# Patient Record
Sex: MALE | Race: WHITE | Employment: UNEMPLOYED | URBAN - METROPOLITAN AREA
[De-identification: names, ages, dates, MRNs, and addresses within clinical notes are randomized per-mention and may not be internally consistent; named-entity substitution may affect disease eponyms.]

---

## 2018-08-20 RX ORDER — SODIUM CHLORIDE 9 MG/ML
INJECTION, SOLUTION INTRAVENOUS CONTINUOUS
Status: CANCELLED | OUTPATIENT
Start: 2018-08-20

## 2018-08-20 RX ORDER — METOLAZONE 2.5 MG/1
2.5 TABLET ORAL DAILY
COMMUNITY

## 2018-08-20 RX ORDER — TAMSULOSIN HYDROCHLORIDE 0.4 MG/1
0.4 CAPSULE ORAL 2 TIMES DAILY
COMMUNITY

## 2018-08-20 RX ORDER — ESCITALOPRAM OXALATE 20 MG/1
20 TABLET ORAL DAILY
COMMUNITY

## 2018-08-20 RX ORDER — AMLODIPINE BESYLATE 10 MG/1
10 TABLET ORAL DAILY
COMMUNITY

## 2018-08-20 RX ORDER — VALSARTAN 320 MG/1
320 TABLET ORAL DAILY
COMMUNITY
End: 2018-11-26

## 2018-08-20 RX ORDER — LIDOCAINE HYDROCHLORIDE 10 MG/ML
0.5 INJECTION, SOLUTION EPIDURAL; INFILTRATION; INTRACAUDAL; PERINEURAL ONCE
Status: CANCELLED | OUTPATIENT
Start: 2018-08-20 | End: 2018-08-20

## 2018-08-20 RX ORDER — FUROSEMIDE 20 MG/1
20 TABLET ORAL 3 TIMES DAILY
COMMUNITY

## 2018-08-20 RX ORDER — ASPIRIN 325 MG
325 TABLET ORAL DAILY
COMMUNITY
End: 2018-11-26

## 2018-08-20 RX ORDER — ATENOLOL 50 MG/1
50 TABLET ORAL DAILY
COMMUNITY

## 2018-08-21 ENCOUNTER — HOSPITAL ENCOUNTER (OUTPATIENT)
Dept: PREADMISSION TESTING | Age: 58
Discharge: HOME | End: 2018-08-21
Attending: UROLOGY | Admitting: UROLOGY

## 2018-08-21 DIAGNOSIS — Z01.811 PRE-OP CHEST EXAM: ICD-10-CM

## 2018-08-21 LAB
ABO/RH: NORMAL
ANION GAP SERPL CALCULATED.3IONS-SCNC: 14 MMOL/L (ref 3–16)
ANTIBODY SCREEN: NORMAL
APTT: 33.3 SEC (ref 26–36)
BASOPHILS ABSOLUTE: 0.1 K/UL (ref 0–0.2)
BASOPHILS RELATIVE PERCENT: 0.8 %
BUN BLDV-MCNC: 30 MG/DL (ref 7–20)
CALCIUM SERPL-MCNC: 9.5 MG/DL (ref 8.3–10.6)
CHLORIDE BLD-SCNC: 94 MMOL/L (ref 99–110)
CO2: 29 MMOL/L (ref 21–32)
CREAT SERPL-MCNC: 0.8 MG/DL (ref 0.9–1.3)
EKG ATRIAL RATE: 64 BPM
EKG DIAGNOSIS: NORMAL
EKG Q-T INTERVAL: 452 MS
EKG QRS DURATION: 122 MS
EKG QTC CALCULATION (BAZETT): 466 MS
EKG R AXIS: -32 DEGREES
EKG T AXIS: -3 DEGREES
EKG VENTRICULAR RATE: 64 BPM
EOSINOPHILS ABSOLUTE: 0.2 K/UL (ref 0–0.6)
EOSINOPHILS RELATIVE PERCENT: 1.7 %
GFR AFRICAN AMERICAN: >60
GFR NON-AFRICAN AMERICAN: >60
GLUCOSE BLD-MCNC: 271 MG/DL (ref 70–99)
HCT VFR BLD CALC: 40.2 % (ref 40.5–52.5)
HEMOGLOBIN: 13.9 G/DL (ref 13.5–17.5)
INR BLD: 1.7 (ref 0.86–1.14)
LYMPHOCYTES ABSOLUTE: 1.9 K/UL (ref 1–5.1)
LYMPHOCYTES RELATIVE PERCENT: 20.2 %
MCH RBC QN AUTO: 33.8 PG (ref 26–34)
MCHC RBC AUTO-ENTMCNC: 34.5 G/DL (ref 31–36)
MCV RBC AUTO: 98 FL (ref 80–100)
MONOCYTES ABSOLUTE: 0.6 K/UL (ref 0–1.3)
MONOCYTES RELATIVE PERCENT: 6.6 %
NEUTROPHILS ABSOLUTE: 6.8 K/UL (ref 1.7–7.7)
NEUTROPHILS RELATIVE PERCENT: 70.7 %
PDW BLD-RTO: 13.6 % (ref 12.4–15.4)
PLATELET # BLD: 211 K/UL (ref 135–450)
PMV BLD AUTO: 9.9 FL (ref 5–10.5)
POTASSIUM SERPL-SCNC: 3.8 MMOL/L (ref 3.5–5.1)
PROTHROMBIN TIME: 19.4 SEC (ref 9.8–13)
RBC # BLD: 4.11 M/UL (ref 4.2–5.9)
SODIUM BLD-SCNC: 137 MMOL/L (ref 136–145)
WBC # BLD: 9.6 K/UL (ref 4–11)

## 2018-08-21 PROCEDURE — 93010 ELECTROCARDIOGRAM REPORT: CPT | Performed by: INTERNAL MEDICINE

## 2018-09-13 PROBLEM — E66.01 OBESITY, MORBID (HCC): Status: ACTIVE | Noted: 2018-08-07

## 2018-09-13 PROBLEM — I10 HYPERTENSION: Status: ACTIVE | Noted: 2018-08-07

## 2018-09-13 PROBLEM — C61 PROSTATE CANCER (HCC): Status: ACTIVE | Noted: 2018-09-13

## 2018-09-13 PROBLEM — G47.30 SLEEP APNEA: Status: ACTIVE | Noted: 2018-08-07

## 2018-09-13 PROBLEM — I48.91 ATRIAL FIBRILLATION (HCC): Status: ACTIVE | Noted: 2018-08-07

## 2018-09-13 PROBLEM — E11.8 CONTROLLED DIABETES MELLITUS TYPE 2 WITH COMPLICATIONS (HCC): Status: ACTIVE | Noted: 2018-08-07

## 2018-11-26 RX ORDER — IRBESARTAN 300 MG/1
300 TABLET ORAL EVERY MORNING
COMMUNITY

## 2018-12-05 ENCOUNTER — ANESTHESIA (OUTPATIENT)
Dept: OPERATING ROOM | Age: 58
End: 2018-12-05
Payer: MEDICARE

## 2018-12-05 ENCOUNTER — ANESTHESIA EVENT (OUTPATIENT)
Dept: OPERATING ROOM | Age: 58
End: 2018-12-05
Payer: MEDICARE

## 2018-12-05 ENCOUNTER — HOSPITAL ENCOUNTER (OUTPATIENT)
Age: 58
Setting detail: OUTPATIENT SURGERY
Discharge: HOME | End: 2018-12-05
Attending: UROLOGY | Admitting: UROLOGY
Payer: MEDICARE

## 2018-12-05 VITALS
SYSTOLIC BLOOD PRESSURE: 122 MMHG | BODY MASS INDEX: 42.66 KG/M2 | OXYGEN SATURATION: 93 % | WEIGHT: 315 LBS | RESPIRATION RATE: 14 BRPM | TEMPERATURE: 97.2 F | HEART RATE: 78 BPM | DIASTOLIC BLOOD PRESSURE: 74 MMHG | HEIGHT: 72 IN

## 2018-12-05 VITALS
SYSTOLIC BLOOD PRESSURE: 151 MMHG | OXYGEN SATURATION: 73 % | DIASTOLIC BLOOD PRESSURE: 71 MMHG | RESPIRATION RATE: 18 BRPM | TEMPERATURE: 99.3 F

## 2018-12-05 DIAGNOSIS — C61 PROSTATE CANCER (HCC): Primary | ICD-10-CM

## 2018-12-05 LAB
A/G RATIO: 1.1 (ref 1.1–2.2)
ALBUMIN SERPL-MCNC: 3.9 G/DL (ref 3.4–5)
ALP BLD-CCNC: 177 U/L (ref 40–129)
ALT SERPL-CCNC: 44 U/L (ref 10–40)
ANION GAP SERPL CALCULATED.3IONS-SCNC: 13 MMOL/L (ref 3–16)
AST SERPL-CCNC: 34 U/L (ref 15–37)
BILIRUB SERPL-MCNC: 0.6 MG/DL (ref 0–1)
BUN BLDV-MCNC: 21 MG/DL (ref 7–20)
CALCIUM SERPL-MCNC: 9.3 MG/DL (ref 8.3–10.6)
CHLORIDE BLD-SCNC: 97 MMOL/L (ref 99–110)
CO2: 30 MMOL/L (ref 21–32)
CREAT SERPL-MCNC: 0.8 MG/DL (ref 0.9–1.3)
GFR AFRICAN AMERICAN: >60
GFR NON-AFRICAN AMERICAN: >60
GLOBULIN: 3.7 G/DL
GLUCOSE BLD-MCNC: 161 MG/DL (ref 70–99)
GLUCOSE BLD-MCNC: 163 MG/DL (ref 70–99)
GLUCOSE BLD-MCNC: 184 MG/DL (ref 70–99)
HCT VFR BLD CALC: 37.1 % (ref 40.5–52.5)
HEMOGLOBIN: 12.5 G/DL (ref 13.5–17.5)
MCH RBC QN AUTO: 31.8 PG (ref 26–34)
MCHC RBC AUTO-ENTMCNC: 33.7 G/DL (ref 31–36)
MCV RBC AUTO: 94.5 FL (ref 80–100)
PDW BLD-RTO: 12.8 % (ref 12.4–15.4)
PERFORMED ON: ABNORMAL
PERFORMED ON: ABNORMAL
PLATELET # BLD: 203 K/UL (ref 135–450)
PMV BLD AUTO: 8.8 FL (ref 5–10.5)
POTASSIUM SERPL-SCNC: 3.2 MMOL/L (ref 3.5–5.1)
RBC # BLD: 3.93 M/UL (ref 4.2–5.9)
SODIUM BLD-SCNC: 140 MMOL/L (ref 136–145)
TOTAL PROTEIN: 7.6 G/DL (ref 6.4–8.2)
WBC # BLD: 8.1 K/UL (ref 4–11)

## 2018-12-05 PROCEDURE — 3700000000 HC ANESTHESIA ATTENDED CARE: Performed by: UROLOGY

## 2018-12-05 PROCEDURE — 85027 COMPLETE CBC AUTOMATED: CPT

## 2018-12-05 PROCEDURE — 3600000014 HC SURGERY LEVEL 4 ADDTL 15MIN: Performed by: UROLOGY

## 2018-12-05 PROCEDURE — 3600000004 HC SURGERY LEVEL 4 BASE: Performed by: UROLOGY

## 2018-12-05 PROCEDURE — 7100000011 HC PHASE II RECOVERY - ADDTL 15 MIN: Performed by: UROLOGY

## 2018-12-05 PROCEDURE — 6360000002 HC RX W HCPCS: Performed by: NURSE ANESTHETIST, CERTIFIED REGISTERED

## 2018-12-05 PROCEDURE — 7100000001 HC PACU RECOVERY - ADDTL 15 MIN: Performed by: UROLOGY

## 2018-12-05 PROCEDURE — 2580000003 HC RX 258: Performed by: NURSE ANESTHETIST, CERTIFIED REGISTERED

## 2018-12-05 PROCEDURE — 88305 TISSUE EXAM BY PATHOLOGIST: CPT

## 2018-12-05 PROCEDURE — 2709999900 HC NON-CHARGEABLE SUPPLY: Performed by: UROLOGY

## 2018-12-05 PROCEDURE — 6360000002 HC RX W HCPCS: Performed by: UROLOGY

## 2018-12-05 PROCEDURE — 3700000001 HC ADD 15 MINUTES (ANESTHESIA): Performed by: UROLOGY

## 2018-12-05 PROCEDURE — 80053 COMPREHEN METABOLIC PANEL: CPT

## 2018-12-05 PROCEDURE — 7100000010 HC PHASE II RECOVERY - FIRST 15 MIN: Performed by: UROLOGY

## 2018-12-05 PROCEDURE — 7100000000 HC PACU RECOVERY - FIRST 15 MIN: Performed by: UROLOGY

## 2018-12-05 PROCEDURE — 2500000003 HC RX 250 WO HCPCS: Performed by: NURSE ANESTHETIST, CERTIFIED REGISTERED

## 2018-12-05 PROCEDURE — 2580000003 HC RX 258: Performed by: UROLOGY

## 2018-12-05 RX ORDER — SODIUM CHLORIDE 0.9 % (FLUSH) 0.9 %
10 SYRINGE (ML) INJECTION EVERY 12 HOURS SCHEDULED
Status: CANCELLED | OUTPATIENT
Start: 2018-12-05

## 2018-12-05 RX ORDER — DEXAMETHASONE SODIUM PHOSPHATE 4 MG/ML
INJECTION, SOLUTION INTRA-ARTICULAR; INTRALESIONAL; INTRAMUSCULAR; INTRAVENOUS; SOFT TISSUE PRN
Status: DISCONTINUED | OUTPATIENT
Start: 2018-12-05 | End: 2018-12-05 | Stop reason: SDUPTHER

## 2018-12-05 RX ORDER — HYDROMORPHONE HCL 110MG/55ML
0.25 PATIENT CONTROLLED ANALGESIA SYRINGE INTRAVENOUS EVERY 5 MIN PRN
Status: DISCONTINUED | OUTPATIENT
Start: 2018-12-05 | End: 2018-12-05 | Stop reason: HOSPADM

## 2018-12-05 RX ORDER — SODIUM CHLORIDE 9 MG/ML
INJECTION, SOLUTION INTRAVENOUS CONTINUOUS PRN
Status: DISCONTINUED | OUTPATIENT
Start: 2018-12-05 | End: 2018-12-05 | Stop reason: SDUPTHER

## 2018-12-05 RX ORDER — FENTANYL CITRATE 50 UG/ML
INJECTION, SOLUTION INTRAMUSCULAR; INTRAVENOUS PRN
Status: DISCONTINUED | OUTPATIENT
Start: 2018-12-05 | End: 2018-12-05 | Stop reason: SDUPTHER

## 2018-12-05 RX ORDER — LIDOCAINE HYDROCHLORIDE 10 MG/ML
1 INJECTION, SOLUTION EPIDURAL; INFILTRATION; INTRACAUDAL; PERINEURAL
Status: CANCELLED | OUTPATIENT
Start: 2018-12-05 | End: 2018-12-05

## 2018-12-05 RX ORDER — ONDANSETRON 2 MG/ML
INJECTION INTRAMUSCULAR; INTRAVENOUS PRN
Status: DISCONTINUED | OUTPATIENT
Start: 2018-12-05 | End: 2018-12-05 | Stop reason: SDUPTHER

## 2018-12-05 RX ORDER — SODIUM CHLORIDE 0.9 % (FLUSH) 0.9 %
10 SYRINGE (ML) INJECTION PRN
Status: CANCELLED | OUTPATIENT
Start: 2018-12-05

## 2018-12-05 RX ORDER — GLYCINE 1.5 G/100ML
IRRIGANT IRRIGATION
Status: COMPLETED | OUTPATIENT
Start: 2018-12-05 | End: 2018-12-05

## 2018-12-05 RX ORDER — SUCCINYLCHOLINE CHLORIDE 20 MG/ML
INJECTION INTRAMUSCULAR; INTRAVENOUS PRN
Status: DISCONTINUED | OUTPATIENT
Start: 2018-12-05 | End: 2018-12-05 | Stop reason: SDUPTHER

## 2018-12-05 RX ORDER — LIDOCAINE HYDROCHLORIDE 20 MG/ML
INJECTION, SOLUTION EPIDURAL; INFILTRATION; INTRACAUDAL; PERINEURAL PRN
Status: DISCONTINUED | OUTPATIENT
Start: 2018-12-05 | End: 2018-12-05 | Stop reason: SDUPTHER

## 2018-12-05 RX ORDER — PROMETHAZINE HYDROCHLORIDE 25 MG/ML
6.25 INJECTION, SOLUTION INTRAMUSCULAR; INTRAVENOUS EVERY 30 MIN PRN
Status: DISCONTINUED | OUTPATIENT
Start: 2018-12-05 | End: 2018-12-05 | Stop reason: HOSPADM

## 2018-12-05 RX ORDER — SODIUM CHLORIDE 9 MG/ML
INJECTION, SOLUTION INTRAVENOUS CONTINUOUS
Status: CANCELLED | OUTPATIENT
Start: 2018-12-05

## 2018-12-05 RX ORDER — LIDOCAINE HYDROCHLORIDE 10 MG/ML
0.5 INJECTION, SOLUTION EPIDURAL; INFILTRATION; INTRACAUDAL; PERINEURAL ONCE
Status: DISCONTINUED | OUTPATIENT
Start: 2018-12-05 | End: 2018-12-05 | Stop reason: HOSPADM

## 2018-12-05 RX ORDER — HYDROMORPHONE HCL 110MG/55ML
0.5 PATIENT CONTROLLED ANALGESIA SYRINGE INTRAVENOUS EVERY 5 MIN PRN
Status: DISCONTINUED | OUTPATIENT
Start: 2018-12-05 | End: 2018-12-05 | Stop reason: HOSPADM

## 2018-12-05 RX ORDER — MAGNESIUM HYDROXIDE 1200 MG/15ML
LIQUID ORAL
Status: COMPLETED | OUTPATIENT
Start: 2018-12-05 | End: 2018-12-05

## 2018-12-05 RX ORDER — HYDROCODONE BITARTRATE AND ACETAMINOPHEN 5; 325 MG/1; MG/1
1 TABLET ORAL EVERY 6 HOURS PRN
Qty: 20 TABLET | Refills: 0 | Status: SHIPPED | OUTPATIENT
Start: 2018-12-05 | End: 2018-12-10

## 2018-12-05 RX ORDER — DIPHENHYDRAMINE HYDROCHLORIDE 50 MG/ML
12.5 INJECTION INTRAMUSCULAR; INTRAVENOUS
Status: DISCONTINUED | OUTPATIENT
Start: 2018-12-05 | End: 2018-12-05 | Stop reason: HOSPADM

## 2018-12-05 RX ORDER — SODIUM CHLORIDE 9 MG/ML
INJECTION, SOLUTION INTRAVENOUS CONTINUOUS PRN
Status: DISCONTINUED | OUTPATIENT
Start: 2018-12-05 | End: 2018-12-05

## 2018-12-05 RX ORDER — AMOXICILLIN 250 MG
1 CAPSULE ORAL 2 TIMES DAILY
Qty: 30 TABLET | Refills: 0 | Status: SHIPPED | OUTPATIENT
Start: 2018-12-05 | End: 2018-12-20

## 2018-12-05 RX ORDER — MEPERIDINE HYDROCHLORIDE 25 MG/ML
12.5 INJECTION INTRAMUSCULAR; INTRAVENOUS; SUBCUTANEOUS EVERY 5 MIN PRN
Status: DISCONTINUED | OUTPATIENT
Start: 2018-12-05 | End: 2018-12-05 | Stop reason: HOSPADM

## 2018-12-05 RX ORDER — HYDROCODONE BITARTRATE AND ACETAMINOPHEN 5; 325 MG/1; MG/1
1 TABLET ORAL PRN
Status: DISCONTINUED | OUTPATIENT
Start: 2018-12-05 | End: 2018-12-05 | Stop reason: HOSPADM

## 2018-12-05 RX ORDER — ROCURONIUM BROMIDE 10 MG/ML
INJECTION, SOLUTION INTRAVENOUS PRN
Status: DISCONTINUED | OUTPATIENT
Start: 2018-12-05 | End: 2018-12-05 | Stop reason: SDUPTHER

## 2018-12-05 RX ORDER — LIDOCAINE HYDROCHLORIDE 20 MG/ML
INJECTION, SOLUTION INFILTRATION; PERINEURAL PRN
Status: DISCONTINUED | OUTPATIENT
Start: 2018-12-05 | End: 2018-12-05 | Stop reason: SDUPTHER

## 2018-12-05 RX ORDER — HYDROCODONE BITARTRATE AND ACETAMINOPHEN 5; 325 MG/1; MG/1
2 TABLET ORAL PRN
Status: DISCONTINUED | OUTPATIENT
Start: 2018-12-05 | End: 2018-12-05 | Stop reason: HOSPADM

## 2018-12-05 RX ORDER — FENTANYL CITRATE 50 UG/ML
25 INJECTION, SOLUTION INTRAMUSCULAR; INTRAVENOUS EVERY 5 MIN PRN
Status: DISCONTINUED | OUTPATIENT
Start: 2018-12-05 | End: 2018-12-05 | Stop reason: HOSPADM

## 2018-12-05 RX ORDER — FENTANYL CITRATE 50 UG/ML
50 INJECTION, SOLUTION INTRAMUSCULAR; INTRAVENOUS EVERY 5 MIN PRN
Status: DISCONTINUED | OUTPATIENT
Start: 2018-12-05 | End: 2018-12-05 | Stop reason: HOSPADM

## 2018-12-05 RX ORDER — CIPROFLOXACIN 2 MG/ML
400 INJECTION, SOLUTION INTRAVENOUS
Status: COMPLETED | OUTPATIENT
Start: 2018-12-05 | End: 2018-12-05

## 2018-12-05 RX ORDER — SODIUM CHLORIDE 9 MG/ML
INJECTION, SOLUTION INTRAVENOUS CONTINUOUS
Status: DISCONTINUED | OUTPATIENT
Start: 2018-12-05 | End: 2018-12-05 | Stop reason: HOSPADM

## 2018-12-05 RX ADMIN — LIDOCAINE HYDROCHLORIDE 200 MG: 20 INJECTION, SOLUTION EPIDURAL; INFILTRATION; INTRACAUDAL; PERINEURAL at 08:25

## 2018-12-05 RX ADMIN — FENTANYL CITRATE 50 MCG: 50 INJECTION, SOLUTION INTRAMUSCULAR; INTRAVENOUS at 08:18

## 2018-12-05 RX ADMIN — FENTANYL CITRATE 50 MCG: 50 INJECTION, SOLUTION INTRAMUSCULAR; INTRAVENOUS at 09:55

## 2018-12-05 RX ADMIN — ONDANSETRON 4 MG: 2 INJECTION INTRAMUSCULAR; INTRAVENOUS at 08:41

## 2018-12-05 RX ADMIN — CIPROFLOXACIN 400 MG: 2 INJECTION, SOLUTION INTRAVENOUS at 08:13

## 2018-12-05 RX ADMIN — SUGAMMADEX 200 MG: 100 INJECTION, SOLUTION INTRAVENOUS at 09:58

## 2018-12-05 RX ADMIN — LIDOCAINE HYDROCHLORIDE 100 MG: 20 INJECTION, SOLUTION INFILTRATION; PERINEURAL at 08:25

## 2018-12-05 RX ADMIN — ROCURONIUM BROMIDE 10 MG: 10 INJECTION, SOLUTION INTRAVENOUS at 08:40

## 2018-12-05 RX ADMIN — FENTANYL CITRATE 50 MCG: 50 INJECTION, SOLUTION INTRAMUSCULAR; INTRAVENOUS at 10:20

## 2018-12-05 RX ADMIN — FENTANYL CITRATE 50 MCG: 50 INJECTION, SOLUTION INTRAMUSCULAR; INTRAVENOUS at 09:05

## 2018-12-05 RX ADMIN — DEXAMETHASONE SODIUM PHOSPHATE 4 MG: 4 INJECTION, SOLUTION INTRAMUSCULAR; INTRAVENOUS at 08:41

## 2018-12-05 RX ADMIN — SODIUM CHLORIDE: 9 INJECTION, SOLUTION INTRAVENOUS at 08:18

## 2018-12-05 RX ADMIN — FENTANYL CITRATE 50 MCG: 50 INJECTION, SOLUTION INTRAMUSCULAR; INTRAVENOUS at 08:25

## 2018-12-05 RX ADMIN — ROCURONIUM BROMIDE 30 MG: 10 INJECTION, SOLUTION INTRAVENOUS at 09:05

## 2018-12-05 RX ADMIN — SUCCINYLCHOLINE CHLORIDE 200 MG: 20 INJECTION, SOLUTION INTRAMUSCULAR; INTRAVENOUS at 08:25

## 2018-12-05 RX ADMIN — SODIUM CHLORIDE: 9 INJECTION, SOLUTION INTRAVENOUS at 07:24

## 2018-12-05 RX ADMIN — ROCURONIUM BROMIDE 20 MG: 10 INJECTION, SOLUTION INTRAVENOUS at 08:29

## 2018-12-05 ASSESSMENT — PULMONARY FUNCTION TESTS
PIF_VALUE: 26
PIF_VALUE: 31
PIF_VALUE: 31
PIF_VALUE: 27
PIF_VALUE: 2
PIF_VALUE: 8
PIF_VALUE: 30
PIF_VALUE: 31
PIF_VALUE: 31
PIF_VALUE: 24
PIF_VALUE: 27
PIF_VALUE: 31
PIF_VALUE: 29
PIF_VALUE: 32
PIF_VALUE: 2
PIF_VALUE: 31
PIF_VALUE: 32
PIF_VALUE: 33
PIF_VALUE: 29
PIF_VALUE: 35
PIF_VALUE: 30
PIF_VALUE: 31
PIF_VALUE: 26
PIF_VALUE: 28
PIF_VALUE: 29
PIF_VALUE: 30
PIF_VALUE: 31
PIF_VALUE: 3
PIF_VALUE: 31
PIF_VALUE: 30
PIF_VALUE: 31
PIF_VALUE: 31
PIF_VALUE: 30
PIF_VALUE: 30
PIF_VALUE: 26
PIF_VALUE: 31
PIF_VALUE: 32
PIF_VALUE: 30
PIF_VALUE: 31
PIF_VALUE: 35
PIF_VALUE: 33
PIF_VALUE: 27
PIF_VALUE: 25
PIF_VALUE: 26
PIF_VALUE: 2
PIF_VALUE: 34
PIF_VALUE: 31
PIF_VALUE: 29
PIF_VALUE: 32
PIF_VALUE: 32
PIF_VALUE: 27
PIF_VALUE: 30
PIF_VALUE: 27
PIF_VALUE: 1
PIF_VALUE: 27
PIF_VALUE: 32
PIF_VALUE: 2
PIF_VALUE: 4
PIF_VALUE: 31
PIF_VALUE: 31
PIF_VALUE: 29
PIF_VALUE: 36
PIF_VALUE: 32
PIF_VALUE: 26
PIF_VALUE: 31
PIF_VALUE: 29
PIF_VALUE: 26
PIF_VALUE: 25
PIF_VALUE: 32
PIF_VALUE: 30
PIF_VALUE: 33
PIF_VALUE: 31
PIF_VALUE: 28
PIF_VALUE: 31
PIF_VALUE: 31
PIF_VALUE: 2
PIF_VALUE: 27
PIF_VALUE: 27
PIF_VALUE: 28
PIF_VALUE: 29
PIF_VALUE: 30
PIF_VALUE: 27
PIF_VALUE: 32
PIF_VALUE: 31
PIF_VALUE: 5
PIF_VALUE: 31
PIF_VALUE: 31
PIF_VALUE: 30
PIF_VALUE: 29
PIF_VALUE: 26
PIF_VALUE: 32
PIF_VALUE: 30
PIF_VALUE: 31
PIF_VALUE: 30
PIF_VALUE: 29
PIF_VALUE: 32
PIF_VALUE: 2
PIF_VALUE: 29
PIF_VALUE: 32
PIF_VALUE: 1
PIF_VALUE: 30
PIF_VALUE: 30

## 2018-12-05 ASSESSMENT — PAIN - FUNCTIONAL ASSESSMENT: PAIN_FUNCTIONAL_ASSESSMENT: 0-10

## 2018-12-05 NOTE — ANESTHESIA PRE PROCEDURE
Department of Anesthesiology  Preprocedure Note       Name:  July Medel   Age:  60 y.o.  :  1960                                          MRN:  [id number]         Date:  2018      Surgeon: Michelle Raymundo):  Susie Hernandez MD    Procedure: CYSTOSCOPY TRANSURETHRAL RESECTION OF PROSTATE (N/A )    Medications prior to admission:   Prior to Admission medications    Medication Sig Start Date End Date Taking? Authorizing Provider   irbesartan (AVAPRO) 300 MG tablet Take 300 mg by mouth every morning   Yes Historical Provider, MD   amLODIPine (NORVASC) 10 MG tablet Take 10 mg by mouth daily   Yes Historical Provider, MD   atenolol (TENORMIN) 50 MG tablet Take 50 mg by mouth daily    Yes Historical Provider, MD   escitalopram (LEXAPRO) 20 MG tablet Take 20 mg by mouth daily   Yes Historical Provider, MD   metolazone (ZAROXOLYN) 2.5 MG tablet Take 2.5 mg by mouth daily   Yes Historical Provider, MD   rivaroxaban (XARELTO) 20 MG TABS tablet Take 20 mg by mouth daily 18  Yes Historical Provider, MD   tamsulosin (FLOMAX) 0.4 MG capsule Take 0.4 mg by mouth 2 times daily    Yes Historical Provider, MD   linagliptin (TRADJENTA) 5 MG tablet Take 1 tablet by mouth daily 18  Yes Historical Provider, MD   furosemide (LASIX) 20 MG tablet Take 20 mg by mouth 3 times daily     Historical Provider, MD       Current medications:    Current Facility-Administered Medications   Medication Dose Route Frequency Provider Last Rate Last Dose    0.9 % sodium chloride infusion   Intravenous Continuous Susie Hernandez MD        lidocaine PF 1 % injection 0.5 mL  0.5 mL Intradermal Once Susie Hernandez MD        ciprofloxacin (CIPRO) IVPB 400 mg  400 mg Intravenous On Call to [address] [facility] [address]           Allergies:     Allergies   Allergen Reactions    Nsaids      Swelling of throat       Problem List:    Patient Active Problem List   Diagnosis Code    Prostate cancer ([facility] C61    Atrial fibrillation ([facility] I48.91  Controlled diabetes mellitus type 2 with complications (HCC) Z02.2    Hypertension I10    Obesity, morbid ([facility] E66.01    Sleep apnea G47.30       Past Medical History:        Diagnosis Date    [facility]fib ([facility]     Diabetes mellitus ([facility]     Hypertension     OCTAVIO (obstructive sleep apnea)     cpap       Past Surgical History:        Procedure Laterality Date    ANKLE SURGERY      KNEE CLOSED REDUCTION Right     PROSTATECTOMY  09/13/2018    ATTEMPTED robotic, DID NOT FINISH        Social History:    Social History   Substance Use Topics    Smoking status: Former Smoker     Types: Pipe     Quit date: 8/20/2017    Smokeless tobacco: Never Used      Comment: 3-4 bowls a day    Alcohol use Yes      Comment: 2-3 beers a week                                Counseling given: Not Answered      Vital Signs (Current):   Vitals:    11/26/18 1443   Weight: (!) 380 lb (172.4 kg)   Height: 6' 1\" (1.854 m)                                              BP Readings from Last 3 Encounters:   09/15/18 134/80       NPO Status:                                                                                 BMI:   Wt Readings from Last 3 Encounters:   11/26/18 (!) 380 lb (172.4 kg)   09/13/18 (!) 383 lb 4.8 oz (173.9 kg)   08/20/18 (!) 360 lb (163.3 kg)     Body mass index is 50.13 kg/m². CBC:   Lab Results   Component Value Date    WBC 9.9 09/14/2018    RBC 3.85 09/14/2018    HGB 13.0 09/14/2018    HCT 37.5 09/14/2018    MCV 97.6 09/14/2018    RDW 13.3 09/14/2018     09/14/2018       CMP:   Lab Results   Component Value Date     09/14/2018    K 3.5 09/14/2018    CL 97 09/14/2018    CO2 30 09/14/2018    BUN 29 09/14/2018    CREATININE 0.8 09/14/2018    GFRAA >60 09/14/2018    LABGLOM >60 09/14/2018    GLUCOSE 306 09/14/2018    CALCIUM 8.8 09/14/2018       POC Tests: No results for input(s): POCGLU, POCNA, POCK, POCCL, POCBUN, POCHEMO, POCHCT in the last 72 hours.     Coags:   Lab Results   Component Value Date

## 2018-12-05 NOTE — PROGRESS NOTES
Pt resting quietly in bed, awake, denies pain. VSS, O2 sats 92% on room air. Khanna in place draining clear yellow urine. Pt seen by Dr. Som Vasquez and anesthesia, phase 1 criteria met. Will transfer pt to same day for discharge.
All body piercing jewelry must be removed. 11. If you have ___dentures, they will be removed before going to the OR; we will provide you a container. If you wear ___contact lenses or ___glasses, they will be removed; please bring a case for them. 12. Please see your family doctor/pediatrician for a history & physical and/or concerning medications. Bring any test results/reports from your physician's office. PCP__________________Phone___________H&P Appt. Date________             13 If you  have a Living Will and Durable Power of  for Healthcare, please bring in a copy. 15. Notify your Surgeon if you develop any illness between now and surgery  time, cough, cold, fever, sore throat, nausea, vomiting, etc.  Please notify your surgeon if you experience dizziness, shortness of breath or blurred vision between now & the time of your surgery             15. DO NOT shave your operative site 96 hours prior to surgery. For face & neck surgery, men may use an electric razor 48 hours prior to surgery. 16. Shower the night before surgery with _x__Antibacterial soap ___Hibiclens             17. To provide excellent care visitors will be limited to one in the room at any given time. 18.  Please bring picture ID and insurance card. 19.  Visit our web site for additional information:  [email or web address]              20.During flu season no children under the age of 15 are permitted in the hospital for the safety of all patients. 21. If you take a long acting insulin in the evening only  take half of your usual  dose the night  before your procedure              22. If you use a c-pap please bring DOS if staying overnight,             23.For your convenience [facility] has a pharmacy on site to fill your prescriptions.              24. If you use oxygen and have a portable tank please bring it  with you the DOS

## 2021-03-15 ENCOUNTER — OFFICE VISIT (OUTPATIENT)
Dept: GASTROENTEROLOGY | Facility: CLINIC | Age: 61
End: 2021-03-15

## 2021-03-15 ENCOUNTER — PREP FOR SURGERY (OUTPATIENT)
Dept: OTHER | Facility: HOSPITAL | Age: 61
End: 2021-03-15

## 2021-03-15 VITALS
DIASTOLIC BLOOD PRESSURE: 58 MMHG | WEIGHT: 315 LBS | SYSTOLIC BLOOD PRESSURE: 105 MMHG | TEMPERATURE: 96.9 F | HEART RATE: 74 BPM

## 2021-03-15 DIAGNOSIS — R93.89 ABNORMAL CAT SCAN: Primary | ICD-10-CM

## 2021-03-15 DIAGNOSIS — K59.00 CONSTIPATION, UNSPECIFIED CONSTIPATION TYPE: ICD-10-CM

## 2021-03-15 DIAGNOSIS — G47.33 OBSTRUCTIVE SLEEP APNEA SYNDROME: ICD-10-CM

## 2021-03-15 DIAGNOSIS — R10.30 LOWER ABDOMINAL PAIN: ICD-10-CM

## 2021-03-15 DIAGNOSIS — Z12.11 SCREEN FOR COLON CANCER: Primary | ICD-10-CM

## 2021-03-15 DIAGNOSIS — E66.9 OBESITY WITHOUT SERIOUS COMORBIDITY, UNSPECIFIED CLASSIFICATION, UNSPECIFIED OBESITY TYPE: ICD-10-CM

## 2021-03-15 PROCEDURE — 99204 OFFICE O/P NEW MOD 45 MIN: CPT | Performed by: INTERNAL MEDICINE

## 2021-03-15 RX ORDER — NYSTATIN 100000 [USP'U]/G
POWDER TOPICAL DAILY PRN
COMMUNITY

## 2021-03-15 RX ORDER — GLIMEPIRIDE 2 MG/1
2 TABLET ORAL DAILY
COMMUNITY
Start: 2021-01-06

## 2021-03-15 RX ORDER — TERBINAFINE HYDROCHLORIDE 250 MG/1
1 TABLET ORAL DAILY
COMMUNITY

## 2021-03-15 RX ORDER — LOSARTAN POTASSIUM 100 MG/1
1 TABLET ORAL DAILY
COMMUNITY
End: 2021-04-26

## 2021-03-15 RX ORDER — SPIRONOLACTONE 100 MG/1
1 TABLET, FILM COATED ORAL DAILY
COMMUNITY

## 2021-03-15 RX ORDER — MAGNESIUM OXIDE 400 MG/1
1 TABLET ORAL DAILY
COMMUNITY

## 2021-03-15 RX ORDER — BUSPIRONE HYDROCHLORIDE 15 MG/1
1 TABLET ORAL DAILY
COMMUNITY

## 2021-03-15 RX ORDER — FUROSEMIDE 40 MG/1
1 TABLET ORAL 2 TIMES DAILY
COMMUNITY

## 2021-03-15 RX ORDER — TAMSULOSIN HYDROCHLORIDE 0.4 MG/1
2 CAPSULE ORAL DAILY
COMMUNITY

## 2021-03-15 NOTE — PROGRESS NOTES
PCP:  Lorena Chamberlain, GARRICK Chamberlain, Lorena Ferrer, GARRICK  [address] [place] [address]    Chief Complaint   Patient presents with   • Abdominal Pain        HPI   The patient is a 60-year-old gentleman with several complaints.  He gets a firmness in his lower abdomen and what sounds like pitting edema.  He gets constipated recently has been started on Linzess.  He has no family history of colon polyps or cancers.  He has never had a colonoscopy.  He does not have diarrhea and again is typically constipated.  He is not the best historian but it looks like he had a CAT scan of the abdomen and pelvis on 11/19/2020.  This showed mild findings of chronic inflammatory changes in the distal descending colon and rectum.  Of note is that he has a son with Crohn's disease and a mother with Crohn's disease.  He does not complain of significant rectal bleeding.  He does at times get a draining area behind his scrotum but it is unclear whether that is just a superficial skin lesion or something more involved such as a fistula.  He has been a smoker.  He quit about 6 years ago.  He was primarily a pipe and cigar smoker.  He is on home oxygen.  He does weigh approximately 385 pounds.  He has a history of hypertension, diabetes, pacemaker, ablation in the past and chronic Xarelto use.  He does have significant sleep apnea as well.  He does use a CPAP.    Allergies   Allergen Reactions   • Nsaids Swelling     Swelling of throat     • Contrast Dye Rash     Hives, swelling , itching             Current Outpatient Medications:   •  busPIRone (BUSPAR) 15 MG tablet, Take 1 tablet by mouth Daily., Disp: , Rfl:   •  furosemide (LASIX) 40 MG tablet, Take 1 tablet by mouth 2 (two) times a day., Disp: , Rfl:   •  glimepiride (AMARYL) 2 MG tablet, Take 2 mg by mouth Daily. as directed, Disp: , Rfl:   •  linaclotide (Linzess) 72 MCG capsule capsule, Take 1 capsule by mouth Daily., Disp: , Rfl:   •  linagliptin  (Tradjenta) 5 MG tablet tablet, Take 1 tablet by mouth Daily., Disp: , Rfl:   •  losartan (COZAAR) 100 MG tablet, Take 1 tablet by mouth Daily., Disp: , Rfl:   •  magnesium oxide (MAG-OX) 400 MG tablet, Take 1 tablet by mouth Daily., Disp: , Rfl:   •  metoprolol tartrate (LOPRESSOR) 25 MG tablet, Take 1 tablet by mouth Daily., Disp: , Rfl:   •  nystatin (nystatin) 440773 UNIT/GM powder, Nystop 100,000 unit/gram topical powder  APPLY POWDER TOPICALLY TO AFFECTED AREA(S) TWICE DAILY, Disp: , Rfl:   •  rivaroxaban (Xarelto) 20 MG tablet, Take 1 tablet by mouth Daily., Disp: , Rfl:   •  spironolactone (ALDACTONE) 100 MG tablet, Take 1 tablet by mouth Daily., Disp: , Rfl:   •  tamsulosin (FLOMAX) 0.4 MG capsule 24 hr capsule, Take 2 capsules by mouth Daily., Disp: , Rfl:   •  terbinafine (lamiSIL) 250 MG tablet, 1 tablet Daily., Disp: , Rfl:   •  vitamin D3 (vitamin d) 125 MCG (5000 UT) capsule capsule, Take 1 capsule by mouth Daily., Disp: , Rfl:      Past Medical History:   Diagnosis Date   • Diabetes (CMS/HCC)    • History of prostate cancer, status post transurethral resection of the prostate and radiation therapy.  He had difficulty with his breathing at that time.    • Hypertension    • Sleep apnea        Past Surgical History:   Procedure Laterality Date   • CARDIAC ABLATION     • PACEMAKER IMPLANTATION     • PROSTATE SURGERY     • REPLACEMENT TOTAL KNEE, knee and ankle surgery in the past on the right. Right         Social History     Socioeconomic History   • Marital status:      Spouse name: Not on file   • Number of children: Not on file   • Years of education: Not on file   • Highest education level: Not on file   Tobacco Use   • Smoking status: Never Smoker   • Smokeless tobacco: Never Used   Vaping Use   • Vaping Use: Former   • Quit date: 9/1/2019   • Substances: CBD   • Devices: Refillable tank   Substance and Sexual Activity   • Alcohol use: Yes     Comment: Rarely    • Drug use: Never   • Sexual  activity: Defer        Family History   Problem Relation Age of Onset   • Breast cancer Mother    • Ovarian cancer Mother    • Heart failure Mother    • Leukemia Father    • Colon cancer Neg Hx    • Colon polyps Neg Hx         Review of Systems   Constitutional: Positive for chills and fatigue. Negative for activity change, appetite change, diaphoresis, fever, unexpected weight gain and unexpected weight loss.   HENT: Positive for dental problem and hearing loss. Negative for congestion, drooling, ear discharge, ear pain, facial swelling, mouth sores, nosebleeds, postnasal drip, rhinorrhea, sinus pressure, sneezing, sore throat, swollen glands, tinnitus, trouble swallowing and voice change.    Respiratory: Negative for apnea, cough, choking, chest tightness, shortness of breath, wheezing and stridor.    Cardiovascular: Positive for leg swelling. Negative for chest pain and palpitations.   Gastrointestinal: Positive for abdominal distention, anal bleeding, constipation and nausea. Negative for abdominal pain, blood in stool, diarrhea, rectal pain, vomiting, GERD and indigestion.   Endocrine: Positive for cold intolerance and polydipsia. Negative for heat intolerance, polyphagia and polyuria.   Musculoskeletal: Positive for arthralgias, back pain, gait problem, joint swelling, neck pain and neck stiffness. Negative for myalgias and bursitis.   Allergic/Immunologic: Negative for environmental allergies, food allergies and immunocompromised state.   Neurological: Positive for dizziness, light-headedness and numbness. Negative for tremors, seizures, facial asymmetry, speech difficulty, weakness and confusion.   Hematological: Negative for adenopathy. Bruises/bleeds easily.   Psychiatric/Behavioral: Negative for agitation, behavioral problems, decreased concentration, dysphoric mood, hallucinations, self-injury, sleep disturbance, suicidal ideas, negative for hyperactivity, depressed mood and stress. The patient is not  nervous/anxious.         Vitals:    03/15/21 1527   BP: 105/58   Pulse: 74   Temp: 96.9 °F (36.1 °C)        Physical Exam   General Appearance: Alert, in no acute distress   Head: Normocephalic, without obvious abnormality, atraumatic   Eyes: Lids and lashes normal, conjunctivae and sclerae normal, no icterus, no pallor, corneas clear, PERRLA   Ears: Ears appear intact with no abnormalities noted   Lungs: respirations regular, even and unlabored Heart: normal rate   Chest Wall: Symmetrical respiratory expansion   Abdomen: No masses, no organomegaly, soft non-tender, non-distended   Extremities: Moves all extremities well, he does have 1-2+ pitting edema pretibially and some chronic venous stasis changes., no cyanosis, no redness   Skin: No bleeding, bruising or rash but he does have chronic venous stasis changes in the lower extremities.   Neurologic: Cranial nerves 2 - 12 grossly intact, no focal deficits     Review of systems was reviewed and positives are noted. All of the remaining review of systems in that system are negative.    Diagnoses and all orders for this visit:    1. Abnormal CAT scan (Primary)    2. Constipation, unspecified constipation type    3. Lower abdominal pain    4. Obstructive sleep apnea syndrome    5. Obesity without serious comorbidity, unspecified classification, unspecified obesity type    Impressions and plan #1 constipation with lower abdominal pain: He had a CAT scan.  This showed some possible inflammatory changes in the sigmoid colon and rectum.  He needs a colonoscopy based on age alone.  I would suggest evaluation.  He will be at higher risk given the fact that he has home oxygen use, sleep apnea, significant obesity, and other comorbid illness.  He will need to stop his anticoagulants 2 days before and he will check with his physician from that standpoint.  He has stopped it for previous procedures in the past and does not feel that this will be a problem.    #2 obstructive  sleep apnea on oxygen in the evening: I am going to do them at the hospital as I think would be safer to do in that setting.  He has significant obesity as well and diabetes.    #3 abnormal CAT scan: He has an abnormal CAT scan with some inflammatory changes in the rectum and sigmoid.  This may be artifactual but certainly with his family history of Crohn's disease and a son as well as a mother I would suggest evaluation.  He is in an age category where that would be important regardless.    #4 peripheral edema: It sounds like he has peripheral edema but sometimes the edema is even up in his lower abdomen.  He has pitting edema there at times.  He is on furosemide.    #5 chronic anticoagulation: He has had an ablation which I assume is heart ablation.  I cannot get much more history but he is on Xarelto and also has a pacemaker.  He will check with his physician to be sure that he can stop the Xarelto for the colonoscopy.    Maurice Proctor MD

## 2021-03-16 PROBLEM — Z12.11 SCREEN FOR COLON CANCER: Status: ACTIVE | Noted: 2021-03-16

## 2021-04-04 RX ORDER — SODIUM, POTASSIUM,MAG SULFATES 17.5-3.13G
2 SOLUTION, RECONSTITUTED, ORAL ORAL TAKE AS DIRECTED
Qty: 354 ML | Refills: 0 | Status: SHIPPED | OUTPATIENT
Start: 2021-04-04

## 2021-04-12 ENCOUNTER — APPOINTMENT (OUTPATIENT)
Dept: PREADMISSION TESTING | Facility: HOSPITAL | Age: 61
End: 2021-04-12

## 2021-04-12 PROCEDURE — U0004 COV-19 TEST NON-CDC HGH THRU: HCPCS

## 2021-04-12 PROCEDURE — C9803 HOPD COVID-19 SPEC COLLECT: HCPCS

## 2021-04-13 LAB — SARS-COV-2 RNA PNL SPEC NAA+PROBE: NOT DETECTED

## 2021-04-26 ENCOUNTER — APPOINTMENT (OUTPATIENT)
Dept: PREADMISSION TESTING | Facility: HOSPITAL | Age: 61
End: 2021-04-26

## 2021-04-26 ENCOUNTER — PRE-ADMISSION TESTING (OUTPATIENT)
Dept: PREADMISSION TESTING | Facility: HOSPITAL | Age: 61
End: 2021-04-26

## 2021-04-26 VITALS — WEIGHT: 315 LBS | BODY MASS INDEX: 41.75 KG/M2 | HEIGHT: 73 IN

## 2021-04-26 LAB
DEPRECATED RDW RBC AUTO: 49.5 FL (ref 37–54)
ERYTHROCYTE [DISTWIDTH] IN BLOOD BY AUTOMATED COUNT: 14.1 % (ref 12.3–15.4)
HCT VFR BLD AUTO: 38.6 % (ref 37.5–51)
HGB BLD-MCNC: 12.3 G/DL (ref 13–17.7)
MCH RBC QN AUTO: 30.7 PG (ref 26.6–33)
MCHC RBC AUTO-ENTMCNC: 31.9 G/DL (ref 31.5–35.7)
MCV RBC AUTO: 96.3 FL (ref 79–97)
PLATELET # BLD AUTO: 169 10*3/MM3 (ref 140–450)
PMV BLD AUTO: 11.2 FL (ref 6–12)
POTASSIUM SERPL-SCNC: 4.7 MMOL/L (ref 3.5–5.2)
QT INTERVAL: 392 MS
QTC INTERVAL: 414 MS
RBC # BLD AUTO: 4.01 10*6/MM3 (ref 4.14–5.8)
SARS-COV-2 RNA PNL SPEC NAA+PROBE: NOT DETECTED
WBC # BLD AUTO: 7.56 10*3/MM3 (ref 3.4–10.8)

## 2021-04-26 PROCEDURE — 93010 ELECTROCARDIOGRAM REPORT: CPT | Performed by: INTERNAL MEDICINE

## 2021-04-26 PROCEDURE — 36415 COLL VENOUS BLD VENIPUNCTURE: CPT

## 2021-04-26 PROCEDURE — 93005 ELECTROCARDIOGRAM TRACING: CPT

## 2021-04-26 PROCEDURE — 84132 ASSAY OF SERUM POTASSIUM: CPT

## 2021-04-26 PROCEDURE — C9803 HOPD COVID-19 SPEC COLLECT: HCPCS

## 2021-04-26 PROCEDURE — 85027 COMPLETE CBC AUTOMATED: CPT

## 2021-04-26 PROCEDURE — U0004 COV-19 TEST NON-CDC HGH THRU: HCPCS

## 2021-04-27 ENCOUNTER — ANESTHESIA EVENT (OUTPATIENT)
Dept: GASTROENTEROLOGY | Facility: HOSPITAL | Age: 61
End: 2021-04-27

## 2021-04-28 ENCOUNTER — ANESTHESIA (OUTPATIENT)
Dept: GASTROENTEROLOGY | Facility: HOSPITAL | Age: 61
End: 2021-04-28

## 2021-04-28 ENCOUNTER — HOSPITAL ENCOUNTER (OUTPATIENT)
Facility: HOSPITAL | Age: 61
Setting detail: HOSPITAL OUTPATIENT SURGERY
Discharge: HOME | End: 2021-04-28
Attending: INTERNAL MEDICINE | Admitting: INTERNAL MEDICINE

## 2021-04-28 VITALS
SYSTOLIC BLOOD PRESSURE: 113 MMHG | WEIGHT: 315 LBS | HEART RATE: 60 BPM | OXYGEN SATURATION: 96 % | DIASTOLIC BLOOD PRESSURE: 65 MMHG | TEMPERATURE: 96.7 F | RESPIRATION RATE: 28 BRPM | BODY MASS INDEX: 41.75 KG/M2 | HEIGHT: 73 IN

## 2021-04-28 DIAGNOSIS — Z12.11 SCREEN FOR COLON CANCER: ICD-10-CM

## 2021-04-28 LAB — GLUCOSE BLDC GLUCOMTR-MCNC: 144 MG/DL (ref 70–130)

## 2021-04-28 PROCEDURE — 82962 GLUCOSE BLOOD TEST: CPT

## 2021-04-28 PROCEDURE — 45385 COLONOSCOPY W/LESION REMOVAL: CPT | Performed by: INTERNAL MEDICINE

## 2021-04-28 PROCEDURE — C1889 IMPLANT/INSERT DEVICE, NOC: HCPCS | Performed by: INTERNAL MEDICINE

## 2021-04-28 PROCEDURE — 88305 TISSUE EXAM BY PATHOLOGIST: CPT | Performed by: INTERNAL MEDICINE

## 2021-04-28 PROCEDURE — 25010000002 PROPOFOL 10 MG/ML EMULSION: Performed by: NURSE ANESTHETIST, CERTIFIED REGISTERED

## 2021-04-28 DEVICE — DEV CLIP ENDO RESOLUTION360 CONTRL ROT 235CM: Type: IMPLANTABLE DEVICE | Site: COLON | Status: FUNCTIONAL

## 2021-04-28 RX ORDER — MIDAZOLAM HYDROCHLORIDE 1 MG/ML
1 INJECTION INTRAMUSCULAR; INTRAVENOUS
Status: DISCONTINUED | OUTPATIENT
Start: 2021-04-28 | End: 2021-04-28 | Stop reason: HOSPADM

## 2021-04-28 RX ORDER — FAMOTIDINE 20 MG/1
20 TABLET, FILM COATED ORAL ONCE
Status: DISCONTINUED | OUTPATIENT
Start: 2021-04-28 | End: 2021-04-28 | Stop reason: HOSPADM

## 2021-04-28 RX ORDER — SODIUM CHLORIDE 0.9 % (FLUSH) 0.9 %
10 SYRINGE (ML) INJECTION AS NEEDED
Status: DISCONTINUED | OUTPATIENT
Start: 2021-04-28 | End: 2021-04-28 | Stop reason: HOSPADM

## 2021-04-28 RX ORDER — FAMOTIDINE 10 MG/ML
20 INJECTION, SOLUTION INTRAVENOUS ONCE
Status: DISCONTINUED | OUTPATIENT
Start: 2021-04-28 | End: 2021-04-28 | Stop reason: HOSPADM

## 2021-04-28 RX ORDER — MIDAZOLAM HYDROCHLORIDE 1 MG/ML
2 INJECTION INTRAMUSCULAR; INTRAVENOUS
Status: DISCONTINUED | OUTPATIENT
Start: 2021-04-28 | End: 2021-04-28 | Stop reason: HOSPADM

## 2021-04-28 RX ORDER — LIDOCAINE HYDROCHLORIDE 10 MG/ML
0.5 INJECTION, SOLUTION EPIDURAL; INFILTRATION; INTRACAUDAL; PERINEURAL ONCE AS NEEDED
Status: DISCONTINUED | OUTPATIENT
Start: 2021-04-28 | End: 2021-04-28 | Stop reason: HOSPADM

## 2021-04-28 RX ORDER — SODIUM CHLORIDE, SODIUM LACTATE, POTASSIUM CHLORIDE, CALCIUM CHLORIDE 600; 310; 30; 20 MG/100ML; MG/100ML; MG/100ML; MG/100ML
9 INJECTION, SOLUTION INTRAVENOUS CONTINUOUS
Status: DISCONTINUED | OUTPATIENT
Start: 2021-04-28 | End: 2021-04-28 | Stop reason: HOSPADM

## 2021-04-28 RX ORDER — PROPOFOL 10 MG/ML
VIAL (ML) INTRAVENOUS AS NEEDED
Status: DISCONTINUED | OUTPATIENT
Start: 2021-04-28 | End: 2021-04-28 | Stop reason: SURG

## 2021-04-28 RX ORDER — SODIUM CHLORIDE 0.9 % (FLUSH) 0.9 %
10 SYRINGE (ML) INJECTION EVERY 12 HOURS SCHEDULED
Status: DISCONTINUED | OUTPATIENT
Start: 2021-04-28 | End: 2021-04-28 | Stop reason: HOSPADM

## 2021-04-28 RX ADMIN — SODIUM CHLORIDE, POTASSIUM CHLORIDE, SODIUM LACTATE AND CALCIUM CHLORIDE 9 ML/HR: 600; 310; 30; 20 INJECTION, SOLUTION INTRAVENOUS at 12:39

## 2021-04-28 RX ADMIN — PROPOFOL 100 MCG/KG/MIN: 10 INJECTION, EMULSION INTRAVENOUS at 13:34

## 2021-04-28 RX ADMIN — PROPOFOL 100 MG: 10 INJECTION, EMULSION INTRAVENOUS at 13:33

## 2021-04-28 NOTE — ANESTHESIA PREPROCEDURE EVALUATION
Anesthesia Evaluation     Patient summary reviewed and Nursing notes reviewed   NPO Solid Status: > 8 hours  NPO Liquid Status: > 8 hours           Airway   Mallampati: II  TM distance: >3 FB  Neck ROM: full  No difficulty expected  Dental    (+) poor dentition    Pulmonary    (+) a smoker (7 years ago) Former, shortness of breath, sleep apnea on CPAP,   (-) COPD, asthma, recent URI  Cardiovascular     Patient on routine beta blocker    (+) pacemaker pacemaker, hypertension, dysrhythmias (sp ablation ),   (-) past MI, angina, cardiac stents      Neuro/Psych  (+) psychiatric history (buspar),     (-) seizures, CVA  GI/Hepatic/Renal/Endo    (+)   hepatitis, liver disease, diabetes mellitus type 2,   (-) no renal disease, no thyroid disorder    Musculoskeletal     Abdominal    Substance History      OB/GYN          Other   arthritis,    history of cancer (prostate )    ROS/Med Hx Other:    K 4.7   Screening colon from Opt due to wieght      Phys Exam Other: Several broken teeth none loose   About to have them removed                 Anesthesia Plan    ASA 3     general and MAC   (PFL)  intravenous induction     Anesthetic plan, all risks, benefits, and alternatives have been provided, discussed and informed consent has been obtained with: patient.    Plan discussed with CRNA.

## 2021-04-29 LAB
CYTO UR: NORMAL
LAB AP CASE REPORT: NORMAL
LAB AP CLINICAL INFORMATION: NORMAL
PATH REPORT.FINAL DX SPEC: NORMAL
PATH REPORT.GROSS SPEC: NORMAL

## 2023-02-21 ENCOUNTER — TRANSCRIBE ORDERS (OUTPATIENT)
Dept: LAB | Facility: HOSPITAL | Age: 63
End: 2023-02-21
Payer: MEDICARE

## 2023-02-21 ENCOUNTER — LAB (OUTPATIENT)
Dept: LAB | Facility: HOSPITAL | Age: 63
End: 2023-02-21
Payer: MEDICARE

## 2023-02-21 DIAGNOSIS — I87.2 CHRONIC VENOUS STASIS DERMATITIS OF BOTH LOWER EXTREMITIES: ICD-10-CM

## 2023-02-21 DIAGNOSIS — E11.9 DIABETES MELLITUS WITHOUT COMPLICATION: ICD-10-CM

## 2023-02-21 DIAGNOSIS — T84.53XD INFECTION OF TOTAL RIGHT KNEE REPLACEMENT, SUBSEQUENT ENCOUNTER: Primary | ICD-10-CM

## 2023-02-21 DIAGNOSIS — L03.115 CELLULITIS OF RIGHT FOOT: ICD-10-CM

## 2023-02-21 DIAGNOSIS — T84.53XD INFECTION OF TOTAL RIGHT KNEE REPLACEMENT, SUBSEQUENT ENCOUNTER: ICD-10-CM

## 2023-02-21 LAB
ALBUMIN SERPL-MCNC: 4 G/DL (ref 3.5–5.2)
ALBUMIN/GLOB SERPL: 1.4 G/DL
ALP SERPL-CCNC: 106 U/L (ref 39–117)
ALT SERPL W P-5'-P-CCNC: 18 U/L (ref 1–41)
ANION GAP SERPL CALCULATED.3IONS-SCNC: 9 MMOL/L (ref 5–15)
AST SERPL-CCNC: 19 U/L (ref 1–40)
BASOPHILS # BLD AUTO: 0.06 10*3/MM3 (ref 0–0.2)
BASOPHILS NFR BLD AUTO: 0.7 % (ref 0–1.5)
BILIRUB SERPL-MCNC: 1.1 MG/DL (ref 0–1.2)
BUN SERPL-MCNC: 20 MG/DL (ref 8–23)
BUN/CREAT SERPL: 18 (ref 7–25)
CALCIUM SPEC-SCNC: 9.1 MG/DL (ref 8.6–10.5)
CHLORIDE SERPL-SCNC: 104 MMOL/L (ref 98–107)
CO2 SERPL-SCNC: 28 MMOL/L (ref 22–29)
CREAT SERPL-MCNC: 1.11 MG/DL (ref 0.76–1.27)
CRP SERPL-MCNC: 0.44 MG/DL (ref 0–0.5)
DEPRECATED RDW RBC AUTO: 50 FL (ref 37–54)
EGFRCR SERPLBLD CKD-EPI 2021: 74.6 ML/MIN/1.73
EOSINOPHIL # BLD AUTO: 0.05 10*3/MM3 (ref 0–0.4)
EOSINOPHIL NFR BLD AUTO: 0.6 % (ref 0.3–6.2)
ERYTHROCYTE [DISTWIDTH] IN BLOOD BY AUTOMATED COUNT: 13.8 % (ref 12.3–15.4)
ERYTHROCYTE [SEDIMENTATION RATE] IN BLOOD: 20 MM/HR (ref 0–20)
GLOBULIN UR ELPH-MCNC: 2.9 GM/DL
GLUCOSE SERPL-MCNC: 147 MG/DL (ref 65–99)
HCT VFR BLD AUTO: 40.9 % (ref 37.5–51)
HGB BLD-MCNC: 13.7 G/DL (ref 13–17.7)
IMM GRANULOCYTES # BLD AUTO: 0.02 10*3/MM3 (ref 0–0.05)
IMM GRANULOCYTES NFR BLD AUTO: 0.2 % (ref 0–0.5)
LYMPHOCYTES # BLD AUTO: 1.15 10*3/MM3 (ref 0.7–3.1)
LYMPHOCYTES NFR BLD AUTO: 12.7 % (ref 19.6–45.3)
MCH RBC QN AUTO: 33 PG (ref 26.6–33)
MCHC RBC AUTO-ENTMCNC: 33.5 G/DL (ref 31.5–35.7)
MCV RBC AUTO: 98.6 FL (ref 79–97)
MONOCYTES # BLD AUTO: 0.78 10*3/MM3 (ref 0.1–0.9)
MONOCYTES NFR BLD AUTO: 8.6 % (ref 5–12)
NEUTROPHILS NFR BLD AUTO: 7 10*3/MM3 (ref 1.7–7)
NEUTROPHILS NFR BLD AUTO: 77.2 % (ref 42.7–76)
NRBC BLD AUTO-RTO: 0 /100 WBC (ref 0–0.2)
PLATELET # BLD AUTO: 169 10*3/MM3 (ref 140–450)
PMV BLD AUTO: 11 FL (ref 6–12)
POTASSIUM SERPL-SCNC: 4.7 MMOL/L (ref 3.5–5.2)
PROT SERPL-MCNC: 6.9 G/DL (ref 6–8.5)
RBC # BLD AUTO: 4.15 10*6/MM3 (ref 4.14–5.8)
SODIUM SERPL-SCNC: 141 MMOL/L (ref 136–145)
WBC NRBC COR # BLD: 9.06 10*3/MM3 (ref 3.4–10.8)

## 2023-02-21 PROCEDURE — 86140 C-REACTIVE PROTEIN: CPT

## 2023-02-21 PROCEDURE — 80053 COMPREHEN METABOLIC PANEL: CPT

## 2023-02-21 PROCEDURE — 85025 COMPLETE CBC W/AUTO DIFF WBC: CPT

## 2023-02-21 PROCEDURE — 36415 COLL VENOUS BLD VENIPUNCTURE: CPT

## 2023-02-21 PROCEDURE — 85652 RBC SED RATE AUTOMATED: CPT

## 2023-10-10 ENCOUNTER — TRANSCRIBE ORDERS (OUTPATIENT)
Dept: LAB | Facility: HOSPITAL | Age: 63
End: 2023-10-10
Payer: MEDICARE

## 2023-10-10 ENCOUNTER — LAB (OUTPATIENT)
Dept: LAB | Facility: HOSPITAL | Age: 63
End: 2023-10-10
Payer: MEDICARE

## 2023-10-10 DIAGNOSIS — L03.115 CELLULITIS OF RIGHT FOOT: ICD-10-CM

## 2023-10-10 DIAGNOSIS — M25.461 SWELLING OF RIGHT KNEE JOINT: ICD-10-CM

## 2023-10-10 DIAGNOSIS — I48.11 LONGSTANDING PERSISTENT ATRIAL FIBRILLATION: ICD-10-CM

## 2023-10-10 DIAGNOSIS — T84.53XD INFECTION OF TOTAL RIGHT KNEE REPLACEMENT, SUBSEQUENT ENCOUNTER: ICD-10-CM

## 2023-10-10 DIAGNOSIS — R31.9 HEMATURIA SYNDROME: ICD-10-CM

## 2023-10-10 DIAGNOSIS — R31.9 HEMATURIA SYNDROME: Primary | ICD-10-CM

## 2023-10-10 LAB
ALBUMIN SERPL-MCNC: 3.4 G/DL (ref 3.5–5.2)
ALBUMIN/GLOB SERPL: 1.1 G/DL
ALP SERPL-CCNC: 117 U/L (ref 39–117)
ALT SERPL W P-5'-P-CCNC: 20 U/L (ref 1–41)
ANION GAP SERPL CALCULATED.3IONS-SCNC: 8 MMOL/L (ref 5–15)
AST SERPL-CCNC: 22 U/L (ref 1–40)
BASOPHILS # BLD AUTO: 0.06 10*3/MM3 (ref 0–0.2)
BASOPHILS NFR BLD AUTO: 0.6 % (ref 0–1.5)
BILIRUB SERPL-MCNC: 0.6 MG/DL (ref 0–1.2)
BUN SERPL-MCNC: 25 MG/DL (ref 8–23)
BUN/CREAT SERPL: 22.7 (ref 7–25)
CALCIUM SPEC-SCNC: 9.2 MG/DL (ref 8.6–10.5)
CHLORIDE SERPL-SCNC: 99 MMOL/L (ref 98–107)
CO2 SERPL-SCNC: 31 MMOL/L (ref 22–29)
CREAT SERPL-MCNC: 1.1 MG/DL (ref 0.76–1.27)
CRP SERPL-MCNC: 1.26 MG/DL (ref 0–0.5)
DEPRECATED RDW RBC AUTO: 47.9 FL (ref 37–54)
EGFRCR SERPLBLD CKD-EPI 2021: 75 ML/MIN/1.73
EOSINOPHIL # BLD AUTO: 0.03 10*3/MM3 (ref 0–0.4)
EOSINOPHIL NFR BLD AUTO: 0.3 % (ref 0.3–6.2)
ERYTHROCYTE [DISTWIDTH] IN BLOOD BY AUTOMATED COUNT: 13.3 % (ref 12.3–15.4)
ERYTHROCYTE [SEDIMENTATION RATE] IN BLOOD: 45 MM/HR (ref 0–20)
GLOBULIN UR ELPH-MCNC: 3.2 GM/DL
GLUCOSE SERPL-MCNC: 184 MG/DL (ref 65–99)
HCT VFR BLD AUTO: 36.4 % (ref 37.5–51)
HGB BLD-MCNC: 11.9 G/DL (ref 13–17.7)
IMM GRANULOCYTES # BLD AUTO: 0.04 10*3/MM3 (ref 0–0.05)
IMM GRANULOCYTES NFR BLD AUTO: 0.4 % (ref 0–0.5)
LYMPHOCYTES # BLD AUTO: 1.12 10*3/MM3 (ref 0.7–3.1)
LYMPHOCYTES NFR BLD AUTO: 10.5 % (ref 19.6–45.3)
MCH RBC QN AUTO: 32.3 PG (ref 26.6–33)
MCHC RBC AUTO-ENTMCNC: 32.7 G/DL (ref 31.5–35.7)
MCV RBC AUTO: 98.9 FL (ref 79–97)
MONOCYTES # BLD AUTO: 0.81 10*3/MM3 (ref 0.1–0.9)
MONOCYTES NFR BLD AUTO: 7.6 % (ref 5–12)
NEUTROPHILS NFR BLD AUTO: 8.65 10*3/MM3 (ref 1.7–7)
NEUTROPHILS NFR BLD AUTO: 80.6 % (ref 42.7–76)
NRBC BLD AUTO-RTO: 0 /100 WBC (ref 0–0.2)
PLATELET # BLD AUTO: 242 10*3/MM3 (ref 140–450)
PMV BLD AUTO: 9.8 FL (ref 6–12)
POTASSIUM SERPL-SCNC: 4.5 MMOL/L (ref 3.5–5.2)
PROT SERPL-MCNC: 6.6 G/DL (ref 6–8.5)
RBC # BLD AUTO: 3.68 10*6/MM3 (ref 4.14–5.8)
SODIUM SERPL-SCNC: 138 MMOL/L (ref 136–145)
WBC NRBC COR # BLD: 10.71 10*3/MM3 (ref 3.4–10.8)

## 2023-10-10 PROCEDURE — 80053 COMPREHEN METABOLIC PANEL: CPT

## 2023-10-10 PROCEDURE — 86140 C-REACTIVE PROTEIN: CPT

## 2023-10-10 PROCEDURE — 36415 COLL VENOUS BLD VENIPUNCTURE: CPT

## 2023-10-10 PROCEDURE — 85025 COMPLETE CBC W/AUTO DIFF WBC: CPT

## 2023-10-10 PROCEDURE — 85652 RBC SED RATE AUTOMATED: CPT

## 2024-06-21 ENCOUNTER — APPOINTMENT (OUTPATIENT)
Dept: GENERAL RADIOLOGY | Facility: HOSPITAL | Age: 64
DRG: 291 | End: 2024-06-21
Payer: MEDICARE

## 2024-06-21 ENCOUNTER — HOSPITAL ENCOUNTER (INPATIENT)
Facility: HOSPITAL | Age: 64
LOS: 16 days | Discharge: HOME | DRG: 291 | End: 2024-07-08
Attending: EMERGENCY MEDICINE | Admitting: INTERNAL MEDICINE
Payer: MEDICARE

## 2024-06-21 DIAGNOSIS — G62.9 NEUROPATHY: ICD-10-CM

## 2024-06-21 DIAGNOSIS — R60.0 PERIPHERAL EDEMA: ICD-10-CM

## 2024-06-21 DIAGNOSIS — R18.8 CIRRHOSIS OF LIVER WITH ASCITES, UNSPECIFIED HEPATIC CIRRHOSIS TYPE: Primary | ICD-10-CM

## 2024-06-21 DIAGNOSIS — R79.89 ELEVATED SERUM CREATININE: ICD-10-CM

## 2024-06-21 DIAGNOSIS — I50.9 CONGESTIVE HEART FAILURE, UNSPECIFIED HF CHRONICITY, UNSPECIFIED HEART FAILURE TYPE: ICD-10-CM

## 2024-06-21 DIAGNOSIS — I50.33 ACUTE ON CHRONIC HEART FAILURE WITH PRESERVED EJECTION FRACTION (HFPEF): ICD-10-CM

## 2024-06-21 DIAGNOSIS — I50.23 ACUTE ON CHRONIC HFREF (HEART FAILURE WITH REDUCED EJECTION FRACTION): ICD-10-CM

## 2024-06-21 DIAGNOSIS — K74.60 CIRRHOSIS OF LIVER WITH ASCITES, UNSPECIFIED HEPATIC CIRRHOSIS TYPE: Primary | ICD-10-CM

## 2024-06-21 DIAGNOSIS — Z12.11 SCREEN FOR COLON CANCER: ICD-10-CM

## 2024-06-21 DIAGNOSIS — E87.6 HYPOKALEMIA: ICD-10-CM

## 2024-06-21 PROBLEM — D64.9 ANEMIA: Status: ACTIVE | Noted: 2024-06-21

## 2024-06-21 PROBLEM — B19.20 HCV (HEPATITIS C VIRUS): Status: ACTIVE | Noted: 2024-06-21

## 2024-06-21 LAB
ALBUMIN SERPL-MCNC: 3.6 G/DL (ref 3.5–5.2)
ALBUMIN/GLOB SERPL: 1.4 G/DL
ALP SERPL-CCNC: 78 U/L (ref 39–117)
ALT SERPL W P-5'-P-CCNC: 9 U/L (ref 1–41)
ANION GAP SERPL CALCULATED.3IONS-SCNC: 14 MMOL/L (ref 5–15)
AST SERPL-CCNC: 14 U/L (ref 1–40)
BASOPHILS # BLD AUTO: 0.06 10*3/MM3 (ref 0–0.2)
BASOPHILS NFR BLD AUTO: 0.5 % (ref 0–1.5)
BILIRUB SERPL-MCNC: 0.6 MG/DL (ref 0–1.2)
BUN SERPL-MCNC: 62 MG/DL (ref 8–23)
BUN/CREAT SERPL: 29.4 (ref 7–25)
CALCIUM SPEC-SCNC: 8.7 MG/DL (ref 8.6–10.5)
CHLORIDE SERPL-SCNC: 93 MMOL/L (ref 98–107)
CO2 SERPL-SCNC: 27 MMOL/L (ref 22–29)
CREAT SERPL-MCNC: 2.11 MG/DL (ref 0.76–1.27)
DEPRECATED RDW RBC AUTO: 50.2 FL (ref 37–54)
EGFRCR SERPLBLD CKD-EPI 2021: 34.1 ML/MIN/1.73
EOSINOPHIL # BLD AUTO: 0.03 10*3/MM3 (ref 0–0.4)
EOSINOPHIL NFR BLD AUTO: 0.3 % (ref 0.3–6.2)
ERYTHROCYTE [DISTWIDTH] IN BLOOD BY AUTOMATED COUNT: 14.6 % (ref 12.3–15.4)
GEN 5 2HR TROPONIN T REFLEX: 131 NG/L
GLOBULIN UR ELPH-MCNC: 2.6 GM/DL
GLUCOSE BLDC GLUCOMTR-MCNC: 175 MG/DL (ref 70–130)
GLUCOSE SERPL-MCNC: 151 MG/DL (ref 65–99)
HCT VFR BLD AUTO: 28.5 % (ref 37.5–51)
HGB BLD-MCNC: 9.2 G/DL (ref 13–17.7)
HOLD SPECIMEN: NORMAL
IMM GRANULOCYTES # BLD AUTO: 0.16 10*3/MM3 (ref 0–0.05)
IMM GRANULOCYTES NFR BLD AUTO: 1.4 % (ref 0–0.5)
LYMPHOCYTES # BLD AUTO: 1.02 10*3/MM3 (ref 0.7–3.1)
LYMPHOCYTES NFR BLD AUTO: 8.7 % (ref 19.6–45.3)
MCH RBC QN AUTO: 30.3 PG (ref 26.6–33)
MCHC RBC AUTO-ENTMCNC: 32.3 G/DL (ref 31.5–35.7)
MCV RBC AUTO: 93.8 FL (ref 79–97)
MONOCYTES # BLD AUTO: 1.06 10*3/MM3 (ref 0.1–0.9)
MONOCYTES NFR BLD AUTO: 9 % (ref 5–12)
NEUTROPHILS NFR BLD AUTO: 80.1 % (ref 42.7–76)
NEUTROPHILS NFR BLD AUTO: 9.39 10*3/MM3 (ref 1.7–7)
NRBC BLD AUTO-RTO: 0 /100 WBC (ref 0–0.2)
NT-PROBNP SERPL-MCNC: 4183 PG/ML (ref 0–900)
PLATELET # BLD AUTO: 217 10*3/MM3 (ref 140–450)
PMV BLD AUTO: 9.9 FL (ref 6–12)
POTASSIUM SERPL-SCNC: 3.2 MMOL/L (ref 3.5–5.2)
PROT SERPL-MCNC: 6.2 G/DL (ref 6–8.5)
QT INTERVAL: 540 MS
QTC INTERVAL: 540 MS
RBC # BLD AUTO: 3.04 10*6/MM3 (ref 4.14–5.8)
SODIUM SERPL-SCNC: 134 MMOL/L (ref 136–145)
TROPONIN T DELTA: -6 NG/L
TROPONIN T SERPL HS-MCNC: 137 NG/L
WBC NRBC COR # BLD AUTO: 11.72 10*3/MM3 (ref 3.4–10.8)
WHOLE BLOOD HOLD COAG: NORMAL
WHOLE BLOOD HOLD SPECIMEN: NORMAL

## 2024-06-21 PROCEDURE — 93005 ELECTROCARDIOGRAM TRACING: CPT | Performed by: EMERGENCY MEDICINE

## 2024-06-21 PROCEDURE — 99285 EMERGENCY DEPT VISIT HI MDM: CPT

## 2024-06-21 PROCEDURE — 94799 UNLISTED PULMONARY SVC/PX: CPT

## 2024-06-21 PROCEDURE — 63710000001 INSULIN LISPRO (HUMAN) PER 5 UNITS: Performed by: NURSE PRACTITIONER

## 2024-06-21 PROCEDURE — 71045 X-RAY EXAM CHEST 1 VIEW: CPT

## 2024-06-21 PROCEDURE — G0378 HOSPITAL OBSERVATION PER HR: HCPCS

## 2024-06-21 PROCEDURE — 99223 1ST HOSP IP/OBS HIGH 75: CPT | Performed by: NURSE PRACTITIONER

## 2024-06-21 PROCEDURE — 84484 ASSAY OF TROPONIN QUANT: CPT | Performed by: EMERGENCY MEDICINE

## 2024-06-21 PROCEDURE — 94660 CPAP INITIATION&MGMT: CPT

## 2024-06-21 PROCEDURE — 85025 COMPLETE CBC W/AUTO DIFF WBC: CPT | Performed by: EMERGENCY MEDICINE

## 2024-06-21 PROCEDURE — 93005 ELECTROCARDIOGRAM TRACING: CPT

## 2024-06-21 PROCEDURE — 82948 REAGENT STRIP/BLOOD GLUCOSE: CPT

## 2024-06-21 PROCEDURE — 83880 ASSAY OF NATRIURETIC PEPTIDE: CPT | Performed by: EMERGENCY MEDICINE

## 2024-06-21 PROCEDURE — 94640 AIRWAY INHALATION TREATMENT: CPT

## 2024-06-21 PROCEDURE — 25010000002 HEPARIN (PORCINE) PER 1000 UNITS: Performed by: NURSE PRACTITIONER

## 2024-06-21 PROCEDURE — 80053 COMPREHEN METABOLIC PANEL: CPT | Performed by: EMERGENCY MEDICINE

## 2024-06-21 PROCEDURE — 25010000002 FUROSEMIDE PER 20 MG: Performed by: EMERGENCY MEDICINE

## 2024-06-21 PROCEDURE — 36415 COLL VENOUS BLD VENIPUNCTURE: CPT

## 2024-06-21 RX ORDER — IPRATROPIUM BROMIDE AND ALBUTEROL SULFATE 2.5; .5 MG/3ML; MG/3ML
3 SOLUTION RESPIRATORY (INHALATION)
Status: DISCONTINUED | OUTPATIENT
Start: 2024-06-21 | End: 2024-07-08 | Stop reason: HOSPADM

## 2024-06-21 RX ORDER — SODIUM CHLORIDE 0.9 % (FLUSH) 0.9 %
10 SYRINGE (ML) INJECTION AS NEEDED
Status: DISCONTINUED | OUTPATIENT
Start: 2024-06-21 | End: 2024-07-08 | Stop reason: HOSPADM

## 2024-06-21 RX ORDER — BUDESONIDE, GLYCOPYRROLATE, AND FORMOTEROL FUMARATE 160; 9; 4.8 UG/1; UG/1; UG/1
2 AEROSOL, METERED RESPIRATORY (INHALATION) 2 TIMES DAILY
COMMUNITY

## 2024-06-21 RX ORDER — ACETAMINOPHEN 160 MG/5ML
650 SOLUTION ORAL EVERY 4 HOURS PRN
Status: DISCONTINUED | OUTPATIENT
Start: 2024-06-21 | End: 2024-07-08 | Stop reason: HOSPADM

## 2024-06-21 RX ORDER — BISACODYL 5 MG/1
5 TABLET, DELAYED RELEASE ORAL DAILY PRN
Status: DISCONTINUED | OUTPATIENT
Start: 2024-06-21 | End: 2024-06-23

## 2024-06-21 RX ORDER — POLYETHYLENE GLYCOL 3350 17 G/17G
17 POWDER, FOR SOLUTION ORAL DAILY PRN
Status: DISCONTINUED | OUTPATIENT
Start: 2024-06-21 | End: 2024-06-23

## 2024-06-21 RX ORDER — NITROGLYCERIN 0.4 MG/1
0.4 TABLET SUBLINGUAL
Status: DISCONTINUED | OUTPATIENT
Start: 2024-06-21 | End: 2024-07-08 | Stop reason: HOSPADM

## 2024-06-21 RX ORDER — ACETAMINOPHEN 650 MG/1
650 SUPPOSITORY RECTAL EVERY 4 HOURS PRN
Status: DISCONTINUED | OUTPATIENT
Start: 2024-06-21 | End: 2024-07-08 | Stop reason: HOSPADM

## 2024-06-21 RX ORDER — FUROSEMIDE 10 MG/ML
80 INJECTION INTRAMUSCULAR; INTRAVENOUS ONCE
Status: COMPLETED | OUTPATIENT
Start: 2024-06-21 | End: 2024-06-21

## 2024-06-21 RX ORDER — PROCHLORPERAZINE EDISYLATE 5 MG/ML
5 INJECTION INTRAMUSCULAR; INTRAVENOUS EVERY 6 HOURS PRN
Status: DISCONTINUED | OUTPATIENT
Start: 2024-06-21 | End: 2024-07-08 | Stop reason: HOSPADM

## 2024-06-21 RX ORDER — BISACODYL 10 MG
10 SUPPOSITORY, RECTAL RECTAL DAILY PRN
Status: DISCONTINUED | OUTPATIENT
Start: 2024-06-21 | End: 2024-06-23

## 2024-06-21 RX ORDER — FERROUS SULFATE 325(65) MG
325 TABLET ORAL
Status: DISCONTINUED | OUTPATIENT
Start: 2024-06-22 | End: 2024-07-08 | Stop reason: HOSPADM

## 2024-06-21 RX ORDER — IBUPROFEN 600 MG/1
1 TABLET ORAL
Status: DISCONTINUED | OUTPATIENT
Start: 2024-06-21 | End: 2024-07-08 | Stop reason: HOSPADM

## 2024-06-21 RX ORDER — ALBUTEROL SULFATE 2.5 MG/3ML
2.5 SOLUTION RESPIRATORY (INHALATION) EVERY 6 HOURS PRN
Status: DISCONTINUED | OUTPATIENT
Start: 2024-06-21 | End: 2024-07-08 | Stop reason: HOSPADM

## 2024-06-21 RX ORDER — DEXTROSE MONOHYDRATE 25 G/50ML
25 INJECTION, SOLUTION INTRAVENOUS
Status: DISCONTINUED | OUTPATIENT
Start: 2024-06-21 | End: 2024-07-08 | Stop reason: HOSPADM

## 2024-06-21 RX ORDER — ONDANSETRON 4 MG/1
4 TABLET, ORALLY DISINTEGRATING ORAL EVERY 6 HOURS PRN
Status: DISCONTINUED | OUTPATIENT
Start: 2024-06-21 | End: 2024-06-21

## 2024-06-21 RX ORDER — METOLAZONE 5 MG/1
5 TABLET ORAL DAILY
COMMUNITY

## 2024-06-21 RX ORDER — ACETAMINOPHEN 325 MG/1
650 TABLET ORAL EVERY 4 HOURS PRN
Status: DISCONTINUED | OUTPATIENT
Start: 2024-06-21 | End: 2024-07-08 | Stop reason: HOSPADM

## 2024-06-21 RX ORDER — INSULIN LISPRO 100 [IU]/ML
2-7 INJECTION, SOLUTION INTRAVENOUS; SUBCUTANEOUS
Status: DISCONTINUED | OUTPATIENT
Start: 2024-06-21 | End: 2024-07-08 | Stop reason: HOSPADM

## 2024-06-21 RX ORDER — HEPARIN SODIUM 5000 [USP'U]/ML
5000 INJECTION, SOLUTION INTRAVENOUS; SUBCUTANEOUS EVERY 8 HOURS SCHEDULED
Status: DISCONTINUED | OUTPATIENT
Start: 2024-06-21 | End: 2024-07-08 | Stop reason: HOSPADM

## 2024-06-21 RX ORDER — IPRATROPIUM BROMIDE AND ALBUTEROL SULFATE 2.5; .5 MG/3ML; MG/3ML
3 SOLUTION RESPIRATORY (INHALATION) EVERY 6 HOURS PRN
Status: DISCONTINUED | OUTPATIENT
Start: 2024-06-21 | End: 2024-06-21

## 2024-06-21 RX ORDER — DOXYCYCLINE HYCLATE 100 MG
100 TABLET ORAL 2 TIMES DAILY
COMMUNITY
End: 2024-07-08 | Stop reason: HOSPADM

## 2024-06-21 RX ORDER — FERROUS GLUCONATE 324(37.5)
324 TABLET ORAL
COMMUNITY

## 2024-06-21 RX ORDER — SODIUM CHLORIDE 0.9 % (FLUSH) 0.9 %
10 SYRINGE (ML) INJECTION EVERY 12 HOURS SCHEDULED
Status: DISCONTINUED | OUTPATIENT
Start: 2024-06-21 | End: 2024-07-08 | Stop reason: HOSPADM

## 2024-06-21 RX ORDER — SODIUM CHLORIDE 9 MG/ML
40 INJECTION, SOLUTION INTRAVENOUS AS NEEDED
Status: DISCONTINUED | OUTPATIENT
Start: 2024-06-21 | End: 2024-07-08 | Stop reason: HOSPADM

## 2024-06-21 RX ORDER — ONDANSETRON 2 MG/ML
4 INJECTION INTRAMUSCULAR; INTRAVENOUS EVERY 6 HOURS PRN
Status: DISCONTINUED | OUTPATIENT
Start: 2024-06-21 | End: 2024-06-21

## 2024-06-21 RX ORDER — AMOXICILLIN 250 MG
2 CAPSULE ORAL 2 TIMES DAILY PRN
Status: DISCONTINUED | OUTPATIENT
Start: 2024-06-21 | End: 2024-06-23

## 2024-06-21 RX ORDER — FUROSEMIDE 10 MG/ML
80 INJECTION INTRAMUSCULAR; INTRAVENOUS
Status: DISCONTINUED | OUTPATIENT
Start: 2024-06-22 | End: 2024-06-25

## 2024-06-21 RX ORDER — POTASSIUM CHLORIDE 20 MEQ/1
40 TABLET, EXTENDED RELEASE ORAL EVERY 4 HOURS
Status: COMPLETED | OUTPATIENT
Start: 2024-06-21 | End: 2024-06-21

## 2024-06-21 RX ORDER — PANTOPRAZOLE SODIUM 40 MG/1
40 TABLET, DELAYED RELEASE ORAL
Status: DISCONTINUED | OUTPATIENT
Start: 2024-06-22 | End: 2024-07-08 | Stop reason: HOSPADM

## 2024-06-21 RX ORDER — NICOTINE POLACRILEX 4 MG
15 LOZENGE BUCCAL
Status: DISCONTINUED | OUTPATIENT
Start: 2024-06-21 | End: 2024-07-08 | Stop reason: HOSPADM

## 2024-06-21 RX ADMIN — POTASSIUM CHLORIDE 40 MEQ: 1500 TABLET, EXTENDED RELEASE ORAL at 16:50

## 2024-06-21 RX ADMIN — FUROSEMIDE 80 MG: 10 INJECTION, SOLUTION INTRAMUSCULAR; INTRAVENOUS at 15:04

## 2024-06-21 RX ADMIN — POTASSIUM CHLORIDE 40 MEQ: 1500 TABLET, EXTENDED RELEASE ORAL at 21:26

## 2024-06-21 RX ADMIN — IPRATROPIUM BROMIDE AND ALBUTEROL SULFATE 3 ML: 2.5; .5 SOLUTION RESPIRATORY (INHALATION) at 23:18

## 2024-06-21 RX ADMIN — INSULIN LISPRO 2 UNITS: 100 INJECTION, SOLUTION INTRAVENOUS; SUBCUTANEOUS at 21:26

## 2024-06-21 NOTE — ED PROVIDER NOTES
"[facility]    EMERGENCY DEPARTMENT ENCOUNTER      Pt Name: Jaycob Ndiaye II  MRN: [id number]  YOB: 1960  Date of evaluation: 6/21/2024  Provider: Greyson Vega DO    CHIEF COMPLAINT       Chief Complaint   Patient presents with    Shortness of Breath    Leg Swelling       HPI  Stated Reason for Visit: PT TO ER FROM HOME FOR BILAT LOWER EXREMITY EDEMA AND SOA. PT STATES HAD RECENT ADMISSION FOR \"INTERNAL BLEDDING\" AT [facility]. STATES IS SOA AND AMBULATION IS DIFFICULT. PT TAKES LASIX. History Obtained From: patient       HISTORY OF PRESENT ILLNESS  (Location/Symptom, Timing/Onset, Context/Setting, Quality, Duration, Modifying Factors, Severity.)   Jaycob Ndiaye II is a 64 y.o. male who presents to the emergency department for evaluation of bilateral lower extremity edema and swelling which been progressively worsening over the last few days.  Notes recent admission for GI bleed at [facility] discharge 1 week ago.  Has a history of arrhythmia, congestive heart failure, peripheral edema, follows with cardiologist Dr. Farfan.  Denies any chest pain, cough congestion fever chills.  He notes no compliant with his Lasix 40 mg twice daily, still making good urine output, notes that the soreness and swelling is been increasing through the bilateral calves and up through the medial thighs.  Usually in the mornings his symptoms are improved but this has been progressively worsening over the last few days.  He does not take his daily weights, is unsure of the amount of weight gain over the last week in general.  Does not wear supplemental oxygen at home, denies any other acute systemic complaints at this time.    Initial vitals heart rate of 70, blood pressure 112/92, saturation is 97% on room air, respiration of 16.      Nursing notes were reviewed.      PAST MEDICAL HISTORY     Past Medical History:   Diagnosis Date    Arthritis     Cancer     Constipation     Depression  "    Diabetes     type 2 dm, check blood sugar once a day    History of hepatitis C     History of prostate cancer     Hypertension     Irregular heartbeat     Pacemaker     home monitoring    Prostate CA     Requires supplemental oxygen     at night with cpap    Sleep apnea     wears a cpap    SOB (shortness of breath) on exertion     Wears glasses          SURGICAL HISTORY       Past Surgical History:   Procedure Laterality Date    CARDIAC ABLATION      COLONOSCOPY N/A 4/28/2021    Procedure: Colonoscopy with polypectomy;  Surgeon: Maurice Proctor MD;  [facility]  Service: Gastroenterology;  Laterality: N/A;    KNEE SURGERY Right     1985    PACEMAKER IMPLANTATION      PROSTATE SURGERY      REPLACEMENT TOTAL KNEE Right          CURRENT MEDICATIONS       Current Facility-Administered Medications:     potassium chloride (KLOR-CON M20) CR tablet 40 mEq, 40 mEq, Oral, Q4H, Greyson Vega DO, 40 mEq at 06/21/24 1650    Potassium Replacement - Follow Nurse / BPA Driven Protocol, , Does not apply, PRN, Greyson Vega DO    sodium chloride 0.9 % flush 10 mL, 10 mL, Intravenous, PRN, Greyson Vega DO    Current Outpatient Medications:     busPIRone (BUSPAR) 15 MG tablet, Take 1 tablet by mouth Daily., Disp: , Rfl:     furosemide (LASIX) 40 MG tablet, Take 1 tablet by mouth 2 (two) times a day., Disp: , Rfl:     glimepiride (AMARYL) 2 MG tablet, Take 2 mg by mouth Daily., Disp: , Rfl:     linaclotide (Linzess) 72 MCG capsule capsule, Take 1 capsule by mouth Daily., Disp: , Rfl:     linagliptin (Tradjenta) 5 MG tablet tablet, Take 1 tablet by mouth Daily., Disp: , Rfl:     magnesium oxide (MAG-OX) 400 MG tablet, Take 1 tablet by mouth Daily., Disp: , Rfl:     Nebivolol HCl (BYSTOLIC PO), Take  by mouth 2 (two) times a day., Disp: , Rfl:     nystatin (nystatin) 182476 UNIT/GM powder, Daily As Needed., Disp: , Rfl:     rivaroxaban (Xarelto) 20 MG tablet, Take 1 tablet by mouth  "Daily. Last dose 4/25, Disp: , Rfl:     sodium-potassium-magnesium sulfates (Suprep Bowel Prep Kit) 17.5-3.13-1.6 GM/177ML solution oral solution, Take 2 bottles by mouth Take As Directed., Disp: 354 mL, Rfl: 0    spironolactone (ALDACTONE) 100 MG tablet, Take 1 tablet by mouth Daily., Disp: , Rfl:     tamsulosin (FLOMAX) 0.4 MG capsule 24 hr capsule, Take 2 capsules by mouth Daily., Disp: , Rfl:     terbinafine (lamiSIL) 250 MG tablet, 1 tablet Daily., Disp: , Rfl:     vitamin D3 (vitamin d) 125 MCG (5000 UT) capsule capsule, Take 1 capsule by mouth Daily., Disp: , Rfl:     ALLERGIES     Nsaids and Contrast dye (echo or unknown ct/mr)    FAMILY HISTORY       Family History   Problem Relation Age of Onset    Breast cancer Mother     Ovarian cancer Mother     Heart failure Mother     Leukemia Father     Colon cancer Neg Hx     Colon polyps Neg Hx           SOCIAL HISTORY       Social History     Socioeconomic History    Marital status:    Tobacco Use    Smoking status: Former     Types: Pipe, Cigars    Smokeless tobacco: Never   Vaping Use    Vaping status: Former    Quit date: 9/1/2019    Substances: CBD    Devices: Refillable tank   Substance and Sexual Activity    Alcohol use: Yes     Comment: Rarely     Drug use: Never    Sexual activity: Defer         PHYSICAL EXAM    (up to 7 for level 4, 8 or more for level 5)     Vitals:    06/21/24 1409 06/21/24 1449 06/21/24 1628   BP: 91/59 112/97 108/70   BP Location: Left arm     Patient Position: Sitting     Pulse: 62 60 61   Resp: 16     Temp: 98.1 °F (36.7 °C)     SpO2: 98% 98% 97%   Weight: 127 kg (279 lb)     Height: 185.4 cm (73\")         Physical Exam  General : Patient is heavyset, obese, awake, alert, oriented, in no acute distress, nontoxic appearing  HEENT: Pupils are equally round, EOMI, conjunctivae clear  Neck: Neck is supple, full range of motion, trachea midline  Cardiac: Heart regular rate, rhythm, no murmurs, rubs, or gallops  Lungs: Lungs are " clear to auscultation, there is no wheezing, rhonchi, or rales. There is no use of accessory muscles  Abdomen: Abdomen is soft, nontender, nondistended. There are no firm or pulsatile masses, no rebound rigidity or guarding  Musculoskeletal: There is significant bilateral lower extremity venous stasis dermatitis, 4+ pitting edema of the lower extremities extending up from the bilateral feet up to the mid thighs, 4 out of 5 strength in all 4 extremities.  No focal muscle deficits are appreciated  Neuro: Motor intact, sensory intact, level of consciousness is normal  Dermatology: venous stasis dermatitis to bilateral lower extremities, skin is warm and dry  Psych: Mentation is grossly normal, cognition is grossly normal. Affect is appropriate      DIAGNOSTIC RESULTS     EKG:  All EKGs are interpreted by the Emergency Department Physician who either signs or Co-signs this chart in the absence of a cardiologist.    ECG 12 Lead ED Triage Standing Order; SOA   Final Result   Test Reason : ED Triage Standing Order~   Blood Pressure :   */*   mmHG   Vent. Rate :  60 BPM     Atrial Rate :  64 BPM      P-R Int :   * ms          QRS Dur : 172 ms       QT Int : 540 ms       P-R-T Axes :   * -68 114 degrees      QTc Int : 540 ms      Ventricular-paced rhythm   Abnormal ECG   When compared with ECG of 26-APR-2021 14:29,   Electronic ventricular pacemaker has replaced Junctional rhythm   Confirmed by XENA MONACO MD ([id number]) on 6/21/2024 2:41:10 PM      Referred By: EDMD           Confirmed By: XENA MONACO MD          RADIOLOGY:     [x] Radiologist's Report Reviewed:  XR Chest 1 View   Final Result   Impression:   Marked enlargement of the cardiac silhouette, with mild pulmonary vascular congestion. Probable bibasilar atelectasis.         Electronically Signed: Maurizio Oakley MD     6/21/2024 3:14 PM EDT     Workstation ID: [id number]          I ordered and independently reviewed the above noted radiographic studies.      I viewed  images of chest x-ray which showed cardiomegaly, vascular congestion per my independent interpretation.    See radiologist's dictation for official interpretation.      ED BEDSIDE ULTRASOUND:   Performed by ED Physician - none    LABS:    I have reviewed and interpreted all of the currently available lab results from this visit (if applicable):  Results for orders placed or performed during the hospital encounter of 06/21/24   Comprehensive Metabolic Panel    Specimen: Blood   Result Value Ref Range    Glucose 151 (H) 65 - 99 mg/dL    BUN 62 (H) 8 - 23 mg/dL    Creatinine 2.11 (H) 0.76 - 1.27 mg/dL    Sodium 134 (L) 136 - 145 mmol/L    Potassium 3.2 (L) 3.5 - 5.2 mmol/L    Chloride 93 (L) 98 - 107 mmol/L    CO2 27.0 22.0 - 29.0 mmol/L    Calcium 8.7 8.6 - 10.5 mg/dL    Total Protein 6.2 6.0 - 8.5 g/dL    Albumin 3.6 3.5 - 5.2 g/dL    ALT (SGPT) 9 1 - 41 U/L    AST (SGOT) 14 1 - 40 U/L    Alkaline Phosphatase 78 39 - 117 U/L    Total Bilirubin 0.6 0.0 - 1.2 mg/dL    Globulin 2.6 gm/dL    A/G Ratio 1.4 g/dL    BUN/Creatinine Ratio 29.4 (H) 7.0 - 25.0    Anion Gap 14.0 5.0 - 15.0 mmol/L    eGFR 34.1 (L) >60.0 mL/min/1.73   BNP    Specimen: Blood   Result Value Ref Range    proBNP 4,183.0 (H) 0.0 - 900.0 pg/mL   Single High Sensitivity Troponin T    Specimen: Blood   Result Value Ref Range    HS Troponin T 137 (C) <22 ng/L   CBC Auto Differential    Specimen: Blood   Result Value Ref Range    WBC 11.72 (H) 3.40 - 10.80 10*3/mm3    RBC 3.04 (L) 4.14 - 5.80 10*6/mm3    Hemoglobin 9.2 (L) 13.0 - 17.7 g/dL    Hematocrit 28.5 (L) 37.5 - 51.0 %    MCV 93.8 79.0 - 97.0 fL    MCH 30.3 26.6 - 33.0 pg    MCHC 32.3 31.5 - 35.7 g/dL    RDW 14.6 12.3 - 15.4 %    RDW-SD 50.2 37.0 - 54.0 fl    MPV 9.9 6.0 - 12.0 fL    Platelets 217 140 - 450 10*3/mm3    Neutrophil % 80.1 (H) 42.7 - 76.0 %    Lymphocyte % 8.7 (L) 19.6 - 45.3 %    Monocyte % 9.0 5.0 - 12.0 %    Eosinophil % 0.3 0.3 - 6.2 %    Basophil % 0.5 0.0 - 1.5 %    Immature Grans  % 1.4 (H) 0.0 - 0.5 %    Neutrophils, Absolute 9.39 (H) 1.70 - 7.00 10*3/mm3    Lymphocytes, Absolute 1.02 0.70 - 3.10 10*3/mm3    Monocytes, Absolute 1.06 (H) 0.10 - 0.90 10*3/mm3    Eosinophils, Absolute 0.03 0.00 - 0.40 10*3/mm3    Basophils, Absolute 0.06 0.00 - 0.20 10*3/mm3    Immature Grans, Absolute 0.16 (H) 0.00 - 0.05 10*3/mm3    nRBC 0.0 0.0 - 0.2 /100 WBC   ECG 12 Lead ED Triage Standing Order; SOA   Result Value Ref Range    QT Interval 540 ms    QTC Interval 540 ms   Green Top (Gel)   Result Value Ref Range    Extra Tube Hold for add-ons.    Lavender Top   Result Value Ref Range    Extra Tube hold for add-on    Gold Top - SST   Result Value Ref Range    Extra Tube Hold for add-ons.    Gray Top   Result Value Ref Range    Extra Tube Hold for add-ons.    Light Blue Top   Result Value Ref Range    Extra Tube Hold for add-ons.         If labs were ordered, I independently reviewed the results and considered them in treating the patient.      EMERGENCY DEPARTMENT COURSE and DIFFERENTIAL DIAGNOSIS/MDM:   Vitals:  AS OF 16:56 EDT    BP - 108/70  HR - 61  TEMP - 98.1 °F (36.7 °C)  O2 SATS - 97%      Orders placed during this visit:  Orders Placed This Encounter   Procedures    XR Chest 1 View    [place] Draw    Comprehensive Metabolic Panel    BNP    Single High Sensitivity Troponin T    CBC Auto Differential    High Sensitivity Troponin T 2Hr    Potassium    NPO Diet NPO Type: Strict NPO    Undress & Gown    Continuous Pulse Oximetry    Vital Signs    Oxygen Therapy- Nasal Cannula; Titrate 1-6 LPM Per SpO2; 90 - 95%    ECG 12 Lead ED Triage Standing Order; SOA    Insert Peripheral IV    CBC & Differential    Green Top (Gel)    Lavender Top    Gold Top - SST    Gray Top    Light Blue Top       All labs have been independently reviewed by me.  All radiology studies have been reviewed by me and the radiologist dictating the report.  All EKG's have been independently viewed and interpreted by me.      Discussion  below represents my analysis of pertinent findings related to patient's condition, differential diagnosis, treatment plan and final disposition.    Differential diagnosis:  The differential diagnosis associated with the patient's presentation includes: Peripheral edema, venous status dermatitis, congestive heart failure.    Additional sources  Discussed/ obtained information from independent historians:   [] Spouse  [] Parent  [x] Family member, son at bedside  [] Friend  [] EMS   [] Other:    External (non-ED) record review:   [] Inpatient record:   [] Office record:   [] Outpatient record:   [] Prior Outpatient labs:   [] Prior Outpatient radiology:   [] Primary Care record:   [] Outside ED record:   [] Other:     Patient's care impacted by:   [] Diabetes  [] Hypertension  [] CHF  [] Hyperlipidemia  [] Coronary Artery Disease   [] COPD   [] Cancer   [] Tobacco Abuse   [] Substance Abuse    [x] Other: Congestive heart failure, venous status dermatitis    Care significantly affected by Social Determinants of Health (housing and economic circumstances, unemployment)    [] Yes     [x] No   If yes, Patient's care significantly limited by Social Determinants of Health including:   [] Inadequate housing   [] Low income   [] Alcoholism and drug addiction in family   [] Problems related to primary support group   [] Unemployment   [] Problems related to employment   [] Other Social Determinants of Health:       MEDICATIONS ADMINISTERED IN ED:  Medications   sodium chloride 0.9 % flush 10 mL (has no administration in time range)   Potassium Replacement - Follow Nurse / BPA Driven Protocol (has no administration in time range)   potassium chloride (KLOR-CON M20) CR tablet 40 mEq (40 mEq Oral Given 6/21/24 1650)   furosemide (LASIX) injection 80 mg (80 mg Intravenous Given 6/21/24 1504)              This is a pleasant 64-year-old male who was had significant worsening of his bilateral lower extremity peripheral edema.  He is  on Lasix 40 mg twice daily has been compliant with his medication.  Recent hospitalization for GI bleed requiring intervention.  Unfortunately his swelling is been progressively worsening on his outpatient educations.  I saturations are 96% on room air, as patient is very heavyset individual with 4+ edema to bilateral lower extremities, significant venous stasis dermatitis.  Lab work reveals a BNP of 4100, creatinine of 2.11, potassium 3.2.  Will plan on continuing with diuresis, using caution long-term given his renal insufficiency.  Potassium replacement.  Chest x-ray with congestive heart failure changes.  At this point given his worsening symptoms on his home therapies he will require stronger diuresis, monitoring, close follow-up with his kidney function.  Case discussed with hospitalist, Dr. Key, for admission.      PROCEDURES:  Procedures    CRITICAL CARE TIME    Total Critical Care time was 0 minutes, excluding separately reportable procedures.   There was a high probability of clinically significant/life threatening deterioration in the patient's condition which required my urgent intervention.      FINAL IMPRESSION      1. Peripheral edema    2. Congestive heart failure, unspecified HF chronicity, unspecified heart failure type    3. Hypokalemia          DISPOSITION/PLAN     ED Disposition       ED Disposition   Decision to Admit    Condition   --    Comment   --               PATIENT REFERRED TO:  No follow-up provider specified.    DISCHARGE MEDICATIONS:     Medication List        ASK your doctor about these medications      busPIRone 15 MG tablet  Commonly known as: BUSPAR     BYSTOLIC PO     furosemide 40 MG tablet  Commonly known as: LASIX     glimepiride 2 MG tablet  Commonly known as: AMARYL     Linzess 72 MCG capsule capsule  Generic drug: linaclotide     magnesium oxide 400 MG tablet  Commonly known as: MAG-OX     nystatin 355935 UNIT/GM powder     spironolactone 100 MG tablet  Commonly known  as: ALDACTONE     Suprep Bowel Prep Kit 17.5-3.13-1.6 GM/177ML solution oral solution  Generic drug: sodium-potassium-magnesium sulfates  Take 2 bottles by mouth Take As Directed.     tamsulosin 0.4 MG capsule 24 hr capsule  Commonly known as: FLOMAX     terbinafine 250 MG tablet  Commonly known as: lamiSIL     Tradjenta 5 MG tablet tablet  Generic drug: linagliptin     vitamin D3 125 MCG (5000 UT) capsule capsule     Xarelto 20 MG tablet  Generic drug: rivaroxaban                  Comment: Please note this report has been produced using speech recognition software.      Greyson Vega DO  Attending Emergency Physician         Greyson Vega,   06/21/24 2602

## 2024-06-21 NOTE — H&P
[facility]  HISTORY AND PHYSICAL    Patient Name: Jaycob Ndiaye II  : 1960  MRN: [id number]  Primary Care Physician: Lorena Chamberlain, GARRICK  Date of admission: 2024    Subjective   Subjective     Chief Complaint:  Shortness of breath, leg swelling     HPI:  Jaycob Ndiaye II is a 64 y.o. male with past medical history significant for CHF, COPD, cirrhosis, prostate cancer, chronic hematuria, GI bleed, HCV, HTN, and HLD who presents to the ED due to worsening shortness of breath and lower extremity edema over the past week. He was recently admitted to [facility] due to hematuria and concern for GI bleed. He reports hematuria has been chronic since prostatectomy and subsequent prostate radiation.  He underwent EGD and colonoscopy at [facility] on  but he is unsure if they found a source of bleeding.  He had previously been on Xarelto due to history of atrial fibrillation but this was discontinued due to persistent hematuria during previous admission to [facility].  He denies any further hematuria or melena since AC was discontinued. Since returning home, he has experienced progressive shortness of breath and worsening peripheral edema which prompted evaluation in the ED today.  He follows with cardiologist Dr. Craig and he has been compliant with home Lasix 40 mg BID. He also notes increased abdominal edema.  He recently underwent abdominal US on 6/10/2024 that revealed nodular liver consistent with cirrhosis. He underwent ultrasound-guided paracentesis on 2024 with approximately 200 ml of skylar-colored fluid removed. He denies any recent chest pain, fever, chills, BRBPR, melena, cough, vomiting, or diarrhea.     In the ED, chest x-ray revealed marked enlargement of the cardiac silhouette with mild pulmonary vascular congestion and probable bibasilar atelectasis.  Labs were reviewed and significant for troponin 137, proBNP 4183,  sodium 134, potassium 3.2, creatinine 2.11, EGFR 34.1, glucose 151, WBC 11.72, and hemoglobin 9.2.  Vital signs were reviewed and significant for blood pressure of 91/59 on presentation.  The patient will be admitted to hospital medicine for further evaluation and treatment.      Personal History     Past Medical History:   Diagnosis Date    Arthritis     Cancer     Constipation     Depression     Diabetes     type 2 dm, check blood sugar once a day    History of hepatitis C     History of prostate cancer     Hypertension     Irregular heartbeat     Pacemaker     home monitoring    Prostate CA     Requires supplemental oxygen     at night with cpap    Sleep apnea     wears a cpap    SOB (shortness of breath) on exertion     Wears glasses              Past Surgical History:   Procedure Laterality Date    CARDIAC ABLATION      COLONOSCOPY N/A 04/28/2021    Procedure: Colonoscopy with polypectomy;  Surgeon: Maurice Proctor MD;  Location: [facility] ENDOSCOPY;  Service: Gastroenterology;  Laterality: N/A;    COLONOSCOPY      ENDOSCOPY      KNEE SURGERY Right     1985    PACEMAKER IMPLANTATION      PROSTATE SURGERY      REPLACEMENT TOTAL KNEE Right        Family History:  family history includes Breast cancer in his mother; Heart failure in his mother; Leukemia in his father; Ovarian cancer in his mother.     Social History:  reports that he has quit smoking. His smoking use included pipe and cigars. He has never used smokeless tobacco. He reports current alcohol use. He reports that he does not use drugs.  Social History     Social History Narrative    Not on file       Medications:  Budeson-Glycopyrrol-Formoterol, Tirzepatide, doxycycline, ferrous gluconate, furosemide, linaclotide, magnesium oxide, metOLazone, nebivolol, rivaroxaban, and spironolactone    Allergies   Allergen Reactions    Ketamine Confusion    Nsaids Swelling     Swelling of throat      Contrast Dye (Echo Or Unknown Ct/Mr) Rash     Hives, swelling ,  itching          Objective   Objective     Vital Signs:   Temp:  [97.9 °F (36.6 °C)-98.1 °F (36.7 °C)] 97.9 °F (36.6 °C)  Heart Rate:  [57-62] 57  Resp:  [16-18] 18  BP: ()/(57-97) 107/64    Physical Exam   Constitutional: Awake, alert, chronically ill-appearing  Eyes: PERRLA, sclerae anicteric, no conjunctival injection  HENT: NCAT, mucous membranes moist  Neck: Supple, no thyromegaly, no lymphadenopathy, trachea midline  Respiratory: Coarse, bibasilar crackles, nonlabored respirations on room air  Cardiovascular: RRR, no murmurs, rubs, or gallops  Gastrointestinal: Positive bowel sounds, moderately distended, nontender  Musculoskeletal: 2+ BLE edema from ankle to abdomen  Psychiatric: Appropriate affect, cooperative  Neurologic: Oriented x 3, moves all extremities, speech clear  Skin: warm, dry, BLE with chronic venous stasis dermatitis changes     Result Review:  I have personally reviewed the results from the time of this admission to 6/21/2024 18:41 EDT and agree with these findings:  [x]  Laboratory list / accordion  [x]  Microbiology  [x]  Radiology  [x]  EKG/Telemetry   []  Cardiology/Vascular   []  Pathology  [x]  Old records  []  Other:  Most notable findings include:     LAB RESULTS:      Lab 06/21/24  1430   WBC 11.72*   HEMOGLOBIN 9.2*   HEMATOCRIT 28.5*   PLATELETS 217   NEUTROS ABS 9.39*   IMMATURE GRANS (ABS) 0.16*   LYMPHS ABS 1.02   MONOS ABS 1.06*   EOS ABS 0.03   MCV 93.8         Lab 06/21/24  1430   SODIUM 134*   POTASSIUM 3.2*   CHLORIDE 93*   CO2 27.0   ANION GAP 14.0   BUN 62*   CREATININE 2.11*   EGFR 34.1*   GLUCOSE 151*   CALCIUM 8.7         Lab 06/21/24  1430   TOTAL PROTEIN 6.2   ALBUMIN 3.6   GLOBULIN 2.6   ALT (SGPT) 9   AST (SGOT) 14   BILIRUBIN 0.6   ALK PHOS 78         Lab 06/21/24  1627 06/21/24  1430   PROBNP  --  4,183.0*   HSTROP T 131* 137*                 Brief Urine Lab Results       None          Microbiology Results (last 10 days)       ** No results found for the  last 240 hours. **            XR Chest 1 View    Result Date: 6/21/2024  XR CHEST 1 VW Date of Exam: 6/21/2024 2:56 PM EDT Indication: SOA triage protocol Comparison: None available. Findings: There is a left subclavian dual-lead cardiac stimulator device. There is cardiomegaly. Central pulmonary vascular congestion. No definite findings to suggest CHF. No pneumothorax identified. No large effusion. There are linear densities in the mid and lower lung suggesting minor atelectasis.     Impression: Impression: Marked enlargement of the cardiac silhouette, with mild pulmonary vascular congestion. Probable bibasilar atelectasis. Electronically Signed: Maurizio Oakley MD  6/21/2024 3:14 PM EDT  Workstation ID: [id number]     ECHO at [facility] 6/10/24  Normal sized left ventricle.    Severe left ventricular septal hypertrophy.    Visually estimated ejection fraction 60% +/- 5%.    Normal left ventricular systolic function.    Grade II diastolic dysfunction.    No hemodynamically significant valvular heart disease.    Severely abnormal left atrial volume index 74 ml/m2.   RUQ US 6/10/24  1. Nodular liver consistent with cirrhosis. Moderate ascites.   2. Cholelithiasis. There is gallbladder wall thickening but this is   nonspecific in the setting of ascites.     Assessment & Plan   Assessment & Plan       Acute on chronic heart failure with preserved ejection fraction (HFpEF)    Elevated serum creatinine    Anemia    Hypokalemia    Cirrhosis of liver    HCV (hepatitis C virus)    Jaycob Ndiaye II is a 64 y.o. male with past medical history significant for CHF, COPD, cirrhosis, prostate cancer, chronic hematuria, GI bleed, HCV, HTN, and HLD who presents to the ED due to worsening shortness of breath and lower extremity edema over the past week.    A/C HFrEF  -Follows with Cardiologist Dr. Craig  -Abhinav ust done at [facility]  -s/p Lasix 80 mg IV in ED cont bid  -hold home Lasix, Metolazone for now   -Cardiology consult in AM    -Strict I&O, daily weights  -AM labs     Elevated Creatinine  -Baseline data deficit  -Creat 2.11 on presentation  -avoid nephrotoxins as able  -CMP in AM     Anemia  Recent GI Bleed  -s/p EGD and colonoscopy at [facility] on 6/13/2024, records requested  -Hgb 9.2 on presentation  -Continue PPI   -continue PO Iron   -Check Iron profile, ferritin, B12, folate, retic in AM  -CBC in AM     Hypokalemia  -Replace per protocol    New cirrhosis   HCV  -s/p US abd on 6/10/24 that revealed nodular liver consistent with cirrhosis and moderate ascites  -S/p US guided paracentesis on 6/12/2024 at [facility] with 200 mL of skylar-colored fluid removed.  No fluid was sent to lab.  -Repeat abdominal US ordered to evaluate ascites   -Will need referral to establish with [facility] upon discharge per patient request    T2DM with peripheral neuropathy  -hold home Mounjaro   -Low dose SSI for now  -Hgb A1C in AM     PAF  -Xarelto discontinued 6/13 at [facility] d/t persistent hematuria     HTN  -hold home bystolic for now d/t borderline BP     DVT prophylaxis:  Heparin     CODE STATUS:    Code Status and Medical Interventions:   Ordered at: 06/21/24 1840     Level Of Support Discussed With:    Patient     Code Status (Patient has no pulse and is not breathing):    CPR (Attempt to Resuscitate)     Medical Interventions (Patient has pulse or is breathing):    Full Support     Expected Discharge  Expected Discharge Date: 6/24/2024; Expected Discharge Time:       This note has been completed as part of a split-shared workflow.     Signature: Electronically signed by GARRICK Danielle, 06/21/24, 6:41 PM EDT  Patient seen and examined.  Agree with above. Cont diuresis, hold other BP meds, CPAP (needs new machine for home)  Edda Key MD 06/21/24 21:51 EDT

## 2024-06-21 NOTE — ED NOTES
" Jaycob Sanchez [facility]    Nursing Report ED to Floor:  Mental status: A&Ox4  Ambulatory status: standby to x1  Oxygen Therapy:  room air  Cardiac Rhythm: paced  Admitted from: home  Safety Concerns:  none  Social Issues: none, family at bedside  ED Room #:  09    ED Nurse Phone Extension - [phone] or may call [phone].      HPI:   Chief Complaint   Patient presents with    Shortness of Breath    Leg Swelling       Past Medical History:  Past Medical History:   Diagnosis Date    Arthritis     Cancer     Constipation     Depression     Diabetes     type 2 dm, check blood sugar once a day    History of hepatitis C     History of prostate cancer     Hypertension     Irregular heartbeat     Pacemaker     home monitoring    Prostate CA     Requires supplemental oxygen     at night with cpap    Sleep apnea     wears a cpap    SOB (shortness of breath) on exertion     Wears glasses         Past Surgical History:  Past Surgical History:   Procedure Laterality Date    CARDIAC ABLATION      COLONOSCOPY N/A 4/28/2021    Procedure: Colonoscopy with polypectomy;  Surgeon: Maurice Proctor MD;  [facility];  Service: Gastroenterology;  Laterality: N/A;    KNEE SURGERY Right     1985    PACEMAKER IMPLANTATION      PROSTATE SURGERY      REPLACEMENT TOTAL KNEE Right         Admitting Doctor:   Edda Key MD    Consulting Provider(s):  Consults       No orders found from 5/23/2024 to 6/22/2024.             Admitting Diagnosis:   The primary encounter diagnosis was Peripheral edema. Diagnoses of Congestive heart failure, unspecified HF chronicity, unspecified heart failure type and Hypokalemia were also pertinent to this visit.    Most Recent Vitals:   Vitals:    06/21/24 1409 06/21/24 1449 06/21/24 1628   BP: 91/59 112/97 108/70   BP Location: Left arm     Patient Position: Sitting     Pulse: 62 60 61   Resp: 16     Temp: 98.1 °F (36.7 °C)     SpO2: 98% 98% 97%   Weight: 127 kg (279 lb)     Height: 185.4 cm (73\") "         Active LDAs/IV Access:   Lines, Drains & Airways       Active LDAs       Name Placement date Placement time Site Days    Peripheral IV 06/21/24 1504 Right Antecubital 06/21/24  1504  Antecubital  less than 1                    Labs (abnormal labs have a star):   Labs Reviewed   COMPREHENSIVE METABOLIC PANEL - Abnormal; Notable for the following components:       Result Value    Glucose 151 (*)     BUN 62 (*)     Creatinine 2.11 (*)     Sodium 134 (*)     Potassium 3.2 (*)     Chloride 93 (*)     BUN/Creatinine Ratio 29.4 (*)     eGFR 34.1 (*)     All other components within normal limits    Narrative:     GFR Normal >60  Chronic Kidney Disease <60  Kidney Failure <15     BNP (IN-HOUSE) - Abnormal; Notable for the following components:    proBNP 4,183.0 (*)     All other components within normal limits    Narrative:     This assay is used as an aid in the diagnosis of individuals suspected of having heart failure. It can be used as an aid in the diagnosis of acute decompensated heart failure (ADHF) in patients presenting with signs and symptoms of ADHF to the emergency department (ED). In addition, NT-proBNP of <300 pg/mL indicates ADHF is not likely.    Age Range Result Interpretation  NT-proBNP Concentration (pg/mL:      <50             Positive            >450                   Gray                 300-450                    Negative             <300    50-75           Positive            >900                  Gray                300-900                  Negative            <300      >75             Positive            >1800                  Gray                300-1800                  Negative            <300   SINGLE HS TROPONIN T - Abnormal; Notable for the following components:    HS Troponin T 137 (*)     All other components within normal limits    Narrative:     High Sensitive Troponin T Reference Range:  <14.0 ng/L- Negative Female for AMI  <22.0 ng/L- Negative Male for AMI  >=14 - Abnormal Female  indicating possible myocardial injury.  >=22 - Abnormal Male indicating possible myocardial injury.   Clinicians would have to utilize clinical acumen, EKG, Troponin, and serial changes to determine if it is an Acute Myocardial Infarction or myocardial injury due to an underlying chronic condition.        CBC WITH AUTO DIFFERENTIAL - Abnormal; Notable for the following components:    WBC 11.72 (*)     RBC 3.04 (*)     Hemoglobin 9.2 (*)     Hematocrit 28.5 (*)     Neutrophil % 80.1 (*)     Lymphocyte % 8.7 (*)     Immature Grans % 1.4 (*)     Neutrophils, Absolute 9.39 (*)     Monocytes, Absolute 1.06 (*)     Immature Grans, Absolute 0.16 (*)     All other components within normal limits   HIGH SENSITIVITIY TROPONIN T 2HR - Abnormal; Notable for the following components:    HS Troponin T 131 (*)     Troponin T Delta -6 (*)     All other components within normal limits    Narrative:     High Sensitive Troponin T Reference Range:  <14.0 ng/L- Negative Female for AMI  <22.0 ng/L- Negative Male for AMI  >=14 - Abnormal Female indicating possible myocardial injury.  >=22 - Abnormal Male indicating possible myocardial injury.   Clinicians would have to utilize clinical acumen, EKG, Troponin, and serial changes to determine if it is an Acute Myocardial Infarction or myocardial injury due to an underlying chronic condition.        RAINBOW DRAW    Narrative:     The following orders were created for panel order [place] Draw.  Procedure                               Abnormality         Status                     ---------                               -----------         ------                     Green Top (Gel)[[id number]]                                  Final result               Lavender Top[[id number]]                                     Final result               Gold Top - SST[[id number]]                                   Final result               Mayen Top[[id number]]                                         Final result                Light Blue Top[[id number]]                                   Final result                 Please view results for these tests on the individual orders.   POTASSIUM   CBC AND DIFFERENTIAL    Narrative:     The following orders were created for panel order CBC & Differential.  Procedure                               Abnormality         Status                     ---------                               -----------         ------                     CBC Auto Differential[[id number]]        Abnormal            Final result                 Please view results for these tests on the individual orders.   GREEN TOP   LAVENDER TOP   GOLD TOP - SST   GRAY TOP   LIGHT BLUE TOP       Meds Given in ED:   Medications   sodium chloride 0.9 % flush 10 mL (has no administration in time range)   Potassium Replacement - Follow Nurse / BPA Driven Protocol (has no administration in time range)   potassium chloride (KLOR-CON M20) CR tablet 40 mEq (40 mEq Oral Given 6/21/24 1650)   furosemide (LASIX) injection 80 mg (80 mg Intravenous Given 6/21/24 1504)           Last NIH score:                                                          Dysphagia screening results:  Patient Factors Component (Dysphagia:Stroke or Rule-out)  Best Eye Response: 4-->(E4) spontaneous (06/21/24 1436)  Best Motor Response: 6-->(M6) obeys commands (06/21/24 1436)  Best Verbal Response: 5-->(V5) oriented (06/21/24 1436)  Kiran Coma Scale Score: 15 (06/21/24 1436)     Kiran Coma Scale:  No data recorded     CIWA:        Restraint Type:            Isolation Status:  No active isolations

## 2024-06-22 PROBLEM — I50.23 ACUTE ON CHRONIC HFREF (HEART FAILURE WITH REDUCED EJECTION FRACTION): Status: ACTIVE | Noted: 2024-06-22

## 2024-06-22 LAB
ALBUMIN SERPL-MCNC: 3.5 G/DL (ref 3.5–5.2)
ALBUMIN/GLOB SERPL: 1.5 G/DL
ALP SERPL-CCNC: 74 U/L (ref 39–117)
ALT SERPL W P-5'-P-CCNC: 9 U/L (ref 1–41)
ANION GAP SERPL CALCULATED.3IONS-SCNC: 12 MMOL/L (ref 5–15)
AST SERPL-CCNC: 13 U/L (ref 1–40)
BASOPHILS # BLD AUTO: 0.05 10*3/MM3 (ref 0–0.2)
BASOPHILS NFR BLD AUTO: 0.5 % (ref 0–1.5)
BILIRUB SERPL-MCNC: 0.6 MG/DL (ref 0–1.2)
BUN SERPL-MCNC: 63 MG/DL (ref 8–23)
BUN/CREAT SERPL: 29.7 (ref 7–25)
CALCIUM SPEC-SCNC: 9 MG/DL (ref 8.6–10.5)
CHLORIDE SERPL-SCNC: 96 MMOL/L (ref 98–107)
CO2 SERPL-SCNC: 27 MMOL/L (ref 22–29)
CREAT SERPL-MCNC: 2.12 MG/DL (ref 0.76–1.27)
DEPRECATED RDW RBC AUTO: 50.5 FL (ref 37–54)
EGFRCR SERPLBLD CKD-EPI 2021: 33.9 ML/MIN/1.73
EOSINOPHIL # BLD AUTO: 0.07 10*3/MM3 (ref 0–0.4)
EOSINOPHIL NFR BLD AUTO: 0.7 % (ref 0.3–6.2)
ERYTHROCYTE [DISTWIDTH] IN BLOOD BY AUTOMATED COUNT: 14.6 % (ref 12.3–15.4)
FERRITIN SERPL-MCNC: 88.02 NG/ML (ref 30–400)
FOLATE SERPL-MCNC: 8.26 NG/ML (ref 4.78–24.2)
GLOBULIN UR ELPH-MCNC: 2.3 GM/DL
GLUCOSE BLDC GLUCOMTR-MCNC: 139 MG/DL (ref 70–130)
GLUCOSE BLDC GLUCOMTR-MCNC: 145 MG/DL (ref 70–130)
GLUCOSE BLDC GLUCOMTR-MCNC: 167 MG/DL (ref 70–130)
GLUCOSE BLDC GLUCOMTR-MCNC: 180 MG/DL (ref 70–130)
GLUCOSE SERPL-MCNC: 116 MG/DL (ref 65–99)
HBA1C MFR BLD: 6 % (ref 4.8–5.6)
HCT VFR BLD AUTO: 27.6 % (ref 37.5–51)
HGB BLD-MCNC: 8.8 G/DL (ref 13–17.7)
IMM GRANULOCYTES # BLD AUTO: 0.07 10*3/MM3 (ref 0–0.05)
IMM GRANULOCYTES NFR BLD AUTO: 0.7 % (ref 0–0.5)
IRON 24H UR-MRATE: 30 MCG/DL (ref 59–158)
IRON SATN MFR SERPL: 10 % (ref 20–50)
LYMPHOCYTES # BLD AUTO: 0.94 10*3/MM3 (ref 0.7–3.1)
LYMPHOCYTES NFR BLD AUTO: 9.2 % (ref 19.6–45.3)
MAGNESIUM SERPL-MCNC: 2.1 MG/DL (ref 1.6–2.4)
MCH RBC QN AUTO: 30.1 PG (ref 26.6–33)
MCHC RBC AUTO-ENTMCNC: 31.9 G/DL (ref 31.5–35.7)
MCV RBC AUTO: 94.5 FL (ref 79–97)
MONOCYTES # BLD AUTO: 1.01 10*3/MM3 (ref 0.1–0.9)
MONOCYTES NFR BLD AUTO: 9.9 % (ref 5–12)
NEUTROPHILS NFR BLD AUTO: 79 % (ref 42.7–76)
NEUTROPHILS NFR BLD AUTO: 8.04 10*3/MM3 (ref 1.7–7)
NRBC BLD AUTO-RTO: 0 /100 WBC (ref 0–0.2)
PLATELET # BLD AUTO: 215 10*3/MM3 (ref 140–450)
PMV BLD AUTO: 10.2 FL (ref 6–12)
POTASSIUM SERPL-SCNC: 3.4 MMOL/L (ref 3.5–5.2)
POTASSIUM SERPL-SCNC: 3.7 MMOL/L (ref 3.5–5.2)
PROT SERPL-MCNC: 5.8 G/DL (ref 6–8.5)
RBC # BLD AUTO: 2.92 10*6/MM3 (ref 4.14–5.8)
RETICS # AUTO: 0.05 10*6/MM3 (ref 0.02–0.13)
RETICS/RBC NFR AUTO: 1.85 % (ref 0.7–1.9)
SODIUM SERPL-SCNC: 135 MMOL/L (ref 136–145)
TIBC SERPL-MCNC: 301 MCG/DL (ref 298–536)
TRANSFERRIN SERPL-MCNC: 202 MG/DL (ref 200–360)
VIT B12 BLD-MCNC: 1127 PG/ML (ref 211–946)
WBC NRBC COR # BLD AUTO: 10.18 10*3/MM3 (ref 3.4–10.8)

## 2024-06-22 PROCEDURE — 97166 OT EVAL MOD COMPLEX 45 MIN: CPT

## 2024-06-22 PROCEDURE — 29581 APPL MULTLAYER CMPRN SYS LEG: CPT

## 2024-06-22 PROCEDURE — 84132 ASSAY OF SERUM POTASSIUM: CPT | Performed by: INTERNAL MEDICINE

## 2024-06-22 PROCEDURE — 97530 THERAPEUTIC ACTIVITIES: CPT

## 2024-06-22 PROCEDURE — 82728 ASSAY OF FERRITIN: CPT | Performed by: NURSE PRACTITIONER

## 2024-06-22 PROCEDURE — 82948 REAGENT STRIP/BLOOD GLUCOSE: CPT

## 2024-06-22 PROCEDURE — 63710000001 INSULIN LISPRO (HUMAN) PER 5 UNITS: Performed by: NURSE PRACTITIONER

## 2024-06-22 PROCEDURE — 83036 HEMOGLOBIN GLYCOSYLATED A1C: CPT | Performed by: NURSE PRACTITIONER

## 2024-06-22 PROCEDURE — 94799 UNLISTED PULMONARY SVC/PX: CPT

## 2024-06-22 PROCEDURE — 84466 ASSAY OF TRANSFERRIN: CPT | Performed by: NURSE PRACTITIONER

## 2024-06-22 PROCEDURE — 85025 COMPLETE CBC W/AUTO DIFF WBC: CPT | Performed by: NURSE PRACTITIONER

## 2024-06-22 PROCEDURE — 80053 COMPREHEN METABOLIC PANEL: CPT | Performed by: NURSE PRACTITIONER

## 2024-06-22 PROCEDURE — 25010000002 FUROSEMIDE PER 20 MG: Performed by: INTERNAL MEDICINE

## 2024-06-22 PROCEDURE — 94664 DEMO&/EVAL PT USE INHALER: CPT

## 2024-06-22 PROCEDURE — 82746 ASSAY OF FOLIC ACID SERUM: CPT | Performed by: NURSE PRACTITIONER

## 2024-06-22 PROCEDURE — 82607 VITAMIN B-12: CPT | Performed by: NURSE PRACTITIONER

## 2024-06-22 PROCEDURE — 97162 PT EVAL MOD COMPLEX 30 MIN: CPT

## 2024-06-22 PROCEDURE — 83735 ASSAY OF MAGNESIUM: CPT | Performed by: NURSE PRACTITIONER

## 2024-06-22 PROCEDURE — 99222 1ST HOSP IP/OBS MODERATE 55: CPT | Performed by: INTERNAL MEDICINE

## 2024-06-22 PROCEDURE — 99233 SBSQ HOSP IP/OBS HIGH 50: CPT | Performed by: INTERNAL MEDICINE

## 2024-06-22 PROCEDURE — 25010000002 HEPARIN (PORCINE) PER 1000 UNITS: Performed by: NURSE PRACTITIONER

## 2024-06-22 PROCEDURE — 85045 AUTOMATED RETICULOCYTE COUNT: CPT | Performed by: NURSE PRACTITIONER

## 2024-06-22 PROCEDURE — 83540 ASSAY OF IRON: CPT | Performed by: NURSE PRACTITIONER

## 2024-06-22 PROCEDURE — 94761 N-INVAS EAR/PLS OXIMETRY MLT: CPT

## 2024-06-22 RX ORDER — POTASSIUM CHLORIDE 20 MEQ/1
40 TABLET, EXTENDED RELEASE ORAL EVERY 4 HOURS
Qty: 4 TABLET | Refills: 0 | Status: COMPLETED | OUTPATIENT
Start: 2024-06-22 | End: 2024-06-22

## 2024-06-22 RX ORDER — METOLAZONE 5 MG/1
5 TABLET ORAL ONCE
Status: COMPLETED | OUTPATIENT
Start: 2024-06-22 | End: 2024-06-22

## 2024-06-22 RX ADMIN — FUROSEMIDE 80 MG: 10 INJECTION, SOLUTION INTRAMUSCULAR; INTRAVENOUS at 08:15

## 2024-06-22 RX ADMIN — FUROSEMIDE 80 MG: 10 INJECTION, SOLUTION INTRAMUSCULAR; INTRAVENOUS at 17:30

## 2024-06-22 RX ADMIN — IPRATROPIUM BROMIDE AND ALBUTEROL SULFATE 3 ML: 2.5; .5 SOLUTION RESPIRATORY (INHALATION) at 16:27

## 2024-06-22 RX ADMIN — POTASSIUM CHLORIDE 40 MEQ: 1500 TABLET, EXTENDED RELEASE ORAL at 11:40

## 2024-06-22 RX ADMIN — METOLAZONE 5 MG: 5 TABLET ORAL at 11:40

## 2024-06-22 RX ADMIN — POTASSIUM CHLORIDE 40 MEQ: 1500 TABLET, EXTENDED RELEASE ORAL at 08:15

## 2024-06-22 RX ADMIN — INSULIN LISPRO 2 UNITS: 100 INJECTION, SOLUTION INTRAVENOUS; SUBCUTANEOUS at 17:30

## 2024-06-22 RX ADMIN — FERROUS SULFATE TAB 325 MG (65 MG ELEMENTAL FE) 325 MG: 325 (65 FE) TAB at 08:15

## 2024-06-22 RX ADMIN — PANTOPRAZOLE SODIUM 40 MG: 40 TABLET, DELAYED RELEASE ORAL at 03:46

## 2024-06-22 RX ADMIN — Medication 10 ML: at 08:16

## 2024-06-22 RX ADMIN — POLYETHYLENE GLYCOL 3350 17 G: 17 POWDER, FOR SOLUTION ORAL at 21:16

## 2024-06-22 RX ADMIN — IPRATROPIUM BROMIDE AND ALBUTEROL SULFATE 3 ML: 2.5; .5 SOLUTION RESPIRATORY (INHALATION) at 20:38

## 2024-06-22 RX ADMIN — INSULIN LISPRO 2 UNITS: 100 INJECTION, SOLUTION INTRAVENOUS; SUBCUTANEOUS at 08:15

## 2024-06-22 RX ADMIN — Medication 10 ML: at 21:16

## 2024-06-22 NOTE — PLAN OF CARE
Goal Outcome Evaluation:              Outcome Evaluation: Pt presents with generalized weakness, decreased activity tolerance, and LE edema limiting ADL and fxnl mobility indp. Pt D for LBD and IND for feeding. Pt completed bed mobility w/ Jesus and STS w/ SPV. Rec. IPOT to address deficits and support return to baseline. Rec. IRF upon d/c, will monitor as pt progresses and LE edema decreases.      Anticipated Discharge Disposition (OT): inpatient rehabilitation facility

## 2024-06-22 NOTE — PAYOR COMM NOTE
"Ref# [id number]  6/21/24 Observation admission  6/22/24 Flipped to IP    Utilization Review  Phone [phone]  Fax [phone]    [facility]  [address] [place] [address]                 Jaycob Scott II (64 y.o. Male)       Date of Birth   1960    Social Security Number       Address   [address] [address]    Home Phone   [phone]    MRN   [id number]       Jewish   None    Marital Status                               Admission Date   6/21/24    Admission Type   Emergency    Admitting Provider   Edda Key MD    Attending Provider   Edda Key MD    Department, Room/Bed   [facility] 6B, N636/1       Discharge Date       Discharge Disposition       Discharge Destination                                 Attending Provider: Edda Key MD    Allergies: Ketamine, Nsaids, Contrast Dye (Echo Or Unknown Ct/mr)    Isolation: None   Infection: None   Code Status: CPR    Ht: 185.4 cm (73\")   Wt: 127 kg (279 lb)    Admission Cmt: None   Principal Problem: Acute on chronic heart failure with preserved ejection fraction (HFpEF) [I50.33]                   Active Insurance as of 6/21/2024       Primary Coverage       Payor Plan Insurance Group Employer/Plan Group    ANTHEM MEDICARE REPLACEMENT ANTHEM MEDICARE ADVANTAGE [id number]       Payor Plan Address Payor Plan Phone Number Payor Plan Fax Number Effective Dates    [address] [phone]  10/1/2020 - None Entered    [facility] [address]         Subscriber Name Subscriber Birth Date Member ID       JAYCOB SCOTT II 1960 [id number]               Secondary Coverage       Payor Plan Insurance Group Employer/Plan Group    [place]        Payor Plan Address Payor Plan Phone Number Payor Plan Fax Number Effective Dates    [address] [phone]  6/21/2024 - None Entered    [facility] [address]         Subscriber Name " Subscriber Birth Date Member ID       KIKI SCOTT II 1960 [id number]                     Emergency Contacts        (Rel.) Home Phone Work Phone Mobile Phone    Kiki Scott (Son) [phone] -- --    isidra scott (Son) -- -- [phone]              [place]: NPI [id number] Tax ID [id number]  Insurance Information                  ANTHEM MEDICARE REPLACEMENT/ANTHEM MEDICARE ADVANTAGE Phone: [phone]    Subscriber: Kiki Scott II Subscriber#: [id number]    Group#: [id number] Precert#: --        [place] Phone: [phone]    Subscriber: Kiki Scott II Subscriber#: [id number]    Group#: -- Precert#: [id number]             History & Physical        Edda Key MD at [facility]              [facility] Medicine Services  HISTORY AND PHYSICAL    Patient Name: Kiki Scott II  : 1960  MRN: [id number]  Primary Care Physician: Lorena Chamberlain, GARRICK  Date of admission: 2024    Subjective  Subjective     Chief Complaint:  Shortness of breath, leg swelling     HPI:  Kiki Scott II is a 64 y.o. male with past medical history significant for CHF, COPD, cirrhosis, prostate cancer, chronic hematuria, GI bleed, HCV, HTN, and HLD who presents to the ED due to worsening shortness of breath and lower extremity edema over the past week. He was recently admitted to [facility] due to hematuria and concern for GI bleed. He reports hematuria has been chronic since prostatectomy and subsequent prostate radiation.  He underwent EGD and colonoscopy at [facility] on  but he is unsure if they found a source of bleeding.  He had previously been on Xarelto due to history of atrial fibrillation but this was discontinued due to persistent hematuria during previous admission to [facility].  He denies any further hematuria or melena since AC was discontinued. Since returning home, he has  experienced progressive shortness of breath and worsening peripheral edema which prompted evaluation in the ED today.  He follows with cardiologist Dr. Craig and he has been compliant with home Lasix 40 mg BID. He also notes increased abdominal edema.  He recently underwent abdominal US on 6/10/2024 that revealed nodular liver consistent with cirrhosis. He underwent ultrasound-guided paracentesis on 6/12/2024 with approximately 200 ml of skylar-colored fluid removed. He denies any recent chest pain, fever, chills, BRBPR, melena, cough, vomiting, or diarrhea.     In the ED, chest x-ray revealed marked enlargement of the cardiac silhouette with mild pulmonary vascular congestion and probable bibasilar atelectasis.  Labs were reviewed and significant for troponin 137, proBNP 4183, sodium 134, potassium 3.2, creatinine 2.11, EGFR 34.1, glucose 151, WBC 11.72, and hemoglobin 9.2.  Vital signs were reviewed and significant for blood pressure of 91/59 on presentation.  The patient will be admitted to hospital medicine for further evaluation and treatment.      Personal History     Past Medical History:   Diagnosis Date    Arthritis     Cancer     Constipation     Depression     Diabetes     type 2 dm, check blood sugar once a day    History of hepatitis C     History of prostate cancer     Hypertension     Irregular heartbeat     Pacemaker     home monitoring    Prostate CA     Requires supplemental oxygen     at night with cpap    Sleep apnea     wears a cpap    SOB (shortness of breath) on exertion     Wears glasses              Past Surgical History:   Procedure Laterality Date    CARDIAC ABLATION      COLONOSCOPY N/A 04/28/2021    Procedure: Colonoscopy with polypectomy;  Surgeon: Maurice Proctor MD;  Location: [facility] ENDOSCOPY;  Service: Gastroenterology;  Laterality: N/A;    COLONOSCOPY      ENDOSCOPY      KNEE SURGERY Right     1985    PACEMAKER IMPLANTATION      PROSTATE SURGERY      REPLACEMENT TOTAL KNEE  Right        Family History:  family history includes Breast cancer in his mother; Heart failure in his mother; Leukemia in his father; Ovarian cancer in his mother.     Social History:  reports that he has quit smoking. His smoking use included pipe and cigars. He has never used smokeless tobacco. He reports current alcohol use. He reports that he does not use drugs.  Social History     Social History Narrative    Not on file       Medications:  Budeson-Glycopyrrol-Formoterol, Tirzepatide, doxycycline, ferrous gluconate, furosemide, linaclotide, magnesium oxide, metOLazone, nebivolol, rivaroxaban, and spironolactone    Allergies   Allergen Reactions    Ketamine Confusion    Nsaids Swelling     Swelling of throat      Contrast Dye (Echo Or Unknown Ct/Mr) Rash     Hives, swelling , itching          Objective  Objective     Vital Signs:   Temp:  [97.9 °F (36.6 °C)-98.1 °F (36.7 °C)] 97.9 °F (36.6 °C)  Heart Rate:  [57-62] 57  Resp:  [16-18] 18  BP: ()/(57-97) 107/64    Physical Exam   Constitutional: Awake, alert, chronically ill-appearing  Eyes: PERRLA, sclerae anicteric, no conjunctival injection  HENT: NCAT, mucous membranes moist  Neck: Supple, no thyromegaly, no lymphadenopathy, trachea midline  Respiratory: Coarse, bibasilar crackles, nonlabored respirations on room air  Cardiovascular: RRR, no murmurs, rubs, or gallops  Gastrointestinal: Positive bowel sounds, moderately distended, nontender  Musculoskeletal: 2+ BLE edema from ankle to abdomen  Psychiatric: Appropriate affect, cooperative  Neurologic: Oriented x 3, moves all extremities, speech clear  Skin: warm, dry, BLE with chronic venous stasis dermatitis changes     Result Review:  I have personally reviewed the results from the time of this admission to 6/21/2024 18:41 EDT and agree with these findings:  [x]  Laboratory list / accordion  [x]  Microbiology  [x]  Radiology  [x]  EKG/Telemetry   []  Cardiology/Vascular   []  Pathology  [x]  Old  records  []  Other:  Most notable findings include:     LAB RESULTS:      Lab 06/21/24  1430   WBC 11.72*   HEMOGLOBIN 9.2*   HEMATOCRIT 28.5*   PLATELETS 217   NEUTROS ABS 9.39*   IMMATURE GRANS (ABS) 0.16*   LYMPHS ABS 1.02   MONOS ABS 1.06*   EOS ABS 0.03   MCV 93.8         Lab 06/21/24  1430   SODIUM 134*   POTASSIUM 3.2*   CHLORIDE 93*   CO2 27.0   ANION GAP 14.0   BUN 62*   CREATININE 2.11*   EGFR 34.1*   GLUCOSE 151*   CALCIUM 8.7         Lab 06/21/24  1430   TOTAL PROTEIN 6.2   ALBUMIN 3.6   GLOBULIN 2.6   ALT (SGPT) 9   AST (SGOT) 14   BILIRUBIN 0.6   ALK PHOS 78         Lab 06/21/24  1627 06/21/24  1430   PROBNP  --  4,183.0*   HSTROP T 131* 137*                 Brief Urine Lab Results       None          Microbiology Results (last 10 days)       ** No results found for the last 240 hours. **            XR Chest 1 View    Result Date: 6/21/2024  XR CHEST 1 VW Date of Exam: 6/21/2024 2:56 PM EDT Indication: SOA triage protocol Comparison: None available. Findings: There is a left subclavian dual-lead cardiac stimulator device. There is cardiomegaly. Central pulmonary vascular congestion. No definite findings to suggest CHF. No pneumothorax identified. No large effusion. There are linear densities in the mid and lower lung suggesting minor atelectasis.     Impression: Impression: Marked enlargement of the cardiac silhouette, with mild pulmonary vascular congestion. Probable bibasilar atelectasis. Electronically Signed: Maurizio Oakley MD  6/21/2024 3:14 PM EDT  Workstation ID: [id number]     ECHO at [facility] 6/10/24  Normal sized left ventricle.    Severe left ventricular septal hypertrophy.    Visually estimated ejection fraction 60% +/- 5%.    Normal left ventricular systolic function.    Grade II diastolic dysfunction.    No hemodynamically significant valvular heart disease.    Severely abnormal left atrial volume index 74 ml/m2.   RUQ US 6/10/24  1. Nodular liver consistent with cirrhosis. Moderate ascites.   2.  Cholelithiasis. There is gallbladder wall thickening but this is   nonspecific in the setting of ascites.     Assessment & Plan  Assessment & Plan       Acute on chronic heart failure with preserved ejection fraction (HFpEF)    Elevated serum creatinine    Anemia    Hypokalemia    Cirrhosis of liver    HCV (hepatitis C virus)    Jaycob Ndiaye II is a 64 y.o. male with past medical history significant for CHF, COPD, cirrhosis, prostate cancer, chronic hematuria, GI bleed, HCV, HTN, and HLD who presents to the ED due to worsening shortness of breath and lower extremity edema over the past week.    A/C HFrEF  -Follows with Cardiologist Dr. Craig  -Echoj ust done at [facility]  -s/p Lasix 80 mg IV in ED cont bid  -hold home Lasix, Metolazone for now   -Cardiology consult in AM   -Strict I&O, daily weights  -AM labs     Elevated Creatinine  -Baseline data deficit  -Creat 2.11 on presentation  -avoid nephrotoxins as able  -CMP in AM     Anemia  Recent GI Bleed  -s/p EGD and colonoscopy at [facility] on 6/13/2024, records requested  -Hgb 9.2 on presentation  -Continue PPI   -continue PO Iron   -Check Iron profile, ferritin, B12, folate, retic in AM  -CBC in AM     Hypokalemia  -Replace per protocol    New cirrhosis   HCV  -s/p US abd on 6/10/24 that revealed nodular liver consistent with cirrhosis and moderate ascites  -S/p US guided paracentesis on 6/12/2024 at [facility] with 200 mL of skylar-colored fluid removed.  No fluid was sent to lab.  -Repeat abdominal US ordered to evaluate ascites   -Will need referral to establish with [facility] upon discharge per patient request    T2DM with peripheral neuropathy  -hold home Mounjaro   -Low dose SSI for now  -Hgb A1C in AM     PAF  -Xarelto discontinued 6/13 at [facility] d/t persistent hematuria     HTN  -hold home bystolic for now d/t borderline BP     DVT prophylaxis:  Heparin     CODE STATUS:    Code Status and Medical Interventions:   Ordered at: 06/21/24 1840     Level Of Support  Discussed With:    Patient     Code Status (Patient has no pulse and is not breathing):    CPR (Attempt to Resuscitate)     Medical Interventions (Patient has pulse or is breathing):    Full Support     Expected Discharge  Expected Discharge Date: 6/24/2024; Expected Discharge Time:       This note has been completed as part of a split-shared workflow.     Signature: Electronically signed by GARRICK Danielle, 06/21/24, 6:41 PM EDT  Patient seen and examined.  Agree with above. Cont diuresis, hold other BP meds, CPAP (needs new machine for home)  Edda Key MD 06/21/24 21:51 EDT               Electronically signed by Edda Key MD at 06/21/24 2151          Emergency Department Notes        Ruma Bermudez, LYNSEY at 06/21/24 1743           [facility]    Nursing Report ED to Floor:  Mental status: A&Ox4  Ambulatory status: standby to x1  Oxygen Therapy:  room air  Cardiac Rhythm: paced  Admitted from: home  Safety Concerns:  none  Social Issues: none, family at bedside  ED Room #:  09    ED Nurse Phone Extension - [phone] or may call [phone].      HPI:   Chief Complaint   Patient presents with    Shortness of Breath    Leg Swelling       Past Medical History:  Past Medical History:   Diagnosis Date    Arthritis     Cancer     Constipation     Depression     Diabetes     type 2 dm, check blood sugar once a day    History of hepatitis C     History of prostate cancer     Hypertension     Irregular heartbeat     Pacemaker     home monitoring    Prostate CA     Requires supplemental oxygen     at night with cpap    Sleep apnea     wears a cpap    SOB (shortness of breath) on exertion     Wears glasses         Past Surgical History:  Past Surgical History:   Procedure Laterality Date    CARDIAC ABLATION      COLONOSCOPY N/A 4/28/2021    Procedure: Colonoscopy with polypectomy;  Surgeon: Maurice Proctor MD;  Location: [facility] ENDOSCOPY;  Service: Gastroenterology;  Laterality: N/A;    KNEE  "SURGERY Right     1985    PACEMAKER IMPLANTATION      PROSTATE SURGERY      REPLACEMENT TOTAL KNEE Right         Admitting Doctor:   Edda Key MD    Consulting Provider(s):  Consults       No orders found from 5/23/2024 to 6/22/2024.             Admitting Diagnosis:   The primary encounter diagnosis was Peripheral edema. Diagnoses of Congestive heart failure, unspecified HF chronicity, unspecified heart failure type and Hypokalemia were also pertinent to this visit.    Most Recent Vitals:   Vitals:    06/21/24 1409 06/21/24 1449 06/21/24 1628   BP: 91/59 112/97 108/70   BP Location: Left arm     Patient Position: Sitting     Pulse: 62 60 61   Resp: 16     Temp: 98.1 °F (36.7 °C)     SpO2: 98% 98% 97%   Weight: 127 kg (279 lb)     Height: 185.4 cm (73\")         Active LDAs/IV Access:   Lines, Drains & Airways       Active LDAs       Name Placement date Placement time Site Days    Peripheral IV 06/21/24 1504 Right Antecubital 06/21/24  1504  Antecubital  less than 1                    Labs (abnormal labs have a star):   Labs Reviewed   COMPREHENSIVE METABOLIC PANEL - Abnormal; Notable for the following components:       Result Value    Glucose 151 (*)     BUN 62 (*)     Creatinine 2.11 (*)     Sodium 134 (*)     Potassium 3.2 (*)     Chloride 93 (*)     BUN/Creatinine Ratio 29.4 (*)     eGFR 34.1 (*)     All other components within normal limits    Narrative:     GFR Normal >60  Chronic Kidney Disease <60  Kidney Failure <15     BNP (IN-HOUSE) - Abnormal; Notable for the following components:    proBNP 4,183.0 (*)     All other components within normal limits    Narrative:     This assay is used as an aid in the diagnosis of individuals suspected of having heart failure. It can be used as an aid in the diagnosis of acute decompensated heart failure (ADHF) in patients presenting with signs and symptoms of ADHF to the emergency department (ED). In addition, NT-proBNP of <300 pg/mL indicates ADHF is not " likely.    Age Range Result Interpretation  NT-proBNP Concentration (pg/mL:      <50             Positive            >450                   Gray                 300-450                    Negative             <300    50-75           Positive            >900                  Gray                300-900                  Negative            <300      >75             Positive            >1800                  Gray                300-1800                  Negative            <300   SINGLE HS TROPONIN T - Abnormal; Notable for the following components:    HS Troponin T 137 (*)     All other components within normal limits    Narrative:     High Sensitive Troponin T Reference Range:  <14.0 ng/L- Negative Female for AMI  <22.0 ng/L- Negative Male for AMI  >=14 - Abnormal Female indicating possible myocardial injury.  >=22 - Abnormal Male indicating possible myocardial injury.   Clinicians would have to utilize clinical acumen, EKG, Troponin, and serial changes to determine if it is an Acute Myocardial Infarction or myocardial injury due to an underlying chronic condition.        CBC WITH AUTO DIFFERENTIAL - Abnormal; Notable for the following components:    WBC 11.72 (*)     RBC 3.04 (*)     Hemoglobin 9.2 (*)     Hematocrit 28.5 (*)     Neutrophil % 80.1 (*)     Lymphocyte % 8.7 (*)     Immature Grans % 1.4 (*)     Neutrophils, Absolute 9.39 (*)     Monocytes, Absolute 1.06 (*)     Immature Grans, Absolute 0.16 (*)     All other components within normal limits   HIGH SENSITIVITIY TROPONIN T 2HR - Abnormal; Notable for the following components:    HS Troponin T 131 (*)     Troponin T Delta -6 (*)     All other components within normal limits    Narrative:     High Sensitive Troponin T Reference Range:  <14.0 ng/L- Negative Female for AMI  <22.0 ng/L- Negative Male for AMI  >=14 - Abnormal Female indicating possible myocardial injury.  >=22 - Abnormal Male indicating possible myocardial injury.   Clinicians would have to  utilize clinical acumen, EKG, Troponin, and serial changes to determine if it is an Acute Myocardial Infarction or myocardial injury due to an underlying chronic condition.        RAINBOW DRAW    Narrative:     The following orders were created for panel order [place] Draw.  Procedure                               Abnormality         Status                     ---------                               -----------         ------                     Green Top (Gel)[[id number]]                                  Final result               Lavender Top[[id number]]                                     Final result               Gold Top - SST[[id number]]                                   Final result               Mayen Top[[id number]]                                         Final result               Light Blue Top[[id number]]                                   Final result                 Please view results for these tests on the individual orders.   POTASSIUM   CBC AND DIFFERENTIAL    Narrative:     The following orders were created for panel order CBC & Differential.  Procedure                               Abnormality         Status                     ---------                               -----------         ------                     CBC Auto Differential[[id number]]        Abnormal            Final result                 Please view results for these tests on the individual orders.   GREEN TOP   LAVENDER TOP   GOLD TOP - SST   GRAY TOP   LIGHT BLUE TOP       Meds Given in ED:   Medications   sodium chloride 0.9 % flush 10 mL (has no administration in time range)   Potassium Replacement - Follow Nurse / BPA Driven Protocol (has no administration in time range)   potassium chloride (KLOR-CON M20) CR tablet 40 mEq (40 mEq Oral Given 6/21/24 1650)   furosemide (LASIX) injection 80 mg (80 mg Intravenous Given 6/21/24 1504)           Last NIH score:                                                          Dysphagia screening  "results:  Patient Factors Component (Dysphagia:Stroke or Rule-out)  Best Eye Response: 4-->(E4) spontaneous (06/21/24 1436)  Best Motor Response: 6-->(M6) obeys commands (06/21/24 1436)  Best Verbal Response: 5-->(V5) oriented (06/21/24 1436)  [place] Coma Scale Score: 15 (06/21/24 1436)     [place] Coma Scale:  No data recorded     CIWA:        Restraint Type:            Isolation Status:  No active isolations          Electronically signed by Ruma Bermudez RN at 06/21/24 1747       Greyson Vega DO at 06/21/24 1432            [place]    EMERGENCY DEPARTMENT ENCOUNTER      Pt Name: Jaycob Ndiaye II  MRN: [id number]  YOB: 1960  Date of evaluation: 6/21/2024  Provider: Greyson Vega DO    CHIEF COMPLAINT       Chief Complaint   Patient presents with    Shortness of Breath    Leg Swelling       HPI  Stated Reason for Visit: PT TO ER FROM HOME FOR BILAT LOWER EXREMITY EDEMA AND SOA. PT STATES HAD RECENT ADMISSION FOR \"INTERNAL BLEDDING\" AT [facility]. STATES IS SOA AND AMBULATION IS DIFFICULT. PT TAKES LASIX. History Obtained From: patient       HISTORY OF PRESENT ILLNESS  (Location/Symptom, Timing/Onset, Context/Setting, Quality, Duration, Modifying Factors, Severity.)   Jaycob Ndiaye II is a 64 y.o. male who presents to the emergency department for evaluation of bilateral lower extremity edema and swelling which been progressively worsening over the last few days.  Notes recent admission for GI bleed at [facility] discharge 1 week ago.  Has a history of arrhythmia, congestive heart failure, peripheral edema, follows with cardiologist Dr. Farfan.  Denies any chest pain, cough congestion fever chills.  He notes no compliant with his Lasix 40 mg twice daily, still making good urine output, notes that the soreness and swelling is been increasing through the bilateral calves and up through the medial thighs.  Usually in the mornings his symptoms are " improved but this has been progressively worsening over the last few days.  He does not take his daily weights, is unsure of the amount of weight gain over the last week in general.  Does not wear supplemental oxygen at home, denies any other acute systemic complaints at this time.    Initial vitals heart rate of 70, blood pressure 112/92, saturation is 97% on room air, respiration of 16.      Nursing notes were reviewed.      PAST MEDICAL HISTORY     Past Medical History:   Diagnosis Date    Arthritis     Cancer     Constipation     Depression     Diabetes     type 2 dm, check blood sugar once a day    History of hepatitis C     History of prostate cancer     Hypertension     Irregular heartbeat     Pacemaker     home monitoring    Prostate CA     Requires supplemental oxygen     at night with cpap    Sleep apnea     wears a cpap    SOB (shortness of breath) on exertion     Wears glasses          SURGICAL HISTORY       Past Surgical History:   Procedure Laterality Date    CARDIAC ABLATION      COLONOSCOPY N/A 4/28/2021    Procedure: Colonoscopy with polypectomy;  Surgeon: Maurice Proctor MD;  Location: [facility] ENDOSCOPY;  Service: Gastroenterology;  Laterality: N/A;    KNEE SURGERY Right     1985    PACEMAKER IMPLANTATION      PROSTATE SURGERY      REPLACEMENT TOTAL KNEE Right          CURRENT MEDICATIONS       Current Facility-Administered Medications:     potassium chloride (KLOR-CON M20) CR tablet 40 mEq, 40 mEq, Oral, Q4H, Greyson Vega DO, 40 mEq at 06/21/24 1650    Potassium Replacement - Follow Nurse / BPA Driven Protocol, , Does not apply, PRN, Greyson Vega DO    sodium chloride 0.9 % flush 10 mL, 10 mL, Intravenous, PRN, rGeyson Vega, DO    Current Outpatient Medications:     busPIRone (BUSPAR) 15 MG tablet, Take 1 tablet by mouth Daily., Disp: , Rfl:     furosemide (LASIX) 40 MG tablet, Take 1 tablet by mouth 2 (two) times a day., Disp: , Rfl:     glimepiride (AMARYL) 2 MG  tablet, Take 2 mg by mouth Daily., Disp: , Rfl:     linaclotide (Linzess) 72 MCG capsule capsule, Take 1 capsule by mouth Daily., Disp: , Rfl:     linagliptin (Tradjenta) 5 MG tablet tablet, Take 1 tablet by mouth Daily., Disp: , Rfl:     magnesium oxide (MAG-OX) 400 MG tablet, Take 1 tablet by mouth Daily., Disp: , Rfl:     Nebivolol HCl (BYSTOLIC PO), Take  by mouth 2 (two) times a day., Disp: , Rfl:     nystatin (nystatin) 854720 UNIT/GM powder, Daily As Needed., Disp: , Rfl:     rivaroxaban (Xarelto) 20 MG tablet, Take 1 tablet by mouth Daily. Last dose 4/25, Disp: , Rfl:     sodium-potassium-magnesium sulfates (Suprep Bowel Prep Kit) 17.5-3.13-1.6 GM/177ML solution oral solution, Take 2 bottles by mouth Take As Directed., Disp: 354 mL, Rfl: 0    spironolactone (ALDACTONE) 100 MG tablet, Take 1 tablet by mouth Daily., Disp: , Rfl:     tamsulosin (FLOMAX) 0.4 MG capsule 24 hr capsule, Take 2 capsules by mouth Daily., Disp: , Rfl:     terbinafine (lamiSIL) 250 MG tablet, 1 tablet Daily., Disp: , Rfl:     vitamin D3 (vitamin d) 125 MCG (5000 UT) capsule capsule, Take 1 capsule by mouth Daily., Disp: , Rfl:     ALLERGIES     Nsaids and Contrast dye (echo or unknown ct/mr)    FAMILY HISTORY       Family History   Problem Relation Age of Onset    Breast cancer Mother     Ovarian cancer Mother     Heart failure Mother     Leukemia Father     Colon cancer Neg Hx     Colon polyps Neg Hx           SOCIAL HISTORY       Social History     Socioeconomic History    Marital status:    Tobacco Use    Smoking status: Former     Types: Pipe, Cigars    Smokeless tobacco: Never   Vaping Use    Vaping status: Former    Quit date: 9/1/2019    Substances: CBD    Devices: Refillable tank   Substance and Sexual Activity    Alcohol use: Yes     Comment: Rarely     Drug use: Never    Sexual activity: Defer         PHYSICAL EXAM    (up to 7 for level 4, 8 or more for level 5)     Vitals:    06/21/24 1409 06/21/24 1449 06/21/24 1628  "  BP: 91/59 112/97 108/70   BP Location: Left arm     Patient Position: Sitting     Pulse: 62 60 61   Resp: 16     Temp: 98.1 °F (36.7 °C)     SpO2: 98% 98% 97%   Weight: 127 kg (279 lb)     Height: 185.4 cm (73\")         Physical Exam  General : Patient is heavyset, obese, awake, alert, oriented, in no acute distress, nontoxic appearing  HEENT: Pupils are equally round, EOMI, conjunctivae clear  Neck: Neck is supple, full range of motion, trachea midline  Cardiac: Heart regular rate, rhythm, no murmurs, rubs, or gallops  Lungs: Lungs are clear to auscultation, there is no wheezing, rhonchi, or rales. There is no use of accessory muscles  Abdomen: Abdomen is soft, nontender, nondistended. There are no firm or pulsatile masses, no rebound rigidity or guarding  Musculoskeletal: There is significant bilateral lower extremity venous stasis dermatitis, 4+ pitting edema of the lower extremities extending up from the bilateral feet up to the mid thighs, 4 out of 5 strength in all 4 extremities.  No focal muscle deficits are appreciated  Neuro: Motor intact, sensory intact, level of consciousness is normal  Dermatology: venous stasis dermatitis to bilateral lower extremities, skin is warm and dry  Psych: Mentation is grossly normal, cognition is grossly normal. Affect is appropriate      DIAGNOSTIC RESULTS     EKG:  All EKGs are interpreted by the Emergency Department Physician who either signs or Co-signs this chart in the absence of a cardiologist.    ECG 12 Lead ED Triage Standing Order; SOA   Final Result   Test Reason : ED Triage Standing Order~   Blood Pressure :   */*   mmHG   Vent. Rate :  60 BPM     Atrial Rate :  64 BPM      P-R Int :   * ms          QRS Dur : 172 ms       QT Int : 540 ms       P-R-T Axes :   * -68 114 degrees      QTc Int : 540 ms      Ventricular-paced rhythm   Abnormal ECG   When compared with ECG of 26-APR-2021 14:29,   Electronic ventricular pacemaker has replaced Junctional rhythm "   Confirmed by XENA MONACO MD ([id number]) on 6/21/2024 2:41:10 PM      Referred By: EDMD           Confirmed By: XENA MONACO MD          RADIOLOGY:     [x] Radiologist's Report Reviewed:  XR Chest 1 View   Final Result   Impression:   Marked enlargement of the cardiac silhouette, with mild pulmonary vascular congestion. Probable bibasilar atelectasis.         Electronically Signed: Maurizio Oakley MD     6/21/2024 3:14 PM EDT     Workstation ID: [id number]          I ordered and independently reviewed the above noted radiographic studies.      I viewed images of chest x-ray which showed cardiomegaly, vascular congestion per my independent interpretation.    See radiologist's dictation for official interpretation.      ED BEDSIDE ULTRASOUND:   Performed by ED Physician - none    LABS:    I have reviewed and interpreted all of the currently available lab results from this visit (if applicable):  Results for orders placed or performed during the hospital encounter of 06/21/24   Comprehensive Metabolic Panel    Specimen: Blood   Result Value Ref Range    Glucose 151 (H) 65 - 99 mg/dL    BUN 62 (H) 8 - 23 mg/dL    Creatinine 2.11 (H) 0.76 - 1.27 mg/dL    Sodium 134 (L) 136 - 145 mmol/L    Potassium 3.2 (L) 3.5 - 5.2 mmol/L    Chloride 93 (L) 98 - 107 mmol/L    CO2 27.0 22.0 - 29.0 mmol/L    Calcium 8.7 8.6 - 10.5 mg/dL    Total Protein 6.2 6.0 - 8.5 g/dL    Albumin 3.6 3.5 - 5.2 g/dL    ALT (SGPT) 9 1 - 41 U/L    AST (SGOT) 14 1 - 40 U/L    Alkaline Phosphatase 78 39 - 117 U/L    Total Bilirubin 0.6 0.0 - 1.2 mg/dL    Globulin 2.6 gm/dL    A/G Ratio 1.4 g/dL    BUN/Creatinine Ratio 29.4 (H) 7.0 - 25.0    Anion Gap 14.0 5.0 - 15.0 mmol/L    eGFR 34.1 (L) >60.0 mL/min/1.73   BNP    Specimen: Blood   Result Value Ref Range    proBNP 4,183.0 (H) 0.0 - 900.0 pg/mL   Single High Sensitivity Troponin T    Specimen: Blood   Result Value Ref Range    HS Troponin T 137 (C) <22 ng/L   CBC Auto Differential    Specimen: Blood   Result  Value Ref Range    WBC 11.72 (H) 3.40 - 10.80 10*3/mm3    RBC 3.04 (L) 4.14 - 5.80 10*6/mm3    Hemoglobin 9.2 (L) 13.0 - 17.7 g/dL    Hematocrit 28.5 (L) 37.5 - 51.0 %    MCV 93.8 79.0 - 97.0 fL    MCH 30.3 26.6 - 33.0 pg    MCHC 32.3 31.5 - 35.7 g/dL    RDW 14.6 12.3 - 15.4 %    RDW-SD 50.2 37.0 - 54.0 fl    MPV 9.9 6.0 - 12.0 fL    Platelets 217 140 - 450 10*3/mm3    Neutrophil % 80.1 (H) 42.7 - 76.0 %    Lymphocyte % 8.7 (L) 19.6 - 45.3 %    Monocyte % 9.0 5.0 - 12.0 %    Eosinophil % 0.3 0.3 - 6.2 %    Basophil % 0.5 0.0 - 1.5 %    Immature Grans % 1.4 (H) 0.0 - 0.5 %    Neutrophils, Absolute 9.39 (H) 1.70 - 7.00 10*3/mm3    Lymphocytes, Absolute 1.02 0.70 - 3.10 10*3/mm3    Monocytes, Absolute 1.06 (H) 0.10 - 0.90 10*3/mm3    Eosinophils, Absolute 0.03 0.00 - 0.40 10*3/mm3    Basophils, Absolute 0.06 0.00 - 0.20 10*3/mm3    Immature Grans, Absolute 0.16 (H) 0.00 - 0.05 10*3/mm3    nRBC 0.0 0.0 - 0.2 /100 WBC   ECG 12 Lead ED Triage Standing Order; SOA   Result Value Ref Range    QT Interval 540 ms    QTC Interval 540 ms   Green Top (Gel)   Result Value Ref Range    Extra Tube Hold for add-ons.    Lavender Top   Result Value Ref Range    Extra Tube hold for add-on    Gold Top - SST   Result Value Ref Range    Extra Tube Hold for add-ons.    Gray Top   Result Value Ref Range    Extra Tube Hold for add-ons.    Light Blue Top   Result Value Ref Range    Extra Tube Hold for add-ons.         If labs were ordered, I independently reviewed the results and considered them in treating the patient.      EMERGENCY DEPARTMENT COURSE and DIFFERENTIAL DIAGNOSIS/MDM:   Vitals:  AS OF 16:56 EDT    BP - 108/70  HR - 61  TEMP - 98.1 °F (36.7 °C)  O2 SATS - 97%      Orders placed during this visit:  Orders Placed This Encounter   Procedures    XR Chest 1 View    [place] Draw    Comprehensive Metabolic Panel    BNP    Single High Sensitivity Troponin T    CBC Auto Differential    High Sensitivity Troponin T 2Hr    Potassium    NPO  Diet NPO Type: Strict NPO    Undress & Gown    Continuous Pulse Oximetry    Vital Signs    Oxygen Therapy- Nasal Cannula; Titrate 1-6 LPM Per SpO2; 90 - 95%    ECG 12 Lead ED Triage Standing Order; SOA    Insert Peripheral IV    CBC & Differential    Green Top (Gel)    Lavender Top    Gold Top - SST    Gray Top    Light Blue Top       All labs have been independently reviewed by me.  All radiology studies have been reviewed by me and the radiologist dictating the report.  All EKG's have been independently viewed and interpreted by me.      Discussion below represents my analysis of pertinent findings related to patient's condition, differential diagnosis, treatment plan and final disposition.    Differential diagnosis:  The differential diagnosis associated with the patient's presentation includes: Peripheral edema, venous status dermatitis, congestive heart failure.    Additional sources  Discussed/ obtained information from independent historians:   [] Spouse  [] Parent  [x] Family member, son at bedside  [] Friend  [] EMS   [] Other:    External (non-ED) record review:   [] Inpatient record:   [] Office record:   [] Outpatient record:   [] Prior Outpatient labs:   [] Prior Outpatient radiology:   [] Primary Care record:   [] Outside ED record:   [] Other:     Patient's care impacted by:   [] Diabetes  [] Hypertension  [] CHF  [] Hyperlipidemia  [] Coronary Artery Disease   [] COPD   [] Cancer   [] Tobacco Abuse   [] Substance Abuse    [x] Other: Congestive heart failure, venous status dermatitis    Care significantly affected by Social Determinants of Health (housing and economic circumstances, unemployment)    [] Yes     [x] No   If yes, Patient's care significantly limited by Social Determinants of Health including:   [] Inadequate housing   [] Low income   [] Alcoholism and drug addiction in family   [] Problems related to primary support group   [] Unemployment   [] Problems related to employment   [] Other  Social Determinants of Health:       MEDICATIONS ADMINISTERED IN ED:  Medications   sodium chloride 0.9 % flush 10 mL (has no administration in time range)   Potassium Replacement - Follow Nurse / BPA Driven Protocol (has no administration in time range)   potassium chloride (KLOR-CON M20) CR tablet 40 mEq (40 mEq Oral Given 6/21/24 1650)   furosemide (LASIX) injection 80 mg (80 mg Intravenous Given 6/21/24 1504)              This is a pleasant 64-year-old male who was had significant worsening of his bilateral lower extremity peripheral edema.  He is on Lasix 40 mg twice daily has been compliant with his medication.  Recent hospitalization for GI bleed requiring intervention.  Unfortunately his swelling is been progressively worsening on his outpatient educations.  I saturations are 96% on room air, as patient is very heavyset individual with 4+ edema to bilateral lower extremities, significant venous stasis dermatitis.  Lab work reveals a BNP of 4100, creatinine of 2.11, potassium 3.2.  Will plan on continuing with diuresis, using caution long-term given his renal insufficiency.  Potassium replacement.  Chest x-ray with congestive heart failure changes.  At this point given his worsening symptoms on his home therapies he will require stronger diuresis, monitoring, close follow-up with his kidney function.  Case discussed with hospitalist, Dr. Key, for admission.      PROCEDURES:  Procedures    CRITICAL CARE TIME    Total Critical Care time was 0 minutes, excluding separately reportable procedures.   There was a high probability of clinically significant/life threatening deterioration in the patient's condition which required my urgent intervention.      FINAL IMPRESSION      1. Peripheral edema    2. Congestive heart failure, unspecified HF chronicity, unspecified heart failure type    3. Hypokalemia          DISPOSITION/PLAN     ED Disposition       ED Disposition   Decision to Admit    Condition   --     Comment   --               PATIENT REFERRED TO:  No follow-up provider specified.    DISCHARGE MEDICATIONS:     Medication List        ASK your doctor about these medications      busPIRone 15 MG tablet  Commonly known as: BUSPAR     BYSTOLIC PO     furosemide 40 MG tablet  Commonly known as: LASIX     glimepiride 2 MG tablet  Commonly known as: AMARYL     Linzess 72 MCG capsule capsule  Generic drug: linaclotide     magnesium oxide 400 MG tablet  Commonly known as: MAG-OX     nystatin 872139 UNIT/GM powder     spironolactone 100 MG tablet  Commonly known as: ALDACTONE     Suprep Bowel Prep Kit 17.5-3.13-1.6 GM/177ML solution oral solution  Generic drug: sodium-potassium-magnesium sulfates  Take 2 bottles by mouth Take As Directed.     tamsulosin 0.4 MG capsule 24 hr capsule  Commonly known as: FLOMAX     terbinafine 250 MG tablet  Commonly known as: lamiSIL     Tradjenta 5 MG tablet tablet  Generic drug: linagliptin     vitamin D3 125 MCG (5000 UT) capsule capsule     Xarelto 20 MG tablet  Generic drug: rivaroxaban                  Comment: Please note this report has been produced using speech recognition software.      Greyson Vega DO  Attending Emergency Physician         Greyson Vega DO  06/21/24 1657      Electronically signed by Greyson Vega DO at 06/21/24 1657       Vital Signs (last 2 days)       Date/Time Temp Temp src Pulse Resp BP Patient Position SpO2    06/22/24 1109 97.6 (36.4) Oral 72 18 112/69 Sitting 100    06/22/24 0658 97.4 (36.3) Oral 68 18 101/65 Sitting 99    06/22/24 0458 97.8 (36.6) Oral -- 18 101/67 Sitting --    06/21/24 2327 -- -- 61 -- -- -- 100    06/21/24 2325 98 (36.7) Oral 65 18 99/61 Lying 99    06/21/24 2318 -- -- 64 18 -- -- 98    06/21/24 1924 98 (36.7) Oral 64 18 99/65 Lying 98    06/21/24 1827 97.9 (36.6) Oral 57 18 107/64 Lying 98    06/21/24 1700 -- -- 59 -- 103/57 -- 98    06/21/24 1628 -- -- 61 -- 108/70 -- 97    06/21/24 1449 -- -- 60 -- 112/97  -- 98    06/21/24 1409 98.1 (36.7) -- 62 16 91/59 Sitting 98          Facility-Administered Medications as of 6/22/2024   Medication Dose Route Frequency Provider Last Rate Last Admin    acetaminophen (TYLENOL) tablet 650 mg  650 mg Oral Q4H PRN Ginna, Mihir A, APRN        Or    acetaminophen (TYLENOL) 160 MG/5ML oral solution 650 mg  650 mg Oral Q4H PRN Ginna, Mihir A, APRN        Or    acetaminophen (TYLENOL) suppository 650 mg  650 mg Rectal Q4H PRN Ginna, Mihir A, APRN        albuterol (PROVENTIL) nebulizer solution 0.083% 2.5 mg/3mL  2.5 mg Nebulization Q6H PRN Edda Key MD        sennosides-docusate (PERICOLACE) 8.6-50 MG per tablet 2 tablet  2 tablet Oral BID PRN Ginna, Mihir A, APRN        And    polyethylene glycol (MIRALAX) packet 17 g  17 g Oral Daily PRN Krysten Chackoshua A, APRN        And    bisacodyl (DULCOLAX) EC tablet 5 mg  5 mg Oral Daily PRN Ginna, Mihir A, APRN        And    bisacodyl (DULCOLAX) suppository 10 mg  10 mg Rectal Daily PRN Ginna, Mihir A, APRN        Calcium Replacement - Follow Nurse / BPA Driven Protocol   Does not apply PRN Ginna, Imhir A, APRN        dextrose (D50W) (25 g/50 mL) IV injection 25 g  25 g Intravenous Q15 Min PRN Krysten Chackoshua A, APRN        dextrose (GLUTOSE) oral gel 15 g  15 g Oral Q15 Min PRN Ginna, Mihir A, APRN        ferrous sulfate tablet 325 mg  325 mg Oral Daily With Breakfast Duncan Chackoua A, APRN   325 mg at 06/22/24 0815    [COMPLETED] furosemide (LASIX) injection 80 mg  80 mg Intravenous Once Greyson Vega DO   80 mg at 06/21/24 1504    furosemide (LASIX) injection 80 mg  80 mg Intravenous BID Edda Key MD   80 mg at 06/22/24 0815    glucagon (GLUCAGEN) injection 1 mg  1 mg Intramuscular Q15 Min PRN Mihir Chacko APRN        heparin (porcine) 5000 UNIT/ML injection 5,000 Units  5,000 Units Subcutaneous Q8H Mihir Chacko APRN        Insulin Lispro (humaLOG) injection 2-7 Units  2-7 Units Subcutaneous 4x Daily AC & at  Bedtime GinnaMihir velásquez APRN   2 Units at 06/22/24 0815    ipratropium-albuterol (DUO-NEB) nebulizer solution 3 mL  3 mL Nebulization 4x Daily - RT Edda Key MD   3 mL at 06/21/24 2318    Magnesium Low Dose Replacement - Follow Nurse / BPA Driven Protocol   Does not apply PRN GinnaKrystenMihir A, APRN        metOLazone (ZAROXOLYN) tablet 5 mg  5 mg Oral Once Kimberley Carrington PA-C        nitroglycerin (NITROSTAT) SL tablet 0.4 mg  0.4 mg Sublingual Q5 Min PRN GinnaMihir, APRN        pantoprazole (PROTONIX) EC tablet 40 mg  40 mg Oral Q AM GinnaMihir APRN   40 mg at 06/22/24 0346    Pharmacy Consult - MTM   Does not apply Daily Jose Aleman, DELMAR        Phosphorus Replacement - Follow Nurse / BPA Driven Protocol   Does not apply PRN GinnaMihir velásquez APRN        [COMPLETED] potassium chloride (KLOR-CON M20) CR tablet 40 mEq  40 mEq Oral Q4H Greyson Vega DO   40 mEq at 06/21/24 2126    potassium chloride (KLOR-CON M20) CR tablet 40 mEq  40 mEq Oral Q4H Edda Key MD   40 mEq at 06/22/24 0815    Potassium Replacement - Follow Nurse / BPA Driven Protocol   Does not apply PRN GinnaDuncan velásquezua A, APRN        prochlorperazine (COMPAZINE) injection 5 mg  5 mg Intravenous Q6H PRN Edda Key MD        sodium chloride 0.9 % flush 10 mL  10 mL Intravenous PRN GinnaKrystenMihir A, APRN        sodium chloride 0.9 % flush 10 mL  10 mL Intravenous Q12H GinnaMihir velásquez, APRN   10 mL at 06/22/24 0816    sodium chloride 0.9 % flush 10 mL  10 mL Intravenous PRN GinnaKrystenMihir A, APRN        sodium chloride 0.9 % infusion 40 mL  40 mL Intravenous PRN GinnaKrystenMihir A, APRN         Lab Results (all)       Procedure Component Value Units Date/Time    POC Glucose Once [[id number]]  (Abnormal) Collected: 06/22/24 1113    Specimen: Blood Updated: 06/22/24 1114     Glucose 145 mg/dL     POC Glucose Once [[id number]]  (Abnormal) Collected: 06/22/24 0700    Specimen: Blood Updated: 06/22/24 0701      Glucose 180 mg/dL     Iron Profile [[id number]]  (Abnormal) Collected: 06/22/24 0203    Specimen: Blood Updated: 06/22/24 0316     Iron 30 mcg/dL      Iron Saturation (TSAT) 10 %      Transferrin 202 mg/dL      TIBC 301 mcg/dL     Comprehensive Metabolic Panel [[id number]]  (Abnormal) Collected: 06/22/24 0203    Specimen: Blood Updated: 06/22/24 0314     Glucose 116 mg/dL      BUN 63 mg/dL      Creatinine 2.12 mg/dL      Sodium 135 mmol/L      Potassium 3.4 mmol/L      Chloride 96 mmol/L      CO2 27.0 mmol/L      Calcium 9.0 mg/dL      Total Protein 5.8 g/dL      Albumin 3.5 g/dL      ALT (SGPT) 9 U/L      AST (SGOT) 13 U/L      Alkaline Phosphatase 74 U/L      Total Bilirubin 0.6 mg/dL      Globulin 2.3 gm/dL      Comment: Calculated Result        A/G Ratio 1.5 g/dL      BUN/Creatinine Ratio 29.7     Anion Gap 12.0 mmol/L      eGFR 33.9 mL/min/1.73     Narrative:      GFR Normal >60  Chronic Kidney Disease <60  Kidney Failure <15      Magnesium [[id number]]  (Normal) Collected: 06/22/24 0203    Specimen: Blood Updated: 06/22/24 0314     Magnesium 2.1 mg/dL     Ferritin [[id number]]  (Normal) Collected: 06/22/24 0203    Specimen: Blood Updated: 06/22/24 0314     Ferritin 88.02 ng/mL     Narrative:      Results may be falsely decreased if patient taking Biotin.      Hemoglobin A1c [[id number]]  (Abnormal) Collected: 06/22/24 0203    Specimen: Blood Updated: 06/22/24 0313     Hemoglobin A1C 6.00 %     Narrative:      Hemoglobin A1C Ranges:    Increased Risk for Diabetes  5.7% to 6.4%  Diabetes                     >= 6.5%  Diabetic Goal                < 7.0%    CBC Auto Differential [[id number]]  (Abnormal) Collected: 06/22/24 0203    Specimen: Blood Updated: 06/22/24 0238     WBC 10.18 10*3/mm3      RBC 2.92 10*6/mm3      Hemoglobin 8.8 g/dL      Hematocrit 27.6 %      MCV 94.5 fL      MCH 30.1 pg      MCHC 31.9 g/dL      RDW 14.6 %      RDW-SD 50.5 fl      MPV 10.2 fL      Platelets 215 10*3/mm3      Neutrophil % 79.0 %       Lymphocyte % 9.2 %      Monocyte % 9.9 %      Eosinophil % 0.7 %      Basophil % 0.5 %      Immature Grans % 0.7 %      Neutrophils, Absolute 8.04 10*3/mm3      Lymphocytes, Absolute 0.94 10*3/mm3      Monocytes, Absolute 1.01 10*3/mm3      Eosinophils, Absolute 0.07 10*3/mm3      Basophils, Absolute 0.05 10*3/mm3      Immature Grans, Absolute 0.07 10*3/mm3      nRBC 0.0 /100 WBC     Reticulocytes [[id number]]  (Normal) Collected: 06/22/24 0203    Specimen: Blood Updated: 06/22/24 0238     Reticulocyte % 1.85 %      Reticulocyte Absolute 0.0540 10*6/mm3     Vitamin B12 [[id number]] Collected: 06/22/24 0203    Specimen: Blood Updated: 06/22/24 0222    Folate [[id number]] Collected: 06/22/24 0203    Specimen: Blood Updated: 06/22/24 0222    POC Glucose Once [[id number]]  (Abnormal) Collected: 06/21/24 1927    Specimen: Blood Updated: 06/21/24 1928     Glucose 175 mg/dL     High Sensitivity Troponin T 2Hr [[id number]]  (Abnormal) Collected: 06/21/24 1627    Specimen: Blood from Arm, Left Updated: 06/21/24 1657     HS Troponin T 131 ng/L      Troponin T Delta -6 ng/L     Narrative:      High Sensitive Troponin T Reference Range:  <14.0 ng/L- Negative Female for AMI  <22.0 ng/L- Negative Male for AMI  >=14 - Abnormal Female indicating possible myocardial injury.  >=22 - Abnormal Male indicating possible myocardial injury.   Clinicians would have to utilize clinical acumen, EKG, Troponin, and serial changes to determine if it is an Acute Myocardial Infarction or myocardial injury due to an underlying chronic condition.         Single High Sensitivity Troponin T [[id number]]  (Abnormal) Collected: 06/21/24 1430    Specimen: Blood Updated: 06/21/24 1506     HS Troponin T 137 ng/L     Narrative:      High Sensitive Troponin T Reference Range:  <14.0 ng/L- Negative Female for AMI  <22.0 ng/L- Negative Male for AMI  >=14 - Abnormal Female indicating possible myocardial injury.  >=22 - Abnormal Male indicating possible myocardial  injury.   Clinicians would have to utilize clinical acumen, EKG, Troponin, and serial changes to determine if it is an Acute Myocardial Infarction or myocardial injury due to an underlying chronic condition.         Comprehensive Metabolic Panel [[id number]]  (Abnormal) Collected: 06/21/24 1430    Specimen: Blood Updated: 06/21/24 1501     Glucose 151 mg/dL      BUN 62 mg/dL      Creatinine 2.11 mg/dL      Sodium 134 mmol/L      Potassium 3.2 mmol/L      Chloride 93 mmol/L      CO2 27.0 mmol/L      Calcium 8.7 mg/dL      Total Protein 6.2 g/dL      Albumin 3.6 g/dL      ALT (SGPT) 9 U/L      AST (SGOT) 14 U/L      Alkaline Phosphatase 78 U/L      Total Bilirubin 0.6 mg/dL      Globulin 2.6 gm/dL      Comment: Calculated Result        A/G Ratio 1.4 g/dL      BUN/Creatinine Ratio 29.4     Anion Gap 14.0 mmol/L      eGFR 34.1 mL/min/1.73     Narrative:      GFR Normal >60  Chronic Kidney Disease <60  Kidney Failure <15      BNP [[id number]]  (Abnormal) Collected: 06/21/24 1430    Specimen: Blood Updated: 06/21/24 1501     proBNP 4,183.0 pg/mL     Narrative:      This assay is used as an aid in the diagnosis of individuals suspected of having heart failure. It can be used as an aid in the diagnosis of acute decompensated heart failure (ADHF) in patients presenting with signs and symptoms of ADHF to the emergency department (ED). In addition, NT-proBNP of <300 pg/mL indicates ADHF is not likely.    Age Range Result Interpretation  NT-proBNP Concentration (pg/mL:      <50             Positive            >450                   Gray                 300-450                    Negative             <300    50-75           Positive            >900                  Gray                300-900                  Negative            <300      >75             Positive            >1800                  Gray                300-1800                  Negative            <300    CBC & Differential [[id number]]  (Abnormal) Collected: 06/21/24  1430    Specimen: Blood Updated: 06/21/24 1445    Narrative:      The following orders were created for panel order CBC & Differential.  Procedure                               Abnormality         Status                     ---------                               -----------         ------                     CBC Auto Differential[[id number]]        Abnormal            Final result                 Please view results for these tests on the individual orders.    CBC Auto Differential [[id number]]  (Abnormal) Collected: 06/21/24 1430    Specimen: Blood Updated: 06/21/24 1445     WBC 11.72 10*3/mm3      RBC 3.04 10*6/mm3      Hemoglobin 9.2 g/dL      Hematocrit 28.5 %      MCV 93.8 fL      MCH 30.3 pg      MCHC 32.3 g/dL      RDW 14.6 %      RDW-SD 50.2 fl      MPV 9.9 fL      Platelets 217 10*3/mm3      Neutrophil % 80.1 %      Lymphocyte % 8.7 %      Monocyte % 9.0 %      Eosinophil % 0.3 %      Basophil % 0.5 %      Immature Grans % 1.4 %      Neutrophils, Absolute 9.39 10*3/mm3      Lymphocytes, Absolute 1.02 10*3/mm3      Monocytes, Absolute 1.06 10*3/mm3      Eosinophils, Absolute 0.03 10*3/mm3      Basophils, Absolute 0.06 10*3/mm3      Immature Grans, Absolute 0.16 10*3/mm3      nRBC 0.0 /100 WBC     [place] Draw [[id number]] Collected: 06/21/24 1430    Specimen: Blood Updated: 06/21/24 1445    Narrative:      The following orders were created for panel order [place] Draw.  Procedure                               Abnormality         Status                     ---------                               -----------         ------                     Green Top (Gel)[[id number]]                                  Final result               Lavender Top[[id number]]                                     Final result               Gold Top - SST[[id number]]                                   Final result               Mayen Top[[id number]]                                         Final result               Light Blue Top[[id number]]                                    Final result                 Please view results for these tests on the individual orders.    Green Top (Gel) [[id number]] Collected: 06/21/24 1430    Specimen: Blood Updated: 06/21/24 1445     Extra Tube Hold for add-ons.     Comment: Auto resulted.       Lavender Top [[id number]] Collected: 06/21/24 1430    Specimen: Blood Updated: 06/21/24 1445     Extra Tube hold for add-on     Comment: Auto resulted       Gold Top - SST [[id number]] Collected: 06/21/24 1430    Specimen: Blood Updated: 06/21/24 1445     Extra Tube Hold for add-ons.     Comment: Auto resulted.       Mayen Top [[id number]] Collected: 06/21/24 1430    Specimen: Blood Updated: 06/21/24 1445     Extra Tube Hold for add-ons.     Comment: Auto resulted.       Light Blue Top [[id number]] Collected: 06/21/24 1430    Specimen: Blood Updated: 06/21/24 1445     Extra Tube Hold for add-ons.     Comment: Auto resulted             Imaging Results (All)       Procedure Component Value Units Date/Time    XR Chest 1 View [[id number]] Collected: 06/21/24 1512     Updated: 06/21/24 1518    Narrative:      XR CHEST 1 VW    Date of Exam: 6/21/2024 2:56 PM EDT    Indication: SOA triage protocol    Comparison: None available.    Findings:  There is a left subclavian dual-lead cardiac stimulator device. There is cardiomegaly. Central pulmonary vascular congestion. No definite findings to suggest CHF. No pneumothorax identified. No large effusion. There are linear densities in the mid and   lower lung suggesting minor atelectasis.      Impression:      Impression:  Marked enlargement of the cardiac silhouette, with mild pulmonary vascular congestion. Probable bibasilar atelectasis.      Electronically Signed: Maurizio Oakley MD    6/21/2024 3:14 PM EDT    Workstation ID: [id number]          ECG/EMG Results (all)       Procedure Component Value Units Date/Time    ECG 12 Lead ED Triage Standing Order; SOA [[id number]] Collected: 06/21/24 1417     Updated:  24 1441     QT Interval 540 ms      QTC Interval 540 ms     Narrative:      Test Reason : ED Triage Standing Order~  Blood Pressure :   */*   mmHG  Vent. Rate :  60 BPM     Atrial Rate :  64 BPM     P-R Int :   * ms          QRS Dur : 172 ms      QT Int : 540 ms       P-R-T Axes :   * -68 114 degrees     QTc Int : 540 ms    Ventricular-paced rhythm  Abnormal ECG  When compared with ECG of 2021 14:29,  Electronic ventricular pacemaker has replaced Junctional rhythm  Confirmed by XENA MONACO MD ([id number]) on 2024 2:41:10 PM    Referred By: EDMD           Confirmed By: XENA MONACO MD          Physician Progress Notes (all)    No notes of this type exist for this encounter.          Consult Notes (all)        Kimberley Carrington PA-C at 24 1011        Consult Orders    1. Inpatient Cardiology Consult [[id number]] ordered by Mihir Chacko APRN at 24 1840                 [facility]  CARDIOLOGY CONSULTATION NOTE    Jaycob Ndiaye   : 1960  MRN:[id number]  Home Phone:[phone]    Date of Admission:2024  Date of Consultation: 24    PCP: Lorena Chamberlain APRN    IDENTIFICATION: A 64 y.o. male resident of [place], [place]     Chief Complaint   Patient presents with    Shortness of Breath    Leg Swelling     PROBLEM LIST:     Acute on chronic heart failure with preserved ejection fraction (HFpEF)    Elevated serum creatinine    Anemia    Hypokalemia    Cirrhosis of liver    HCV (hepatitis C virus)    ALLERGIES:   Allergies   Allergen Reactions    Ketamine Confusion    Nsaids Swelling     Swelling of throat      Contrast Dye (Echo Or Unknown Ct/Mr) Rash     Hives, swelling , itching          HOME MEDICINES:   Current Outpatient Medications   Medication Instructions    Budeson-Glycopyrrol-Formoterol (Breztri Aerosphere) 160-9-4.8 MCG/ACT aerosol inhaler 2 puffs, Inhalation, 2 Times Daily    doxycycline  (VIBRAMYICN) 100 mg, Oral, 2 Times Daily    ferrous gluconate 324 mg, Oral, Daily With Breakfast    furosemide (LASIX) 40 MG tablet 1 tablet, Oral, 2 times daily    linaclotide (Linzess) 72 MCG capsule capsule 1 capsule, Oral, Daily    magnesium oxide (MAG-OX) 400 MG tablet 1 tablet, Oral, Daily    metOLazone (ZAROXOLYN) 5 mg, Oral, Daily    nebivolol (BYSTOLIC) 5 MG tablet 1 tablet, Oral, Daily    spironolactone (ALDACTONE) 100 MG tablet 1 tablet, Daily    Tirzepatide (MOUNJARO) 10 mg, Subcutaneous, Weekly, Patient takes on Fridays     HPI: Mr. Ndiaye is a 63 y/o male with PAF, s/p remote ablation and PPM placement, HTN, DM2, cirrhosis, prostate cancer with radiation proctitis, CKD III, JEN on CPAP and recent hematuria and GI bleed treated at [facility] last week who is seen in consultation for CHF. Presents to the hospital with increasing RODRIGUEZ and LE edema for the past month. He follows with Dr. Craig, reports compliance with Lasix 40mg BID and Metolazone daily. He was discharged from [facility] last Friday, his anticoagulation was discontinued, and he has not seen any recent hematuria or hematochezia. He notes LE edema and RODRIGUEZ with minimal exertion. He wears CPAP nightly. He ambulates with walker at home. He denies any chest pain. Recent echo at [facility]  6/10 showed normal LVEF, severe septal hypertrophy, grade II diastolic dysfunction, severe LAE, no significant valvular abnormalities. Cr is elevated at 2.1, and he denies prior known history of CKD, however recent Cr at [facility] last week ranged from 1.7--2.0.       ROS: All systems have been reviewed and are negative with the exception of those mentioned in the HPI and problem list above.    Surgical History:   Past Surgical History:   Procedure Laterality Date    CARDIAC ABLATION      COLONOSCOPY N/A 04/28/2021    Procedure: Colonoscopy with polypectomy;  Surgeon: Maurice Proctor MD;  Location: [facility] ENDOSCOPY;  Service: Gastroenterology;  Laterality: N/A;    COLONOSCOPY    "   ENDOSCOPY      KNEE SURGERY Right     1985    PACEMAKER IMPLANTATION      PROSTATE SURGERY      REPLACEMENT TOTAL KNEE Right        Social History:   Social History     Socioeconomic History    Marital status:    Tobacco Use    Smoking status: Former     Types: Pipe, Cigars    Smokeless tobacco: Never   Vaping Use    Vaping status: Former    Quit date: 9/1/2019    Substances: CBD    Devices: Refillable tank   Substance and Sexual Activity    Alcohol use: Yes     Comment: Rarely     Drug use: Never    Sexual activity: Defer       Family History:   Family History   Problem Relation Age of Onset    Breast cancer Mother     Ovarian cancer Mother     Heart failure Mother     Leukemia Father     Colon cancer Neg Hx     Colon polyps Neg Hx        Objective     /65 (BP Location: Left arm, Patient Position: Sitting)   Pulse 68   Temp 97.4 °F (36.3 °C) (Oral)   Resp 18   Ht 185.4 cm (73\")   Wt 127 kg (279 lb)   SpO2 99%   BMI 36.81 kg/m²     Intake/Output Summary (Last 24 hours) at 6/22/2024 1011  Last data filed at 6/22/2024 0400  Gross per 24 hour   Intake 50 ml   Output 500 ml   Net -450 ml       PHYSICAL EXAM:  CONSTITUTIONAL: Obese, cooperative, in no acute distress. Anasarca   CARDIOVASCULAR:  Regular rhythm and normal rate, no murmur, gallop, rub.   RESPIRATORY: Normal respiratory effort, no wheezing, rales or rhonchi  EXTREMITIES: No gross deformities, 4+ pitting edema, anasarca  SKIN: Warm, dry. No bleeding, bruising or rash  NEUROLOGICAL: No focal deficits    Labs/Diagnostic Data  Results from last 7 days   Lab Units 06/22/24  0203 06/21/24  1430   SODIUM mmol/L 135* 134*   POTASSIUM mmol/L 3.4* 3.2*   CHLORIDE mmol/L 96* 93*   CO2 mmol/L 27.0 27.0   BUN mg/dL 63* 62*   CREATININE mg/dL 2.12* 2.11*   GLUCOSE mg/dL 116* 151*   CALCIUM mg/dL 9.0 8.7     Results from last 7 days   Lab Units 06/21/24  1627 06/21/24  1430   HSTROP T ng/L 131* 137*     Results from last 7 days   Lab Units " 06/22/24  0203 06/21/24  1430   WBC 10*3/mm3 10.18 11.72*   HEMOGLOBIN g/dL 8.8* 9.2*   HEMATOCRIT % 27.6* 28.5*   PLATELETS 10*3/mm3 215 217     Results from last 7 days   Lab Units 06/22/24  0203   MAGNESIUM mg/dL 2.1       Results from last 7 days   Lab Units 06/22/24  0203   HEMOGLOBIN A1C % 6.00*     Results from last 7 days   Lab Units 06/21/24  1430   PROBNP pg/mL 4,183.0*     I personally reviewed the patient's EKG/Telemetry data    Radiology Data:   XR Chest 1 View    Result Date: 6/21/2024  Impression: Marked enlargement of the cardiac silhouette, with mild pulmonary vascular congestion. Probable bibasilar atelectasis. Electronically Signed: Maurizio Oakley MD  6/21/2024 3:14 PM EDT  Workstation ID: [id number]       Current Medications:    ferrous sulfate, 325 mg, Oral, Daily With Breakfast  furosemide, 80 mg, Intravenous, BID  heparin (porcine), 5,000 Units, Subcutaneous, Q8H  insulin lispro, 2-7 Units, Subcutaneous, 4x Daily AC & at Bedtime  ipratropium-albuterol, 3 mL, Nebulization, 4x Daily - RT  pantoprazole, 40 mg, Oral, Q AM  potassium chloride ER, 40 mEq, Oral, Q4H  sodium chloride, 10 mL, Intravenous, Q12H         Assessment and Plan:     1. HFpEF/ Anasarca   - echo 6/10/24 at [facility] with normal EF, severe septal hypertrophy, grade II diastolic dysfunction and no significant valvular abnormalities  - agree with IV Lasix 80mg BID, will give Metolazone 5mg today for additional diuresis  - consider vasodilator if BP allows, however BP is borderline low now   - resume BB when able   - PT/wounds for light compression wraps when able     2. History of PAF, s/p ablation and PPM (IDB)  - per Dr. Craig  - recent discontinuation of anticoagulation due to GI bleed - ?candidate for Watchman or GA exclusion device     3. Cirrhosis with recent paracentesis  - per hospitalists    4. CKD III  - Cr 2 at [facility] last week, stable   - monitor closely with diuresis and low threshold to consult nephrology if renal function  worsens     5. Anemia with recent hematuria/hematochezia   - per hospitalists  - patient denies recent signs/symptoms of bleeding       Scribed for Hung Meyers MD by Kimberley Carrington PA-C. 6/22/2024  10:56 EDT              Electronically signed by Kimberley Carrington PA-C at 06/22/24 0573

## 2024-06-22 NOTE — PLAN OF CARE
Goal Outcome Evaluation:  Plan of Care Reviewed With: patient           Outcome Evaluation: Pt presenting with severe pitting edema of BLE with shiny blistered skin and several dry crusts/scabs.  PT applied light compression with multilayered compressogrips to increase venous return and improve skin integrity.  Nursing may remove compression as needed for increased pain or impaired circulation, and may lift up wraps for skin checks.  PT will change wraps in 2-3 days.

## 2024-06-22 NOTE — PLAN OF CARE
Goal Outcome Evaluation:  Plan of Care Reviewed With: patient         Patient Aox4, HIGINIO RANDLE. HR 60s-70s. SBP 110s. Patient getting up to toilet and became aggressive with RN as he did not want to abide by safety rules such as chair alarms and having staff in the room when walking. Patient educated on safety multiple times. Mood improved throughout the day. LLE compression sleeve taken off around foot due to increased pain, okay by PT WOC.

## 2024-06-22 NOTE — THERAPY EVALUATION
Acute Care - Wound/Debridement Initial Evaluation  [facility]     Patient Name: Jaycob Ndiaye II  : 1960  MRN: [id number]  Today's Date: 2024                Admit Date: 2024    Visit Dx:    ICD-10-CM ICD-9-CM   1. Peripheral edema  R60.0 782.3   2. Congestive heart failure, unspecified HF chronicity, unspecified heart failure type  I50.9 428.0   3. Hypokalemia  E87.6 276.8       Patient Active Problem List   Diagnosis    Screen for colon cancer    Acute on chronic heart failure with preserved ejection fraction (HFpEF)    Elevated serum creatinine    Anemia    Hypokalemia    Cirrhosis of liver    HCV (hepatitis C virus)    Acute on chronic HFrEF (heart failure with reduced ejection fraction)        Past Medical History:   Diagnosis Date    Arthritis     Cancer     Constipation     Depression     Diabetes     type 2 dm, check blood sugar once a day    History of hepatitis C     History of prostate cancer     Hypertension     Irregular heartbeat     Pacemaker     home monitoring    Prostate CA     Requires supplemental oxygen     at night with cpap    Sleep apnea     wears a cpap    SOB (shortness of breath) on exertion     Wears glasses         Past Surgical History:   Procedure Laterality Date    CARDIAC ABLATION      COLONOSCOPY N/A 2021    Procedure: Colonoscopy with polypectomy;  Surgeon: Maurice Proctor MD;  Location: [facility] ENDOSCOPY;  Service: Gastroenterology;  Laterality: N/A;    COLONOSCOPY      ENDOSCOPY      KNEE SURGERY Right     1985    PACEMAKER IMPLANTATION      PROSTATE SURGERY      REPLACEMENT TOTAL KNEE Right            Wound 24 1540 Bilateral lower leg Blisters (Active)   Wound Image     24 1540   Dressing Appearance open to air;dried drainage 24 1540   Base dry;scab 24 1540   Periwound intact;blistered;redness;swelling;warm 24 1540   Periwound Temperature warm 24 1540   Periwound Skin Turgor soft 24 1540   Edges  irregular 06/22/24 1540   Wound Length (cm) 2.5 cm 06/22/24 1540   Wound Width (cm) 3 cm 06/22/24 1540   Wound Surface Area (cm^2) 7.5 cm^2 06/22/24 1540   Drainage Characteristics/Odor serous 06/22/24 1540   Drainage Amount scant 06/22/24 1540   Care, Wound cleansed with;wound cleanser 06/22/24 1540   Dressing Care multi-layer wrap 06/22/24 1540   Periwound Care cleansed with pH balanced cleanser;dry periwound area maintained 06/22/24 1540      Lymphedema       Row Name 06/22/24 1540             Lymphedema Edema Assessment    Pitting Edema Severe  -         Skin Changes/Observations    Location/Assessment Lower Extremity  -      Lower Extremity Conditions bilateral:;clean;dry;shiny;crust;fragile  -      Lower Extremity Color/Pigment bilateral:;blanchable;hyperpigmented  -         Lymphedema Sensation    Lymphedema Sensation Reports RLE:;LLE:;tingling;numbness  -      Lymphedema Sensation Comments reports neuropathy toes to prox calf BLE  -         Lymphedema Pulses/Capillary Refill    Lymphedema Pulses/Capillary Refill lower extremity pulses;capillary refill  -      Dorsalis Pedis Pulse right:;left:;+2 normal  -      Posterior Tibialis Pulse not tested  -      Capillary Refill lower extremity capillary refill  -      Lower Extremity Capillary Refill right:;left:;less than 3 seconds  -         Lymphedema Measurements    Measurement Type(s) Quick Girth  -      Quick Girth Areas Lower extremities  -         LLE Quick Girth (cm)    Mid foot 30.3 cm  -      Smallest ankle 31 cm  -      Largest calf 52.5 cm  -         RLE Quick Girth (cm)    Mid foot 31 cm  -JM      Smallest ankle 30.5 cm  -      Largest calf 51 cm  -      RLE Quick Girth Total 112.5  -         Compression/Skin Care    Compression/Skin Care skin care;wrapping location;bandaging  -      Skin Care washed/dried;lotion applied  -      Wrapping Location lower extremity  -      Wrapping Location LE bilateral:;foot to  knee  -JM      Wrapping Comments size 5&6 compressogrips doubled/overlapping for gradient compression  -                User Key  (r) = Recorded By, (t) = Taken By, (c) = Cosigned By      Initials Name Provider Type    Carolina Mccurdy, PT Physical Therapist                    WOUND DEBRIDEMENT                     PT Assessment (Last 12 Hours)       PT Evaluation and Treatment       Row Name 06/22/24 1540          Physical Therapy Time and Intention    Subjective Information complains of;swelling;pain  -     Document Type wound care;evaluation  -     Patient Effort good  -     Symptoms Noted During/After Treatment none  -       Row Name 06/22/24 1540          General Information    Patient Profile Reviewed yes  -     Patient Observations alert;cooperative;agree to therapy  -     Pertinent History of Current Functional Problem Pt admitted with CHF exacerbation and BLE edema, reports he has tried wearing compression stockings with difficulty donning them consistently.  -     Existing Precautions/Restrictions fall;other (see comments)  BLE edema  -     Risks Reviewed patient:;increased discomfort  -     Benefits Reviewed patient:;improve skin integrity;improve function  -     Barriers to Rehab medically complex;previous functional deficit  -       Row Name 06/22/24 1540          Pain    Pretreatment Pain Rating 2/10  -JM     Posttreatment Pain Rating 2/10  -JM     Pain Location - Side/Orientation Bilateral  -     Pain Location lower  -     Pain Location - extremity  -     Pre/Posttreatment Pain Comment tenderness with palpation to calves  -     Pain Intervention(s) Repositioned  -       Row Name 06/22/24 1540          Cognition    Orientation Status (Cognition) oriented x 3  -       Row Name 06/22/24 1540          Wound 06/22/24 1540 Bilateral lower leg Blisters    Wound - Properties Group Placement Date: 06/22/24  - Placement Time: 1540  - Side: Bilateral  - Orientation:  lower  -JM Location: leg  -JM Primary Wound Type: Blisters  -JM    Wound Image Images linked: 3  -JM     Dressing Appearance open to air;dried drainage  -JM     Base dry;scab  -JM     Periwound intact;blistered;redness;swelling;warm  -     Periwound Temperature warm  -     Periwound Skin Turgor soft  -     Edges irregular  -     Wound Length (cm) 2.5 cm  lat RLE, medial RLE scab 1.5cm x1.5cm x obscured  -JM     Wound Width (cm) 3 cm  -     Wound Depth (cm) --  obscured by crusts  -     Wound Surface Area (cm^2) 7.5 cm^2  -     Drainage Characteristics/Odor serous  -JM     Drainage Amount scant  -JM     Care, Wound cleansed with;wound cleanser  -     Dressing Care multi-layer wrap  -     Periwound Care cleansed with pH balanced cleanser;dry periwound area maintained  -JM     Retired Wound - Properties Group Placement Date: 06/22/24  - Placement Time: 1540  - Side: Bilateral  - Orientation: lower  - Location: leg  -JM Primary Wound Type: Blisters  -JM    Retired Wound - Properties Group Date first assessed: 06/22/24  - Time first assessed: 1540  - Side: Bilateral  - Location: leg  -JM Primary Wound Type: Blisters  -JM      Row Name 06/22/24 1540          Plan of Care Review    Plan of Care Reviewed With patient  -     Outcome Evaluation Pt presenting with severe pitting edema of BLE with shiny blistered skin and several dry crusts/scabs.  PT applied light compression with multilayered compressogrips to increase venous return and improve skin integrity.  Nursing may remove compression as needed for increased pain or impaired circulation, and may lift up wraps for skin checks.  PT will change wraps in 2-3 days.  -JM       Row Name 06/22/24 1540          Positioning and Restraints    Pre-Treatment Position sitting in chair/recliner  -     Post Treatment Position chair  -     In Chair notified nsg;reclined;call light within reach;encouraged to call for assist;legs elevated  -        Row Name 06/22/24 1540          Therapy Assessment/Plan (PT)    Criteria for Skilled Interventions Met (PT) yes;skilled treatment is necessary  -       Row Name 06/22/24 1540          PT Evaluation Complexity    History, PT Evaluation Complexity 1-2 personal factors and/or comorbidities  -     Examination of Body Systems (PT Eval Complexity) total of 3 or more elements  -     Clinical Presentation (PT Evaluation Complexity) evolving  -     Clinical Decision Making (PT Evaluation Complexity) moderate complexity  -     Overall Complexity (PT Evaluation Complexity) moderate complexity  -       Row Name 06/22/24 1540          Physical Therapy Goals    Wound Care Goal Selection (PT) wound care, PT goal 1  -       Row Name 06/22/24 1540          Wound Care Goal 1 (PT)    Wound Care Goal 1 (PT) Reduce BLE edema by 4cm to faciliate improved skin integrity and functional mobility.  -     Time Frame (Wound Care Goal 1, PT) long term goal (LTG);10 days  -               User Key  (r) = Recorded By, (t) = Taken By, (c) = Cosigned By      Initials Name Provider Type    Carolina Mccurdy, PT Physical Therapist                  Physical Therapy Education       Title: PT OT SLP Therapies (In Progress)       Topic: Physical Therapy (In Progress)       Point: Mobility training (Done)       Learning Progress Summary             Patient Acceptance, E, VU by ND at 6/22/2024 1006                         Point: Home exercise program (Not Started)       Learner Progress:  Not documented in this visit.              Point: Body mechanics (Done)       Learning Progress Summary             Patient Acceptance, E, VU by ND at 6/22/2024 1006                         Point: Precautions (Done)       Learning Progress Summary             Patient Acceptance, E, VU by ND at 6/22/2024 1006                                         User Key       Initials Effective Dates Name Provider Type Discipline    ND 11/16/23 -  Pippa  aMryjane, PT Physical Therapist PT                    Recommendation and Plan  Anticipated Discharge Disposition (PT): inpatient rehabilitation facility  Planned Therapy Interventions (PT): wound care  Therapy Frequency (PT): daily  Plan of Care Reviewed With: patient           Outcome Evaluation: Pt presenting with severe pitting edema of BLE with shiny blistered skin and several dry crusts/scabs.  PT applied light compression with multilayered compressogrips to increase venous return and improve skin integrity.  Nursing may remove compression as needed for increased pain or impaired circulation, and may lift up wraps for skin checks.  PT will change wraps in 2-3 days.  Plan of Care Reviewed With: patient            Time Calculation   PT Charges       Row Name 06/22/24 1628 06/22/24 1006          Time Calculation    Start Time 1540  -JM 0842  -ND     PT Received On -- 06/22/24  -ND     PT Goal Re-Cert Due Date 07/02/24  -JM 07/02/24  -ND        Timed Charges    41920 - PT Therapeutic Activity Minutes -- 10  -ND        Untimed Charges    PT Eval/Re-eval Minutes 25  -JM 31  -ND     Wound Care 61495 Multilayer comp below knee  -JM --     02980-Klexbubwza comp below knee 20  -JM --        Total Minutes    Timed Charges Total Minutes -- 10  -ND     Untimed Charges Total Minutes 45  -JM 31  -ND      Total Minutes 45  -JM 41  -ND               User Key  (r) = Recorded By, (t) = Taken By, (c) = Cosigned By      Initials Name Provider Type    Carolina Mccurdy, PT Physical Therapist    ND Maryjane Das PT Physical Therapist                      Therapy Charges for Today       Code Description Service Date Service Provider Modifiers Qty    [id number] HC PT MULTI LAYER COMP SYS BELOW KNEE 6/22/2024 Carolina Singleton PT GP 1              PT G-Codes  Outcome Measure Options: AM-PAC 6 Clicks Basic Mobility (PT)  AM-PAC 6 Clicks Score (PT): 18  AM-PAC 6 Clicks Score (OT): 17       Carolina Singleton PT  6/22/2024

## 2024-06-22 NOTE — CONSULTS
[facility] at [facility]  CARDIOLOGY CONSULTATION NOTE    Jaycob Ndiaye II  : 1960  MRN:[id number]  Home Phone:[phone]    Date of Admission:2024  Date of Consultation: 24    PCP: Lorena Chamberlain APRN    IDENTIFICATION: A 64 y.o. male resident of [place], [place]     Chief Complaint   Patient presents with    Shortness of Breath    Leg Swelling     PROBLEM LIST:     Acute on chronic heart failure with preserved ejection fraction (HFpEF)    Elevated serum creatinine    Anemia    Hypokalemia    Cirrhosis of liver    HCV (hepatitis C virus)    ALLERGIES:   Allergies   Allergen Reactions    Ketamine Confusion    Nsaids Swelling     Swelling of throat      Contrast Dye (Echo Or Unknown Ct/Mr) Rash     Hives, swelling , itching          HOME MEDICINES:   Current Outpatient Medications   Medication Instructions    Budeson-Glycopyrrol-Formoterol (Breztri Aerosphere) 160-9-4.8 MCG/ACT aerosol inhaler 2 puffs, Inhalation, 2 Times Daily    doxycycline (VIBRAMYICN) 100 mg, Oral, 2 Times Daily    ferrous gluconate 324 mg, Oral, Daily With Breakfast    furosemide (LASIX) 40 MG tablet 1 tablet, Oral, 2 times daily    linaclotide (Linzess) 72 MCG capsule capsule 1 capsule, Oral, Daily    magnesium oxide (MAG-OX) 400 MG tablet 1 tablet, Oral, Daily    metOLazone (ZAROXOLYN) 5 mg, Oral, Daily    nebivolol (BYSTOLIC) 5 MG tablet 1 tablet, Oral, Daily    spironolactone (ALDACTONE) 100 MG tablet 1 tablet, Daily    Tirzepatide (MOUNJARO) 10 mg, Subcutaneous, Weekly, Patient takes on      HPI: Mr. Ndiaye is a 65 y/o male with PAF, s/p remote ablation and PPM placement, HTN, DM2, cirrhosis, prostate cancer with radiation proctitis, CKD III, JEN on CPAP and recent hematuria and GI bleed treated at [facility] last week who is seen in consultation for CHF. Presents to the hospital with increasing RODRIGUEZ and LE edema for the past month. He follows with Dr. Craig, reports  "compliance with Lasix 40mg BID and Metolazone daily. He was discharged from [facility] last Friday, his anticoagulation was discontinued, and he has not seen any recent hematuria or hematochezia. He notes LE edema and RODRIGUEZ with minimal exertion. He wears CPAP nightly. He ambulates with walker at home. He denies any chest pain. Recent echo at [facility]  6/10 showed normal LVEF, severe septal hypertrophy, grade II diastolic dysfunction, severe LAE, no significant valvular abnormalities. Cr is elevated at 2.1, and he denies prior known history of CKD, however recent Cr at [facility] last week ranged from 1.7--2.0.       ROS: All systems have been reviewed and are negative with the exception of those mentioned in the HPI and problem list above.    Surgical History:   Past Surgical History:   Procedure Laterality Date    CARDIAC ABLATION      COLONOSCOPY N/A 04/28/2021    Procedure: Colonoscopy with polypectomy;  Surgeon: Maurice Proctor MD;  Location: [facility] ENDOSCOPY;  Service: Gastroenterology;  Laterality: N/A;    COLONOSCOPY      ENDOSCOPY      KNEE SURGERY Right     1985    PACEMAKER IMPLANTATION      PROSTATE SURGERY      REPLACEMENT TOTAL KNEE Right        Social History:   Social History     Socioeconomic History    Marital status:    Tobacco Use    Smoking status: Former     Types: Pipe, Cigars    Smokeless tobacco: Never   Vaping Use    Vaping status: Former    Quit date: 9/1/2019    Substances: CBD    Devices: Refillable tank   Substance and Sexual Activity    Alcohol use: Yes     Comment: Rarely     Drug use: Never    Sexual activity: Defer       Family History:   Family History   Problem Relation Age of Onset    Breast cancer Mother     Ovarian cancer Mother     Heart failure Mother     Leukemia Father     Colon cancer Neg Hx     Colon polyps Neg Hx        Objective     /65 (BP Location: Left arm, Patient Position: Sitting)   Pulse 68   Temp 97.4 °F (36.3 °C) (Oral)   Resp 18   Ht 185.4 cm (73\")   Wt " 127 kg (279 lb)   SpO2 99%   BMI 36.81 kg/m²     Intake/Output Summary (Last 24 hours) at 6/22/2024 1011  Last data filed at 6/22/2024 0400  Gross per 24 hour   Intake 50 ml   Output 500 ml   Net -450 ml       PHYSICAL EXAM:  CONSTITUTIONAL: Obese, cooperative, in no acute distress. Anasarca   CARDIOVASCULAR:  Regular rhythm and normal rate, no murmur, gallop, rub.   RESPIRATORY: Normal respiratory effort, no wheezing, rales or rhonchi  EXTREMITIES: No gross deformities, 4+ pitting edema, anasarca  SKIN: Warm, dry. No bleeding, bruising or rash  NEUROLOGICAL: No focal deficits    Labs/Diagnostic Data  Results from last 7 days   Lab Units 06/22/24  0203 06/21/24  1430   SODIUM mmol/L 135* 134*   POTASSIUM mmol/L 3.4* 3.2*   CHLORIDE mmol/L 96* 93*   CO2 mmol/L 27.0 27.0   BUN mg/dL 63* 62*   CREATININE mg/dL 2.12* 2.11*   GLUCOSE mg/dL 116* 151*   CALCIUM mg/dL 9.0 8.7     Results from last 7 days   Lab Units 06/21/24  1627 06/21/24  1430   HSTROP T ng/L 131* 137*     Results from last 7 days   Lab Units 06/22/24  0203 06/21/24  1430   WBC 10*3/mm3 10.18 11.72*   HEMOGLOBIN g/dL 8.8* 9.2*   HEMATOCRIT % 27.6* 28.5*   PLATELETS 10*3/mm3 215 217     Results from last 7 days   Lab Units 06/22/24  0203   MAGNESIUM mg/dL 2.1       Results from last 7 days   Lab Units 06/22/24  0203   HEMOGLOBIN A1C % 6.00*     Results from last 7 days   Lab Units 06/21/24  1430   PROBNP pg/mL 4,183.0*     I personally reviewed the patient's EKG/Telemetry data    Radiology Data:   XR Chest 1 View    Result Date: 6/21/2024  Impression: Marked enlargement of the cardiac silhouette, with mild pulmonary vascular congestion. Probable bibasilar atelectasis. Electronically Signed: Maurizio Oakley MD  6/21/2024 3:14 PM EDT  Workstation ID: [id number]       Current Medications:    ferrous sulfate, 325 mg, Oral, Daily With Breakfast  furosemide, 80 mg, Intravenous, BID  heparin (porcine), 5,000 Units, Subcutaneous, Q8H  insulin lispro, 2-7 Units,  Subcutaneous, 4x Daily AC & at Bedtime  ipratropium-albuterol, 3 mL, Nebulization, 4x Daily - RT  pantoprazole, 40 mg, Oral, Q AM  potassium chloride ER, 40 mEq, Oral, Q4H  sodium chloride, 10 mL, Intravenous, Q12H         Assessment and Plan:     1. HFpEF/ Anasarca   - echo 6/10/24 at [facility] with normal EF, severe septal hypertrophy, grade II diastolic dysfunction and no significant valvular abnormalities  - agree with IV Lasix 80mg BID, will give Metolazone 5mg today for additional diuresis  - consider vasodilator if BP allows, however BP is borderline low now   - resume BB when able   - PT/wounds for light compression wraps when able     2. History of PAF, s/p ablation and PPM (IDB)  - per Dr. Craig  - recent discontinuation of anticoagulation due to GI bleed - ?candidate for Watchman or GA exclusion device     3. Cirrhosis with recent paracentesis  - per hospitalists    4. CKD III  - Cr 2 at [facility] last week, stable   - monitor closely with diuresis and low threshold to consult nephrology if renal function worsens     5. Anemia with recent hematuria/hematochezia   - per hospitalists  - patient denies recent signs/symptoms of bleeding       Scribed for Hung Meyers MD by Kimberley Carrington PA-C. 6/22/2024  10:56 EDT    I,Hung Meyers M.D., personally performed the services described in this documentation as scribed by the above named individual in my presence, and it is both accurate and complete.    Hung Meyers MD, [facility], [facility] Cardiology  06/22/24  17:16 EDT

## 2024-06-22 NOTE — PROGRESS NOTES
[facility] Medicine Services  PROGRESS NOTE    Patient Name: Jaycob Ndiaye II  : 1960  MRN: [id number]    Date of Admission: 2024  Primary Care Physician: Lorena Chamberlain APRN    Subjective   Subjective     CC:  Heart failure    HPI:  Patient is feeling poorly this morning.  Still feels like there is a lot of fluid retention all over his body.  Still short of breath.  Unsure if he put out a lot of fluid yesterday.      Objective   Objective     Vital Signs:   Temp:  [97.4 °F (36.3 °C)-98.1 °F (36.7 °C)] 97.4 °F (36.3 °C)  Heart Rate:  [57-68] 68  Resp:  [16-18] 18  BP: ()/(57-97) 101/65     Physical Exam:  Constitutional: No acute distress, awake, alert  Respiratory: Clear to auscultation bilaterally, respiratory effort normal on room air  Cardiovascular: RRR,   Gastrointestinal: Positive bowel sounds, soft, nontender, nondistended  Musculoskeletal: 3+ lower extremity edema  Psychiatric: Appropriate affect, cooperative  Neurologic: Oriented x 3, gross focal neurological deficits  Skin: Bilateral venous stasis dermatitis      Results Reviewed:  LAB RESULTS:      Lab 24  0203 24  1430   WBC 10.18 11.72*   HEMOGLOBIN 8.8* 9.2*   HEMATOCRIT 27.6* 28.5*   PLATELETS 215 217   NEUTROS ABS 8.04* 9.39*   IMMATURE GRANS (ABS) 0.07* 0.16*   LYMPHS ABS 0.94 1.02   MONOS ABS 1.01* 1.06*   EOS ABS 0.07 0.03   MCV 94.5 93.8         Lab 24  0203 24  1430   SODIUM 135* 134*   POTASSIUM 3.4* 3.2*   CHLORIDE 96* 93*   CO2 27.0 27.0   ANION GAP 12.0 14.0   BUN 63* 62*   CREATININE 2.12* 2.11*   EGFR 33.9* 34.1*   GLUCOSE 116* 151*   CALCIUM 9.0 8.7   MAGNESIUM 2.1  --    HEMOGLOBIN A1C 6.00*  --          Lab 24  0203 24  1430   TOTAL PROTEIN 5.8* 6.2   ALBUMIN 3.5 3.6   GLOBULIN 2.3 2.6   ALT (SGPT) 9 9   AST (SGOT) 13 14   BILIRUBIN 0.6 0.6   ALK PHOS 74 78         Lab 24  1627 24  1430   PROBNP  --  4,183.0*   HSTROP T  131* 137*             Lab 06/22/24  0203   IRON 30*   IRON SATURATION (TSAT) 10*   TIBC 301   TRANSFERRIN 202   FERRITIN 88.02         Brief Urine Lab Results       None            Microbiology Results Abnormal       None            XR Chest 1 View    Result Date: 6/21/2024  XR CHEST 1 VW Date of Exam: 6/21/2024 2:56 PM EDT Indication: SOA triage protocol Comparison: None available. Findings: There is a left subclavian dual-lead cardiac stimulator device. There is cardiomegaly. Central pulmonary vascular congestion. No definite findings to suggest CHF. No pneumothorax identified. No large effusion. There are linear densities in the mid and lower lung suggesting minor atelectasis.     Impression: Impression: Marked enlargement of the cardiac silhouette, with mild pulmonary vascular congestion. Probable bibasilar atelectasis. Electronically Signed: Maurizio Oakley MD  6/21/2024 3:14 PM EDT  Workstation ID: [id number]         Current medications:  Scheduled Meds:ferrous sulfate, 325 mg, Oral, Daily With Breakfast  furosemide, 80 mg, Intravenous, BID  heparin (porcine), 5,000 Units, Subcutaneous, Q8H  insulin lispro, 2-7 Units, Subcutaneous, 4x Daily AC & at Bedtime  ipratropium-albuterol, 3 mL, Nebulization, 4x Daily - RT  pantoprazole, 40 mg, Oral, Q AM  potassium chloride ER, 40 mEq, Oral, Q4H  sodium chloride, 10 mL, Intravenous, Q12H      Continuous Infusions:   PRN Meds:.  acetaminophen **OR** acetaminophen **OR** acetaminophen    Albuterol Sulfate NEB Orderable    senna-docusate sodium **AND** polyethylene glycol **AND** bisacodyl **AND** bisacodyl    Calcium Replacement - Follow Nurse / BPA Driven Protocol    dextrose    dextrose    glucagon (human recombinant)    Magnesium Low Dose Replacement - Follow Nurse / BPA Driven Protocol    nitroglycerin    Phosphorus Replacement - Follow Nurse / BPA Driven Protocol    Potassium Replacement - Follow Nurse / BPA Driven Protocol    prochlorperazine    sodium chloride     sodium chloride    sodium chloride    Assessment & Plan   Assessment & Plan     Active Hospital Problems    Diagnosis  POA    **Acute on chronic heart failure with preserved ejection fraction (HFpEF) [I50.33]  Yes    Elevated serum creatinine [R79.89]  Yes    Anemia [D64.9]  Yes    Hypokalemia [E87.6]  Yes    Cirrhosis of liver [K74.60]  Yes    HCV (hepatitis C virus) [B19.20]  Yes      Resolved Hospital Problems   No resolved problems to display.        Brief Hospital Course to date:  Jaycob Ndiaye II is a 64 y.o. male with past medical history significant for CHF, COPD, cirrhosis, prostate cancer, chronic hematuria, GI bleed, HCV, HTN, and HLD who presents to the ED due to worsening shortness of breath and lower extremity edema over the past week.     A/C HFrEF  -Follows with Cardiologist Dr. Craig  -Echo just done at [facility], will defer additional echocardiogram  -Continue 80 mg of IV Lasix twice daily  -Strict I&O, daily weights  -Monitor creatinine daily to check creatinine and electrolytes while on IV Lasix. Creatinine stable this morning.      Elevated Creatinine  -Baseline data deficit  -Creat 2.11 on presentation, stable this morning after diuresis  -avoid nephrotoxins as able  -Monitor creatinine with BMP daily     Anemia  Recent GI Bleed  -s/p EGD and colonoscopy at [facility] on 6/13/2024, records requested  -Hgb 9.2 on presentation, trended down slightly to 8.8 this morning  -Continue PPI   -continue PO Iron   -Monitor hemoglobin/hematocrit daily     Hypokalemia  -Replace per protocol     New cirrhosis   HCV  -s/p US abd on 6/10/24 that revealed nodular liver consistent with cirrhosis and moderate ascites  -S/p US guided paracentesis on 6/12/2024 at [facility] with 200 mL of skylar-colored fluid removed.  No fluid was sent to lab.  -Repeat abdominal US ordered to evaluate ascites   -Will need referral to establish with [facility] upon discharge per patient request     T2DM with peripheral neuropathy  -hold home  Mounjaro, Hg A1c is 6.  -Continue low dose  SSI for now     PAF  -Xarelto discontinued 6/13 at [facility] d/t persistent hematuria      HTN  -hold home bystolic for now d/t borderline BP      DVT prophylaxis:  Heparin     Expected Discharge Location and Transportation: home  Expected Discharge   Expected Discharge Date: 6/24/2024; Expected Discharge Time:      VTE Prophylaxis:  Pharmacologic VTE prophylaxis orders are present.         AM-PAC 6 Clicks Score (PT): 17 (06/21/24 2107)    CODE STATUS:   Code Status and Medical Interventions:   Ordered at: 06/21/24 1840     Level Of Support Discussed With:    Patient     Code Status (Patient has no pulse and is not breathing):    CPR (Attempt to Resuscitate)     Medical Interventions (Patient has pulse or is breathing):    Full Support     This patient's problems and plans were partially entered by my partner and updated as appropriate by me 06/22/24. Today is my first day evaluating this patient's active medical problems. I Personally reviewed chart and adjusted note to reflect daily changes in management/clinical condition. Copied text in this note has been reviewed and is accurate as of  06/22/24      Edda Kendrick MD  06/22/24

## 2024-06-22 NOTE — THERAPY EVALUATION
Patient Name: Jaycob Ndiaye II  : 1960    MRN: [id number]                              Today's Date: 2024       Admit Date: 2024    Visit Dx:     ICD-10-CM ICD-9-CM   1. Peripheral edema  R60.0 782.3   2. Congestive heart failure, unspecified HF chronicity, unspecified heart failure type  I50.9 428.0   3. Hypokalemia  E87.6 276.8     Patient Active Problem List   Diagnosis    Screen for colon cancer    Acute on chronic heart failure with preserved ejection fraction (HFpEF)    Elevated serum creatinine    Anemia    Hypokalemia    Cirrhosis of liver    HCV (hepatitis C virus)     Past Medical History:   Diagnosis Date    Arthritis     Cancer     Constipation     Depression     Diabetes     type 2 dm, check blood sugar once a day    History of hepatitis C     History of prostate cancer     Hypertension     Irregular heartbeat     Pacemaker     home monitoring    Prostate CA     Requires supplemental oxygen     at night with cpap    Sleep apnea     wears a cpap    SOB (shortness of breath) on exertion     Wears glasses      Past Surgical History:   Procedure Laterality Date    CARDIAC ABLATION      COLONOSCOPY N/A 2021    Procedure: Colonoscopy with polypectomy;  Surgeon: Maurice Proctor MD;  Location: [facility] ENDOSCOPY;  Service: Gastroenterology;  Laterality: N/A;    COLONOSCOPY      ENDOSCOPY      KNEE SURGERY Right     1985    PACEMAKER IMPLANTATION      PROSTATE SURGERY      REPLACEMENT TOTAL KNEE Right       General Information       Row Name 24 0937          OT Time and Intention    Document Type evaluation  -AF     Mode of Treatment occupational therapy  -AF       Row Name 24 0937          General Information    Patient Profile Reviewed yes  -AF     Prior Level of Function independent:;all household mobility;community mobility;gait;transfer;bed mobility;ADL's;home management;cooking;cleaning;driving;shopping;using stairs  -AF     Existing Precautions/Restrictions  fall;other (see comments)  LE edema  -AF     Barriers to Rehab medically complex  -AF       Row Name 06/22/24 0937          Occupational Profile    Environmental Supports and Barriers (Occupational Profile) Pt lives in one level home. Son is present but not at all times, will soon move out per pt. Pt has standard walker, rolling walker and straight cane. He primarily uses cane for ambulation. He reports indp. in fxnl mobility, ADLs and IADLs prior to most recent hospitilization.  -AF       Row Name 06/22/24 0937          Living Environment    People in Home child(paco), adult  -AF       Row Name 06/22/24 0937          Home Main Entrance    Number of Stairs, Main Entrance five  -AF     Stair Railings, Main Entrance none  -AF       Row Name 06/22/24 0937          Stairs Within Home, Primary    Number of Stairs, Within Home, Primary none  -AF       Row Name 06/22/24 0937          Cognition    Orientation Status (Cognition) oriented x 3  -AF       Row Name 06/22/24 0937          Safety Issues, Functional Mobility    Safety Issues Affecting Function (Mobility) awareness of need for assistance;safety precautions follow-through/compliance  -AF     Impairments Affecting Function (Mobility) balance;endurance/activity tolerance;strength;shortness of breath  -AF               User Key  (r) = Recorded By, (t) = Taken By, (c) = Cosigned By      Initials Name Provider Type    AF Roberta Corbin OT Occupational Therapist                     Mobility/ADL's       Row Name 06/22/24 0941          Bed Mobility    Bed Mobility supine-sit  -AF     Supine-Sit [place] (Bed Mobility) minimum assist (75% patient effort)  -AF     Assistive Device (Bed Mobility) bed rails;head of bed elevated  -AF       Row Name 06/22/24 0941          Transfers    Transfers sit-stand transfer;stand-sit transfer  -AF       Row Name 06/22/24 0941          Sit-Stand Transfer    Sit-Stand [place] (Transfers) contact guard;verbal cues  -AF     Assistive  Device (Sit-Stand Transfers) walker, front-wheeled  -AF       Row Name 06/22/24 0941          Stand-Sit Transfer    Stand-Sit [place] (Transfers) contact guard;verbal cues  -AF     Assistive Device (Stand-Sit Transfers) walker, front-wheeled  -AF       Row Name 06/22/24 0941          Functional Mobility    Functional Mobility- Comment Defer to PT.  -AF       Row Name 06/22/24 0941          Activities of Daily Living    BADL Assessment/Intervention lower body dressing;feeding;grooming  -AF       Row Name 06/22/24 0941          Lower Body Dressing Assessment/Training    [place] (Lower Body Dressing) doff;don;socks;dependent (less than 25% patient effort)  -AF     Position (Lower Body Dressing) edge of bed sitting;supported sitting  -AF     Comment, (Lower Body Dressing) Pt reports he usually wears slip on socks but does like to wear compression socks.  -AF       Row Name 06/22/24 0941          Self-Feeding Assessment/Training    [place] (Feeding) feeding skills;independent  -AF     Position (Self-Feeding) sitting up in bed  -AF       Row Name 06/22/24 0941          Grooming Assessment/Training    Oral Care other (see comments)  already completed.  -AF               User Key  (r) = Recorded By, (t) = Taken By, (c) = Cosigned By      Initials Name Provider Type    AF Roberta Corbin OT Occupational Therapist                   Obj/Interventions       Row Name 06/22/24 0943          Sensory Assessment (Somatosensory)    Sensory Assessment (Somatosensory) bilateral UE  -AF     Sensory Subjective Reports numbness;tingling  -AF     Sensory Assessment Reported numbness and tingling in both hands.  -AF       Row Name 06/22/24 0943          Vision Assessment/Intervention    Visual Impairment/Limitations WFL  -AF       Row Name 06/22/24 0943          Range of Motion Comprehensive    General Range of Motion bilateral upper extremity ROM WFL;hand range of motion deficits identified  -AF     Comment,  General Range of Motion Unable to grasp with left hand.  -AF       Row Name 06/22/24 0943          Strength Comprehensive (MMT)    General Manual Muscle Testing (MMT) Assessment upper extremity strength deficits identified  -AF     Comment, General Manual Muscle Testing (MMT) Assessment RUE 3/5 and LUE grossly 4/5.  -AF       Row Name 06/22/24 0943          Balance    Balance Assessment sitting static balance;sitting dynamic balance;sit to stand dynamic balance;standing static balance;standing dynamic balance  -AF     Static Sitting Balance standby assist  -AF     Dynamic Sitting Balance contact guard  -AF     Position, Sitting Balance unsupported;sitting in chair;sitting edge of bed  -AF     Sit to Stand Dynamic Balance supervision;verbal cues  -AF     Static Standing Balance verbal cues;supervision  -AF     Dynamic Standing Balance contact guard;verbal cues  -AF     Position/Device Used, Standing Balance supported;walker, front-wheeled  -AF     Balance Interventions sitting;standing;sit to stand;supported;static;dynamic;occupation based/functional task  -AF     Comment, Balance LBD  -AF               User Key  (r) = Recorded By, (t) = Taken By, (c) = Cosigned By      Initials Name Provider Type    AF Roberta Corbin OT Occupational Therapist                   Goals/Plan       Row Name 06/22/24 0952          Bed Mobility Goal 1 (OT)    Activity/Assistive Device (Bed Mobility Goal 1, OT) --  -AF     [place] Level/Cues Needed (Bed Mobility Goal 1, OT) --  -AF     Time Frame (Bed Mobility Goal 1, OT) --  -AF       Row Name 06/22/24 0952          Transfer Goal 1 (OT)    Activity/Assistive Device (Transfer Goal 1, OT) sit-to-stand/stand-to-sit;bed-to-chair/chair-to-bed;toilet  -AF     [place] Level/Cues Needed (Transfer Goal 1, OT) standby assist  -AF     Time Frame (Transfer Goal 1, OT) short term goal (STG);5 days  -AF       Row Name 06/22/24 0952          Toileting Goal 1 (OT)    Activity/Device (Toileting  Goal 1, OT) adjust/manage clothing;perform perineal hygiene;commode;grab bar/safety frame  -AF     [place] Level/Cues Needed (Toileting Goal 1, OT) standby assist  -AF     Time Frame (Toileting Goal 1, OT) short term goal (STG);5 days  -AF       Row Name 06/22/24 0952          Grooming Goal 1 (OT)    Activity/Device (Grooming Goal 1, OT) grooming skills, all;hair care;oral care;wash face, hands  -AF     [place] (Grooming Goal 1, OT) modified independence  -AF     Time Frame (Grooming Goal 1, OT) long term goal (LTG);by discharge;10 days  -AF       Row Name 06/22/24 0952          Therapy Assessment/Plan (OT)    Planned Therapy Interventions (OT) activity tolerance training;adaptive equipment training;BADL retraining;functional balance retraining;occupation/activity based interventions;patient/caregiver education/training;ROM/therapeutic exercise;strengthening exercise;transfer/mobility retraining  -AF               User Key  (r) = Recorded By, (t) = Taken By, (c) = Cosigned By      Initials Name Provider Type    AF Roberta Corbin OT Occupational Therapist                   Clinical Impression       Row Name 06/22/24 0948          Pain Assessment    Pretreatment Pain Rating 0/10 - no pain  -AF     Posttreatment Pain Rating 0/10 - no pain  -AF     Pre/Posttreatment Pain Comment pt tolerated. Reported BLEs were sore to the touch.  -AF     Pain Intervention(s) Ambulation/increased activity;Repositioned  -AF       Row Name 06/22/24 0948          Plan of Care Review    Outcome Evaluation Pt presents with generalized weakness, decreased activity tolerance, and LE edema limiting ADL and fxnl mobility indp. Pt D for LBD and IND for feeding. Pt completed bed mobility w/ Jesus and STS w/ SPV. Rec. IPOT to address deficits and support return to baseline. Rec. IRF upon d/c, will monitor as pt progresses and LE edema decreases.  -AF       Row Name 06/22/24 0963          Therapy Assessment/Plan (OT)    Patient/Family  Therapy Goal Statement (OT) Return home  -AF     Rehab Potential (OT) good, to achieve stated therapy goals  -AF     Criteria for Skilled Therapeutic Interventions Met (OT) yes;meets criteria;skilled treatment is necessary  -AF     Therapy Frequency (OT) daily  -AF     Predicted Duration of Therapy Intervention (OT) 10  -AF       Row Name 06/22/24 0948          Therapy Plan Review/Discharge Plan (OT)    Anticipated Discharge Disposition (OT) inpatient rehabilitation facility  -AF       Row Name 06/22/24 0948          Vital Signs    Pre Systolic BP Rehab 101  -AF     Pre Treatment Diastolic BP 65  -AF     Post Systolic BP Rehab 106  -AF     Post Treatment Diastolic BP 63  -AF     Post SpO2 (%) 97  -AF     Pre Patient Position Supine  -AF     Intra Patient Position Standing  -AF     Post Patient Position Sitting  -AF       Row Name 06/22/24 0948          Positioning and Restraints    Pre-Treatment Position in bed  -AF     Post Treatment Position chair  -AF     In Chair notified nsg;reclined;waffle cushion;sitting;call light within reach;encouraged to call for assist;exit alarm on;legs elevated  -AF               User Key  (r) = Recorded By, (t) = Taken By, (c) = Cosigned By      Initials Name Provider Type    AF Roberta Corbin OT Occupational Therapist                   Outcome Measures       Row Name 06/22/24 0955          How much help from another is currently needed...    Putting on and taking off regular lower body clothing? 2  -AF     Bathing (including washing, rinsing, and drying) 2  -AF     Toileting (which includes using toilet bed pan or urinal) 3  -AF     Putting on and taking off regular upper body clothing 3  -AF     Taking care of personal grooming (such as brushing teeth) 3  -AF     Eating meals 4  -AF     AM-PAC 6 Clicks Score (OT) 17  -AF       Row Name 06/22/24 0800          How much help from another person do you currently need...    Turning from your back to your side while in flat bed without  using bedrails? 3  -DM     Moving from lying on back to sitting on the side of a flat bed without bedrails? 3  -DM     Moving to and from a bed to a chair (including a wheelchair)? 3  -DM     Standing up from a chair using your arms (e.g., wheelchair, bedside chair)? 3  -DM     Climbing 3-5 steps with a railing? 3  -DM     To walk in hospital room? 3  -DM     AM-PAC 6 Clicks Score (PT) 18  -DM     Highest Level of Mobility Goal 6 --> Walk 10 steps or more  -DM       Row Name 06/22/24 0955          Functional Assessment    Outcome Measure Options AM-PAC 6 Clicks Daily Activity (OT)  -AF               User Key  (r) = Recorded By, (t) = Taken By, (c) = Cosigned By      Initials Name Provider Type    Gracie Cam, RN Registered Nurse    Roberta Murillo OT Occupational Therapist                    Occupational Therapy Education       Title: PT OT SLP Therapies (In Progress)       Topic: Occupational Therapy (In Progress)       Point: ADL training (Done)       Description:   Instruct learner(s) on proper safety adaptation and remediation techniques during self care or transfers.   Instruct in proper use of assistive devices.                  Learning Progress Summary             Patient Acceptance, TB,E, VU by AF at 6/22/2024 0955                         Point: Home exercise program (Done)       Description:   Instruct learner(s) on appropriate technique for monitoring, assisting and/or progressing therapeutic exercises/activities.                  Learning Progress Summary             Patient Acceptance, TB,E, VU by AF at 6/22/2024 0955                         Point: Precautions (Not Started)       Description:   Instruct learner(s) on prescribed precautions during self-care and functional transfers.                  Learner Progress:  Not documented in this visit.              Point: Body mechanics (Done)       Description:   Instruct learner(s) on proper positioning and spine alignment during self-care,  functional mobility activities and/or exercises.                  Learning Progress Summary             Patient Acceptance, TB,E, VU by AF at 6/22/2024 0955                                         User Key       Initials Effective Dates Name Provider Type Discipline    AF 08/15/23 -  Roberta Corbin OT Occupational Therapist OT                  OT Recommendation and Plan  Planned Therapy Interventions (OT): activity tolerance training, adaptive equipment training, BADL retraining, functional balance retraining, occupation/activity based interventions, patient/caregiver education/training, ROM/therapeutic exercise, strengthening exercise, transfer/mobility retraining  Therapy Frequency (OT): daily  Plan of Care Review  Outcome Evaluation: Pt presents with generalized weakness, decreased activity tolerance, and LE edema limiting ADL and fxnl mobility indp. Pt D for LBD and IND for feeding. Pt completed bed mobility w/ Jesus and STS w/ SPV. Rec. IPOT to address deficits and support return to baseline. Rec. IRF upon d/c, will monitor as pt progresses and LE edema decreases.     Time Calculation:   Evaluation Complexity (OT)  Review Occupational Profile/Medical/Therapy History Complexity: expanded/moderate complexity  Assessment, Occupational Performance/Identification of Deficit Complexity: 3-5 performance deficits  Clinical Decision Making Complexity (OT): detailed assessment/moderate complexity  Overall Complexity of Evaluation (OT): moderate complexity     Time Calculation- OT       Row Name 06/22/24 0956             Time Calculation- OT    OT Start Time 0800  -AF      OT Received On 06/22/24  -AF      OT Goal Re-Cert Due Date 07/02/24  -AF         Untimed Charges    OT Eval/Re-eval Minutes 55  -AF         Total Minutes    Untimed Charges Total Minutes 55  -AF       Total Minutes 55  -AF                User Key  (r) = Recorded By, (t) = Taken By, (c) = Cosigned By      Initials Name Provider Type    AF Roberta Corbin  OT Occupational Therapist                  Therapy Charges for Today       Code Description Service Date Service Provider Modifiers Qty    [id number] HC OT EVAL MOD COMPLEXITY 4 6/22/2024 Roberta Corbin OT GO 1                 Roberta Corbin OT  6/22/2024

## 2024-06-22 NOTE — THERAPY EVALUATION
Patient Name: Jaycob Ndiaye II  : 1960    MRN: [id number]                              Today's Date: 2024       Admit Date: 2024    Visit Dx:     ICD-10-CM ICD-9-CM   1. Peripheral edema  R60.0 782.3   2. Congestive heart failure, unspecified HF chronicity, unspecified heart failure type  I50.9 428.0   3. Hypokalemia  E87.6 276.8     Patient Active Problem List   Diagnosis    Screen for colon cancer    Acute on chronic heart failure with preserved ejection fraction (HFpEF)    Elevated serum creatinine    Anemia    Hypokalemia    Cirrhosis of liver    HCV (hepatitis C virus)     Past Medical History:   Diagnosis Date    Arthritis     Cancer     Constipation     Depression     Diabetes     type 2 dm, check blood sugar once a day    History of hepatitis C     History of prostate cancer     Hypertension     Irregular heartbeat     Pacemaker     home monitoring    Prostate CA     Requires supplemental oxygen     at night with cpap    Sleep apnea     wears a cpap    SOB (shortness of breath) on exertion     Wears glasses      Past Surgical History:   Procedure Laterality Date    CARDIAC ABLATION      COLONOSCOPY N/A 2021    Procedure: Colonoscopy with polypectomy;  Surgeon: Maurice Proctor MD;  Location: [facility] ENDOSCOPY;  Service: Gastroenterology;  Laterality: N/A;    COLONOSCOPY      ENDOSCOPY      KNEE SURGERY Right     1985    PACEMAKER IMPLANTATION      PROSTATE SURGERY      REPLACEMENT TOTAL KNEE Right       General Information       Row Name 24 0957          Physical Therapy Time and Intention    Document Type evaluation  -ND     Mode of Treatment physical therapy  -ND       Row Name 24 0957          General Information    Patient Profile Reviewed yes  -ND     Prior Level of Function independent:;all household mobility;community mobility;gait;transfer;bed mobility;using stairs;driving  Pt primarily uses SPC for mobility but has standard walker and rollator. Pt  sleeps in hospital bed at home.  -ND     Existing Precautions/Restrictions fall;other (see comments)  BLE edema  -ND     Barriers to Rehab medically complex;previous functional deficit  -ND       Row Name 06/22/24 0957          Living Environment    People in Home child(paco), adult;other (see comments)  Intermittently, son potentially moving out soon.  -ND       Row Name 06/22/24 0957          Home Main Entrance    Number of Stairs, Main Entrance five  -ND     Stair Railings, Main Entrance none;other (see comments)  Reports HR might be installed soon  -ND       Row Name 06/22/24 0957          Stairs Within Home, Primary    Number of Stairs, Within Home, Primary none  -ND       Row Name 06/22/24 0957          Cognition    Orientation Status (Cognition) oriented x 3  -ND       Row Name 06/22/24 0957          Safety Issues, Functional Mobility    Safety Issues Affecting Function (Mobility) awareness of need for assistance;safety precaution awareness;safety precautions follow-through/compliance  -ND     Impairments Affecting Function (Mobility) balance;endurance/activity tolerance;shortness of breath;strength;range of motion (ROM)  -ND               User Key  (r) = Recorded By, (t) = Taken By, (c) = Cosigned By      Initials Name Provider Type    ND Maryjane Das, PT Physical Therapist                   Mobility       Row Name 06/22/24 0958          Bed Mobility    Bed Mobility supine-sit  -ND     Supine-Sit [place] (Bed Mobility) minimum assist (75% patient effort);1 person assist;verbal cues;nonverbal cues (demo/gesture)  -ND     Assistive Device (Bed Mobility) bed rails;head of bed elevated  -ND     Comment, (Bed Mobility) Assist at trunk, HOB max elevated.  -ND       Row Name 06/22/24 0958          Sit-Stand Transfer    Sit-Stand [place] (Transfers) contact guard;verbal cues  -ND     Assistive Device (Sit-Stand Transfers) walker, front-wheeled  -ND     Comment, (Sit-Stand Transfer) x1 from EOB.  -ND        Row Name 06/22/24 0958          Gait/Stairs (Locomotion)    [place] Level (Gait) contact guard;verbal cues  -ND     Assistive Device (Gait) walker, front-wheeled  -ND     Distance in Feet (Gait) 20  -ND     Deviations/Abnormal Patterns (Gait) bilateral deviations;base of support, wide;vianey decreased;gait speed decreased;stride length decreased  -ND     Bilateral Gait Deviations forward flexed posture  -ND     Comment, (Gait/Stairs) Pt ambulates 20' in room and declines further ambulation due to getting short of breath with activity. Pt endorses only minor RODRIGUEZ following short ambulation bout, O2 at 97% on room air.  -ND               User Key  (r) = Recorded By, (t) = Taken By, (c) = Cosigned By      Initials Name Provider Type    Maryjane Rubin PT Physical Therapist                   Obj/Interventions       Row Name 06/22/24 1000          Range of Motion Comprehensive    General Range of Motion lower extremity range of motion deficits identified  -ND     Comment, General Range of Motion Bilateral DF limitations secondary to edema.  -ND       Row Name 06/22/24 1000          Strength Comprehensive (MMT)    General Manual Muscle Testing (MMT) Assessment lower extremity strength deficits identified  -ND     Comment, General Manual Muscle Testing (MMT) Assessment RLE 3+/5, LLE 4/5.  -ND       Row Name 06/22/24 1000          Balance    Balance Assessment sitting static balance;sitting dynamic balance;sit to stand dynamic balance;standing static balance;standing dynamic balance  -ND     Static Sitting Balance standby assist  -ND     Dynamic Sitting Balance contact guard  -ND     Position, Sitting Balance unsupported;sitting edge of bed  -ND     Sit to Stand Dynamic Balance contact guard  -ND     Static Standing Balance standby assist  -ND     Dynamic Standing Balance contact guard;verbal cues  -ND     Position/Device Used, Standing Balance supported;walker, front-wheeled  -ND     Balance Interventions  sitting;standing;sit to stand;supported;static;dynamic  -ND       Row Name 06/22/24 1000          Sensory Assessment (Somatosensory)    Sensory Assessment (Somatosensory) left LE;right LE  -ND     Left LE Sensory Assessment general sensation;impaired  -ND     Right LE Sensory Assessment general sensation;impaired  -ND               User Key  (r) = Recorded By, (t) = Taken By, (c) = Cosigned By      Initials Name Provider Type    ND Maryjane Das, PT Physical Therapist                   Goals/Plan       Row Name 06/22/24 1005          Bed Mobility Goal 1 (PT)    Activity/Assistive Device (Bed Mobility Goal 1, PT) sit to supine/supine to sit  -ND     [place] Level/Cues Needed (Bed Mobility Goal 1, PT) modified independence  -ND     Time Frame (Bed Mobility Goal 1, PT) short term goal (STG);3 days  -ND       Row Name 06/22/24 1005          Transfer Goal 1 (PT)    Activity/Assistive Device (Transfer Goal 1, PT) sit-to-stand/stand-to-sit;bed-to-chair/chair-to-bed;walker, rolling  -ND     [place] Level/Cues Needed (Transfer Goal 1, PT) modified independence  -ND     Time Frame (Transfer Goal 1, PT) long term goal (LTG);1 week  -ND       Row Name 06/22/24 1005          Gait Training Goal 1 (PT)    Activity/Assistive Device (Gait Training Goal 1, PT) gait (walking locomotion);increase endurance/gait distance;decrease fall risk;assistive device use;walker, rolling  -ND     [place] Level (Gait Training Goal 1, PT) modified independence  -ND     Distance (Gait Training Goal 1, PT) 150  -ND     Time Frame (Gait Training Goal 1, PT) long term goal (LTG);1 week  -ND       Row Name 06/22/24 1005          Stairs Goal 1 (PT)    Activity/Assistive Device (Stairs Goal 1, PT) ascending stairs;descending stairs  -ND     [place] Level/Cues Needed (Stairs Goal 1, PT) independent  -ND     Number of Stairs (Stairs Goal 1, PT) 6  -ND     Time Frame (Stairs Goal 1, PT) long term goal (LTG);1 week  -ND       Row Name  06/22/24 1005          Therapy Assessment/Plan (PT)    Planned Therapy Interventions (PT) balance training;bed mobility training;gait training;home exercise program;patient/family education;transfer training;ROM (range of motion);strengthening;stair training  -ND               User Key  (r) = Recorded By, (t) = Taken By, (c) = Cosigned By      Initials Name Provider Type    ND Maryjane Das, PT Physical Therapist                   Clinical Impression       Row Name 06/22/24 1003          Pain    Pretreatment Pain Rating 0/10 - no pain  -ND     Posttreatment Pain Rating 0/10 - no pain  -ND       Row Name 06/22/24 1003          Plan of Care Review    Plan of Care Reviewed With patient  -ND     Outcome Evaluation PT evaluation completed. Pt presenting with generalized weakness, BLE edema and resultant minor ROM deficits, and limited activity tolerance warranting IP PT services to facilitate return to baseline. Recommend IRF following d/c pending progress.  -ND       Row Name 06/22/24 1003          Therapy Assessment/Plan (PT)    Patient/Family Therapy Goals Statement (PT) Return to baseline.  -ND     Rehab Potential (PT) fair, will monitor progress closely  -ND     Criteria for Skilled Interventions Met (PT) yes;meets criteria;skilled treatment is necessary  -ND     Therapy Frequency (PT) daily  -ND     Predicted Duration of Therapy Intervention (PT) 1 week.  -ND       Row Name 06/22/24 1003          Vital Signs    Pre Systolic BP Rehab 101  -ND     Pre Treatment Diastolic BP 65  -ND     Post Systolic BP Rehab 106  -ND     Post Treatment Diastolic BP 63  -ND     Pre SpO2 (%) 97  -ND     O2 Delivery Pre Treatment room air  -ND     Intra SpO2 (%) 97  -ND     O2 Delivery Intra Treatment room air  -ND     Post SpO2 (%) 100  -ND     O2 Delivery Post Treatment room air  -ND     Pre Patient Position Supine  -ND     Intra Patient Position Standing  -ND     Post Patient Position Sitting  -ND       Row Name 06/22/24 1003           Positioning and Restraints    Pre-Treatment Position in bed  -ND     Post Treatment Position chair  -ND     In Chair notified nsg;reclined;legs elevated;call light within reach;encouraged to call for assist;exit alarm on;waffle cushion;RLE elevated;LLE elevated  -ND               User Key  (r) = Recorded By, (t) = Taken By, (c) = Cosigned By      Initials Name Provider Type    Maryjane Rubin, AR Physical Therapist                   Outcome Measures       Row Name 06/22/24 1005 06/22/24 0800       How much help from another person do you currently need...    Turning from your back to your side while in flat bed without using bedrails? 3  -ND 3  -DM    Moving from lying on back to sitting on the side of a flat bed without bedrails? 3  -ND 3  -DM    Moving to and from a bed to a chair (including a wheelchair)? 3  -ND 3  -DM    Standing up from a chair using your arms (e.g., wheelchair, bedside chair)? 3  -ND 3  -DM    Climbing 3-5 steps with a railing? 3  -ND 3  -DM    To walk in hospital room? 3  -ND 3  -DM    AM-PAC 6 Clicks Score (PT) 18  -ND 18  -DM    Highest Level of Mobility Goal 6 --> Walk 10 steps or more  -ND 6 --> Walk 10 steps or more  -DM      Row Name 06/22/24 1005 06/22/24 0955       Functional Assessment    Outcome Measure Options AM-PAC 6 Clicks Basic Mobility (PT)  -ND AM-PAC 6 Clicks Daily Activity (OT)  -AF              User Key  (r) = Recorded By, (t) = Taken By, (c) = Cosigned By      Initials Name Provider Type    Gracie Cam, RN Registered Nurse    Roberta Murillo OT Occupational Therapist    Maryjane Rubin, PT Physical Therapist                                 Physical Therapy Education       Title: PT OT SLP Therapies (In Progress)       Topic: Physical Therapy (In Progress)       Point: Mobility training (Done)       Learning Progress Summary             Patient Acceptance, E, VU by ND at 6/22/2024 1006                         Point: Home exercise program (Not  Started)       Learner Progress:  Not documented in this visit.              Point: Body mechanics (Done)       Learning Progress Summary             Patient Acceptance, E, VU by ND at 6/22/2024 1006                         Point: Precautions (Done)       Learning Progress Summary             Patient Acceptance, E, VU by ND at 6/22/2024 1006                                         User Key       Initials Effective Dates Name Provider Type Discipline    ND 11/16/23 -  Maryjane Das, PT Physical Therapist PT                  PT Recommendation and Plan  Planned Therapy Interventions (PT): balance training, bed mobility training, gait training, home exercise program, patient/family education, transfer training, ROM (range of motion), strengthening, stair training  Plan of Care Reviewed With: patient  Outcome Evaluation: PT evaluation completed. Pt presenting with generalized weakness, BLE edema and resultant minor ROM deficits, and limited activity tolerance warranting IP PT services to facilitate return to baseline. Recommend IRF following d/c pending progress.     Time Calculation:   PT Evaluation Complexity  History, PT Evaluation Complexity: 3 or more personal factors and/or comorbidities  Examination of Body Systems (PT Eval Complexity): total of 4 or more elements  Clinical Presentation (PT Evaluation Complexity): evolving  Clinical Decision Making (PT Evaluation Complexity): moderate complexity  Overall Complexity (PT Evaluation Complexity): moderate complexity     PT Charges       Row Name 06/22/24 1006             Time Calculation    Start Time 0842  -ND      PT Received On 06/22/24  -ND      PT Goal Re-Cert Due Date 07/02/24  -ND         Timed Charges    61527 - PT Therapeutic Activity Minutes 10  -ND         Untimed Charges    PT Eval/Re-eval Minutes 31  -ND         Total Minutes    Timed Charges Total Minutes 10  -ND      Untimed Charges Total Minutes 31  -ND       Total Minutes 41  -ND                 User Key  (r) = Recorded By, (t) = Taken By, (c) = Cosigned By      Initials Name Provider Type    Maryjane Rubin, PT Physical Therapist                  Therapy Charges for Today       Code Description Service Date Service Provider Modifiers Qty    40300524277  PT THERAPEUTIC ACT EA 15 MIN 6/22/2024 Maryjane Das, PT GP 1    [id number] HC PT EVAL MOD COMPLEXITY 3 6/22/2024 Maryjane Das, PT GP 1            PT G-Codes  Outcome Measure Options: AM-PAC 6 Clicks Basic Mobility (PT)  AM-PAC 6 Clicks Score (PT): 18  AM-PAC 6 Clicks Score (OT): 17  PT Discharge Summary  Anticipated Discharge Disposition (PT): inpatient rehabilitation facility    Maryjane Das PT  6/22/2024

## 2024-06-22 NOTE — PLAN OF CARE
Goal Outcome Evaluation:  Plan of Care Reviewed With: patient           Outcome Evaluation: PT evaluation completed. Pt presenting with generalized weakness, BLE edema and resultant minor ROM deficits, and limited activity tolerance warranting IP PT services to facilitate return to baseline. Recommend IRF following d/c pending progress.      Anticipated Discharge Disposition (PT): inpatient rehabilitation facility

## 2024-06-23 LAB
ANION GAP SERPL CALCULATED.3IONS-SCNC: 13 MMOL/L (ref 5–15)
BUN SERPL-MCNC: 66 MG/DL (ref 8–23)
BUN/CREAT SERPL: 31.3 (ref 7–25)
CALCIUM SPEC-SCNC: 9.2 MG/DL (ref 8.6–10.5)
CHLORIDE SERPL-SCNC: 94 MMOL/L (ref 98–107)
CO2 SERPL-SCNC: 26 MMOL/L (ref 22–29)
CREAT SERPL-MCNC: 2.11 MG/DL (ref 0.76–1.27)
DEPRECATED RDW RBC AUTO: 51.7 FL (ref 37–54)
EGFRCR SERPLBLD CKD-EPI 2021: 34.1 ML/MIN/1.73
ERYTHROCYTE [DISTWIDTH] IN BLOOD BY AUTOMATED COUNT: 14.7 % (ref 12.3–15.4)
GLUCOSE BLDC GLUCOMTR-MCNC: 137 MG/DL (ref 70–130)
GLUCOSE BLDC GLUCOMTR-MCNC: 145 MG/DL (ref 70–130)
GLUCOSE BLDC GLUCOMTR-MCNC: 173 MG/DL (ref 70–130)
GLUCOSE BLDC GLUCOMTR-MCNC: 209 MG/DL (ref 70–130)
GLUCOSE SERPL-MCNC: 146 MG/DL (ref 65–99)
HCT VFR BLD AUTO: 28.7 % (ref 37.5–51)
HGB BLD-MCNC: 9.2 G/DL (ref 13–17.7)
MCH RBC QN AUTO: 30.8 PG (ref 26.6–33)
MCHC RBC AUTO-ENTMCNC: 32.1 G/DL (ref 31.5–35.7)
MCV RBC AUTO: 96 FL (ref 79–97)
PLATELET # BLD AUTO: 233 10*3/MM3 (ref 140–450)
PMV BLD AUTO: 10.2 FL (ref 6–12)
POTASSIUM SERPL-SCNC: 3.5 MMOL/L (ref 3.5–5.2)
POTASSIUM SERPL-SCNC: 3.7 MMOL/L (ref 3.5–5.2)
RBC # BLD AUTO: 2.99 10*6/MM3 (ref 4.14–5.8)
SODIUM SERPL-SCNC: 133 MMOL/L (ref 136–145)
WBC NRBC COR # BLD AUTO: 9.32 10*3/MM3 (ref 3.4–10.8)

## 2024-06-23 PROCEDURE — 94660 CPAP INITIATION&MGMT: CPT

## 2024-06-23 PROCEDURE — 94664 DEMO&/EVAL PT USE INHALER: CPT

## 2024-06-23 PROCEDURE — 84132 ASSAY OF SERUM POTASSIUM: CPT | Performed by: INTERNAL MEDICINE

## 2024-06-23 PROCEDURE — 25010000002 FUROSEMIDE PER 20 MG: Performed by: INTERNAL MEDICINE

## 2024-06-23 PROCEDURE — 63710000001 INSULIN LISPRO (HUMAN) PER 5 UNITS: Performed by: NURSE PRACTITIONER

## 2024-06-23 PROCEDURE — 82948 REAGENT STRIP/BLOOD GLUCOSE: CPT

## 2024-06-23 PROCEDURE — 99232 SBSQ HOSP IP/OBS MODERATE 35: CPT | Performed by: INTERNAL MEDICINE

## 2024-06-23 PROCEDURE — 94799 UNLISTED PULMONARY SVC/PX: CPT

## 2024-06-23 PROCEDURE — 85027 COMPLETE CBC AUTOMATED: CPT | Performed by: INTERNAL MEDICINE

## 2024-06-23 PROCEDURE — 94761 N-INVAS EAR/PLS OXIMETRY MLT: CPT

## 2024-06-23 PROCEDURE — 25010000002 HEPARIN (PORCINE) PER 1000 UNITS: Performed by: NURSE PRACTITIONER

## 2024-06-23 PROCEDURE — 99232 SBSQ HOSP IP/OBS MODERATE 35: CPT | Performed by: NURSE PRACTITIONER

## 2024-06-23 PROCEDURE — 80048 BASIC METABOLIC PNL TOTAL CA: CPT | Performed by: INTERNAL MEDICINE

## 2024-06-23 RX ORDER — AMOXICILLIN 250 MG
2 CAPSULE ORAL 2 TIMES DAILY PRN
Status: DISCONTINUED | OUTPATIENT
Start: 2024-06-23 | End: 2024-06-24

## 2024-06-23 RX ORDER — METOLAZONE 5 MG/1
5 TABLET ORAL DAILY
Status: DISCONTINUED | OUTPATIENT
Start: 2024-06-23 | End: 2024-06-26

## 2024-06-23 RX ORDER — POLYETHYLENE GLYCOL 3350 17 G/17G
17 POWDER, FOR SOLUTION ORAL 2 TIMES DAILY
Status: DISCONTINUED | OUTPATIENT
Start: 2024-06-23 | End: 2024-06-24

## 2024-06-23 RX ORDER — BISACODYL 10 MG
10 SUPPOSITORY, RECTAL RECTAL DAILY PRN
Status: DISCONTINUED | OUTPATIENT
Start: 2024-06-23 | End: 2024-06-24

## 2024-06-23 RX ORDER — BISACODYL 5 MG/1
5 TABLET, DELAYED RELEASE ORAL DAILY PRN
Status: DISCONTINUED | OUTPATIENT
Start: 2024-06-23 | End: 2024-06-24

## 2024-06-23 RX ORDER — POTASSIUM CHLORIDE 20 MEQ/1
40 TABLET, EXTENDED RELEASE ORAL EVERY 4 HOURS
Status: COMPLETED | OUTPATIENT
Start: 2024-06-23 | End: 2024-06-23

## 2024-06-23 RX ADMIN — INSULIN LISPRO 2 UNITS: 100 INJECTION, SOLUTION INTRAVENOUS; SUBCUTANEOUS at 12:13

## 2024-06-23 RX ADMIN — POLYETHYLENE GLYCOL 3350 17 G: 17 POWDER, FOR SOLUTION ORAL at 12:30

## 2024-06-23 RX ADMIN — IPRATROPIUM BROMIDE AND ALBUTEROL SULFATE 3 ML: 2.5; .5 SOLUTION RESPIRATORY (INHALATION) at 08:20

## 2024-06-23 RX ADMIN — IPRATROPIUM BROMIDE AND ALBUTEROL SULFATE 3 ML: 2.5; .5 SOLUTION RESPIRATORY (INHALATION) at 20:48

## 2024-06-23 RX ADMIN — METOLAZONE 5 MG: 5 TABLET ORAL at 12:28

## 2024-06-23 RX ADMIN — INSULIN LISPRO 3 UNITS: 100 INJECTION, SOLUTION INTRAVENOUS; SUBCUTANEOUS at 21:50

## 2024-06-23 RX ADMIN — POLYETHYLENE GLYCOL 3350 17 G: 17 POWDER, FOR SOLUTION ORAL at 21:50

## 2024-06-23 RX ADMIN — IPRATROPIUM BROMIDE AND ALBUTEROL SULFATE 3 ML: 2.5; .5 SOLUTION RESPIRATORY (INHALATION) at 12:54

## 2024-06-23 RX ADMIN — POTASSIUM CHLORIDE 40 MEQ: 1500 TABLET, EXTENDED RELEASE ORAL at 13:32

## 2024-06-23 RX ADMIN — FUROSEMIDE 80 MG: 10 INJECTION, SOLUTION INTRAMUSCULAR; INTRAVENOUS at 17:23

## 2024-06-23 RX ADMIN — POTASSIUM CHLORIDE 40 MEQ: 1500 TABLET, EXTENDED RELEASE ORAL at 17:14

## 2024-06-23 RX ADMIN — SENNOSIDES AND DOCUSATE SODIUM 2 TABLET: 50; 8.6 TABLET ORAL at 22:01

## 2024-06-23 RX ADMIN — Medication 10 ML: at 21:51

## 2024-06-23 RX ADMIN — HEPARIN SODIUM 5000 UNITS: 5000 INJECTION INTRAVENOUS; SUBCUTANEOUS at 13:32

## 2024-06-23 RX ADMIN — IPRATROPIUM BROMIDE AND ALBUTEROL SULFATE 3 ML: 2.5; .5 SOLUTION RESPIRATORY (INHALATION) at 16:23

## 2024-06-23 RX ADMIN — FERROUS SULFATE TAB 325 MG (65 MG ELEMENTAL FE) 325 MG: 325 (65 FE) TAB at 09:21

## 2024-06-23 RX ADMIN — FUROSEMIDE 80 MG: 10 INJECTION, SOLUTION INTRAMUSCULAR; INTRAVENOUS at 09:21

## 2024-06-23 RX ADMIN — PANTOPRAZOLE SODIUM 40 MG: 40 TABLET, DELAYED RELEASE ORAL at 06:28

## 2024-06-23 NOTE — CASE MANAGEMENT/SOCIAL WORK
Discharge Planning Assessment  [facility]     Patient Name: Jaycob Ndiaye II  MRN: [id number]  Today's Date: 6/23/2024    Admit Date: 6/21/2024        Discharge Needs Assessment       Row Name 06/23/24 1516       Living Environment    People in Home child(paco), adult    Name(s) of People in Home Rahul Ndiaye, jana [phone]    Current Living Arrangements other (see comments)  mobile home    Potentially Unsafe Housing Conditions unable to assess    In the past 12 months has the electric, gas, oil, or water company threatened to shut off services in your home? No    Primary Care Provided by self    Provides Primary Care For no one    Family Caregiver if Needed child(paco), adult    Family Caregiver Names Rahul Ndiaye, jana [phone]    Quality of Family Relationships unable to assess    Able to Return to Prior Arrangements yes       Resource/Environmental Concerns    Resource/Environmental Concerns none       Transportation Needs    In the past 12 months, has lack of transportation kept you from medical appointments or from getting medications? no    In the past 12 months, has lack of transportation kept you from meetings, work, or from getting things needed for daily living? No       Food Insecurity    Within the past 12 months, you worried that your food would run out before you got the money to buy more. Sometimes  SW referral made - [facility]    Within the past 12 months, the food you bought just didn't last and you didn't have money to get more. Sometimes  SW referral made - [facility]       Transition Planning    Patient/Family Anticipates Transition to home with family    Patient/Family Anticipated Services at Transition none    Transportation Anticipated family or friend will provide       Discharge Needs Assessment    Readmission Within the Last 30 Days no previous admission in last 30 days    Equipment Currently Used at Home cpap;shower chair;cane, straight;walker, standard;walker, rolling;glucometer;bp  cuff;commode;hospital bed    Concerns to be Addressed discharge planning    Equipment Needed After Discharge cane, straight;glucometer;commode;bp cuff;hospital bed;cpap;walker, rolling;walker, standard;shower chair    Discharge Coordination/Progress With verbal consent, I spoke with Pt, in room, regarding discharge plan. Pt is admitted with HF. He lives with son in [facility]. There are 5 steps at the entrance of the home. He is independent with ADLs at baseline and has the following DME: CPAP, shower chair, straight cane, standard walker, rolling walker, glucometer, BP cuff, BSC and hospital bed. His PCP is Lorena Chamberlain, Nurse Practitioner. He has [place] [employer] and Medicaid insurance which has Rx coverage. He uses [facility]. He has previously used [facility]. He denies home O2. His goal is to return home with family. He is not interested in rehab. Family will provide transportation at discharge. A  referral was made due to discovered food security issues. CM will continue to follow hospital course.                   Discharge Plan    No documentation.                 Continued Care and Services - Admitted Since 6/21/2024    No active coordination exists for this encounter.       Expected Discharge Date and Time       Expected Discharge Date Expected Discharge Time    Jun 24, 2024            Demographic Summary       Row Name 06/23/24 151       General Information    Arrived From home    Reason for Consult discharge planning    Preferred Language English    General Information Comments PCP Lorena Chamberlain Nurse Practitioner       Contact Information    Permission Granted to Share Info With     Contact Information Obtained for     Contact Information Comments Jaycob Ndiaye, son [phone], Paul Ndiaye, son [phone], Rahullissett Ndiaye, son [phone]                   Functional Status       Row Name 06/23/24 7791        Functional Status    Usual Activity Tolerance moderate    Current Activity Tolerance moderate       Functional Status, IADL    Medications independent    Meal Preparation independent    Housekeeping independent    Laundry independent    Shopping independent                   Psychosocial    No documentation.                  Abuse/Neglect    No documentation.                  Legal    No documentation.                  Substance Abuse    No documentation.                  Patient Forms    No documentation.                     Judi Ashraf RN

## 2024-06-23 NOTE — PROGRESS NOTES
[facility]  INPATIENT PROGRESS NOTE         [facility] 6B    6/23/2024      PATIENT IDENTIFICATION:   Name:  Jaycob Ndiaye II      MRN:  [id number]     64 y.o.  male             Reason for visit: Acute on chronic HFpEF, PAF      SUBJECTIVE:   No change today     OBJECTIVE:  Vitals:    06/23/24 0003 06/23/24 0340 06/23/24 0702 06/23/24 0920   BP:  105/71 94/60 110/62   BP Location:  Left arm Left arm    Patient Position:  Lying Lying    Pulse: 64 73 77    Resp:  16 18    Temp:  97.6 °F (36.4 °C) 97.7 °F (36.5 °C)    TempSrc:  Oral Oral    SpO2: 100% 97%     Weight:       Height:               Body mass index is 36.81 kg/m².  No intake or output data in the 24 hours ending 06/23/24 1108    Telemetry: Personally reviewed, normal sinus rhythm, no arrhythmia     Exam:  General Appearance:   well developed  well nourished  Neck:  thyroid not enlarged  supple  Respiratory:  no respiratory distress  normal breath sounds  no rales  Cardiovascular:  no jugular venous distention  regular rhythm  apical impulse normal  S1 normal, S2 normal  no S3, no S4   no murmur  no rub, no thrill  carotid pulses normal; no bruit  pedal pulses normal  lower extremity edema: 3+  Anasarca  Skin:   warm, dry      Allergies   Allergen Reactions    Ketamine Confusion    Nsaids Swelling     Swelling of throat      Contrast Dye (Echo Or Unknown Ct/Mr) Rash     Hives, swelling , itching        Scheduled meds:       ferrous sulfate, 325 mg, Oral, Daily With Breakfast  furosemide, 80 mg, Intravenous, BID  heparin (porcine), 5,000 Units, Subcutaneous, Q8H  insulin lispro, 2-7 Units, Subcutaneous, 4x Daily AC & at Bedtime  ipratropium-albuterol, 3 mL, Nebulization, 4x Daily - RT  metOLazone, 5 mg, Oral, Daily  pantoprazole, 40 mg, Oral, Q AM  pharmacy consult - MTM, , Does not apply, Daily  sodium chloride, 10 mL, Intravenous, Q12H      IV meds:                         Data Review:  Results  "from last 7 days   Lab Units 06/23/24  0500 06/22/24  0203 06/21/24  1430   SODIUM mmol/L 133* 135* 134*   BUN mg/dL 66* 63* 62*   CREATININE mg/dL 2.11* 2.12* 2.11*   GLUCOSE mg/dL 146* 116* 151*     Results from last 7 days   Lab Units 06/23/24  0500 06/22/24  0203 06/21/24  1430   WBC 10*3/mm3 9.32 10.18 11.72*   HEMOGLOBIN g/dL 9.2* 8.8* 9.2*         Results from last 7 days   Lab Units 06/22/24  0203 06/21/24  1430   ALT (SGPT) U/L 9 9   AST (SGOT) U/L 13 14     No results found for: \"DIGOXIN\"   Lab Results   Component Value Date    TSH 2.070 06/07/2019           Invalid input(s): \"LDLCALC\"    Estimated Creatinine Clearance: 48.7 mL/min (A) (by C-G formula based on SCr of 2.11 mg/dL (H)).        Imaging (last 24 hr):   I personally reviewed the most recent chest x-ray and other pertinent imaging studies.  Results for orders placed during the hospital encounter of 06/21/24    XR Chest 1 View    Narrative  XR CHEST 1 VW    Date of Exam: 6/21/2024 2:56 PM EDT    Indication: SOA triage protocol    Comparison: None available.    Findings:  There is a left subclavian dual-lead cardiac stimulator device. There is cardiomegaly. Central pulmonary vascular congestion. No definite findings to suggest CHF. No pneumothorax identified. No large effusion. There are linear densities in the mid and  lower lung suggesting minor atelectasis.    Impression  Impression:  Marked enlargement of the cardiac silhouette, with mild pulmonary vascular congestion. Probable bibasilar atelectasis.      Electronically Signed: Maurizio Oakley MD  6/21/2024 3:14 PM EDT  Workstation ID: [id number]        Last ECHO:        PROBLEM LIST:     Acute on chronic heart failure with preserved ejection fraction (HFpEF)    Elevated serum creatinine    Anemia    Hypokalemia    Cirrhosis of liver    HCV (hepatitis C virus)    Acute on chronic HFrEF (heart failure with reduced ejection fraction)      Initial cardiac assessment: 64-year-old with chronic HFpEF, " cirrhosis, PAF, CKD stage III, anemia presenting with acute on chronic HFpEF    ASSESSMENT/PLAN:  1. HFpEF/ Anasarca   - echo 6/10/24 at [facility] with normal EF, severe septal hypertrophy, grade II diastolic dysfunction and no significant valvular abnormalities  - agree with IV Lasix 80mg BID  Start metolazone 5 mg daily    Consider spironolactone if renal function stable sick/24    - resume BB when able   - PT/wounds for light compression wraps when able      2. History of PAF, s/p ablation and PPM (IDB)  - per Dr. Craig  - recent discontinuation of anticoagulation due to GI bleed - ?candidate for Watchman or GA exclusion device      3. Cirrhosis with recent paracentesis  - per hospitalists     4. CKD III  - Cr 2 at [facility] last week, stable   - monitor closely with diuresis and low threshold to consult nephrology if renal function worsens.   Currently stable.     5. Anemia with recent hematuria/hematochezia   - per hospitalists  - patient denies recent signs/symptoms of bleeding           Hung Meyers MD  6/23/2024    11:08 EDT

## 2024-06-23 NOTE — PLAN OF CARE
Goal Outcome Evaluation:  Plan of Care Reviewed With: patient        Progress: improving  Outcome Evaluation: Pt still has severe pitting edema in lower Extremities. IV lasix continued. Pt up to bedside commode multiple times a day with walker. Ezequiel, VPaced on tele. Rm air, VSS. Cont current plan of care.

## 2024-06-23 NOTE — PROGRESS NOTES
"    [facility] Medicine Services  PROGRESS NOTE    Patient Name: Jaycob Ndiaye II  : 1960  MRN: [id number]    Date of Admission: 2024  Primary Care Physician: Lorena Chamberlain, GARRICK    Subjective   Subjective     CC:  Heart failure    HPI:  Patient was seen resting up in bed awake and alert.  No acute distress.  Calm and interactive.  Reports mild shortness of air, worse with exertion.  Overall states he \"does not feel good\" today.  Feels his abdomen and lower extremities are very tight and having mild nausea.  Asking to have his MiraLAX scheduled twice daily as he takes at home.  Also does not like the food and would like to talk to the dietitian.      Objective   Objective     Vital Signs:   Temp:  [97.4 °F (36.3 °C)-98.3 °F (36.8 °C)] 97.7 °F (36.5 °C)  Heart Rate:  [63-77] 77  Resp:  [16-18] 18  BP: ()/(57-79) 102/67     Physical Exam:  Constitutional: No acute distress, awake, alert.  Resting up in bed.  Chronically debilitated and disheveled appearing.  HENT: NCAT, mucous membranes moist  Respiratory: Clear to auscultation bilaterally but decreased at bases, respiratory effort normal on room air with sats 97%.  Cardiovascular: RRR, no murmurs, rubs, or gallops  Gastrointestinal: Positive bowel sounds, soft, nontender, nondistended.  Obese.  Musculoskeletal: 3+ BLE edema.  Mild anasarca.  Ferrer spontaneously.  Psychiatric: Appropriate affect, cooperative  Neurologic: Oriented x 3, strength symmetric in all extremities, Cranial Nerves grossly intact to confrontation, speech clear  Skin: Bilateral lower extremity venous stasis dermatitis changes.      Results Reviewed:  LAB RESULTS:      Lab 24  0500 24  0203 24  1430   WBC 9.32 10.18 11.72*   HEMOGLOBIN 9.2* 8.8* 9.2*   HEMATOCRIT 28.7* 27.6* 28.5*   PLATELETS 233 215 217   NEUTROS ABS  --  8.04* 9.39*   IMMATURE GRANS (ABS)  --  0.07* 0.16*   LYMPHS ABS  --  0.94 1.02   MONOS ABS  -- "  1.01* 1.06*   EOS ABS  --  0.07 0.03   MCV 96.0 94.5 93.8         Lab 06/23/24  0500 06/22/24  1756 06/22/24  0203 06/21/24  1430   SODIUM 133*  --  135* 134*   POTASSIUM 3.5 3.7 3.4* 3.2*   CHLORIDE 94*  --  96* 93*   CO2 26.0  --  27.0 27.0   ANION GAP 13.0  --  12.0 14.0   BUN 66*  --  63* 62*   CREATININE 2.11*  --  2.12* 2.11*   EGFR 34.1*  --  33.9* 34.1*   GLUCOSE 146*  --  116* 151*   CALCIUM 9.2  --  9.0 8.7   MAGNESIUM  --   --  2.1  --    HEMOGLOBIN A1C  --   --  6.00*  --          Lab 06/22/24  0203 06/21/24  1430   TOTAL PROTEIN 5.8* 6.2   ALBUMIN 3.5 3.6   GLOBULIN 2.3 2.6   ALT (SGPT) 9 9   AST (SGOT) 13 14   BILIRUBIN 0.6 0.6   ALK PHOS 74 78         Lab 06/21/24  1627 06/21/24  1430   PROBNP  --  4,183.0*   HSTROP T 131* 137*             Lab 06/22/24  0203   IRON 30*   IRON SATURATION (TSAT) 10*   TIBC 301   TRANSFERRIN 202   FERRITIN 88.02   FOLATE 8.26   VITAMIN B 12 1,127*         Brief Urine Lab Results       None            Microbiology Results Abnormal       None            XR Chest 1 View    Result Date: 6/21/2024  XR CHEST 1 VW Date of Exam: 6/21/2024 2:56 PM EDT Indication: SOA triage protocol Comparison: None available. Findings: There is a left subclavian dual-lead cardiac stimulator device. There is cardiomegaly. Central pulmonary vascular congestion. No definite findings to suggest CHF. No pneumothorax identified. No large effusion. There are linear densities in the mid and lower lung suggesting minor atelectasis.     Impression: Impression: Marked enlargement of the cardiac silhouette, with mild pulmonary vascular congestion. Probable bibasilar atelectasis. Electronically Signed: Maurizio Oakley MD  6/21/2024 3:14 PM EDT  Workstation ID: [id number]         Current medications:  Scheduled Meds:ferrous sulfate, 325 mg, Oral, Daily With Breakfast  furosemide, 80 mg, Intravenous, BID  heparin (porcine), 5,000 Units, Subcutaneous, Q8H  insulin lispro, 2-7 Units, Subcutaneous, 4x Daily AC & at  Bedtime  ipratropium-albuterol, 3 mL, Nebulization, 4x Daily - RT  metOLazone, 5 mg, Oral, Daily  pantoprazole, 40 mg, Oral, Q AM  pharmacy consult - MTM, , Does not apply, Daily  polyethylene glycol, 17 g, Oral, BID  potassium chloride ER, 40 mEq, Oral, Q4H  sodium chloride, 10 mL, Intravenous, Q12H      Continuous Infusions:   PRN Meds:.  acetaminophen **OR** acetaminophen **OR** acetaminophen    Albuterol Sulfate NEB Orderable    senna-docusate sodium **AND** polyethylene glycol **AND** bisacodyl **AND** bisacodyl    Calcium Replacement - Follow Nurse / BPA Driven Protocol    dextrose    dextrose    glucagon (human recombinant)    Magnesium Low Dose Replacement - Follow Nurse / BPA Driven Protocol    nitroglycerin    Phosphorus Replacement - Follow Nurse / BPA Driven Protocol    Potassium Replacement - Follow Nurse / BPA Driven Protocol    prochlorperazine    sodium chloride    sodium chloride    sodium chloride    Assessment & Plan   Assessment & Plan     Active Hospital Problems    Diagnosis  POA    **Acute on chronic heart failure with preserved ejection fraction (HFpEF) [I50.33]  Yes    Acute on chronic HFrEF (heart failure with reduced ejection fraction) [I50.23]  Yes    Elevated serum creatinine [R79.89]  Yes    Anemia [D64.9]  Yes    Hypokalemia [E87.6]  Yes    Cirrhosis of liver [K74.60]  Yes    HCV (hepatitis C virus) [B19.20]  Yes      Resolved Hospital Problems   No resolved problems to display.        Brief Hospital Course to date:  Jaycob Ndiaye II is a 64 y.o. male with past medical history significant for CHF, COPD, cirrhosis, prostate cancer, chronic hematuria, GI bleed, HCV, HTN, and HLD who presents to the ED due to worsening shortness of breath and lower extremity edema over the past week.    This patient's problems and plans were partially entered by my partner and updated as appropriate by me 06/23/24.    Assessment/plan:  Patient is new to me today     A/C HFrEF  -Follows with  Cardiologist Dr. Craig  -Echo just done at [facility], will defer additional echocardiogram  -Continue 80 mg of IV Lasix twice daily  -Strict I&O, daily weights  -Monitor creatinine daily to check creatinine and electrolytes while on IV Lasix. -Creatinine stable this morning.   -Still with significant anasarca and lower extremity edema.  Deferring further diuresis to cardiology service     Elevated Creatinine  -Baseline data deficit  -Creat 2.11 on presentation, stable this morning after diuresis  -avoid nephrotoxins as able  -Monitor creatinine with BMP daily.  Creatinine today 2.11.  Generally stable for the third day.  Cardiology diuresing.     Anemia  Recent GI Bleed  -s/p EGD and colonoscopy at [facility] on 6/13/2024, records requested  -Hgb 9.2 on presentation, trended down slightly to 8.8 this morning  -Continue PPI   -continue PO Iron   -Monitor hemoglobin/hematocrit daily     Hypokalemia  -Replace per protocol     New cirrhosis   HCV  -s/p US abd on 6/10/24 that revealed nodular liver consistent with cirrhosis and moderate ascites  -S/p US guided paracentesis on 6/12/2024 at [facility] with 200 mL of skylar-colored fluid removed.  No fluid was sent to lab.  -Consider repeat abdominal US ordered to evaluate ascites   -Will need referral to establish with [facility] upon discharge per patient request     T2DM with peripheral neuropathy  -holding home Mounjaro, Hg A1c is 6.0  -Continue low dose  SSI for now.  Glucoses stable running 139-167 last 24 hours     PAF  -Xarelto discontinued 6/13 at [facility] d/t persistent hematuria      HTN  -Continue holding home bystolic for now d/t borderline BP      DVT prophylaxis:  Heparin     Expected Discharge Location and Transportation: home  Expected Discharge   Expected Discharge Date: 6/24/2024; Expected Discharge Time:      VTE Prophylaxis:  Pharmacologic VTE prophylaxis orders are present.         AM-PAC 6 Clicks Score (PT): 18 (06/22/24 1004)    CODE STATUS:   Code Status and Medical  Interventions:   Ordered at: 06/21/24 1840     Level Of Support Discussed With:    Patient     Code Status (Patient has no pulse and is not breathing):    CPR (Attempt to Resuscitate)     Medical Interventions (Patient has pulse or is breathing):    Full Support       Fariba Mckeon, GARRICK  06/23/24

## 2024-06-24 ENCOUNTER — APPOINTMENT (OUTPATIENT)
Dept: CARDIOLOGY | Facility: HOSPITAL | Age: 64
DRG: 291 | End: 2024-06-24
Payer: MEDICARE

## 2024-06-24 LAB
BH CV LOWER VASCULAR LEFT COMMON FEMORAL AUGMENT: NORMAL
BH CV LOWER VASCULAR LEFT COMMON FEMORAL COMPETENT: NORMAL
BH CV LOWER VASCULAR LEFT COMMON FEMORAL COMPRESS: NORMAL
BH CV LOWER VASCULAR LEFT COMMON FEMORAL PHASIC: NORMAL
BH CV LOWER VASCULAR LEFT COMMON FEMORAL SPONT: NORMAL
BH CV LOWER VASCULAR LEFT GASTRONEMIUS COMPRESS: NORMAL
BH CV LOWER VASCULAR LEFT GREATER SAPH AK COMPRESS: NORMAL
BH CV LOWER VASCULAR LEFT GREATER SAPH BK COMPRESS: NORMAL
BH CV LOWER VASCULAR LEFT LESSER SAPH COMPRESS: NORMAL
BH CV LOWER VASCULAR LEFT MID FEMORAL AUGMENT: NORMAL
BH CV LOWER VASCULAR LEFT MID FEMORAL COMPETENT: NORMAL
BH CV LOWER VASCULAR LEFT MID FEMORAL COMPRESS: NORMAL
BH CV LOWER VASCULAR LEFT MID FEMORAL PHASIC: NORMAL
BH CV LOWER VASCULAR LEFT MID FEMORAL SPONT: NORMAL
BH CV LOWER VASCULAR LEFT PERONEAL COMPRESS: NORMAL
BH CV LOWER VASCULAR LEFT POPLITEAL AUGMENT: NORMAL
BH CV LOWER VASCULAR LEFT POPLITEAL COMPETENT: NORMAL
BH CV LOWER VASCULAR LEFT POPLITEAL COMPRESS: NORMAL
BH CV LOWER VASCULAR LEFT POPLITEAL PHASIC: NORMAL
BH CV LOWER VASCULAR LEFT POPLITEAL SPONT: NORMAL
BH CV LOWER VASCULAR LEFT POSTERIOR TIBIAL COMPRESS: NORMAL
BH CV LOWER VASCULAR LEFT PROXIMAL FEMORAL COMPRESS: NORMAL
BH CV LOWER VASCULAR LEFT SAPHENOFEMORAL JUNCTION COMPRESS: NORMAL
BH CV LOWER VASCULAR RIGHT COMMON FEMORAL AUGMENT: NORMAL
BH CV LOWER VASCULAR RIGHT COMMON FEMORAL COMPETENT: NORMAL
BH CV LOWER VASCULAR RIGHT COMMON FEMORAL COMPRESS: NORMAL
BH CV LOWER VASCULAR RIGHT COMMON FEMORAL PHASIC: NORMAL
BH CV LOWER VASCULAR RIGHT COMMON FEMORAL SPONT: NORMAL
BH CV LOWER VASCULAR RIGHT GASTRONEMIUS COMPRESS: NORMAL
BH CV LOWER VASCULAR RIGHT GREATER SAPH AK COMPRESS: NORMAL
BH CV LOWER VASCULAR RIGHT GREATER SAPH BK COMPRESS: NORMAL
BH CV LOWER VASCULAR RIGHT LESSER SAPH COMPRESS: NORMAL
BH CV LOWER VASCULAR RIGHT MID FEMORAL AUGMENT: NORMAL
BH CV LOWER VASCULAR RIGHT MID FEMORAL COMPETENT: NORMAL
BH CV LOWER VASCULAR RIGHT MID FEMORAL COMPRESS: NORMAL
BH CV LOWER VASCULAR RIGHT MID FEMORAL PHASIC: NORMAL
BH CV LOWER VASCULAR RIGHT MID FEMORAL SPONT: NORMAL
BH CV LOWER VASCULAR RIGHT PERONEAL COMPRESS: NORMAL
BH CV LOWER VASCULAR RIGHT POPLITEAL AUGMENT: NORMAL
BH CV LOWER VASCULAR RIGHT POPLITEAL COMPETENT: NORMAL
BH CV LOWER VASCULAR RIGHT POPLITEAL COMPRESS: NORMAL
BH CV LOWER VASCULAR RIGHT POPLITEAL PHASIC: NORMAL
BH CV LOWER VASCULAR RIGHT POPLITEAL SPONT: NORMAL
BH CV LOWER VASCULAR RIGHT POSTERIOR TIBIAL COMPRESS: NORMAL
BH CV LOWER VASCULAR RIGHT PROXIMAL FEMORAL COMPRESS: NORMAL
BH CV LOWER VASCULAR RIGHT SAPHENOFEMORAL JUNCTION COMPRESS: NORMAL
GLUCOSE BLDC GLUCOMTR-MCNC: 152 MG/DL (ref 70–130)
GLUCOSE BLDC GLUCOMTR-MCNC: 161 MG/DL (ref 70–130)
GLUCOSE BLDC GLUCOMTR-MCNC: 162 MG/DL (ref 70–130)
GLUCOSE BLDC GLUCOMTR-MCNC: 176 MG/DL (ref 70–130)
GLUCOSE BLDC GLUCOMTR-MCNC: 181 MG/DL (ref 70–130)

## 2024-06-24 PROCEDURE — 99232 SBSQ HOSP IP/OBS MODERATE 35: CPT | Performed by: NURSE PRACTITIONER

## 2024-06-24 PROCEDURE — 93970 EXTREMITY STUDY: CPT | Performed by: INTERNAL MEDICINE

## 2024-06-24 PROCEDURE — 94799 UNLISTED PULMONARY SVC/PX: CPT

## 2024-06-24 PROCEDURE — 93970 EXTREMITY STUDY: CPT

## 2024-06-24 PROCEDURE — 25010000002 FUROSEMIDE PER 20 MG: Performed by: INTERNAL MEDICINE

## 2024-06-24 PROCEDURE — 94761 N-INVAS EAR/PLS OXIMETRY MLT: CPT

## 2024-06-24 PROCEDURE — 63710000001 INSULIN LISPRO (HUMAN) PER 5 UNITS: Performed by: NURSE PRACTITIONER

## 2024-06-24 PROCEDURE — 94664 DEMO&/EVAL PT USE INHALER: CPT

## 2024-06-24 PROCEDURE — 82948 REAGENT STRIP/BLOOD GLUCOSE: CPT

## 2024-06-24 RX ORDER — AMOXICILLIN 250 MG
2 CAPSULE ORAL 2 TIMES DAILY
Status: DISCONTINUED | OUTPATIENT
Start: 2024-06-24 | End: 2024-06-29

## 2024-06-24 RX ORDER — BISACODYL 5 MG/1
5 TABLET, DELAYED RELEASE ORAL DAILY PRN
Status: DISCONTINUED | OUTPATIENT
Start: 2024-06-24 | End: 2024-06-29

## 2024-06-24 RX ORDER — POLYETHYLENE GLYCOL 3350 17 G/17G
17 POWDER, FOR SOLUTION ORAL 2 TIMES DAILY
Status: DISCONTINUED | OUTPATIENT
Start: 2024-06-24 | End: 2024-06-29

## 2024-06-24 RX ORDER — BISACODYL 10 MG
10 SUPPOSITORY, RECTAL RECTAL DAILY PRN
Status: DISCONTINUED | OUTPATIENT
Start: 2024-06-24 | End: 2024-06-29

## 2024-06-24 RX ADMIN — POLYETHYLENE GLYCOL 3350 17 G: 17 POWDER, FOR SOLUTION ORAL at 08:04

## 2024-06-24 RX ADMIN — IPRATROPIUM BROMIDE AND ALBUTEROL SULFATE 3 ML: 2.5; .5 SOLUTION RESPIRATORY (INHALATION) at 13:17

## 2024-06-24 RX ADMIN — Medication 10 ML: at 22:06

## 2024-06-24 RX ADMIN — FUROSEMIDE 80 MG: 10 INJECTION, SOLUTION INTRAMUSCULAR; INTRAVENOUS at 18:12

## 2024-06-24 RX ADMIN — FUROSEMIDE 80 MG: 10 INJECTION, SOLUTION INTRAMUSCULAR; INTRAVENOUS at 08:04

## 2024-06-24 RX ADMIN — PANTOPRAZOLE SODIUM 40 MG: 40 TABLET, DELAYED RELEASE ORAL at 06:12

## 2024-06-24 RX ADMIN — METOLAZONE 5 MG: 5 TABLET ORAL at 08:04

## 2024-06-24 RX ADMIN — FERROUS SULFATE TAB 325 MG (65 MG ELEMENTAL FE) 325 MG: 325 (65 FE) TAB at 08:04

## 2024-06-24 RX ADMIN — IPRATROPIUM BROMIDE AND ALBUTEROL SULFATE 3 ML: 2.5; .5 SOLUTION RESPIRATORY (INHALATION) at 16:09

## 2024-06-24 RX ADMIN — INSULIN LISPRO 2 UNITS: 100 INJECTION, SOLUTION INTRAVENOUS; SUBCUTANEOUS at 15:19

## 2024-06-24 RX ADMIN — INSULIN LISPRO 2 UNITS: 100 INJECTION, SOLUTION INTRAVENOUS; SUBCUTANEOUS at 08:03

## 2024-06-24 RX ADMIN — SENNOSIDES AND DOCUSATE SODIUM 2 TABLET: 50; 8.6 TABLET ORAL at 22:05

## 2024-06-24 RX ADMIN — MAGNESIUM HYDROXIDE 30 ML: 400 SUSPENSION ORAL at 22:05

## 2024-06-24 RX ADMIN — POLYETHYLENE GLYCOL 3350 17 G: 17 POWDER, FOR SOLUTION ORAL at 22:05

## 2024-06-24 RX ADMIN — IPRATROPIUM BROMIDE AND ALBUTEROL SULFATE 3 ML: 2.5; .5 SOLUTION RESPIRATORY (INHALATION) at 08:35

## 2024-06-24 RX ADMIN — INSULIN LISPRO 2 UNITS: 100 INJECTION, SOLUTION INTRAVENOUS; SUBCUTANEOUS at 22:05

## 2024-06-24 RX ADMIN — Medication 10 ML: at 08:07

## 2024-06-24 RX ADMIN — INSULIN LISPRO 2 UNITS: 100 INJECTION, SOLUTION INTRAVENOUS; SUBCUTANEOUS at 18:12

## 2024-06-24 RX ADMIN — BISACODYL 5 MG: 5 TABLET, COATED ORAL at 06:12

## 2024-06-24 RX ADMIN — IPRATROPIUM BROMIDE AND ALBUTEROL SULFATE 3 ML: 2.5; .5 SOLUTION RESPIRATORY (INHALATION) at 20:47

## 2024-06-24 NOTE — PROGRESS NOTES
"    [facility] Medicine Services  PROGRESS NOTE    Patient Name: Jaycob Ndiaye II  : 1960  MRN: [id number]    Date of Admission: 2024  Primary Care Physician: Lorena Chamberlain, GARRICK    Subjective   Subjective     CC:  Heart failure    HPI:  Patient seen resting up in the chair no acute distress.  Awake and alert.  States eating better when we get his food \"right\".  Mild intermittent nausea but no vomiting.  Feels his lower extremity swelling is increasing.  Asking about replacement of leg wraps, but larger ones.  Awaiting duplex today and will defer to PT to reapply leg wraps      Objective   Objective     Vital Signs:   Temp:  [97.4 °F (36.3 °C)-97.9 °F (36.6 °C)] 97.9 °F (36.6 °C)  Heart Rate:  [67-91] 80  Resp:  [18-20] 20  BP: ()/(63-75) 90/67     Physical Exam:  Constitutional: No acute distress, awake, alert.  Resting up in the chair.  Chronically debilitated and disheveled appearing.  HENT: NCAT, mucous membranes moist  Respiratory: Clear to auscultation bilaterally but decreased at bases, respiratory effort normal on room air with sats 96%.  Cardiovascular: RRR, no murmurs, rubs, or gallops  Gastrointestinal: Positive bowel sounds, soft, nontender, nondistended.  Obese.  Musculoskeletal: 3+ BLE edema.  Moderate anasarca.  Ferrer spontaneously.  Psychiatric: Appropriate affect, cooperative  Neurologic: Oriented x 3, strength symmetric in all extremities, Cranial Nerves grossly intact to confrontation, speech clear  Skin: Bilateral lower extremity venous stasis dermatitis changes.      Results Reviewed:  LAB RESULTS:      Lab 24  0500 24  0203 24  1430   WBC 9.32 10.18 11.72*   HEMOGLOBIN 9.2* 8.8* 9.2*   HEMATOCRIT 28.7* 27.6* 28.5*   PLATELETS 233 215 217   NEUTROS ABS  --  8.04* 9.39*   IMMATURE GRANS (ABS)  --  0.07* 0.16*   LYMPHS ABS  --  0.94 1.02   MONOS ABS  --  1.01* 1.06*   EOS ABS  --  0.07 0.03   MCV 96.0 94.5 93.8       "   Lab 06/23/24  2114 06/23/24  0500 06/22/24  1756 06/22/24  0203 06/21/24  1430   SODIUM  --  133*  --  135* 134*   POTASSIUM 3.7 3.5 3.7 3.4* 3.2*   CHLORIDE  --  94*  --  96* 93*   CO2  --  26.0  --  27.0 27.0   ANION GAP  --  13.0  --  12.0 14.0   BUN  --  66*  --  63* 62*   CREATININE  --  2.11*  --  2.12* 2.11*   EGFR  --  34.1*  --  33.9* 34.1*   GLUCOSE  --  146*  --  116* 151*   CALCIUM  --  9.2  --  9.0 8.7   MAGNESIUM  --   --   --  2.1  --    HEMOGLOBIN A1C  --   --   --  6.00*  --          Lab 06/22/24  0203 06/21/24  1430   TOTAL PROTEIN 5.8* 6.2   ALBUMIN 3.5 3.6   GLOBULIN 2.3 2.6   ALT (SGPT) 9 9   AST (SGOT) 13 14   BILIRUBIN 0.6 0.6   ALK PHOS 74 78         Lab 06/21/24  1627 06/21/24  1430   PROBNP  --  4,183.0*   HSTROP T 131* 137*             Lab 06/22/24  0203   IRON 30*   IRON SATURATION (TSAT) 10*   TIBC 301   TRANSFERRIN 202   FERRITIN 88.02   FOLATE 8.26   VITAMIN B 12 1,127*         Brief Urine Lab Results       None            Microbiology Results Abnormal       None            No radiology results from the last 24 hrs        Current medications:  Scheduled Meds:ferrous sulfate, 325 mg, Oral, Daily With Breakfast  furosemide, 80 mg, Intravenous, BID  heparin (porcine), 5,000 Units, Subcutaneous, Q8H  insulin lispro, 2-7 Units, Subcutaneous, 4x Daily AC & at Bedtime  ipratropium-albuterol, 3 mL, Nebulization, 4x Daily - RT  metOLazone, 5 mg, Oral, Daily  pantoprazole, 40 mg, Oral, Q AM  pharmacy consult - [place], , Does not apply, Daily  polyethylene glycol, 17 g, Oral, BID  sodium chloride, 10 mL, Intravenous, Q12H      Continuous Infusions:   PRN Meds:.  acetaminophen **OR** acetaminophen **OR** acetaminophen    Albuterol Sulfate NEB Orderable    senna-docusate sodium **AND** polyethylene glycol **AND** bisacodyl **AND** bisacodyl    Calcium Replacement - Follow Nurse / BPA Driven Protocol    dextrose    dextrose    glucagon (human recombinant)    Magnesium Low Dose Replacement - Follow  Nurse / BPA Driven Protocol    nitroglycerin    Phosphorus Replacement - Follow Nurse / BPA Driven Protocol    Potassium Replacement - Follow Nurse / BPA Driven Protocol    prochlorperazine    sodium chloride    sodium chloride    sodium chloride    Assessment & Plan   Assessment & Plan     Active Hospital Problems    Diagnosis  POA    **Acute on chronic heart failure with preserved ejection fraction (HFpEF) [I50.33]  Yes    Acute on chronic HFrEF (heart failure with reduced ejection fraction) [I50.23]  Yes    Elevated serum creatinine [R79.89]  Yes    Anemia [D64.9]  Yes    Hypokalemia [E87.6]  Yes    Cirrhosis of liver [K74.60]  Yes    HCV (hepatitis C virus) [B19.20]  Yes      Resolved Hospital Problems   No resolved problems to display.        Brief Hospital Course to date:  Jaycob Ndiaye II is a 64 y.o. male with past medical history significant for CHF, COPD, cirrhosis, prostate cancer, chronic hematuria, GI bleed, HCV, HTN, and HLD who presents to the ED due to worsening shortness of breath and lower extremity edema over the past week.    This patient's problems and plans were partially entered by my partner and updated as appropriate by me 06/24/24.    Assessment/plan:  Patient is new to me today     A/C HFrEF  -Follows with Cardiologist Dr. Craig  -Echo just done at [facility], will defer additional echocardiogram  -Continue 80 mg of IV Lasix twice daily  -Strict I&O, daily weights  -Monitor creatinine daily to check creatinine and electrolytes while on IV Lasix. -Creatinine stable at last check.  -Still with significant anasarca and lower extremity edema.  Deferring further diuresis to cardiology service.  See below.  -- Will recheck a.m. labs.    Significant BLE edema edema  Anasarca  --Initially had leg wraps but asked that they be removed because they were too tight.  Currently off.  --Edema worsening.  Cardiology managing diuretics  --Cards has ordered BLE venous duplex to rule out DVT  today  --Will need to reach out to PT for replacement of leg wraps (may need slightly larger wrap or looser so that he will continue to wear) after DVT ruled out     Elevated Creatinine  -Baseline data deficit  -Creat 2.11 on presentation, stable this morning after diuresis  -avoid nephrotoxins as able  -Monitor creatinine with BMP daily.  Creatinine 2.11.  Generally stable for the third day.  Cardiology diuresing.     Anemia  Recent GI Bleed  -s/p EGD and colonoscopy at [facility] on 6/13/2024, records requested  -Hgb 9.2 on presentation, trended down slightly to 8.8 this morning  -Continue PPI   -continue PO Iron   -Monitor hemoglobin/hematocrit daily     Hypokalemia  -Replace per protocol     New cirrhosis   HCV  -s/p US abd on 6/10/24 that revealed nodular liver consistent with cirrhosis and moderate ascites  -S/p US guided paracentesis on 6/12/2024 at [facility] with 200 mL of skylar-colored fluid removed.  No fluid was sent to lab.  -Consider repeat abdominal US ordered to evaluate ascites   -Will need referral to establish with [facility] upon discharge per patient request     T2DM with peripheral neuropathy  -holding home Mounjaro, Hg A1c is 6.0  -Continue low dose  SSI for now.  Glucoses stable running 137-209 last 24 hours     PAF  -Xarelto discontinued 6/13 at [facility] d/t persistent hematuria      HTN  -Continue holding home bystolic for now d/t borderline BP      DVT prophylaxis:  Heparin     Expected Discharge Location and Transportation: home once medically improved and cleared by cardiology.  Expected Discharge   Expected Discharge Date: 6/25/2024; Expected Discharge Time:      VTE Prophylaxis:  Pharmacologic VTE prophylaxis orders are present.         AM-PAC 6 Clicks Score (PT): 18 (06/22/24 1005)    CODE STATUS:   Code Status and Medical Interventions:   Ordered at: 06/21/24 1750     Level Of Support Discussed With:    Patient     Code Status (Patient has no pulse and is not breathing):    CPR (Attempt to Resuscitate)      Medical Interventions (Patient has pulse or is breathing):    Full Support       Fariba Mckeon, GARRICK  06/24/24

## 2024-06-24 NOTE — PAYOR COMM NOTE
"Ref# [id number]   Still pending  Clinical update    Utilization Review  Phone [phone]  Fax [phone]    [facility]  [address] [place] [address]       Jaycob Scott II (64 y.o. Male)       Date of Birth   1960    Social Security Number       Address   [address] [place] [address]    Home Phone   [phone]    MRN   [id number]       Tenriism   None    Marital Status                               Admission Date   6/21/24    Admission Type   Emergency    Admitting Provider   Edda Kendrick MD    Attending Provider   Edda Kendrick MD    Department, Room/Bed   [facility] 6B, N636/1       Discharge Date       Discharge Disposition       Discharge Destination                                 Attending Provider: Edda Kendrick MD    Allergies: Ketamine, Nsaids, Contrast Dye (Echo Or Unknown Ct/mr)    Isolation: None   Infection: None   Code Status: CPR    Ht: 185.4 cm (73\")   Wt: 156 kg (343 lb)    Admission Cmt: None   Principal Problem: Acute on chronic heart failure with preserved ejection fraction (HFpEF) [I50.33]                   Active Insurance as of 6/21/2024       Primary Coverage       Payor Plan Insurance Group Employer/Plan Group    ANTHEM MEDICARE REPLACEMENT ANTHEM MEDICARE ADVANTAGE [id number]       Payor Plan Address Payor Plan Phone Number Payor Plan Fax Number Effective Dates    [address] [phone]  10/1/2020 - None Entered    [facility] [id number]         Subscriber Name Subscriber Birth Date Member ID       JAYCOB SCOTT II 1960 [id number]               Secondary Coverage       Payor Plan Insurance Group Employer/Plan Group    [place]        Payor Plan Address Payor Plan Phone Number Payor Plan Fax Number Effective Dates    [address] [phone]  6/21/2024 - None Entered    [facility] [id number]         Subscriber Name Subscriber Birth Date Member ID "       KIKI SCOTT  1960 [id number]                     Emergency Contacts        (Rel.) Home Phone Work Phone Mobile Phone    Kiki Scott (Son) [phone] -- --    isidra scott (Son) -- -- [phone]    Rahul Sctot (Son) -- -- [phone]              Vital Signs (last day)       Date/Time Temp Temp src Pulse Resp BP Patient Position SpO2    06/24/24 1443 97.5 (36.4) Oral 77 20 106/66 Lying 92    06/24/24 1317 -- -- 80 20 -- -- 94    06/24/24 1237 -- -- 77 -- -- -- 97    06/24/24 1102 97.9 (36.6) Oral 91 18 90/67 Sitting 96    06/24/24 0835 -- -- 79 18 -- -- 99    06/24/24 0804 -- -- 81 -- 100/69 -- --    06/24/24 0758 -- -- 85 -- -- -- 96    06/24/24 0708 97.9 (36.6) Oral 67 18 107/64 Lying 94    06/24/24 0309 97.8 (36.6) Oral 74 18 105/75 Lying 98    06/23/24 2301 97.6 (36.4) Oral 77 18 109/63 Sitting 98    06/23/24 2048 -- -- -- 20 -- -- --    06/23/24 2008 97.6 (36.4) Oral 72 18 98/69 Sitting --    06/23/24 1723 -- -- 79 -- 107/73 -- --    06/23/24 1633 97.4 (36.3) Oral 75 18 98/63 Lying --    06/23/24 1228 -- -- 77 -- 102/67 -- --    06/23/24 0920 -- -- -- -- 110/62 -- --    06/23/24 0702 97.7 (36.5) Oral 77 18 94/60 Lying --    06/23/24 0340 97.6 (36.4) Oral 73 16 105/71 Lying 97    06/23/24 0003 -- -- 64 -- -- -- 100          Current Facility-Administered Medications   Medication Dose Route Frequency Provider Last Rate Last Admin    acetaminophen (TYLENOL) tablet 650 mg  650 mg Oral Q4H PRN Mihir Chacko APRN        Or    acetaminophen (TYLENOL) 160 MG/5ML oral solution 650 mg  650 mg Oral Q4H PRN Mihir Chacko APRN        Or    acetaminophen (TYLENOL) suppository 650 mg  650 mg Rectal Q4H PRN Mihir Chacko APRN        albuterol (PROVENTIL) nebulizer solution 0.083% 2.5 mg/3mL  2.5 mg Nebulization Q6H PRN Edda Key MD        sennosides-docusate (PERICOLACE) 8.6-50 MG per tablet 2 tablet  2 tablet Oral BID PRN Fariba Mckeon APRN   2 tablet at  06/23/24 2201    And    polyethylene glycol (MIRALAX) packet 17 g  17 g Oral BID Fariba Mckeon APRN   17 g at 06/24/24 0804    And    bisacodyl (DULCOLAX) EC tablet 5 mg  5 mg Oral Daily PRN Fariba Mckeon APRN   5 mg at 06/24/24 0612    And    bisacodyl (DULCOLAX) suppository 10 mg  10 mg Rectal Daily PRN Fariba Mckeon, APRN        Calcium Replacement - Follow Nurse / BPA Driven Protocol   Does not apply PRN Mihir Chacko APRN        dextrose (D50W) (25 g/50 mL) IV injection 25 g  25 g Intravenous Q15 Min PRN Mihir Chacko APRN        dextrose (GLUTOSE) oral gel 15 g  15 g Oral Q15 Min PRN Mihir Chacko APRN        ferrous sulfate tablet 325 mg  325 mg Oral Daily With Breakfast Mihir Chacko APRN   325 mg at 06/24/24 0804    furosemide (LASIX) injection 80 mg  80 mg Intravenous BID Edda Key MD   80 mg at 06/24/24 0804    glucagon (GLUCAGEN) injection 1 mg  1 mg Intramuscular Q15 Min PRN Mihir Chacko APRN        heparin (porcine) 5000 UNIT/ML injection 5,000 Units  5,000 Units Subcutaneous Q8H Mihir Chacko APRN   5,000 Units at 06/23/24 1332    Insulin Lispro (humaLOG) injection 2-7 Units  2-7 Units Subcutaneous 4x Daily AC & at Bedtime Mihir Chacko APRN   2 Units at 06/24/24 0803    ipratropium-albuterol (DUO-NEB) nebulizer solution 3 mL  3 mL Nebulization 4x Daily - RT Edda Key MD   3 mL at 06/24/24 1317    Magnesium Low Dose Replacement - Follow Nurse / BPA Driven Protocol   Does not apply PRN Mihir Chacko APRN        metOLazone (ZAROXOLYN) tablet 5 mg  5 mg Oral Daily Hung Meyers MD   5 mg at 06/24/24 0804    nitroglycerin (NITROSTAT) SL tablet 0.4 mg  0.4 mg Sublingual Q5 Min PRN Mihir Chacko APRN        pantoprazole (PROTONIX) EC tablet 40 mg  40 mg Oral Q AM Mihir Chacko APRN   40 mg at 06/24/24 0612    Pharmacy Consult - MTM   Does not apply Daily Jose Aleman RPH        Phosphorus Replacement - Follow  Nurse / BPA Driven Protocol   Does not apply PRN Ginna, Mihir A, APRN        Potassium Replacement - Follow Nurse / BPA Driven Protocol   Does not apply PRN Ginna, Mihir A, APRN        prochlorperazine (COMPAZINE) injection 5 mg  5 mg Intravenous Q6H PRN Edda Key MD        sodium chloride 0.9 % flush 10 mL  10 mL Intravenous PRN Ginna, Mihir A, APRN        sodium chloride 0.9 % flush 10 mL  10 mL Intravenous Q12H Ginna, Mihir A, APRN   10 mL at 06/24/24 0807    sodium chloride 0.9 % flush 10 mL  10 mL Intravenous PRN Ginna, Mihir A, APRN        sodium chloride 0.9 % infusion 40 mL  40 mL Intravenous PRN Ginna, Mihir A, APRN         Lab Results (last 48 hours)       Procedure Component Value Units Date/Time    POC Glucose Once [[id number]]  (Abnormal) Collected: 06/24/24 1507    Specimen: Blood Updated: 06/24/24 1508     Glucose 162 mg/dL     POC Glucose Once [[id number]]  (Abnormal) Collected: 06/24/24 1101    Specimen: Blood Updated: 06/24/24 1102     Glucose 161 mg/dL     POC Glucose Once [[id number]]  (Abnormal) Collected: 06/24/24 0711    Specimen: Blood Updated: 06/24/24 0713     Glucose 152 mg/dL     Potassium [[id number]]  (Normal) Collected: 06/23/24 2114    Specimen: Blood Updated: 06/23/24 2149     Potassium 3.7 mmol/L     POC Glucose Once [[id number]]  (Abnormal) Collected: 06/23/24 2007    Specimen: Blood Updated: 06/23/24 2008     Glucose 209 mg/dL     POC Glucose Once [[id number]]  (Abnormal) Collected: 06/23/24 1645    Specimen: Blood Updated: 06/23/24 1648     Glucose 137 mg/dL     POC Glucose Once [[id number]]  (Abnormal) Collected: 06/23/24 1231    Specimen: Blood Updated: 06/23/24 1232     Glucose 173 mg/dL     POC Glucose Once [[id number]]  (Abnormal) Collected: 06/23/24 0705    Specimen: Blood Updated: 06/23/24 0706     Glucose 145 mg/dL     Basic Metabolic Panel [[id number]]  (Abnormal) Collected: 06/23/24 0500    Specimen: Blood Updated: 06/23/24 0640     Glucose 146 mg/dL      BUN 66  "mg/dL      Creatinine 2.11 mg/dL      Sodium 133 mmol/L      Potassium 3.5 mmol/L      Chloride 94 mmol/L      CO2 26.0 mmol/L      Calcium 9.2 mg/dL      BUN/Creatinine Ratio 31.3     Anion Gap 13.0 mmol/L      eGFR 34.1 mL/min/1.73     Narrative:      GFR Normal >60  Chronic Kidney Disease <60  Kidney Failure <15      CBC (No Diff) [[id number]]  (Abnormal) Collected: 24 0500    Specimen: Blood Updated: 24 0608     WBC 9.32 10*3/mm3      RBC 2.99 10*6/mm3      Hemoglobin 9.2 g/dL      Hematocrit 28.7 %      MCV 96.0 fL      MCH 30.8 pg      MCHC 32.1 g/dL      RDW 14.7 %      RDW-SD 51.7 fl      MPV 10.2 fL      Platelets 233 10*3/mm3     POC Glucose Once [[id number]]  (Abnormal) Collected: 24 1938    Specimen: Blood Updated: 24 1939     Glucose 139 mg/dL     Potassium [[id number]]  (Normal) Collected: 24 1756    Specimen: Blood Updated: 24 1845     Potassium 3.7 mmol/L     POC Glucose Once [[id number]]  (Abnormal) Collected: 24 1718    Specimen: Blood Updated: 24 1719     Glucose 167 mg/dL           Imaging Results (Last 48 Hours)       ** No results found for the last 48 hours. **          ECG/EMG Results (last 48 hours)       ** No results found for the last 48 hours. **          Operative/Procedure Notes (all)    No notes of this type exist for this encounter.          Physician Progress Notes (last 48 hours)        Fariba Mckeon APRN at 24 0935              [facility] Medicine Services  PROGRESS NOTE    Patient Name: Jaycob Ndiaye II  : 1960  MRN: [id number]    Date of Admission: 2024  Primary Care Physician: Lorena Chamberlain APRN    Subjective   Subjective     CC:  Heart failure    HPI:  Patient seen resting up in the chair no acute distress.  Awake and alert.  States eating better when we get his food \"right\".  Mild intermittent nausea but no vomiting.  Feels his lower extremity swelling " is increasing.  Asking about replacement of leg wraps, but larger ones.  Awaiting duplex today and will defer to PT to reapply leg wraps      Objective   Objective     Vital Signs:   Temp:  [97.4 °F (36.3 °C)-97.9 °F (36.6 °C)] 97.9 °F (36.6 °C)  Heart Rate:  [67-91] 80  Resp:  [18-20] 20  BP: ()/(63-75) 90/67     Physical Exam:  Constitutional: No acute distress, awake, alert.  Resting up in the chair.  Chronically debilitated and disheveled appearing.  HENT: NCAT, mucous membranes moist  Respiratory: Clear to auscultation bilaterally but decreased at bases, respiratory effort normal on room air with sats 96%.  Cardiovascular: RRR, no murmurs, rubs, or gallops  Gastrointestinal: Positive bowel sounds, soft, nontender, nondistended.  Obese.  Musculoskeletal: 3+ BLE edema.  Moderate anasarca.  Ferrer spontaneously.  Psychiatric: Appropriate affect, cooperative  Neurologic: Oriented x 3, strength symmetric in all extremities, Cranial Nerves grossly intact to confrontation, speech clear  Skin: Bilateral lower extremity venous stasis dermatitis changes.      Results Reviewed:  LAB RESULTS:      Lab 06/23/24  0500 06/22/24  0203 06/21/24  1430   WBC 9.32 10.18 11.72*   HEMOGLOBIN 9.2* 8.8* 9.2*   HEMATOCRIT 28.7* 27.6* 28.5*   PLATELETS 233 215 217   NEUTROS ABS  --  8.04* 9.39*   IMMATURE GRANS (ABS)  --  0.07* 0.16*   LYMPHS ABS  --  0.94 1.02   MONOS ABS  --  1.01* 1.06*   EOS ABS  --  0.07 0.03   MCV 96.0 94.5 93.8         Lab 06/23/24  2114 06/23/24  0500 06/22/24  1756 06/22/24  0203 06/21/24  1430   SODIUM  --  133*  --  135* 134*   POTASSIUM 3.7 3.5 3.7 3.4* 3.2*   CHLORIDE  --  94*  --  96* 93*   CO2  --  26.0  --  27.0 27.0   ANION GAP  --  13.0  --  12.0 14.0   BUN  --  66*  --  63* 62*   CREATININE  --  2.11*  --  2.12* 2.11*   EGFR  --  34.1*  --  33.9* 34.1*   GLUCOSE  --  146*  --  116* 151*   CALCIUM  --  9.2  --  9.0 8.7   MAGNESIUM  --   --   --  2.1  --    HEMOGLOBIN A1C  --   --   --  6.00*  --           Lab 06/22/24  0203 06/21/24  1430   TOTAL PROTEIN 5.8* 6.2   ALBUMIN 3.5 3.6   GLOBULIN 2.3 2.6   ALT (SGPT) 9 9   AST (SGOT) 13 14   BILIRUBIN 0.6 0.6   ALK PHOS 74 78         Lab 06/21/24  1627 06/21/24  1430   PROBNP  --  4,183.0*   HSTROP T 131* 137*             Lab 06/22/24  0203   IRON 30*   IRON SATURATION (TSAT) 10*   TIBC 301   TRANSFERRIN 202   FERRITIN 88.02   FOLATE 8.26   VITAMIN B 12 1,127*         Brief Urine Lab Results       None            Microbiology Results Abnormal       None            No radiology results from the last 24 hrs        Current medications:  Scheduled Meds:ferrous sulfate, 325 mg, Oral, Daily With Breakfast  furosemide, 80 mg, Intravenous, BID  heparin (porcine), 5,000 Units, Subcutaneous, Q8H  insulin lispro, 2-7 Units, Subcutaneous, 4x Daily AC & at Bedtime  ipratropium-albuterol, 3 mL, Nebulization, 4x Daily - RT  metOLazone, 5 mg, Oral, Daily  pantoprazole, 40 mg, Oral, Q AM  pharmacy consult - MTM, , Does not apply, Daily  polyethylene glycol, 17 g, Oral, BID  sodium chloride, 10 mL, Intravenous, Q12H      Continuous Infusions:   PRN Meds:.  acetaminophen **OR** acetaminophen **OR** acetaminophen    Albuterol Sulfate NEB Orderable    senna-docusate sodium **AND** polyethylene glycol **AND** bisacodyl **AND** bisacodyl    Calcium Replacement - Follow Nurse / BPA Driven Protocol    dextrose    dextrose    glucagon (human recombinant)    Magnesium Low Dose Replacement - Follow Nurse / BPA Driven Protocol    nitroglycerin    Phosphorus Replacement - Follow Nurse / BPA Driven Protocol    Potassium Replacement - Follow Nurse / BPA Driven Protocol    prochlorperazine    sodium chloride    sodium chloride    sodium chloride    Assessment & Plan   Assessment & Plan     Active Hospital Problems    Diagnosis  POA    **Acute on chronic heart failure with preserved ejection fraction (HFpEF) [I50.33]  Yes    Acute on chronic HFrEF (heart failure with reduced ejection fraction)  [I50.23]  Yes    Elevated serum creatinine [R79.89]  Yes    Anemia [D64.9]  Yes    Hypokalemia [E87.6]  Yes    Cirrhosis of liver [K74.60]  Yes    HCV (hepatitis C virus) [B19.20]  Yes      Resolved Hospital Problems   No resolved problems to display.        Brief Hospital Course to date:  Jaycob Ndiaye II is a 64 y.o. male with past medical history significant for CHF, COPD, cirrhosis, prostate cancer, chronic hematuria, GI bleed, HCV, HTN, and HLD who presents to the ED due to worsening shortness of breath and lower extremity edema over the past week.    This patient's problems and plans were partially entered by my partner and updated as appropriate by me 06/24/24.    Assessment/plan:  Patient is new to me today     A/C HFrEF  -Follows with Cardiologist Dr. Craig  -Echo just done at [facility], will defer additional echocardiogram  -Continue 80 mg of IV Lasix twice daily  -Strict I&O, daily weights  -Monitor creatinine daily to check creatinine and electrolytes while on IV Lasix. -Creatinine stable at last check.  -Still with significant anasarca and lower extremity edema.  Deferring further diuresis to cardiology service.  See below.  -- Will recheck a.m. labs.    Significant BLE edema edema  Anasarca  --Initially had leg wraps but asked that they be removed because they were too tight.  Currently off.  --Edema worsening.  Cardiology managing diuretics  --Cards has ordered BLE venous duplex to rule out DVT today  --Will need to reach out to PT for replacement of leg wraps (may need slightly larger wrap or looser so that he will continue to wear) after DVT ruled out     Elevated Creatinine  -Baseline data deficit  -Creat 2.11 on presentation, stable this morning after diuresis  -avoid nephrotoxins as able  -Monitor creatinine with BMP daily.  Creatinine 2.11.  Generally stable for the third day.  Cardiology diuresing.     Anemia  Recent GI Bleed  -s/p EGD and colonoscopy at [facility] on 6/13/2024, records  requested  -Hgb 9.2 on presentation, trended down slightly to 8.8 this morning  -Continue PPI   -continue PO Iron   -Monitor hemoglobin/hematocrit daily     Hypokalemia  -Replace per protocol     New cirrhosis   HCV  -s/p US abd on 6/10/24 that revealed nodular liver consistent with cirrhosis and moderate ascites  -S/p US guided paracentesis on 6/12/2024 at [facility] with 200 mL of skylar-colored fluid removed.  No fluid was sent to lab.  -Consider repeat abdominal US ordered to evaluate ascites   -Will need referral to establish with [facility] upon discharge per patient request     T2DM with peripheral neuropathy  -holding home Mounjaro, Hg A1c is 6.0  -Continue low dose  SSI for now.  Glucoses stable running 137-209 last 24 hours     PAF  -Xarelto discontinued 6/13 at [facility] d/t persistent hematuria      HTN  -Continue holding home bystolic for now d/t borderline BP      DVT prophylaxis:  Heparin     Expected Discharge Location and Transportation: home once medically improved and cleared by cardiology.  Expected Discharge   Expected Discharge Date: 6/25/2024; Expected Discharge Time:      VTE Prophylaxis:  Pharmacologic VTE prophylaxis orders are present.         AM-PAC 6 Clicks Score (PT): 18 (06/22/24 1005)    CODE STATUS:   Code Status and Medical Interventions:   Ordered at: 06/21/24 1840     Level Of Support Discussed With:    Patient     Code Status (Patient has no pulse and is not breathing):    CPR (Attempt to Resuscitate)     Medical Interventions (Patient has pulse or is breathing):    Full Support       GARRICK Bowen  06/24/24        Electronically signed by Fariba Mckeon APRN at 06/24/24 1413       Lloyd Craig MD at 06/24/24 0914           [facility] - Progress Note    Jaycob Ndiaye II  1960  N636/1    06/24/24, 09:14 EDT      Chief Complaint: Following for acute on chronic HFpEF, PAF    Subjective:   Sitting up in bedside, RN present.   "Reports continued SOA although improving.  Still with significant BLE edema up to thighs.  BP remains marginal.  Denies chest pain.    Review of Systems:  Pertinent positives are listed above and in physical exam.  All others have been reviewed and are negative.    ferrous sulfate, 325 mg, Oral, Daily With Breakfast  furosemide, 80 mg, Intravenous, BID  heparin (porcine), 5,000 Units, Subcutaneous, Q8H  insulin lispro, 2-7 Units, Subcutaneous, 4x Daily AC & at Bedtime  ipratropium-albuterol, 3 mL, Nebulization, 4x Daily - RT  metOLazone, 5 mg, Oral, Daily  pantoprazole, 40 mg, Oral, Q AM  pharmacy consult - MTM, , Does not apply, Daily  polyethylene glycol, 17 g, Oral, BID  sodium chloride, 10 mL, Intravenous, Q12H        Objective:  Vitals:   height is 185.4 cm (73\") and weight is 156 kg (343 lb 9.6 oz) (abnormal). His oral temperature is 97.9 °F (36.6 °C). His blood pressure is 100/69 and his pulse is 79. His respiration is 18 and oxygen saturation is 99%.     Intake/Output Summary (Last 24 hours) at 6/24/2024 0914  Last data filed at 6/23/2024 2200  Gross per 24 hour   Intake 525 ml   Output --   Net 525 ml       Physical Exam:  General:  WN, NAD, A and O x3.  CV: Irregular. No murmur, rub, or gallop.  Resp:  CTA Tomi, equal, nonlabored.  Abd:  Soft, + BS, no organomegaly. Nontender to palpation.  Obese  Extrem: 2-3+ edema BLE, 2+ pedal/PT pulses.            Results from last 7 days   Lab Units 06/23/24  0500   WBC 10*3/mm3 9.32   HEMOGLOBIN g/dL 9.2*   HEMATOCRIT % 28.7*   PLATELETS 10*3/mm3 233     Results from last 7 days   Lab Units 06/23/24  2114 06/23/24  0500 06/22/24  1756 06/22/24  0203   SODIUM mmol/L  --  133*  --  135*   POTASSIUM mmol/L 3.7 3.5   < > 3.4*   CHLORIDE mmol/L  --  94*  --  96*   CO2 mmol/L  --  26.0  --  27.0   BUN mg/dL  --  66*  --  63*   CREATININE mg/dL  --  2.11*  --  2.12*   CALCIUM mg/dL  --  9.2  --  9.0   BILIRUBIN mg/dL  --   --   --  0.6   ALK PHOS U/L  --   --   --  74   ALT " (SGPT) U/L  --   --   --  9   AST (SGOT) U/L  --   --   --  13   GLUCOSE mg/dL  --  146*  --  116*    < > = values in this interval not displayed.                 Results from last 7 days   Lab Units 06/21/24  1430   PROBNP pg/mL 4,183.0*       Tele: Atrial fibrillation    Assessment:  -64-year-old CM with known HFpEF presents to [facility] ED with progressive dyspnea, BLE edema.  Recently admitted to  [facility] over urologic issues.  Multiple changes to diuretic therapy and antihypertensives at that time.  -Acute on chronic HFpEF with recent echo 6/10/2024: Normal EF, severe septal hypertrophy, grade 2 diastolic dysfunction with no significant valvular abnormalities.  -PAF with remote ablation and [employer] pacemaker.    -Recent discontinuation of anticoagulation secondary to GIB  -CKD stage III  -Cirrhosis status post recent paracentesis  -Anemia of chronic disease  -History of compliance issues secondary to insurance coverage.          Plan:  -Admitted to hospitalist services, appreciate their assistance.  -Continue with IV Lasix and metolazone.  Trend daily labs.  -Asked C rep to come and interrogate device.  NOAC currently on hold.  -Bilateral lower extremity venous duplex to rule out DVT.  -Cardiology will continue to follow.        I discussed the patient's findings and my recommendations with the patient, any present family members, and the nursing staff.  Lloyd Craig MD saw and examined patient, verified hx and PE, read all radiographic studies, reviewed labs and micro data, and formulated dx, plan for treatment and all medical decision making.      Dariana Miranda PA-C  06/24/24, 09:14 EDT                      Electronically signed by Lloyd Craig MD at 06/24/24 1309       Hung Meyers MD at 06/23/24 1102          [facility]  INPATIENT PROGRESS NOTE         [facility] 6B    6/23/2024      PATIENT IDENTIFICATION:   Name:  Jaycob Sanchez  Senthil IBARRA      MRN:  [id number]     64 y.o.  male             Reason for visit: Acute on chronic HFpEF, PAF      SUBJECTIVE:   No change today     OBJECTIVE:  Vitals:    06/23/24 0003 06/23/24 0340 06/23/24 0702 06/23/24 0920   BP:  105/71 94/60 110/62   BP Location:  Left arm Left arm    Patient Position:  Lying Lying    Pulse: 64 73 77    Resp:  16 18    Temp:  97.6 °F (36.4 °C) 97.7 °F (36.5 °C)    TempSrc:  Oral Oral    SpO2: 100% 97%     Weight:       Height:               Body mass index is 36.81 kg/m².  No intake or output data in the 24 hours ending 06/23/24 1108    Telemetry: Personally reviewed, normal sinus rhythm, no arrhythmia     Exam:  General Appearance:   well developed  well nourished  Neck:  thyroid not enlarged  supple  Respiratory:  no respiratory distress  normal breath sounds  no rales  Cardiovascular:  no jugular venous distention  regular rhythm  apical impulse normal  S1 normal, S2 normal  no S3, no S4   no murmur  no rub, no thrill  carotid pulses normal; no bruit  pedal pulses normal  lower extremity edema: 3+  Anasarca  Skin:   warm, dry      Allergies   Allergen Reactions    Ketamine Confusion    Nsaids Swelling     Swelling of throat      Contrast Dye (Echo Or Unknown Ct/Mr) Rash     Hives, swelling , itching        Scheduled meds:       ferrous sulfate, 325 mg, Oral, Daily With Breakfast  furosemide, 80 mg, Intravenous, BID  heparin (porcine), 5,000 Units, Subcutaneous, Q8H  insulin lispro, 2-7 Units, Subcutaneous, 4x Daily AC & at Bedtime  ipratropium-albuterol, 3 mL, Nebulization, 4x Daily - RT  metOLazone, 5 mg, Oral, Daily  pantoprazole, 40 mg, Oral, Q AM  pharmacy consult - MTM, , Does not apply, Daily  sodium chloride, 10 mL, Intravenous, Q12H      IV meds:                         Data Review:  Results from last 7 days   Lab Units 06/23/24  0500 06/22/24  0203 06/21/24  1430   SODIUM mmol/L 133* 135* 134*   BUN mg/dL 66* 63* 62*   CREATININE mg/dL 2.11* 2.12* 2.11*   GLUCOSE  "mg/dL 146* 116* 151*     Results from last 7 days   Lab Units 06/23/24  0500 06/22/24  0203 06/21/24  1430   WBC 10*3/mm3 9.32 10.18 11.72*   HEMOGLOBIN g/dL 9.2* 8.8* 9.2*         Results from last 7 days   Lab Units 06/22/24  0203 06/21/24  1430   ALT (SGPT) U/L 9 9   AST (SGOT) U/L 13 14     No results found for: \"DIGOXIN\"   Lab Results   Component Value Date    TSH 2.070 06/07/2019           Invalid input(s): \"LDLCALC\"    Estimated Creatinine Clearance: 48.7 mL/min (A) (by C-G formula based on SCr of 2.11 mg/dL (H)).        Imaging (last 24 hr):   I personally reviewed the most recent chest x-ray and other pertinent imaging studies.  Results for orders placed during the hospital encounter of 06/21/24    XR Chest 1 View    Narrative  XR CHEST 1 VW    Date of Exam: 6/21/2024 2:56 PM EDT    Indication: SOA triage protocol    Comparison: None available.    Findings:  There is a left subclavian dual-lead cardiac stimulator device. There is cardiomegaly. Central pulmonary vascular congestion. No definite findings to suggest CHF. No pneumothorax identified. No large effusion. There are linear densities in the mid and  lower lung suggesting minor atelectasis.    Impression  Impression:  Marked enlargement of the cardiac silhouette, with mild pulmonary vascular congestion. Probable bibasilar atelectasis.      Electronically Signed: Maurizio Oakley MD  6/21/2024 3:14 PM EDT  Workstation ID: [id number]        Last ECHO:        PROBLEM LIST:     Acute on chronic heart failure with preserved ejection fraction (HFpEF)    Elevated serum creatinine    Anemia    Hypokalemia    Cirrhosis of liver    HCV (hepatitis C virus)    Acute on chronic HFrEF (heart failure with reduced ejection fraction)      Initial cardiac assessment: 64-year-old with chronic HFpEF, cirrhosis, PAF, CKD stage III, anemia presenting with acute on chronic HFpEF    ASSESSMENT/PLAN:  1. HFpEF/ Anasarca   - echo 6/10/24 at [facility] with normal EF, severe septal " "hypertrophy, grade II diastolic dysfunction and no significant valvular abnormalities  - agree with IV Lasix 80mg BID  Start metolazone 5 mg daily    Consider spironolactone if renal function stable sick/24    - resume BB when able   - PT/wounds for light compression wraps when able      2. History of PAF, s/p ablation and PPM (IDB)  - per Dr. Craig  - recent discontinuation of anticoagulation due to GI bleed - ?candidate for Watchman or GA exclusion device      3. Cirrhosis with recent paracentesis  - per hospitalists     4. CKD III  - Cr 2 at [facility] last week, stable   - monitor closely with diuresis and low threshold to consult nephrology if renal function worsens.   Currently stable.     5. Anemia with recent hematuria/hematochezia   - per hospitalists  - patient denies recent signs/symptoms of bleeding           Hung Meyers MD  2024    11:08 EDT      Electronically signed by Hung Meyers MD at 24 1110       Fariba Mckeon APRN at 24 0950              [facility] Medicine Services  PROGRESS NOTE    Patient Name: Jaycob Ndiaye II  : 1960  MRN: [id number]    Date of Admission: 2024  Primary Care Physician: Lorena Chamberlain, GARRICK    Subjective   Subjective     CC:  Heart failure    HPI:  Patient was seen resting up in bed awake and alert.  No acute distress.  Calm and interactive.  Reports mild shortness of air, worse with exertion.  Overall states he \"does not feel good\" today.  Feels his abdomen and lower extremities are very tight and having mild nausea.  Asking to have his MiraLAX scheduled twice daily as he takes at home.  Also does not like the food and would like to talk to the dietitian.      Objective   Objective     Vital Signs:   Temp:  [97.4 °F (36.3 °C)-98.3 °F (36.8 °C)] 97.7 °F (36.5 °C)  Heart Rate:  [63-77] 77  Resp:  [16-18] 18  BP: ()/(57-79) 102/67     Physical Exam:  Constitutional: No acute " distress, awake, alert.  Resting up in bed.  Chronically debilitated and disheveled appearing.  HENT: NCAT, mucous membranes moist  Respiratory: Clear to auscultation bilaterally but decreased at bases, respiratory effort normal on room air with sats 97%.  Cardiovascular: RRR, no murmurs, rubs, or gallops  Gastrointestinal: Positive bowel sounds, soft, nontender, nondistended.  Obese.  Musculoskeletal: 3+ BLE edema.  Mild anasarca.  Ferrer spontaneously.  Psychiatric: Appropriate affect, cooperative  Neurologic: Oriented x 3, strength symmetric in all extremities, Cranial Nerves grossly intact to confrontation, speech clear  Skin: Bilateral lower extremity venous stasis dermatitis changes.      Results Reviewed:  LAB RESULTS:      Lab 06/23/24  0500 06/22/24  0203 06/21/24  1430   WBC 9.32 10.18 11.72*   HEMOGLOBIN 9.2* 8.8* 9.2*   HEMATOCRIT 28.7* 27.6* 28.5*   PLATELETS 233 215 217   NEUTROS ABS  --  8.04* 9.39*   IMMATURE GRANS (ABS)  --  0.07* 0.16*   LYMPHS ABS  --  0.94 1.02   MONOS ABS  --  1.01* 1.06*   EOS ABS  --  0.07 0.03   MCV 96.0 94.5 93.8         Lab 06/23/24  0500 06/22/24  1756 06/22/24  0203 06/21/24  1430   SODIUM 133*  --  135* 134*   POTASSIUM 3.5 3.7 3.4* 3.2*   CHLORIDE 94*  --  96* 93*   CO2 26.0  --  27.0 27.0   ANION GAP 13.0  --  12.0 14.0   BUN 66*  --  63* 62*   CREATININE 2.11*  --  2.12* 2.11*   EGFR 34.1*  --  33.9* 34.1*   GLUCOSE 146*  --  116* 151*   CALCIUM 9.2  --  9.0 8.7   MAGNESIUM  --   --  2.1  --    HEMOGLOBIN A1C  --   --  6.00*  --          Lab 06/22/24  0203 06/21/24  1430   TOTAL PROTEIN 5.8* 6.2   ALBUMIN 3.5 3.6   GLOBULIN 2.3 2.6   ALT (SGPT) 9 9   AST (SGOT) 13 14   BILIRUBIN 0.6 0.6   ALK PHOS 74 78         Lab 06/21/24  1627 06/21/24  1430   PROBNP  --  4,183.0*   HSTROP T 131* 137*             Lab 06/22/24  0203   IRON 30*   IRON SATURATION (TSAT) 10*   TIBC 301   TRANSFERRIN 202   FERRITIN 88.02   FOLATE 8.26   VITAMIN B 12 1,127*         Brief Urine Lab Results        None            Microbiology Results Abnormal       None            XR Chest 1 View    Result Date: 6/21/2024  XR CHEST 1 VW Date of Exam: 6/21/2024 2:56 PM EDT Indication: SOA triage protocol Comparison: None available. Findings: There is a left subclavian dual-lead cardiac stimulator device. There is cardiomegaly. Central pulmonary vascular congestion. No definite findings to suggest CHF. No pneumothorax identified. No large effusion. There are linear densities in the mid and lower lung suggesting minor atelectasis.     Impression: Impression: Marked enlargement of the cardiac silhouette, with mild pulmonary vascular congestion. Probable bibasilar atelectasis. Electronically Signed: Maurizio Oakley MD  6/21/2024 3:14 PM EDT  Workstation ID: [id number]         Current medications:  Scheduled Meds:ferrous sulfate, 325 mg, Oral, Daily With Breakfast  furosemide, 80 mg, Intravenous, BID  heparin (porcine), 5,000 Units, Subcutaneous, Q8H  insulin lispro, 2-7 Units, Subcutaneous, 4x Daily AC & at Bedtime  ipratropium-albuterol, 3 mL, Nebulization, 4x Daily - RT  metOLazone, 5 mg, Oral, Daily  pantoprazole, 40 mg, Oral, Q AM  pharmacy consult - MTM, , Does not apply, Daily  polyethylene glycol, 17 g, Oral, BID  potassium chloride ER, 40 mEq, Oral, Q4H  sodium chloride, 10 mL, Intravenous, Q12H      Continuous Infusions:   PRN Meds:.  acetaminophen **OR** acetaminophen **OR** acetaminophen    Albuterol Sulfate NEB Orderable    senna-docusate sodium **AND** polyethylene glycol **AND** bisacodyl **AND** bisacodyl    Calcium Replacement - Follow Nurse / BPA Driven Protocol    dextrose    dextrose    glucagon (human recombinant)    Magnesium Low Dose Replacement - Follow Nurse / BPA Driven Protocol    nitroglycerin    Phosphorus Replacement - Follow Nurse / BPA Driven Protocol    Potassium Replacement - Follow Nurse / BPA Driven Protocol    prochlorperazine    sodium chloride    sodium chloride    sodium  chloride    Assessment & Plan   Assessment & Plan     Active Hospital Problems    Diagnosis  POA    **Acute on chronic heart failure with preserved ejection fraction (HFpEF) [I50.33]  Yes    Acute on chronic HFrEF (heart failure with reduced ejection fraction) [I50.23]  Yes    Elevated serum creatinine [R79.89]  Yes    Anemia [D64.9]  Yes    Hypokalemia [E87.6]  Yes    Cirrhosis of liver [K74.60]  Yes    HCV (hepatitis C virus) [B19.20]  Yes      Resolved Hospital Problems   No resolved problems to display.        Brief Hospital Course to date:  Jaycob Ndiaye II is a 64 y.o. male with past medical history significant for CHF, COPD, cirrhosis, prostate cancer, chronic hematuria, GI bleed, HCV, HTN, and HLD who presents to the ED due to worsening shortness of breath and lower extremity edema over the past week.    This patient's problems and plans were partially entered by my partner and updated as appropriate by me 06/23/24.    Assessment/plan:  Patient is new to me today     A/C HFrEF  -Follows with Cardiologist Dr. Craig  -Echo just done at [facility], will defer additional echocardiogram  -Continue 80 mg of IV Lasix twice daily  -Strict I&O, daily weights  -Monitor creatinine daily to check creatinine and electrolytes while on IV Lasix. -Creatinine stable this morning.   -Still with significant anasarca and lower extremity edema.  Deferring further diuresis to cardiology service     Elevated Creatinine  -Baseline data deficit  -Creat 2.11 on presentation, stable this morning after diuresis  -avoid nephrotoxins as able  -Monitor creatinine with BMP daily.  Creatinine today 2.11.  Generally stable for the third day.  Cardiology diuresing.     Anemia  Recent GI Bleed  -s/p EGD and colonoscopy at [facility] on 6/13/2024, records requested  -Hgb 9.2 on presentation, trended down slightly to 8.8 this morning  -Continue PPI   -continue PO Iron   -Monitor hemoglobin/hematocrit daily     Hypokalemia  -Replace per protocol      New cirrhosis   HCV  -s/p US abd on 6/10/24 that revealed nodular liver consistent with cirrhosis and moderate ascites  -S/p US guided paracentesis on 6/12/2024 at [facility] with 200 mL of skylar-colored fluid removed.  No fluid was sent to lab.  -Consider repeat abdominal US ordered to evaluate ascites   -Will need referral to establish with [facility] upon discharge per patient request     T2DM with peripheral neuropathy  -holding home Mounjaro, Hg A1c is 6.0  -Continue low dose  SSI for now.  Glucoses stable running 139-167 last 24 hours     PAF  -Xarelto discontinued 6/13 at [facility] d/t persistent hematuria      HTN  -Continue holding home bystolic for now d/t borderline BP      DVT prophylaxis:  Heparin     Expected Discharge Location and Transportation: home  Expected Discharge   Expected Discharge Date: 6/24/2024; Expected Discharge Time:      VTE Prophylaxis:  Pharmacologic VTE prophylaxis orders are present.         AM-PAC 6 Clicks Score (PT): 18 (06/22/24 1005)    CODE STATUS:   Code Status and Medical Interventions:   Ordered at: 06/21/24 1840     Level Of Support Discussed With:    Patient     Code Status (Patient has no pulse and is not breathing):    CPR (Attempt to Resuscitate)     Medical Interventions (Patient has pulse or is breathing):    Full Support       GARRICK Bowen  06/23/24        Electronically signed by Fariba Mckeon APRN at 06/23/24 1431       Consult Notes (last 48 hours)  Notes from 06/22/24 1510 through 06/24/24 1510   No notes of this type exist for this encounter.

## 2024-06-24 NOTE — PLAN OF CARE
Goal Outcome Evaluation:  Plan of Care Reviewed With: patient        Progress: improving  Outcome Evaluation: Severe Edema in BLE and lower abdomen. Duplex today that came back normal. Pt complained of pain but didnt request pain meds. Pt declined VTE. On Rm air. VSS.

## 2024-06-24 NOTE — PROGRESS NOTES
" [facility] - Progress Note    Jaycob Ndiaye II  1960  [id number]    06/24/24, 09:14 EDT      Chief Complaint: Following for acute on chronic HFpEF, PAF    Subjective:   Sitting up in bedside, RN present.  Reports continued SOA although improving.  Still with significant BLE edema up to thighs.  BP remains marginal.  Denies chest pain.    Review of Systems:  Pertinent positives are listed above and in physical exam.  All others have been reviewed and are negative.    ferrous sulfate, 325 mg, Oral, Daily With Breakfast  furosemide, 80 mg, Intravenous, BID  heparin (porcine), 5,000 Units, Subcutaneous, Q8H  insulin lispro, 2-7 Units, Subcutaneous, 4x Daily AC & at Bedtime  ipratropium-albuterol, 3 mL, Nebulization, 4x Daily - RT  metOLazone, 5 mg, Oral, Daily  pantoprazole, 40 mg, Oral, Q AM  pharmacy consult - MTM, , Does not apply, Daily  polyethylene glycol, 17 g, Oral, BID  sodium chloride, 10 mL, Intravenous, Q12H        Objective:  Vitals:   height is 185.4 cm (73\") and weight is 156 kg (343 lb 9.6 oz) (abnormal). His oral temperature is 97.9 °F (36.6 °C). His blood pressure is 100/69 and his pulse is 79. His respiration is 18 and oxygen saturation is 99%.     Intake/Output Summary (Last 24 hours) at 6/24/2024 0914  Last data filed at 6/23/2024 2200  Gross per 24 hour   Intake 525 ml   Output --   Net 525 ml       Physical Exam:  General:  WN, NAD, A and O x3.  CV: Irregular. No murmur, rub, or gallop.  Resp:  CTA Tomi, equal, nonlabored.  Abd:  Soft, + BS, no organomegaly. Nontender to palpation.  Obese  Extrem: 2-3+ edema BLE, 2+ pedal/PT pulses.            Results from last 7 days   Lab Units 06/23/24  0500   WBC 10*3/mm3 9.32   HEMOGLOBIN g/dL 9.2*   HEMATOCRIT % 28.7*   PLATELETS 10*3/mm3 233     Results from last 7 days   Lab Units 06/23/24  2114 06/23/24  0500 06/22/24  1756 06/22/24  0203   SODIUM mmol/L  --  133*  --  135*   POTASSIUM mmol/L 3.7 3.5   < > 3.4*   CHLORIDE mmol/L  " --  94*  --  96*   CO2 mmol/L  --  26.0  --  27.0   BUN mg/dL  --  66*  --  63*   CREATININE mg/dL  --  2.11*  --  2.12*   CALCIUM mg/dL  --  9.2  --  9.0   BILIRUBIN mg/dL  --   --   --  0.6   ALK PHOS U/L  --   --   --  74   ALT (SGPT) U/L  --   --   --  9   AST (SGOT) U/L  --   --   --  13   GLUCOSE mg/dL  --  146*  --  116*    < > = values in this interval not displayed.                 Results from last 7 days   Lab Units 06/21/24  1430   PROBNP pg/mL 4,183.0*       Tele: Atrial fibrillation    Assessment:  -64-year-old CM with known HFpEF presents to [facility] ED with progressive dyspnea, BLE edema.  Recently admitted to  [facility] over [facility]rologic issues.  Multiple changes to diuretic therapy and antihypertensives at that time.  -Acute on chronic HFpEF with recent echo 6/10/2024: Normal EF, severe septal hypertrophy, grade 2 diastolic dysfunction with no significant valvular abnormalities.  -PAF with remote ablation and [employer] pacemaker.    -Recent discontinuation of anticoagulation secondary to GIB  -CKD stage III  -Cirrhosis status post recent paracentesis  -Anemia of chronic disease  -History of compliance issues secondary to insurance coverage.          Plan:  -Admitted to hospitalist services, appreciate their assistance.  -Continue with IV Lasix and metolazone.  Trend daily labs.  -Asked [employer] rep to come and interrogate device.  NOAC currently on hold.  -Bilateral lower extremity venous duplex to rule out DVT.  -Cardiology will continue to follow.        I discussed the patient's findings and my recommendations with the patient, any present family members, and the nursing staff.  Lloyd Craig MD saw and examined patient, verified hx and PE, read all radiographic studies, reviewed labs and micro data, and formulated dx, plan for treatment and all medical decision making.      Dariana Miranda PA-C  06/24/24, 09:14 EDT

## 2024-06-25 LAB
ANION GAP SERPL CALCULATED.3IONS-SCNC: 15 MMOL/L (ref 5–15)
BUN SERPL-MCNC: 66 MG/DL (ref 8–23)
BUN/CREAT SERPL: 33.3 (ref 7–25)
CALCIUM SPEC-SCNC: 9.2 MG/DL (ref 8.6–10.5)
CHLORIDE SERPL-SCNC: 93 MMOL/L (ref 98–107)
CO2 SERPL-SCNC: 27 MMOL/L (ref 22–29)
CREAT SERPL-MCNC: 1.98 MG/DL (ref 0.76–1.27)
DEPRECATED RDW RBC AUTO: 52.3 FL (ref 37–54)
EGFRCR SERPLBLD CKD-EPI 2021: 36.8 ML/MIN/1.73
ERYTHROCYTE [DISTWIDTH] IN BLOOD BY AUTOMATED COUNT: 14.9 % (ref 12.3–15.4)
GLUCOSE BLDC GLUCOMTR-MCNC: 139 MG/DL (ref 70–130)
GLUCOSE BLDC GLUCOMTR-MCNC: 152 MG/DL (ref 70–130)
GLUCOSE BLDC GLUCOMTR-MCNC: 156 MG/DL (ref 70–130)
GLUCOSE BLDC GLUCOMTR-MCNC: 168 MG/DL (ref 70–130)
GLUCOSE SERPL-MCNC: 132 MG/DL (ref 65–99)
HCT VFR BLD AUTO: 27.2 % (ref 37.5–51)
HGB BLD-MCNC: 8.7 G/DL (ref 13–17.7)
MCH RBC QN AUTO: 30.7 PG (ref 26.6–33)
MCHC RBC AUTO-ENTMCNC: 32 G/DL (ref 31.5–35.7)
MCV RBC AUTO: 96.1 FL (ref 79–97)
PLATELET # BLD AUTO: 202 10*3/MM3 (ref 140–450)
PMV BLD AUTO: 9.8 FL (ref 6–12)
POTASSIUM SERPL-SCNC: 3.4 MMOL/L (ref 3.5–5.2)
POTASSIUM SERPL-SCNC: 3.7 MMOL/L (ref 3.5–5.2)
RBC # BLD AUTO: 2.83 10*6/MM3 (ref 4.14–5.8)
SODIUM SERPL-SCNC: 135 MMOL/L (ref 136–145)
WBC NRBC COR # BLD AUTO: 8.77 10*3/MM3 (ref 3.4–10.8)

## 2024-06-25 PROCEDURE — 94664 DEMO&/EVAL PT USE INHALER: CPT

## 2024-06-25 PROCEDURE — 63710000001 INSULIN LISPRO (HUMAN) PER 5 UNITS: Performed by: NURSE PRACTITIONER

## 2024-06-25 PROCEDURE — 94761 N-INVAS EAR/PLS OXIMETRY MLT: CPT

## 2024-06-25 PROCEDURE — 82948 REAGENT STRIP/BLOOD GLUCOSE: CPT

## 2024-06-25 PROCEDURE — 85027 COMPLETE CBC AUTOMATED: CPT | Performed by: NURSE PRACTITIONER

## 2024-06-25 PROCEDURE — 25010000002 FUROSEMIDE PER 20 MG: Performed by: INTERNAL MEDICINE

## 2024-06-25 PROCEDURE — 94799 UNLISTED PULMONARY SVC/PX: CPT

## 2024-06-25 PROCEDURE — 97530 THERAPEUTIC ACTIVITIES: CPT

## 2024-06-25 PROCEDURE — 80048 BASIC METABOLIC PNL TOTAL CA: CPT | Performed by: NURSE PRACTITIONER

## 2024-06-25 PROCEDURE — 84132 ASSAY OF SERUM POTASSIUM: CPT | Performed by: INTERNAL MEDICINE

## 2024-06-25 PROCEDURE — 99232 SBSQ HOSP IP/OBS MODERATE 35: CPT | Performed by: INTERNAL MEDICINE

## 2024-06-25 PROCEDURE — 94660 CPAP INITIATION&MGMT: CPT

## 2024-06-25 RX ORDER — FUROSEMIDE 10 MG/ML
80 INJECTION INTRAMUSCULAR; INTRAVENOUS
Status: DISCONTINUED | OUTPATIENT
Start: 2024-06-25 | End: 2024-07-02

## 2024-06-25 RX ORDER — POTASSIUM CHLORIDE 20 MEQ/1
40 TABLET, EXTENDED RELEASE ORAL EVERY 4 HOURS
Status: COMPLETED | OUTPATIENT
Start: 2024-06-25 | End: 2024-06-25

## 2024-06-25 RX ORDER — MAGNESIUM CARB/ALUMINUM HYDROX 105-160MG
150 TABLET,CHEWABLE ORAL ONCE
Status: COMPLETED | OUTPATIENT
Start: 2024-06-25 | End: 2024-06-25

## 2024-06-25 RX ADMIN — POLYETHYLENE GLYCOL 3350 17 G: 17 POWDER, FOR SOLUTION ORAL at 10:04

## 2024-06-25 RX ADMIN — IPRATROPIUM BROMIDE AND ALBUTEROL SULFATE 3 ML: 2.5; .5 SOLUTION RESPIRATORY (INHALATION) at 20:55

## 2024-06-25 RX ADMIN — IPRATROPIUM BROMIDE AND ALBUTEROL SULFATE 3 ML: 2.5; .5 SOLUTION RESPIRATORY (INHALATION) at 15:45

## 2024-06-25 RX ADMIN — FUROSEMIDE 80 MG: 10 INJECTION, SOLUTION INTRAMUSCULAR; INTRAVENOUS at 15:21

## 2024-06-25 RX ADMIN — POTASSIUM CHLORIDE 40 MEQ: 1500 TABLET, EXTENDED RELEASE ORAL at 15:22

## 2024-06-25 RX ADMIN — POTASSIUM CHLORIDE 40 MEQ: 1500 TABLET, EXTENDED RELEASE ORAL at 10:04

## 2024-06-25 RX ADMIN — SENNOSIDES AND DOCUSATE SODIUM 2 TABLET: 50; 8.6 TABLET ORAL at 21:32

## 2024-06-25 RX ADMIN — SENNOSIDES AND DOCUSATE SODIUM 2 TABLET: 50; 8.6 TABLET ORAL at 10:05

## 2024-06-25 RX ADMIN — INSULIN LISPRO 2 UNITS: 100 INJECTION, SOLUTION INTRAVENOUS; SUBCUTANEOUS at 17:15

## 2024-06-25 RX ADMIN — BISACODYL 5 MG: 5 TABLET, COATED ORAL at 05:34

## 2024-06-25 RX ADMIN — INSULIN LISPRO 2 UNITS: 100 INJECTION, SOLUTION INTRAVENOUS; SUBCUTANEOUS at 07:51

## 2024-06-25 RX ADMIN — Medication 10 ML: at 21:48

## 2024-06-25 RX ADMIN — INSULIN LISPRO 2 UNITS: 100 INJECTION, SOLUTION INTRAVENOUS; SUBCUTANEOUS at 11:56

## 2024-06-25 RX ADMIN — POLYETHYLENE GLYCOL 3350 17 G: 17 POWDER, FOR SOLUTION ORAL at 21:32

## 2024-06-25 RX ADMIN — IPRATROPIUM BROMIDE AND ALBUTEROL SULFATE 3 ML: 2.5; .5 SOLUTION RESPIRATORY (INHALATION) at 11:24

## 2024-06-25 RX ADMIN — MAGNESIUM CITRATE 150 ML: 1.75 LIQUID ORAL at 11:56

## 2024-06-25 RX ADMIN — METOLAZONE 5 MG: 5 TABLET ORAL at 10:05

## 2024-06-25 RX ADMIN — PANTOPRAZOLE SODIUM 40 MG: 40 TABLET, DELAYED RELEASE ORAL at 05:32

## 2024-06-25 RX ADMIN — FERROUS SULFATE TAB 325 MG (65 MG ELEMENTAL FE) 325 MG: 325 (65 FE) TAB at 07:51

## 2024-06-25 NOTE — CONSULTS
"                  Clinical Nutrition     Nutrition Education   Reason for Visit:   APRN/PA Consult      Patient Name: Jaycob Ndiaye II  YOB: 1960  MRN: [id number]  Date of Encounter: 06/25/24 10:41 EDT  Admission date: 6/21/2024    Comments:     RD provided diet education on low sodium, high protein, small frequent meals. Education material provided. Pt appears motivated to change, will likely need some reinforcement.     Applicable Diagnoses     New cirrhosis, T2DM, CHF, diverticulitis (per pt report)    Diet/Nutrition Related History:     Pt w/multiple questions related to dietary habits. Pt states he was recently at OSH and received diet education but has some confusion on what to eat 2/2 conflicting recommendations. Pt states he has been paying attention to sodium in his diet and limiting processed foods however notes w/limited resources so can't always buy whole grains, fresh veg etc. RD discussed rationale for low sodium/high protein/frequent meals. RD encouraged pt to continue w/low sodium diet and reviewed strategies/tips. RD also discussed small meals throughout the day and provided pt w/examples. RD also encouraged high protein from lean sources. Pt verbalized understanding and asked appropriate questions.     Labs reviewed   Yes    Nutrition Diagnosis     Problem Food and nutrition knowledge deficit   Related To Cirrhosis, HF, diverticulitis   Signs/Symptoms Pt report     Goal:     Increase knowledge on diet/nutrition effects on condition/status     Nutrition Intervention     Education provided regarding nutrition therapy for: Diet rationale, Key food habit change, Sodium Restriction, and Cirrhosis    Education provided regarding food habits/behavior related to:Food choices, Eating pattern, Label reading, Seasoning food, Food preparation, and Food shopping     RD provided printed material via [employer] [place] \"Low Sodium Diet\" and \"Increasing Protein\" education. "       Monitoring/Evaluation:     Pt acknowledged understanding of material covered      Lenore Biggs, MS,RD,LD  Time Spent: 45

## 2024-06-25 NOTE — PAYOR COMM NOTE
"Ref# [id number]   Still Pending  Clinical Update    Utilization Review  Phone [phone]  Fax [phone]    [facility]  [address] [place] [address]             Jaycob Scott II (64 y.o. Male)       Date of Birth   1960    Social Security Number       Address   [address] [place] [address]    Home Phone   [phone]    MRN   [id number]       [employer]   None    Marital Status                               Admission Date   6/21/24    Admission Type   Emergency    Admitting Provider   Malachi Regan MD    Attending Provider   Malachi Regan MD    Department, Room/Bed   [facility] 6B, N636/1       Discharge Date       Discharge Disposition       Discharge Destination                                 Attending Provider: Malachi Regan MD    Allergies: Ketamine, Nsaids, Contrast Dye (Echo Or Unknown Ct/mr)    Isolation: None   Infection: None   Code Status: CPR    Ht: 185.4 cm (73\")   Wt: 156 kg (343 lb)    Admission Cmt: None   Principal Problem: Acute on chronic heart failure with preserved ejection fraction (HFpEF) [I50.33]                   Active Insurance as of 6/21/2024       Primary Coverage       Payor Plan Insurance Group Employer/Plan Group    ANTHEM MEDICARE REPLACEMENT ANTHEM MEDICARE ADVANTAGE [id number]       Payor Plan Address Payor Plan Phone Number Payor Plan Fax Number Effective Dates    [address] [phone]  10/1/2020 - None Entered    [facility] [id number]         Subscriber Name Subscriber Birth Date Member ID       JAYCOB SCOTT II 1960 [id number]               Secondary Coverage       Payor Plan Insurance Group Employer/Plan Group    [place]        Payor Plan Address Payor Plan Phone Number Payor Plan Fax Number Effective Dates    [address] [phone]  6/21/2024 - None Entered    [facility] [id number]         Subscriber Name Subscriber Birth Date Member ID "       KIKI ELAM  1960 [id number]                     Emergency Contacts        (Rel.) Home Phone Work Phone Mobile Phone    Kiki Elam (Son) [phone] -- --    isidra elam (Son) -- -- [phone]    Rahul Elam (Son) -- -- [phone]              Vital Signs (last day)       Date/Time Temp Temp src Pulse Resp BP Patient Position SpO2    06/25/24 1124 -- -- 73 18 -- -- 91    06/25/24 1105 97.5 (36.4) Oral 75 18 118/72 Sitting 98    06/25/24 1004 -- -- 71 -- 104/55 -- --    06/25/24 0726 97.6 (36.4) Oral 77 18 110/64 Sitting 95    06/25/24 0334 98.1 (36.7) Oral 74 18 103/59 Lying 96    06/24/24 2252 98 (36.7) Oral 82 18 108/69 Lying 97    06/24/24 2047 -- -- -- 20 -- -- --    06/24/24 1928 97.5 (36.4) Oral 69 18 108/84 Lying 99    06/24/24 1812 -- -- 71 -- 107/70 -- --    06/24/24 1800 -- -- 74 -- -- -- 96    06/24/24 1614 -- -- 70 -- -- -- 100    06/24/24 1609 -- -- 74 16 -- -- --    06/24/24 1443 97.5 (36.4) Oral 77 20 106/66 Lying 92    06/24/24 1400 -- -- 80 -- -- -- 93    06/24/24 1317 -- -- 80 20 -- -- 94    06/24/24 1237 -- -- 77 -- -- -- 97    06/24/24 1200 -- -- 71 -- -- -- 96    06/24/24 1102 97.9 (36.6) Oral 91 18 90/67 Sitting 96    06/24/24 1100 -- -- 85 -- 90/67 -- 97    06/24/24 1000 -- -- 72 -- -- -- 99    06/24/24 0835 -- -- 79 18 -- -- 99    06/24/24 0804 -- -- 81 -- 100/69 -- --    06/24/24 0800 -- -- 77 -- -- -- 98    06/24/24 0758 -- -- 85 -- -- -- 96    06/24/24 0708 97.9 (36.6) Oral 67 18 107/64 Lying 94    06/24/24 0700 -- -- 75 -- -- -- 94    06/24/24 0309 97.8 (36.6) Oral 74 18 105/75 Lying 98          Current Facility-Administered Medications   Medication Dose Route Frequency Provider Last Rate Last Admin    acetaminophen (TYLENOL) tablet 650 mg  650 mg Oral Q4H PRN Mihir Chacko APRN        Or    acetaminophen (TYLENOL) 160 MG/5ML oral solution 650 mg  650 mg Oral Q4H PRN Mihir Chacko APRN        Or    acetaminophen (TYLENOL) suppository  650 mg  650 mg Rectal Q4H PRN Mihir Chacko APRSUYAPA        albuterol (PROVENTIL) nebulizer solution 0.083% 2.5 mg/3mL  2.5 mg Nebulization Q6H PRN Edda Key MD        sennosides-docusate (PERICOLACE) 8.6-50 MG per tablet 2 tablet  2 tablet Oral BID Fariba Mckeon APRN   2 tablet at 06/25/24 1005    And    polyethylene glycol (MIRALAX) packet 17 g  17 g Oral BID Fariba Mckeon, APRN   17 g at 06/25/24 1004    And    bisacodyl (DULCOLAX) EC tablet 5 mg  5 mg Oral Daily PRN Fariba Mckeon APRN   5 mg at 06/25/24 0534    And    bisacodyl (DULCOLAX) suppository 10 mg  10 mg Rectal Daily PRN Fariba Mckeon, APRN        Calcium Replacement - Follow Nurse / BPA Driven Protocol   Does not apply PRN Mihir Chacko APRN        dextrose (D50W) (25 g/50 mL) IV injection 25 g  25 g Intravenous Q15 Min PRN Mihir Chacko APRN        dextrose (GLUTOSE) oral gel 15 g  15 g Oral Q15 Min PRN Mihir Chacok APRN        ferrous sulfate tablet 325 mg  325 mg Oral Daily With Breakfast Mihir Chacko APRN   325 mg at 06/25/24 0751    furosemide (LASIX) injection 80 mg  80 mg Intravenous BID Edda Key MD   80 mg at 06/25/24 1004    glucagon (GLUCAGEN) injection 1 mg  1 mg Intramuscular Q15 Min PRN Mihir Chacko APRN        heparin (porcine) 5000 UNIT/ML injection 5,000 Units  5,000 Units Subcutaneous Q8H Mihir Chacko APRN   5,000 Units at 06/23/24 1332    Insulin Lispro (humaLOG) injection 2-7 Units  2-7 Units Subcutaneous 4x Daily AC & at Bedtime Mihir Chacko APRN   2 Units at 06/25/24 1156    ipratropium-albuterol (DUO-NEB) nebulizer solution 3 mL  3 mL Nebulization 4x Daily - RT Edda Key MD   3 mL at 06/25/24 1124    magnesium hydroxide (MILK OF MAGNESIA) 400 MG/5ML suspension 30 mL  30 mL Oral Daily PRN Fariba Mckeon APRN   30 mL at 06/24/24 2203    Magnesium Low Dose Replacement - Follow Nurse / BPA Driven Protocol   Does not apply  PRN Ginna, Mihir A, APRN        metOLazone (ZAROXOLYN) tablet 5 mg  5 mg Oral Daily Hung Meyers MD   5 mg at 06/25/24 1005    nitroglycerin (NITROSTAT) SL tablet 0.4 mg  0.4 mg Sublingual Q5 Min PRN Ginna, Mihir A, APRN        pantoprazole (PROTONIX) EC tablet 40 mg  40 mg Oral Q AM Ginna, Mihir A, APRN   40 mg at 06/25/24 0532    Pharmacy Consult - MTM   Does not apply Daily Jose Aleman, KRISTIN        Phosphorus Replacement - Follow Nurse / BPA Driven Protocol   Does not apply PRN Ginna, Mihir A, APRN        potassium chloride (KLOR-CON M20) CR tablet 40 mEq  40 mEq Oral Q4H Malachi Regan MD   40 mEq at 06/25/24 1004    Potassium Replacement - Follow Nurse / BPA Driven Protocol   Does not apply PRN Ginna, Mihir A, APRN        prochlorperazine (COMPAZINE) injection 5 mg  5 mg Intravenous Q6H PRN Edda Key MD        sodium chloride 0.9 % flush 10 mL  10 mL Intravenous PRN Ginna, Mihir A, APRN        sodium chloride 0.9 % flush 10 mL  10 mL Intravenous Q12H Ginna, Mihir A, APRN   10 mL at 06/24/24 2206    sodium chloride 0.9 % flush 10 mL  10 mL Intravenous PRN Ginna, Mihir A, APRN        sodium chloride 0.9 % infusion 40 mL  40 mL Intravenous PRN Ginna, Mihir A, APRN         Lab Results (last 48 hours)       Procedure Component Value Units Date/Time    POC Glucose Once [[id number]]  (Abnormal) Collected: 06/25/24 1107    Specimen: Blood Updated: 06/25/24 1108     Glucose 156 mg/dL     Basic Metabolic Panel [[id number]]  (Abnormal) Collected: 06/25/24 0754    Specimen: Blood Updated: 06/25/24 0842     Glucose 132 mg/dL      BUN 66 mg/dL      Creatinine 1.98 mg/dL      Sodium 135 mmol/L      Potassium 3.4 mmol/L      Chloride 93 mmol/L      CO2 27.0 mmol/L      Calcium 9.2 mg/dL      BUN/Creatinine Ratio 33.3     Anion Gap 15.0 mmol/L      eGFR 36.8 mL/min/1.73     Narrative:      GFR Normal >60  Chronic Kidney Disease <60  Kidney Failure <15      CBC (No Diff) [[id number]]  (Abnormal)  Collected: 06/25/24 0754    Specimen: Blood Updated: 06/25/24 0819     WBC 8.77 10*3/mm3      RBC 2.83 10*6/mm3      Hemoglobin 8.7 g/dL      Hematocrit 27.2 %      MCV 96.1 fL      MCH 30.7 pg      MCHC 32.0 g/dL      RDW 14.9 %      RDW-SD 52.3 fl      MPV 9.8 fL      Platelets 202 10*3/mm3     POC Glucose Once [[id number]]  (Abnormal) Collected: 06/25/24 0729    Specimen: Blood Updated: 06/25/24 0729     Glucose 152 mg/dL     POC Glucose Once [[id number]]  (Abnormal) Collected: 06/24/24 1927    Specimen: Blood Updated: 06/24/24 1929     Glucose 181 mg/dL     POC Glucose Once [[id number]]  (Abnormal) Collected: 06/24/24 1704    Specimen: Blood Updated: 06/24/24 1706     Glucose 176 mg/dL     POC Glucose Once [[id number]]  (Abnormal) Collected: 06/24/24 1507    Specimen: Blood Updated: 06/24/24 1508     Glucose 162 mg/dL     POC Glucose Once [[id number]]  (Abnormal) Collected: 06/24/24 1101    Specimen: Blood Updated: 06/24/24 1102     Glucose 161 mg/dL     POC Glucose Once [[id number]]  (Abnormal) Collected: 06/24/24 0711    Specimen: Blood Updated: 06/24/24 0713     Glucose 152 mg/dL     Potassium [[id number]]  (Normal) Collected: 06/23/24 2114    Specimen: Blood Updated: 06/23/24 2149     Potassium 3.7 mmol/L     POC Glucose Once [[id number]]  (Abnormal) Collected: 06/23/24 2007    Specimen: Blood Updated: 06/23/24 2008     Glucose 209 mg/dL     POC Glucose Once [[id number]]  (Abnormal) Collected: 06/23/24 1645    Specimen: Blood Updated: 06/23/24 1648     Glucose 137 mg/dL           Imaging Results (Last 48 Hours)       ** No results found for the last 48 hours. **          ECG/EMG Results (last 48 hours)       ** No results found for the last 48 hours. **          Operative/Procedure Notes (last 48 hours)  Notes from 06/23/24 1253 through 06/25/24 1253   No notes of this type exist for this encounter.          Physician Progress Notes (last 48 hours)        Lloyd Craig MD at 06/25/24 7239           [facility] - Progress Note    Jaycob Ndiaye II  1960  N636/1    06/25/24, 08:32 EDT      Chief Complaint: Following for acute on chronic HFpEF, PAF    Subjective:   No new overnight events.  Vital stable, maintaining sats on room air.  Venous duplex negative.  Up in room to use restroom this morning. Breathing is stable.  Edema has not improved much.  Still very constipated.        Review of Systems:  Pertinent positives are listed above and in physical exam.  All others have been reviewed and are negative.    ferrous sulfate, 325 mg, Oral, Daily With Breakfast  furosemide, 80 mg, Intravenous, BID  heparin (porcine), 5,000 Units, Subcutaneous, Q8H  insulin lispro, 2-7 Units, Subcutaneous, 4x Daily AC & at Bedtime  ipratropium-albuterol, 3 mL, Nebulization, 4x Daily - RT  metOLazone, 5 mg, Oral, Daily  pantoprazole, 40 mg, Oral, Q AM  pharmacy consult - MT, , Does not apply, Daily  senna-docusate sodium, 2 tablet, Oral, BID   And  polyethylene glycol, 17 g, Oral, BID  sodium chloride, 10 mL, Intravenous, Q12H        Objective:  Vitals:   height is 185.4 cm (73\") and weight is 156 kg (343 lb) (abnormal). His oral temperature is 97.6 °F (36.4 °C). His blood pressure is 110/64 and his pulse is 77. His respiration is 18 and oxygen saturation is 95%.   No intake or output data in the 24 hours ending 06/25/24 0832      Physical Exam:  General:  WN, NAD, A and O x3.  CV: Irregular. No murmur, rub, or gallop.  Resp:  CTA Tomi, equal, nonlabored.  Abd:  Soft, + BS, no organomegaly. Nontender to palpation.  Obese  Extrem: 2-3+ edema BLE, 2+ pedal/PT pulses.            Results from last 7 days   Lab Units 06/25/24  0754   WBC 10*3/mm3 8.77   HEMOGLOBIN g/dL 8.7*   HEMATOCRIT % 27.2*   PLATELETS 10*3/mm3 202     Results from last 7 days   Lab Units 06/23/24  2114 06/23/24  0500 06/22/24  1756 06/22/24  0203   SODIUM mmol/L  --  133*  --  135*   POTASSIUM mmol/L 3.7 3.5   < > 3.4*   CHLORIDE mmol/L  --  94*  " --  96*   CO2 mmol/L  --  26.0  --  27.0   BUN mg/dL  --  66*  --  63*   CREATININE mg/dL  --  2.11*  --  2.12*   CALCIUM mg/dL  --  9.2  --  9.0   BILIRUBIN mg/dL  --   --   --  0.6   ALK PHOS U/L  --   --   --  74   ALT (SGPT) U/L  --   --   --  9   AST (SGOT) U/L  --   --   --  13   GLUCOSE mg/dL  --  146*  --  116*    < > = values in this interval not displayed.                 Results from last 7 days   Lab Units 06/21/24  1430   PROBNP pg/mL 4,183.0*       Tele: Atrial fibrillation    Assessment:  -64-year-old CM with known HFpEF presents to [facility] ED with progressive dyspnea, BLE edema.  Recently admitted to  [facility] over urologic issues.  Multiple changes to diuretic therapy and antihypertensives at that time.  -Acute on chronic HFpEF with recent echo 6/10/2024: Normal EF, severe septal hypertrophy, grade 2 diastolic dysfunction with no significant valvular abnormalities.  -PAF with remote ablation and [employer] pacemaker.    -Recent discontinuation of anticoagulation secondary to GIB  -CKD stage III  -Cirrhosis status post recent paracentesis  -Anemia of chronic disease  -History of compliance issues secondary to insurance coverage.          Plan:  -Admitted to hospitalist services, appreciate their assistance.  -Continue with IV Lasix and daily metolazone.  Trend daily labs, a.m. labs currently pending.  Consider nephrology consult, defer to primary service.  -NOAC on hold.  Worsening anemia.  C rep interrogated device 624, normal interrogation.  -Bilateral lower extremity venous duplex negative  -Cardiology will continue to follow.        I discussed the patient's findings and my recommendations with the patient, any present family members, and the nursing staff.  Lloyd Craig MD saw and examined patient, verified hx and PE, read all radiographic studies, reviewed labs and micro data, and formulated dx, plan for treatment and all medical decision making.      Dariana Miarnda PA-C  06/25/24,  "08:34 EDT                      Electronically signed by Lloyd Craig MD at 24 1031       Fariba Mckeon APRN at 24 0935              [facility] Medicine Services  PROGRESS NOTE    Patient Name: Jaycob Ndiaye II  : 1960  MRN: [id number]    Date of Admission: 2024  Primary Care Physician: Lorena Chamberlain APRN    Subjective   Subjective     CC:  Heart failure    HPI:  Patient seen resting up in the chair no acute distress.  Awake and alert.  States eating better when we get his food \"right\".  Mild intermittent nausea but no vomiting.  Feels his lower extremity swelling is increasing.  Asking about replacement of leg wraps, but larger ones.  Awaiting duplex today and will defer to PT to reapply leg wraps      Objective   Objective     Vital Signs:   Temp:  [97.4 °F (36.3 °C)-97.9 °F (36.6 °C)] 97.9 °F (36.6 °C)  Heart Rate:  [67-91] 80  Resp:  [18-20] 20  BP: ()/(63-75) 90/67     Physical Exam:  Constitutional: No acute distress, awake, alert.  Resting up in the chair.  Chronically debilitated and disheveled appearing.  HENT: NCAT, mucous membranes moist  Respiratory: Clear to auscultation bilaterally but decreased at bases, respiratory effort normal on room air with sats 96%.  Cardiovascular: RRR, no murmurs, rubs, or gallops  Gastrointestinal: Positive bowel sounds, soft, nontender, nondistended.  Obese.  Musculoskeletal: 3+ BLE edema.  Moderate anasarca.  Ferrer spontaneously.  Psychiatric: Appropriate affect, cooperative  Neurologic: Oriented x 3, strength symmetric in all extremities, Cranial Nerves grossly intact to confrontation, speech clear  Skin: Bilateral lower extremity venous stasis dermatitis changes.      Results Reviewed:  LAB RESULTS:      Lab 24  0500 24  0203 24  1430   WBC 9.32 10.18 11.72*   HEMOGLOBIN 9.2* 8.8* 9.2*   HEMATOCRIT 28.7* 27.6* 28.5*   PLATELETS 233 215 217   NEUTROS ABS  --  " 8.04* 9.39*   IMMATURE GRANS (ABS)  --  0.07* 0.16*   LYMPHS ABS  --  0.94 1.02   MONOS ABS  --  1.01* 1.06*   EOS ABS  --  0.07 0.03   MCV 96.0 94.5 93.8         Lab 06/23/24  2114 06/23/24  0500 06/22/24  1756 06/22/24  0203 06/21/24  1430   SODIUM  --  133*  --  135* 134*   POTASSIUM 3.7 3.5 3.7 3.4* 3.2*   CHLORIDE  --  94*  --  96* 93*   CO2  --  26.0  --  27.0 27.0   ANION GAP  --  13.0  --  12.0 14.0   BUN  --  66*  --  63* 62*   CREATININE  --  2.11*  --  2.12* 2.11*   EGFR  --  34.1*  --  33.9* 34.1*   GLUCOSE  --  146*  --  116* 151*   CALCIUM  --  9.2  --  9.0 8.7   MAGNESIUM  --   --   --  2.1  --    HEMOGLOBIN A1C  --   --   --  6.00*  --          Lab 06/22/24  0203 06/21/24  1430   TOTAL PROTEIN 5.8* 6.2   ALBUMIN 3.5 3.6   GLOBULIN 2.3 2.6   ALT (SGPT) 9 9   AST (SGOT) 13 14   BILIRUBIN 0.6 0.6   ALK PHOS 74 78         Lab 06/21/24  1627 06/21/24  1430   PROBNP  --  4,183.0*   HSTROP T 131* 137*             Lab 06/22/24  0203   IRON 30*   IRON SATURATION (TSAT) 10*   TIBC 301   TRANSFERRIN 202   FERRITIN 88.02   FOLATE 8.26   VITAMIN B 12 1,127*         Brief Urine Lab Results       None            Microbiology Results Abnormal       None            No radiology results from the last 24 hrs        Current medications:  Scheduled Meds:ferrous sulfate, 325 mg, Oral, Daily With Breakfast  furosemide, 80 mg, Intravenous, BID  heparin (porcine), 5,000 Units, Subcutaneous, Q8H  insulin lispro, 2-7 Units, Subcutaneous, 4x Daily AC & at Bedtime  ipratropium-albuterol, 3 mL, Nebulization, 4x Daily - RT  metOLazone, 5 mg, Oral, Daily  pantoprazole, 40 mg, Oral, Q AM  pharmacy consult - MTM, , Does not apply, Daily  polyethylene glycol, 17 g, Oral, BID  sodium chloride, 10 mL, Intravenous, Q12H      Continuous Infusions:   PRN Meds:.  acetaminophen **OR** acetaminophen **OR** acetaminophen    Albuterol Sulfate NEB Orderable    senna-docusate sodium **AND** polyethylene glycol **AND** bisacodyl **AND**  bisacodyl    Calcium Replacement - Follow Nurse / BPA Driven Protocol    dextrose    dextrose    glucagon (human recombinant)    Magnesium Low Dose Replacement - Follow Nurse / BPA Driven Protocol    nitroglycerin    Phosphorus Replacement - Follow Nurse / BPA Driven Protocol    Potassium Replacement - Follow Nurse / BPA Driven Protocol    prochlorperazine    sodium chloride    sodium chloride    sodium chloride    Assessment & Plan   Assessment & Plan     Active Hospital Problems    Diagnosis  POA    **Acute on chronic heart failure with preserved ejection fraction (HFpEF) [I50.33]  Yes    Acute on chronic HFrEF (heart failure with reduced ejection fraction) [I50.23]  Yes    Elevated serum creatinine [R79.89]  Yes    Anemia [D64.9]  Yes    Hypokalemia [E87.6]  Yes    Cirrhosis of liver [K74.60]  Yes    HCV (hepatitis C virus) [B19.20]  Yes      Resolved Hospital Problems   No resolved problems to display.        Brief Hospital Course to date:  Jaycob Ndiaye II is a 64 y.o. male with past medical history significant for CHF, COPD, cirrhosis, prostate cancer, chronic hematuria, GI bleed, HCV, HTN, and HLD who presents to the ED due to worsening shortness of breath and lower extremity edema over the past week.    This patient's problems and plans were partially entered by my partner and updated as appropriate by me 06/24/24.    Assessment/plan:  Patient is new to me today     A/C HFrEF  -Follows with Cardiologist Dr. Craig  -Echo just done at [facility], will defer additional echocardiogram  -Continue 80 mg of IV Lasix twice daily  -Strict I&O, daily weights  -Monitor creatinine daily to check creatinine and electrolytes while on IV Lasix. -Creatinine stable at last check.  -Still with significant anasarca and lower extremity edema.  Deferring further diuresis to cardiology service.  See below.  -- Will recheck a.m. labs.    Significant BLE edema edema  Anasarca  --Initially had leg wraps but asked that they be  removed because they were too tight.  Currently off.  --Edema worsening.  Cardiology managing diuretics  --Cards has ordered BLE venous duplex to rule out DVT today  --Will need to reach out to PT for replacement of leg wraps (may need slightly larger wrap or looser so that he will continue to wear) after DVT ruled out     Elevated Creatinine  -Baseline data deficit  -Creat 2.11 on presentation, stable this morning after diuresis  -avoid nephrotoxins as able  -Monitor creatinine with BMP daily.  Creatinine 2.11.  Generally stable for the third day.  Cardiology diuresing.     Anemia  Recent GI Bleed  -s/p EGD and colonoscopy at [facility] on 6/13/2024, records requested  -Hgb 9.2 on presentation, trended down slightly to 8.8 this morning  -Continue PPI   -continue PO Iron   -Monitor hemoglobin/hematocrit daily     Hypokalemia  -Replace per protocol     New cirrhosis   HCV  -s/p US abd on 6/10/24 that revealed nodular liver consistent with cirrhosis and moderate ascites  -S/p US guided paracentesis on 6/12/2024 at [facility] with 200 mL of skylar-colored fluid removed.  No fluid was sent to lab.  -Consider repeat abdominal US ordered to evaluate ascites   -Will need referral to establish with [facility] upon discharge per patient request     T2DM with peripheral neuropathy  -holding home Mounjaro, Hg A1c is 6.0  -Continue low dose  SSI for now.  Glucoses stable running 137-209 last 24 hours     PAF  -Xarelto discontinued 6/13 at [facility] d/t persistent hematuria      HTN  -Continue holding home bystolic for now d/t borderline BP      DVT prophylaxis:  Heparin     Expected Discharge Location and Transportation: home once medically improved and cleared by cardiology.  Expected Discharge   Expected Discharge Date: 6/25/2024; Expected Discharge Time:      VTE Prophylaxis:  Pharmacologic VTE prophylaxis orders are present.         AM-PAC 6 Clicks Score (PT): 18 (06/22/24 1001)    CODE STATUS:   Code Status and Medical Interventions:   Ordered  "at: 06/21/24 1840     Level Of Support Discussed With:    Patient     Code Status (Patient has no pulse and is not breathing):    CPR (Attempt to Resuscitate)     Medical Interventions (Patient has pulse or is breathing):    Full Support       GARRICK Bowen  06/24/24        Electronically signed by Fariba Mckeon APRN at 06/24/24 1413       Lloyd Craig MD at 06/24/24 0914           [facility] - Progress Note    Jaycob Ndiaye II  1960  N636/1 06/24/24, 09:14 EDT      Chief Complaint: Following for acute on chronic HFpEF, PAF    Subjective:   Sitting up in bedside, RN present.  Reports continued SOA although improving.  Still with significant BLE edema up to thighs.  BP remains marginal.  Denies chest pain.    Review of Systems:  Pertinent positives are listed above and in physical exam.  All others have been reviewed and are negative.    ferrous sulfate, 325 mg, Oral, Daily With Breakfast  furosemide, 80 mg, Intravenous, BID  heparin (porcine), 5,000 Units, Subcutaneous, Q8H  insulin lispro, 2-7 Units, Subcutaneous, 4x Daily AC & at Bedtime  ipratropium-albuterol, 3 mL, Nebulization, 4x Daily - RT  metOLazone, 5 mg, Oral, Daily  pantoprazole, 40 mg, Oral, Q AM  pharmacy consult - MTM, , Does not apply, Daily  polyethylene glycol, 17 g, Oral, BID  sodium chloride, 10 mL, Intravenous, Q12H        Objective:  Vitals:   height is 185.4 cm (73\") and weight is 156 kg (343 lb 9.6 oz) (abnormal). His oral temperature is 97.9 °F (36.6 °C). His blood pressure is 100/69 and his pulse is 79. His respiration is 18 and oxygen saturation is 99%.     Intake/Output Summary (Last 24 hours) at 6/24/2024 0914  Last data filed at 6/23/2024 2200  Gross per 24 hour   Intake 525 ml   Output --   Net 525 ml       Physical Exam:  General:  WN, NAD, A and O x3.  CV: Irregular. No murmur, rub, or gallop.  Resp:  CTA Tomi, equal, nonlabored.  Abd:  Soft, + BS, no organomegaly. " Nontender to palpation.  Obese  Extrem: 2-3+ edema BLE, 2+ pedal/PT pulses.            Results from last 7 days   Lab Units 06/23/24  0500   WBC 10*3/mm3 9.32   HEMOGLOBIN g/dL 9.2*   HEMATOCRIT % 28.7*   PLATELETS 10*3/mm3 233     Results from last 7 days   Lab Units 06/23/24  2114 06/23/24  0500 06/22/24  1756 06/22/24  0203   SODIUM mmol/L  --  133*  --  135*   POTASSIUM mmol/L 3.7 3.5   < > 3.4*   CHLORIDE mmol/L  --  94*  --  96*   CO2 mmol/L  --  26.0  --  27.0   BUN mg/dL  --  66*  --  63*   CREATININE mg/dL  --  2.11*  --  2.12*   CALCIUM mg/dL  --  9.2  --  9.0   BILIRUBIN mg/dL  --   --   --  0.6   ALK PHOS U/L  --   --   --  74   ALT (SGPT) U/L  --   --   --  9   AST (SGOT) U/L  --   --   --  13   GLUCOSE mg/dL  --  146*  --  116*    < > = values in this interval not displayed.                 Results from last 7 days   Lab Units 06/21/24  1430   PROBNP pg/mL 4,183.0*       Tele: Atrial fibrillation    Assessment:  -64-year-old CM with known HFpEF presents to [facility] ED with progressive dyspnea, BLE edema.  [facility].  Multiple changes to diuretic therapy and antihypertensives at that time.  -Acute on chronic HFpEF with recent echo 6/10/2024: Normal EF, severe septal hypertrophy, grade 2 diastolic dysfunction with no significant valvular abnormalities.  -PAF with remote ablation and [employer] pacemaker.    -Recent discontinuation of anticoagulation secondary to GIB  -CKD stage III  -Cirrhosis status post recent paracentesis  -Anemia of chronic disease  -History of compliance issues secondary to insurance coverage.          Plan:  -Admitted to hospitalist services, appreciate their assistance.  -Continue with IV Lasix and metolazone.  Trend daily labs.  -Asked [employer] rep to come and interrogate device.  NOAC currently on hold.  -Bilateral lower extremity venous duplex to rule out DVT.  -Cardiology will continue to follow.        I discussed the patient's findings  and my recommendations with the patient, any present family members, and the nursing staff.  Lloyd Craig MD saw and examined patient, verified hx and PE, read all radiographic studies, reviewed labs and micro data, and formulated dx, plan for treatment and all medical decision making.      Dariana Miranda PA-C  06/24/24, 09:14 EDT                      Electronically signed by Lloyd Craig MD at 06/24/24 1309       Consult Notes (last 48 hours)  Notes from 06/23/24 1253 through 06/25/24 1253   No notes of this type exist for this encounter.

## 2024-06-25 NOTE — PLAN OF CARE
Goal Outcome Evaluation:  Plan of Care Reviewed With: patient        Progress: improving  Outcome Evaluation: Pt increased ambulation distance this date and reports less edema in BLE making ambulation slightly easier but still not baseline. Pt continues to present with generalized weakness, minor balance deficits, limited activity tolerance, and RODRIGUEZ warranting continued skilled therapy to facilitate return to baseline. Recommend home with assist and HHPT following d/c.      Anticipated Discharge Disposition (PT): inpatient rehabilitation facility

## 2024-06-25 NOTE — THERAPY TREATMENT NOTE
Patient Name: Jaycob Ndiaye II  : 1960    MRN: [id number]                              Today's Date: 2024       Admit Date: 2024    Visit Dx:     ICD-10-CM ICD-9-CM   1. Peripheral edema  R60.0 782.3   2. Congestive heart failure, unspecified HF chronicity, unspecified heart failure type  I50.9 428.0   3. Hypokalemia  E87.6 276.8     Patient Active Problem List   Diagnosis    Screen for colon cancer    Acute on chronic heart failure with preserved ejection fraction (HFpEF)    Elevated serum creatinine    Anemia    Hypokalemia    Cirrhosis of liver    HCV (hepatitis C virus)    Acute on chronic HFrEF (heart failure with reduced ejection fraction)     Past Medical History:   Diagnosis Date    Arthritis     Cancer     Constipation     Depression     Diabetes     type 2 dm, check blood sugar once a day    History of hepatitis C     History of prostate cancer     Hypertension     Irregular heartbeat     Pacemaker     home monitoring    Prostate CA     Requires supplemental oxygen     at night with cpap    Sleep apnea     wears a cpap    SOB (shortness of breath) on exertion     Wears glasses      Past Surgical History:   Procedure Laterality Date    CARDIAC ABLATION      COLONOSCOPY N/A 2021    Procedure: Colonoscopy with polypectomy;  Surgeon: Maurice Proctor MD;  Location: [facility] ENDOSCOPY;  Service: Gastroenterology;  Laterality: N/A;    COLONOSCOPY      ENDOSCOPY      KNEE SURGERY Right     1985    PACEMAKER IMPLANTATION      PROSTATE SURGERY      REPLACEMENT TOTAL KNEE Right       General Information       Row Name 24 1059          Physical Therapy Time and Intention    Document Type therapy note (daily note)  -ND     Mode of Treatment physical therapy  -ND       Row Name 24 1051          General Information    Patient Profile Reviewed yes  -ND     Existing Precautions/Restrictions fall;other (see comments)  BLE edema.  -ND     Barriers to Rehab medically  complex;previous functional deficit  -ND       Row Name 06/25/24 1059          Cognition    Orientation Status (Cognition) oriented x 3  -ND       Row Name 06/25/24 1059          Safety Issues, Functional Mobility    Safety Issues Affecting Function (Mobility) awareness of need for assistance;insight into deficits/self-awareness;safety precaution awareness;safety precautions follow-through/compliance  -ND     Impairments Affecting Function (Mobility) balance;endurance/activity tolerance;shortness of breath;strength;range of motion (ROM)  -ND               User Key  (r) = Recorded By, (t) = Taken By, (c) = Cosigned By      Initials Name Provider Type    ND Maryjane Das, PT Physical Therapist                   Mobility       Row Name 06/25/24 1101          Bed Mobility    Comment, (Bed Mobility) Pt sitting UIC upon PT arrival.  -ND       Row Name 06/25/24 1101          Sit-Stand Transfer    Sit-Stand [place] (Transfers) standby assist  -ND     Assistive Device (Sit-Stand Transfers) walker, front-wheeled  -ND     Comment, (Sit-Stand Transfer) x1 from chair, x1 from bench  -ND       Row Name 06/25/24 1101          Gait/Stairs (Locomotion)    [place] Level (Gait) contact guard;verbal cues  -ND     Assistive Device (Gait) walker, front-wheeled  -ND     Distance in Feet (Gait) 60  + 95'  -ND     Deviations/Abnormal Patterns (Gait) bilateral deviations;base of support, wide;gait speed decreased;stride length decreased  -ND     Bilateral Gait Deviations forward flexed posture  -ND     Comment, (Gait/Stairs) Pt ambulates 60' takes an extended seated rest break and ambulates another 95'. Pt denies RODRIGUEZ with activity but onset occurs once pt is seated. Following long ambulation bout O2 was at 99% on room air despite pt reports of RODRIGUEZ that takes ~2 min before it subsides. PT suggested trailing stairs but pt declines due to heaviness of BLE.  -ND               User Key  (r) = Recorded By, (t) = Taken By, (c) =  Cosigned By      Initials Name Provider Type    Maryjane Rubin PT Physical Therapist                   Obj/Interventions       Row Name 06/25/24 1105          Motor Skills    Therapeutic Exercise other (see comments)  Declined during session but agreeable to perform independently t/o day.  -ND       Row Name 06/25/24 1105          Balance    Balance Assessment sitting static balance;sitting dynamic balance;sit to stand dynamic balance;standing static balance;standing dynamic balance  -ND     Static Sitting Balance standby assist  -ND     Dynamic Sitting Balance standby assist  -ND     Position, Sitting Balance unsupported;sitting in chair  -ND     Sit to Stand Dynamic Balance standby assist  -ND     Static Standing Balance standby assist  -ND     Dynamic Standing Balance standby assist  -ND     Position/Device Used, Standing Balance supported;walker, front-wheeled  -ND     Balance Interventions sitting;standing;sit to stand;supported;static;dynamic  -ND               User Key  (r) = Recorded By, (t) = Taken By, (c) = Cosigned By      Initials Name Provider Type    Maryjane Rubin PT Physical Therapist                   Goals/Plan    No documentation.                  Clinical Impression       Row Name 06/25/24 1106          Pain    Pretreatment Pain Rating 0/10 - no pain  -ND     Posttreatment Pain Rating 0/10 - no pain  -ND       Row Name 06/25/24 1106          Plan of Care Review    Plan of Care Reviewed With patient  -ND     Progress improving  -ND     Outcome Evaluation Pt increased ambulation distance this date and reports less edema in BLE making ambulation slightly easier but still not baseline. Pt continues to present with generalized weakness, minor balance deficits, limited activity tolerance, and RODRIGUEZ warranting continued skilled therapy to facilitate return to baseline. Recommend home with assist and HHPT following d/c.  -ND       Row Name 06/25/24 1106          Vital Signs    Pre Systolic  BP Rehab 104  -ND     Pre Treatment Diastolic BP 55  -ND     Post Systolic BP Rehab 115  -ND     Post Treatment Diastolic BP 66  -ND     Pretreatment Heart Rate (beats/min) 72  -ND     Posttreatment Heart Rate (beats/min) 73  -ND     Pre SpO2 (%) 98  -ND     O2 Delivery Pre Treatment room air  -ND     O2 Delivery Intra Treatment room air  -ND     Post SpO2 (%) 99  -ND     O2 Delivery Post Treatment room air  -ND     Pre Patient Position Sitting  -ND     Intra Patient Position Standing  -ND     Post Patient Position Sitting  -ND       Row Name 06/25/24 1106          Positioning and Restraints    Pre-Treatment Position sitting in chair/recliner  -ND     Post Treatment Position chair  -ND     In Chair notified nsg;sitting;call light within reach;encouraged to call for assist;waffle cushion  Exit alarm unchanged.  -ND               User Key  (r) = Recorded By, (t) = Taken By, (c) = Cosigned By      Initials Name Provider Type    Maryjane Rubin, AR Physical Therapist                   Outcome Measures       Row Name 06/25/24 1109          How much help from another person do you currently need...    Turning from your back to your side while in flat bed without using bedrails? 4  -ND     Moving from lying on back to sitting on the side of a flat bed without bedrails? 3  -ND     Moving to and from a bed to a chair (including a wheelchair)? 3  -ND     Standing up from a chair using your arms (e.g., wheelchair, bedside chair)? 3  -ND     Climbing 3-5 steps with a railing? 3  -ND     To walk in hospital room? 3  -ND     AM-PAC 6 Clicks Score (PT) 19  -ND     Highest Level of Mobility Goal 6 --> Walk 10 steps or more  -ND       Row Name 06/25/24 1109          Functional Assessment    Outcome Measure Options AM-PAC 6 Clicks Basic Mobility (PT)  -ND               User Key  (r) = Recorded By, (t) = Taken By, (c) = Cosigned By      Initials Name Provider Type    Maryjane Rubin PT Physical Therapist                                  Physical Therapy Education       Title: PT OT SLP Therapies (In Progress)       Topic: Physical Therapy (In Progress)       Point: Mobility training (Done)       Learning Progress Summary             Patient Acceptance, E, VU by ND at 6/25/2024 1109    Acceptance, E, VU by ND at 6/22/2024 1006                         Point: Home exercise program (Not Started)       Learner Progress:  Not documented in this visit.              Point: Body mechanics (Done)       Learning Progress Summary             Patient Acceptance, E, VU by ND at 6/25/2024 1109    Acceptance, E, VU by ND at 6/22/2024 1006                         Point: Precautions (Done)       Learning Progress Summary             Patient Acceptance, E, VU by ND at 6/25/2024 1109    Acceptance, E, VU by ND at 6/22/2024 1006                                         User Key       Initials Effective Dates Name Provider Type Discipline    ND 11/16/23 -  Maryjane Das PT Physical Therapist PT                  PT Recommendation and Plan  Planned Therapy Interventions (PT): balance training, bed mobility training, gait training, home exercise program, patient/family education, transfer training, ROM (range of motion), strengthening, stair training  Plan of Care Reviewed With: patient  Progress: improving  Outcome Evaluation: Pt increased ambulation distance this date and reports less edema in BLE making ambulation slightly easier but still not baseline. Pt continues to present with generalized weakness, minor balance deficits, limited activity tolerance, and RODRIGUEZ warranting continued skilled therapy to facilitate return to baseline. Recommend home with assist and HHPT following d/c.     Time Calculation:   PT Evaluation Complexity  History, PT Evaluation Complexity: 3 or more personal factors and/or comorbidities  Examination of Body Systems (PT Eval Complexity): total of 4 or more elements  Clinical Presentation (PT Evaluation Complexity):  evolving  Clinical Decision Making (PT Evaluation Complexity): moderate complexity  Overall Complexity (PT Evaluation Complexity): moderate complexity     PT Charges       Row Name 06/25/24 1110             Time Calculation    Start Time 1022  -ND      PT Received On 06/25/24  -ND         Timed Charges    84313 - PT Therapeutic Activity Minutes 28  -ND         Total Minutes    Timed Charges Total Minutes 28  -ND       Total Minutes 28  -ND                User Key  (r) = Recorded By, (t) = Taken By, (c) = Cosigned By      Initials Name Provider Type    ND Maryjane Das, PT Physical Therapist                  Therapy Charges for Today       Code Description Service Date Service Provider Modifiers Qty    83296789830 HC PT THERAPEUTIC ACT EA 15 MIN 6/25/2024 Maryjane Das, PT GP 2            PT G-Codes  Outcome Measure Options: AM-PAC 6 Clicks Basic Mobility (PT)  AM-PAC 6 Clicks Score (PT): 19  AM-PAC 6 Clicks Score (OT): 17  PT Discharge Summary  Anticipated Discharge Disposition (PT): inpatient rehabilitation facility    Maryjane Das PT  6/25/2024

## 2024-06-25 NOTE — PROGRESS NOTES
" [facility] - Progress Note    Jaycob Ndiaye II  1960  [id number]    06/25/24, 08:32 EDT      Chief Complaint: Following for acute on chronic HFpEF, PAF    Subjective:   No new overnight events.  Vital stable, maintaining sats on room air.  Venous duplex negative.  Up in room to use restroom this morning. Breathing is stable.  Edema has not improved much.  Still very constipated.        Review of Systems:  Pertinent positives are listed above and in physical exam.  All others have been reviewed and are negative.    ferrous sulfate, 325 mg, Oral, Daily With Breakfast  furosemide, 80 mg, Intravenous, BID  heparin (porcine), 5,000 Units, Subcutaneous, Q8H  insulin lispro, 2-7 Units, Subcutaneous, 4x Daily AC & at Bedtime  ipratropium-albuterol, 3 mL, Nebulization, 4x Daily - RT  metOLazone, 5 mg, Oral, Daily  pantoprazole, 40 mg, Oral, Q AM  pharmacy consult - MTM, , Does not apply, Daily  senna-docusate sodium, 2 tablet, Oral, BID   And  polyethylene glycol, 17 g, Oral, BID  sodium chloride, 10 mL, Intravenous, Q12H        Objective:  Vitals:   height is 185.4 cm (73\") and weight is 156 kg (343 lb) (abnormal). His oral temperature is 97.6 °F (36.4 °C). His blood pressure is 110/64 and his pulse is 77. His respiration is 18 and oxygen saturation is 95%.   No intake or output data in the 24 hours ending 06/25/24 0832      Physical Exam:  General:  WN, NAD, A and O x3.  CV: Irregular. No murmur, rub, or gallop.  Resp:  CTA Tomi, equal, nonlabored.  Abd:  Soft, + BS, no organomegaly. Nontender to palpation.  Obese  Extrem: 2-3+ edema BLE, 2+ pedal/PT pulses.            Results from last 7 days   Lab Units 06/25/24  0754   WBC 10*3/mm3 8.77   HEMOGLOBIN g/dL 8.7*   HEMATOCRIT % 27.2*   PLATELETS 10*3/mm3 202     Results from last 7 days   Lab Units 06/23/24  2114 06/23/24  0500 06/22/24  1756 06/22/24  0203   SODIUM mmol/L  --  133*  --  135*   POTASSIUM mmol/L 3.7 3.5   < > 3.4*   CHLORIDE mmol/L  " --  94*  --  96*   CO2 mmol/L  --  26.0  --  27.0   BUN mg/dL  --  66*  --  63*   CREATININE mg/dL  --  2.11*  --  2.12*   CALCIUM mg/dL  --  9.2  --  9.0   BILIRUBIN mg/dL  --   --   --  0.6   ALK PHOS U/L  --   --   --  74   ALT (SGPT) U/L  --   --   --  9   AST (SGOT) U/L  --   --   --  13   GLUCOSE mg/dL  --  146*  --  116*    < > = values in this interval not displayed.                 Results from last 7 days   Lab Units 06/21/24  1430   PROBNP pg/mL 4,183.0*       Tele: Atrial fibrillation    Assessment:  -64-year-old CM with known HFpEF presents to [facility] ED with progressive dyspnea, BLE edema.  [facility] issues.  Multiple changes to diuretic therapy and antihypertensives at that time.  -Acute on chronic HFpEF with recent echo 6/10/2024: Normal EF, severe septal hypertrophy, grade 2 diastolic dysfunction with no significant valvular abnormalities.  -PAF with remote ablation and [employer] pacemaker.    -Recent discontinuation of anticoagulation secondary to GIB  -CKD stage III  -Cirrhosis status post recent paracentesis  -Anemia of chronic disease  -History of compliance issues secondary to insurance coverage.          Plan:  -Admitted to hospitalist services, appreciate their assistance.  -Continue with IV Lasix and daily metolazone.  Trend daily labs, a.m. labs currently pending.  Consider nephrology consult, defer to primary service.  -NOAC on hold.  Worsening anemia.  [facility] rep interrogated device 624, normal interrogation.  -Bilateral lower extremity venous duplex negative  -Cardiology will continue to follow.        I discussed the patient's findings and my recommendations with the patient, any present family members, and the nursing staff.  Lloyd Craig MD saw and examined patient, verified hx and PE, read all radiographic studies, reviewed labs and micro data, and formulated dx, plan for treatment and all medical decision making.      Dariana Miranda,  SHANTELL  06/25/24, 08:34 EDT

## 2024-06-25 NOTE — PROGRESS NOTES
"    [facility] Medicine Services  PROGRESS NOTE    Patient Name: Jaycob Ndiaye II  : 1960  MRN: [id number]    Date of Admission: 2024  Primary Care Physician: Lorena Chamberlain APRN    Subjective   Subjective     CC:  Heart failure    HPI:  Feels \"bloated\" in his legs and belly.  Been contsipated per nursing.  Given multiple meds for constipation but refused suppository or enema.       Objective   Objective     Vital Signs:   Temp:  [97.5 °F (36.4 °C)-98.1 °F (36.7 °C)] 97.5 °F (36.4 °C)  Heart Rate:  [69-82] 73  Resp:  [16-20] 18  BP: (103-118)/(55-84) 118/72     Physical Exam:  Constitutional: No acute distress, awake, alert, sitting up in chair, morbidly obese  HENT: NCAT, mucous membranes moist  Respiratory: Clear to auscultation bilaterally, respiratory effort normal   Cardiovascular: RRR, no murmurs, rubs, or gallops  Gastrointestinal: Positive bowel sounds, soft, nontender, nondistended  Musculoskeletal: venous stasis changes distal bilateral LE's with 3+pitting edema bilateral LE's  Psychiatric: Appropriate affect, cooperative  Neurologic: Oriented x 3, strength symmetric in all extremities, Cranial Nerves grossly intact to confrontation, speech clear  Skin: No rashes        Results Reviewed:  LAB RESULTS:      Lab 24  0754 24  0500 24  0203 24  1430   WBC 8.77 9.32 10.18 11.72*   HEMOGLOBIN 8.7* 9.2* 8.8* 9.2*   HEMATOCRIT 27.2* 28.7* 27.6* 28.5*   PLATELETS 202 233 215 217   NEUTROS ABS  --   --  8.04* 9.39*   IMMATURE GRANS (ABS)  --   --  0.07* 0.16*   LYMPHS ABS  --   --  0.94 1.02   MONOS ABS  --   --  1.01* 1.06*   EOS ABS  --   --  0.07 0.03   MCV 96.1 96.0 94.5 93.8         Lab 24  0754 24  2114 24  0500 24  1756 24  0203 24  1430   SODIUM 135*  --  133*  --  135* 134*   POTASSIUM 3.4* 3.7 3.5 3.7 3.4* 3.2*   CHLORIDE 93*  --  94*  --  96* 93*   CO2 27.0  --  26.0  --  27.0 27.0 "   ANION GAP 15.0  --  13.0  --  12.0 14.0   BUN 66*  --  66*  --  63* 62*   CREATININE 1.98*  --  2.11*  --  2.12* 2.11*   EGFR 36.8*  --  34.1*  --  33.9* 34.1*   GLUCOSE 132*  --  146*  --  116* 151*   CALCIUM 9.2  --  9.2  --  9.0 8.7   MAGNESIUM  --   --   --   --  2.1  --    HEMOGLOBIN A1C  --   --   --   --  6.00*  --          Lab 06/22/24  0203 06/21/24  1430   TOTAL PROTEIN 5.8* 6.2   ALBUMIN 3.5 3.6   GLOBULIN 2.3 2.6   ALT (SGPT) 9 9   AST (SGOT) 13 14   BILIRUBIN 0.6 0.6   ALK PHOS 74 78         Lab 06/21/24  1627 06/21/24  1430   PROBNP  --  4,183.0*   HSTROP T 131* 137*             Lab 06/22/24  0203   IRON 30*   IRON SATURATION (TSAT) 10*   TIBC 301   TRANSFERRIN 202   FERRITIN 88.02   FOLATE 8.26   VITAMIN B 12 1,127*         Brief Urine Lab Results       None            Microbiology Results Abnormal       None            Duplex Venous Lower Extremity - Bilateral CAR    Result Date: 6/24/2024    Normal bilateral lower extremity venous duplex scan.          Current medications:  Scheduled Meds:ferrous sulfate, 325 mg, Oral, Daily With Breakfast  furosemide, 80 mg, Intravenous, BID  heparin (porcine), 5,000 Units, Subcutaneous, Q8H  insulin lispro, 2-7 Units, Subcutaneous, 4x Daily AC & at Bedtime  ipratropium-albuterol, 3 mL, Nebulization, 4x Daily - RT  metOLazone, 5 mg, Oral, Daily  pantoprazole, 40 mg, Oral, Q AM  pharmacy consult - MTM, , Does not apply, Daily  senna-docusate sodium, 2 tablet, Oral, BID   And  polyethylene glycol, 17 g, Oral, BID  potassium chloride ER, 40 mEq, Oral, Q4H  sodium chloride, 10 mL, Intravenous, Q12H      Continuous Infusions:   PRN Meds:.  acetaminophen **OR** acetaminophen **OR** acetaminophen    Albuterol Sulfate NEB Orderable    senna-docusate sodium **AND** polyethylene glycol **AND** bisacodyl **AND** bisacodyl    Calcium Replacement - Follow Nurse / BPA Driven Protocol    dextrose    dextrose    glucagon (human recombinant)    magnesium hydroxide    Magnesium  Low Dose Replacement - Follow Nurse / BPA Driven Protocol    nitroglycerin    Phosphorus Replacement - Follow Nurse / BPA Driven Protocol    Potassium Replacement - Follow Nurse / BPA Driven Protocol    prochlorperazine    sodium chloride    sodium chloride    sodium chloride    Assessment & Plan   Assessment & Plan     Active Hospital Problems    Diagnosis  POA    **Acute on chronic heart failure with preserved ejection fraction (HFpEF) [I50.33]  Yes    Acute on chronic HFrEF (heart failure with reduced ejection fraction) [I50.23]  Yes    Elevated serum creatinine [R79.89]  Yes    Anemia [D64.9]  Yes    Hypokalemia [E87.6]  Yes    Cirrhosis of liver [K74.60]  Yes    HCV (hepatitis C virus) [B19.20]  Yes      Resolved Hospital Problems   No resolved problems to display.        Brief Hospital Course to date:  Jaycob Ndiaye II is a 64 y.o. male with past medical history significant for CHF, COPD, cirrhosis, prostate cancer, chronic hematuria, GI bleed, HCV, HTN, and HLD who presents to the ED due to worsening shortness of breath and lower extremity edema over the past week.    This patient's problems and plans were partially entered by my partner and updated as appropriate by me 06/25/24.    Assessment/plan:     A/C HFrEF  -Follows with Cardiologist Dr. Craig  -Echo just done at [facility], will defer additional echocardiogram  -Continue 80 mg of IV Lasix twice daily with metalozone  -Strict I&O, daily weights  -Monitor creatinine daily to check creatinine and electrolytes while on IV Lasix.   -Still with significant anasarca and lower extremity edema.  Deferring further diuresis to cardiology service.  .    Significant BLE edema edema  Anasarca  --Initially had leg wraps but asked that they be removed because they were too tight.  Currently off.  --Edema worsening.  Cardiology managing diuretics  -- BLE venous duplex negative for DVT  --Will need to reach out to PT for replacement of leg wraps (may need slightly  larger wrap or looser so that he will continue to wear)      Elevated Creatinine  -Baseline data deficit  -Creat 2.11 on presentation, stable this morning after diuresis  -avoid nephrotoxins as able  -Monitor creatinine with BMP daily.  generally stable for the third day.  Cardiology diuresingsilvestre nephrology consult to help with diuresis     Anemia  Recent GI Bleed  -s/p EGD and colonoscopy at [facility] on 6/13/2024, records requested  -Hgb 9.2 on presentation, trended down slightly to 8.8 this morning  -Continue PPI   -continue PO Iron   -Monitor hemoglobin/hematocrit daily     Hypokalemia  -Replace per protocol     New cirrhosis   HCV  -s/p US abd on 6/10/24 that revealed nodular liver consistent with cirrhosis and moderate ascites  -S/p US guided paracentesis on 6/12/2024 at [facility] with 200 mL of skylar-colored fluid removed.  No fluid was sent to lab.  -Consider repeat abdominal US ordered to evaluate ascites   -Will need referral to establish with [facility] upon discharge per patient request     T2DM with peripheral neuropathy  -holding home Mounjaro, Hg A1c is 6.0  -Continue low dose  SSI for now.  Glucoses stable running 137-209 last 24 hours     PAF  -Xarelto discontinued 6/13 at [facility] d/t persistent hematuria      HTN  -Continue holding home bystolic for now d/t borderline BP      DVT prophylaxis:  Heparin     Expected Discharge Location and Transportation: home once medically improved and cleared by cardiology.  Expected Discharge   Expected Discharge Date: 6/27/2024; Expected Discharge Time:      VTE Prophylaxis:  Pharmacologic VTE prophylaxis orders are present.         AM-PAC 6 Clicks Score (PT): 19 (06/25/24 1109)    CODE STATUS:   Code Status and Medical Interventions:   Ordered at: 06/21/24 3020     Level Of Support Discussed With:    Patient     Code Status (Patient has no pulse and is not breathing):    CPR (Attempt to Resuscitate)     Medical Interventions (Patient has pulse or is breathing):    Full  Support       Malachi Regan MD  06/25/24

## 2024-06-25 NOTE — PLAN OF CARE
Goal Outcome Evaluation:  Plan of Care Reviewed With: patient        Progress: improving  Outcome Evaluation: Pt didnt sleep much over night. rested during the day. Legs are slowly getting better and less swollen. No c/o pain. Given laxatives to help have a bowel movement. VSS

## 2024-06-26 ENCOUNTER — APPOINTMENT (OUTPATIENT)
Dept: ULTRASOUND IMAGING | Facility: HOSPITAL | Age: 64
DRG: 291 | End: 2024-06-26
Payer: MEDICARE

## 2024-06-26 LAB
ALBUMIN SERPL-MCNC: 3.4 G/DL (ref 3.5–5.2)
ANION GAP SERPL CALCULATED.3IONS-SCNC: 14 MMOL/L (ref 5–15)
BUN SERPL-MCNC: 64 MG/DL (ref 8–23)
BUN/CREAT SERPL: 31.8 (ref 7–25)
CALCIUM SPEC-SCNC: 9.3 MG/DL (ref 8.6–10.5)
CHLORIDE SERPL-SCNC: 93 MMOL/L (ref 98–107)
CO2 SERPL-SCNC: 27 MMOL/L (ref 22–29)
CREAT SERPL-MCNC: 2.01 MG/DL (ref 0.76–1.27)
DEPRECATED RDW RBC AUTO: 51.4 FL (ref 37–54)
EGFRCR SERPLBLD CKD-EPI 2021: 36.1 ML/MIN/1.73
ERYTHROCYTE [DISTWIDTH] IN BLOOD BY AUTOMATED COUNT: 14.9 % (ref 12.3–15.4)
GLUCOSE BLDC GLUCOMTR-MCNC: 155 MG/DL (ref 70–130)
GLUCOSE BLDC GLUCOMTR-MCNC: 181 MG/DL (ref 70–130)
GLUCOSE BLDC GLUCOMTR-MCNC: 186 MG/DL (ref 70–130)
GLUCOSE BLDC GLUCOMTR-MCNC: 210 MG/DL (ref 70–130)
GLUCOSE SERPL-MCNC: 113 MG/DL (ref 65–99)
HCT VFR BLD AUTO: 26.5 % (ref 37.5–51)
HGB BLD-MCNC: 8.6 G/DL (ref 13–17.7)
MCH RBC QN AUTO: 30.8 PG (ref 26.6–33)
MCHC RBC AUTO-ENTMCNC: 32.5 G/DL (ref 31.5–35.7)
MCV RBC AUTO: 95 FL (ref 79–97)
PHOSPHATE SERPL-MCNC: 4.7 MG/DL (ref 2.5–4.5)
PLATELET # BLD AUTO: 195 10*3/MM3 (ref 140–450)
PMV BLD AUTO: 10.2 FL (ref 6–12)
POTASSIUM SERPL-SCNC: 3.6 MMOL/L (ref 3.5–5.2)
POTASSIUM SERPL-SCNC: 3.9 MMOL/L (ref 3.5–5.2)
RBC # BLD AUTO: 2.79 10*6/MM3 (ref 4.14–5.8)
SODIUM SERPL-SCNC: 134 MMOL/L (ref 136–145)
SODIUM UR-SCNC: 20 MMOL/L
WBC NRBC COR # BLD AUTO: 7.28 10*3/MM3 (ref 3.4–10.8)

## 2024-06-26 PROCEDURE — 97530 THERAPEUTIC ACTIVITIES: CPT

## 2024-06-26 PROCEDURE — P9047 ALBUMIN (HUMAN), 25%, 50ML: HCPCS | Performed by: INTERNAL MEDICINE

## 2024-06-26 PROCEDURE — 25010000002 FUROSEMIDE PER 20 MG: Performed by: INTERNAL MEDICINE

## 2024-06-26 PROCEDURE — 94799 UNLISTED PULMONARY SVC/PX: CPT

## 2024-06-26 PROCEDURE — 82570 ASSAY OF URINE CREATININE: CPT | Performed by: INTERNAL MEDICINE

## 2024-06-26 PROCEDURE — 63710000001 INSULIN LISPRO (HUMAN) PER 5 UNITS: Performed by: NURSE PRACTITIONER

## 2024-06-26 PROCEDURE — 29581 APPL MULTLAYER CMPRN SYS LEG: CPT

## 2024-06-26 PROCEDURE — 82948 REAGENT STRIP/BLOOD GLUCOSE: CPT

## 2024-06-26 PROCEDURE — 84132 ASSAY OF SERUM POTASSIUM: CPT | Performed by: INTERNAL MEDICINE

## 2024-06-26 PROCEDURE — 85027 COMPLETE CBC AUTOMATED: CPT | Performed by: INTERNAL MEDICINE

## 2024-06-26 PROCEDURE — 80069 RENAL FUNCTION PANEL: CPT | Performed by: INTERNAL MEDICINE

## 2024-06-26 PROCEDURE — 94660 CPAP INITIATION&MGMT: CPT

## 2024-06-26 PROCEDURE — 99232 SBSQ HOSP IP/OBS MODERATE 35: CPT | Performed by: INTERNAL MEDICINE

## 2024-06-26 PROCEDURE — 25010000002 ALBUMIN HUMAN 25% PER 50 ML: Performed by: INTERNAL MEDICINE

## 2024-06-26 PROCEDURE — 76775 US EXAM ABDO BACK WALL LIM: CPT

## 2024-06-26 PROCEDURE — 97535 SELF CARE MNGMENT TRAINING: CPT

## 2024-06-26 PROCEDURE — 84300 ASSAY OF URINE SODIUM: CPT | Performed by: INTERNAL MEDICINE

## 2024-06-26 RX ORDER — POTASSIUM CHLORIDE 20 MEQ/1
40 TABLET, EXTENDED RELEASE ORAL EVERY 4 HOURS
Qty: 4 TABLET | Refills: 0 | Status: COMPLETED | OUTPATIENT
Start: 2024-06-26 | End: 2024-06-26

## 2024-06-26 RX ORDER — SPIRONOLACTONE 25 MG/1
25 TABLET ORAL DAILY
Status: DISCONTINUED | OUTPATIENT
Start: 2024-06-26 | End: 2024-07-01

## 2024-06-26 RX ORDER — ALBUMIN (HUMAN) 12.5 G/50ML
25 SOLUTION INTRAVENOUS 2 TIMES DAILY
Status: DISCONTINUED | OUTPATIENT
Start: 2024-06-26 | End: 2024-06-27

## 2024-06-26 RX ADMIN — INSULIN LISPRO 3 UNITS: 100 INJECTION, SOLUTION INTRAVENOUS; SUBCUTANEOUS at 12:43

## 2024-06-26 RX ADMIN — IPRATROPIUM BROMIDE AND ALBUTEROL SULFATE 3 ML: 2.5; .5 SOLUTION RESPIRATORY (INHALATION) at 13:36

## 2024-06-26 RX ADMIN — PANTOPRAZOLE SODIUM 40 MG: 40 TABLET, DELAYED RELEASE ORAL at 06:41

## 2024-06-26 RX ADMIN — POLYETHYLENE GLYCOL 3350 17 G: 17 POWDER, FOR SOLUTION ORAL at 08:33

## 2024-06-26 RX ADMIN — IPRATROPIUM BROMIDE AND ALBUTEROL SULFATE 3 ML: 2.5; .5 SOLUTION RESPIRATORY (INHALATION) at 19:35

## 2024-06-26 RX ADMIN — ALBUMIN (HUMAN) 25 G: 0.25 INJECTION, SOLUTION INTRAVENOUS at 21:07

## 2024-06-26 RX ADMIN — Medication 10 ML: at 21:11

## 2024-06-26 RX ADMIN — Medication 10 ML: at 12:44

## 2024-06-26 RX ADMIN — IPRATROPIUM BROMIDE AND ALBUTEROL SULFATE 3 ML: 2.5; .5 SOLUTION RESPIRATORY (INHALATION) at 16:28

## 2024-06-26 RX ADMIN — FUROSEMIDE 80 MG: 10 INJECTION, SOLUTION INTRAMUSCULAR; INTRAVENOUS at 08:35

## 2024-06-26 RX ADMIN — SENNOSIDES AND DOCUSATE SODIUM 2 TABLET: 50; 8.6 TABLET ORAL at 21:06

## 2024-06-26 RX ADMIN — INSULIN LISPRO 2 UNITS: 100 INJECTION, SOLUTION INTRAVENOUS; SUBCUTANEOUS at 21:06

## 2024-06-26 RX ADMIN — POTASSIUM CHLORIDE 40 MEQ: 1500 TABLET, EXTENDED RELEASE ORAL at 12:44

## 2024-06-26 RX ADMIN — METOLAZONE 5 MG: 5 TABLET ORAL at 08:33

## 2024-06-26 RX ADMIN — FERROUS SULFATE TAB 325 MG (65 MG ELEMENTAL FE) 325 MG: 325 (65 FE) TAB at 08:33

## 2024-06-26 RX ADMIN — POLYETHYLENE GLYCOL 3350 17 G: 17 POWDER, FOR SOLUTION ORAL at 21:06

## 2024-06-26 RX ADMIN — SPIRONOLACTONE 25 MG: 25 TABLET ORAL at 14:22

## 2024-06-26 RX ADMIN — INSULIN LISPRO 2 UNITS: 100 INJECTION, SOLUTION INTRAVENOUS; SUBCUTANEOUS at 08:33

## 2024-06-26 RX ADMIN — SENNOSIDES AND DOCUSATE SODIUM 2 TABLET: 50; 8.6 TABLET ORAL at 08:33

## 2024-06-26 RX ADMIN — POTASSIUM CHLORIDE 40 MEQ: 1500 TABLET, EXTENDED RELEASE ORAL at 08:33

## 2024-06-26 RX ADMIN — INSULIN LISPRO 2 UNITS: 100 INJECTION, SOLUTION INTRAVENOUS; SUBCUTANEOUS at 17:32

## 2024-06-26 NOTE — CONSULTS
Patient Care Team:  Lorena Chamberlain APRN as PCP - General (Nurse Practitioner)  Lloyd Craig MD as Consulting Physician (Cardiology)    Chief complaint: Generalize edema    History of Present Illness: Jaycob Ndiaye II is a 64 y.o. male with past medical history significant for CHF, COPD, cirrhosis, prostate cancer, chronic hematuria, GI bleed, HCV, HTN, and HLD who presents to the ED due to worsening shortness of breath and lower extremity edema over the past week. Patient was recently admitted at [facility] with hematuria and possible GI bleed. Underwent EGD on that admission. Nephrology has been consulted for evaluation and management of abnormal renal function and volume issues. Patient has no prior knowledge of low kidney function. He has hx of DM and HTN. Densie NSAID use. No family hx of ESRD. Per review of old records . Patient's serum cr ~ 1.6mg/dl during admission at [facility]. Cr peaked at 1.9mg/dl during that admission. Previous baseline cr ~ 1.1 in 2023. Cr on this admit  2.1mg/dl GFR 36. No UA.    Review of Systems   Constitutional: Negative.    HENT: Negative.     Cardiovascular:  Positive for leg swelling.   Gastrointestinal:  Positive for abdominal distention. Negative for abdominal pain.   Genitourinary:  Positive for hematuria.   Musculoskeletal: Negative.    Neurological: Negative.    Hematological: Negative.    Psychiatric/Behavioral: Negative.          Past Medical History:   Diagnosis Date    Arthritis     Cancer     Constipation     Depression     Diabetes     type 2 dm, check blood sugar once a day    History of hepatitis C     History of prostate cancer     Hypertension     Irregular heartbeat     Pacemaker     home monitoring    Prostate CA     Requires supplemental oxygen     at night with cpap    Sleep apnea     wears a cpap    SOB (shortness of breath) on exertion     Wears glasses    ,   Past Surgical History:   Procedure Laterality Date    CARDIAC ABLATION       COLONOSCOPY N/A 04/28/2021    Procedure: Colonoscopy with polypectomy;  Surgeon: Maurice Proctor MD;  Location: [facility] ENDOSCOPY;  Service: Gastroenterology;  Laterality: N/A;    COLONOSCOPY      ENDOSCOPY      KNEE SURGERY Right     1985    PACEMAKER IMPLANTATION      PROSTATE SURGERY      REPLACEMENT TOTAL KNEE Right    ,   Family History   Problem Relation Age of Onset    Breast cancer Mother     Ovarian cancer Mother     Heart failure Mother     Leukemia Father     Colon cancer Neg Hx     Colon polyps Neg Hx    ,   Social History     Socioeconomic History    Marital status:    Tobacco Use    Smoking status: Former     Types: Pipe, Cigars    Smokeless tobacco: Never   Vaping Use    Vaping status: Former    Quit date: 9/1/2019    Substances: CBD    Devices: Refillable tank   Substance and Sexual Activity    Alcohol use: Yes     Comment: Rarely     Drug use: Never    Sexual activity: Defer     E-cigarette/Vaping    E-cigarette/Vaping Use Former User     Quit Date 9/1/19      E-cigarette/Vaping Substances    Nicotine No     THC No     CBD Yes     Flavoring No      E-cigarette/Vaping Devices    Disposable No     Refillable Tank Yes          ,   Medications Prior to Admission   Medication Sig Dispense Refill Last Dose    Budeson-Glycopyrrol-Formoterol (Breztri Aerosphere) 160-9-4.8 MCG/ACT aerosol inhaler Inhale 2 puffs 2 (Two) Times a Day.   6/21/2024    doxycycline (VIBRAMYICN) 100 MG tablet Take 1 tablet by mouth 2 (Two) Times a Day.   6/21/2024    ferrous gluconate 324 (37.5 Fe) MG tablet tablet Take 1 tablet by mouth Daily With Breakfast.   6/20/2024    furosemide (LASIX) 40 MG tablet Take 1 tablet by mouth 2 (two) times a day.   6/21/2024    linaclotide (Linzess) 72 MCG capsule capsule Take 1 capsule by mouth Daily.   6/20/2024    magnesium oxide (MAG-OX) 400 MG tablet Take 1 tablet by mouth Daily.   6/20/2024    metOLazone (ZAROXOLYN) 5 MG tablet Take 1 tablet by mouth Daily.   6/20/2024     "nebivolol (BYSTOLIC) 5 MG tablet Take 1 tablet by mouth Daily.   6/20/2024    Tirzepatide (MOUNJARO) 10 MG/0.5ML solution pen-injector pen Inject 0.5 mL under the skin into the appropriate area as directed 1 (One) Time Per Week. Patient takes on Fridays 6/21/2024    spironolactone (ALDACTONE) 100 MG tablet Take 1 tablet by mouth Daily. (Patient not taking: Reported on 6/21/2024)   Not Taking   , Scheduled Meds:  ferrous sulfate, 325 mg, Oral, Daily With Breakfast  furosemide, 80 mg, Intravenous, BID  heparin (porcine), 5,000 Units, Subcutaneous, Q8H  insulin lispro, 2-7 Units, Subcutaneous, 4x Daily AC & at Bedtime  ipratropium-albuterol, 3 mL, Nebulization, 4x Daily - RT  metOLazone, 5 mg, Oral, Daily  pantoprazole, 40 mg, Oral, Q AM  pharmacy consult - [place], , Does not apply, Daily  senna-docusate sodium, 2 tablet, Oral, BID   And  polyethylene glycol, 17 g, Oral, BID  potassium chloride ER, 40 mEq, Oral, Q4H  sodium chloride, 10 mL, Intravenous, Q12H   , and Continuous Infusions:       Objective     Vital Signs  Temp:  [97.1 °F (36.2 °C)-98.2 °F (36.8 °C)] 97.1 °F (36.2 °C)  Heart Rate:  [65-83] 81  Resp:  [16-18] 18  BP: ()/(59-72) 113/71    No intake/output data recorded.  No intake/output data recorded.    Physical Exam    Results Review:    I reviewed the patient's new clinical results.    WBC WBC   Date Value Ref Range Status   06/26/2024 7.28 3.40 - 10.80 10*3/mm3 Final   06/25/2024 8.77 3.40 - 10.80 10*3/mm3 Final      HGB Hemoglobin   Date Value Ref Range Status   06/26/2024 8.6 (L) 13.0 - 17.7 g/dL Final   06/25/2024 8.7 (L) 13.0 - 17.7 g/dL Final      HCT Hematocrit   Date Value Ref Range Status   06/26/2024 26.5 (L) 37.5 - 51.0 % Final   06/25/2024 27.2 (L) 37.5 - 51.0 % Final      Platlets No results found for: \"LABPLAT\"   MCV MCV   Date Value Ref Range Status   06/26/2024 95.0 79.0 - 97.0 fL Final   06/25/2024 96.1 79.0 - 97.0 fL Final          Sodium Sodium   Date Value Ref Range Status " "  06/26/2024 134 (L) 136 - 145 mmol/L Final   06/25/2024 135 (L) 136 - 145 mmol/L Final      Potassium Potassium   Date Value Ref Range Status   06/26/2024 3.6 3.5 - 5.2 mmol/L Final     Comment:     Slight hemolysis detected by analyzer. Result may be falsely elevated.   06/25/2024 3.7 3.5 - 5.2 mmol/L Final     Comment:     Slight hemolysis detected by analyzer. Result may be falsely elevated.   06/25/2024 3.4 (L) 3.5 - 5.2 mmol/L Final   06/23/2024 3.7 3.5 - 5.2 mmol/L Final      Chloride Chloride   Date Value Ref Range Status   06/26/2024 93 (L) 98 - 107 mmol/L Final   06/25/2024 93 (L) 98 - 107 mmol/L Final      CO2 CO2   Date Value Ref Range Status   06/26/2024 27.0 22.0 - 29.0 mmol/L Final   06/25/2024 27.0 22.0 - 29.0 mmol/L Final      BUN BUN   Date Value Ref Range Status   06/26/2024 64 (H) 8 - 23 mg/dL Final   06/25/2024 66 (H) 8 - 23 mg/dL Final      Creatinine Creatinine   Date Value Ref Range Status   06/26/2024 2.01 (H) 0.76 - 1.27 mg/dL Final   06/25/2024 1.98 (H) 0.76 - 1.27 mg/dL Final      Calcium Calcium   Date Value Ref Range Status   06/26/2024 9.3 8.6 - 10.5 mg/dL Final   06/25/2024 9.2 8.6 - 10.5 mg/dL Final      PO4 No results found for: \"CAPO4\"   Albumin Albumin   Date Value Ref Range Status   06/26/2024 3.4 (L) 3.5 - 5.2 g/dL Final      Magnesium No results found for: \"MG\"   Uric Acid No results found for: \"URICACID\"         Assessment & Plan       Acute on chronic heart failure with preserved ejection fraction (HFpEF)    Elevated serum creatinine    Anemia    Hypokalemia    Cirrhosis of liver    HCV (hepatitis C virus)    Acute on chronic HFrEF (heart failure with reduced ejection fraction)      Assessment & Plan     Acute kidney disease: Last known stable cr 1.1 in 2023. Cr on recent admission at [facility] few weeks ago 1.6-1.9 mg/dl. Most recent cr 2.0-2.1 GFR 35-36ml/min. UA, Urine lytes pending.     New onset liver cirrhosis: Complications include ascites and LE edema.     Volume status: " Dependent edema b/l + ascites. Getting diuretics    Hypokalemia: In the setting of diuretics    Hyponatremia: Due to total body volume overload.     HFpEF: Dependent edema      Anemia: Recent hx of GI bleed. S/p EGD and colonoscopy.     Recs  Acute kidney disease etiology hemodynamic variation in renal function w diuretic use+ volume overload. HRS can't be entirely excluded.   - Check urine Na, cl and FeNA  - Renal US  - Add albumin 25 gm TID  - Continue diuretics lasix 80 mg BID. Add spironolactone 25 mg daily. Hold metolazone for now  -  Need strict I/O.   - Avoid Nephrotoxic agents. .      I discussed the patients findings and my recommendations with patient and family    William Bartlett MD  06/26/24  12:35 EDT

## 2024-06-26 NOTE — THERAPY TREATMENT NOTE
Patient Name: Jaycob Ndiaye II  : 1960    MRN: [id number]                              Today's Date: 2024       Admit Date: 2024    Visit Dx:     ICD-10-CM ICD-9-CM   1. Peripheral edema  R60.0 782.3   2. Congestive heart failure, unspecified HF chronicity, unspecified heart failure type  I50.9 428.0   3. Hypokalemia  E87.6 276.8     Patient Active Problem List   Diagnosis    Screen for colon cancer    Acute on chronic heart failure with preserved ejection fraction (HFpEF)    Elevated serum creatinine    Anemia    Hypokalemia    Cirrhosis of liver    HCV (hepatitis C virus)    Acute on chronic HFrEF (heart failure with reduced ejection fraction)     Past Medical History:   Diagnosis Date    Arthritis     Cancer     Constipation     Depression     Diabetes     type 2 dm, check blood sugar once a day    History of hepatitis C     History of prostate cancer     Hypertension     Irregular heartbeat     Pacemaker     home monitoring    Prostate CA     Requires supplemental oxygen     at night with cpap    Sleep apnea     wears a cpap    SOB (shortness of breath) on exertion     Wears glasses      Past Surgical History:   Procedure Laterality Date    CARDIAC ABLATION      COLONOSCOPY N/A 2021    Procedure: Colonoscopy with polypectomy;  Surgeon: Maurice Proctor MD;  Location: [facility] ENDOSCOPY;  Service: Gastroenterology;  Laterality: N/A;    COLONOSCOPY      ENDOSCOPY      KNEE SURGERY Right     1985    PACEMAKER IMPLANTATION      PROSTATE SURGERY      REPLACEMENT TOTAL KNEE Right       General Information       Row Name 24          OT Time and Intention    Document Type therapy note (daily note)  -KF     Mode of Treatment occupational therapy;individual therapy  -KF       Row Name 24          General Information    Patient Profile Reviewed yes  -KF     Existing Precautions/Restrictions fall;other (see comments)  BLE edema  -KF     Barriers to Rehab medically  complex;previous functional deficit  -       Row Name 06/26/24 0830          Cognition    Orientation Status (Cognition) oriented x 3  -       Row Name 06/26/24 0830          Safety Issues, Functional Mobility    Safety Issues Affecting Function (Mobility) awareness of need for assistance;insight into deficits/self-awareness;safety precaution awareness;safety precautions follow-through/compliance;sequencing abilities  -     Impairments Affecting Function (Mobility) balance;endurance/activity tolerance;shortness of breath;strength;range of motion (ROM)  -               User Key  (r) = Recorded By, (t) = Taken By, (c) = Cosigned By      Initials Name Provider Type     Sho Workman, OT Occupational Therapist                     Mobility/ADL's       Row Name 06/26/24 0831          Bed Mobility    Comment, (Bed Mobility) Pt found and left up in the chair.  -       Row Name 06/26/24 0831          Transfers    Transfers sit-stand transfer;stand-sit transfer  -     Comment, (Transfers) Pt with good safety awareness during stands.  -       Row Name 06/26/24 0831          Sit-Stand Transfer    Sit-Stand [place] (Transfers) standby assist  -     Assistive Device (Sit-Stand Transfers) walker, front-wheeled  -       Row Name 06/26/24 0831          Stand-Sit Transfer    Stand-Sit [place] (Transfers) standby assist  -     Assistive Device (Stand-Sit Transfers) walker, front-wheeled  -[facility] Name 06/26/24 0831          Functional Mobility    Functional Mobility- Ind. Level contact guard assist  -     Functional Mobility- Device walker, front-wheeled  -     Functional Mobility-Distance (Feet) --  HH distance  -     Functional Mobility- Comment Pt ambulated a short household distance using a RWx with CGA. Pt presents with a forward flexed posture and wide base of support during ambulation. Pt reported increased SOA and BLE fatigue with mobility requiring an extended seated rest break. O2  monitored and remained >95% on RA.  -       Row Name 06/26/24 0831          Activities of Daily Living    BADL Assessment/Intervention upper body dressing;lower body dressing;grooming;feeding  -       Row Name 06/26/24 0831          Lower Body Dressing Assessment/Training    [place] Level (Lower Body Dressing) don;shoes/slippers;dependent (less than 25% patient effort)  -KF     Position (Lower Body Dressing) unsupported sitting  -KF     Comment, (Lower Body Dressing) Pt requiring increased assistance during LBD secondary to BLE edema.  -       Row Name 06/26/24 0831          Self-Feeding Assessment/Training    [place] Level (Feeding) liquids to mouth;independent  -KF     Position (Self-Feeding) unsupported sitting  -KF       Row Name 06/26/24 0831          Grooming Assessment/Training    [place] Level (Grooming) wash face, hands;oral care regimen  -KF     Oral Care mouth wash rinse  -KF     Position (Grooming) unsupported sitting  -KF     Comment, (Grooming) Deferred sink side ADLs secondary to fatigue following HH ambulation.  -       Row Name 06/26/24 0831          Upper Body Dressing Assessment/Training    [place] Level (Upper Body Dressing) don;doff;front opening garment;standby assist  -KF     Position (Upper Body Dressing) unsupported sitting  -KF               User Key  (r) = Recorded By, (t) = Taken By, (c) = Cosigned By      Initials Name Provider Type    Sho Ugarte OT Occupational Therapist                   Obj/Interventions       Row Name 06/26/24 0840          Balance    Balance Assessment sitting static balance;sitting dynamic balance;sit to stand dynamic balance;standing static balance;standing dynamic balance  -KF     Static Sitting Balance supervision  -KF     Dynamic Sitting Balance standby assist  -KF     Position, Sitting Balance unsupported;sitting in chair  -KF     Sit to Stand Dynamic Balance standby assist  -KF     Static Standing Balance standby assist   -KF     Dynamic Standing Balance contact guard  -KF     Position/Device Used, Standing Balance supported;walker, front-wheeled  -KF     Balance Interventions sitting;standing;sit to stand;supported;static;dynamic;occupation based/functional task  -KF               User Key  (r) = Recorded By, (t) = Taken By, (c) = Cosigned By      Initials Name Provider Type    KF Sho Workman, DELFINA Occupational Therapist                   Goals/Plan    No documentation.                  Clinical Impression       Row Name 06/26/24 0841          Pain Assessment    Pretreatment Pain Rating 0/10 - no pain  -KF     Posttreatment Pain Rating 0/10 - no pain  -KF     Pain Intervention(s) Repositioned;Ambulation/increased activity  -KF       Row Name 06/26/24 0841          Plan of Care Review    Plan of Care Reviewed With patient  -KF     Progress improving  -KF     Outcome Evaluation Pt with good participation and progress toward goals during OT session. The pt ambulated a short HH distance using a RWx with CGA, nearing appropriateness for SBA. The pt does fatigue quickly however requiring extended seated rest. Grooming ADLs were performed seated secondary to fatigue. The pt remains below his functional baseline with generalized weakness and decreased activity tolerance warranting continued IP OT services. Continue to recommend a d/c to IRF for best outcome, however will continue to adjust recommendation based on pt's continued progression.  -       Row Name 06/26/24 0841          Therapy Assessment/Plan (OT)    Rehab Potential (OT) good, to achieve stated therapy goals  -     Criteria for Skilled Therapeutic Interventions Met (OT) yes;meets criteria;skilled treatment is necessary  -KF     Therapy Frequency (OT) daily  -       Row Name 06/26/24 0841          Therapy Plan Review/Discharge Plan (OT)    Anticipated Discharge Disposition (OT) inpatient rehabilitation facility  -       Row Name 06/26/24 0841          Vital Signs    Pre  Systolic BP Rehab 105  -KF     Pre Treatment Diastolic BP 67  -KF     Pretreatment Heart Rate (beats/min) 83  -KF     Pre SpO2 (%) 97  -KF     O2 Delivery Pre Treatment room air  -KF     Intra SpO2 (%) 96  -KF     O2 Delivery Intra Treatment room air  -KF     Post SpO2 (%) 99  -KF     O2 Delivery Post Treatment room air  -KF     Pre Patient Position Sitting  -KF     Intra Patient Position Standing  -KF     Post Patient Position Sitting  -KF       Row Name 06/26/24 0841          Positioning and Restraints    Pre-Treatment Position sitting in chair/recliner  -KF     Post Treatment Position chair  -KF     In Chair notified nsg;reclined;call light within reach;encouraged to call for assist  chair alarm unchanged  -KF               User Key  (r) = Recorded By, (t) = Taken By, (c) = Cosigned By      Initials Name Provider Type    Sho Ugarte OT Occupational Therapist                   Outcome Measures       Row Name 06/26/24 0843          How much help from another is currently needed...    Putting on and taking off regular lower body clothing? 1  -KF     Bathing (including washing, rinsing, and drying) 2  -KF     Toileting (which includes using toilet bed pan or urinal) 3  -KF     Putting on and taking off regular upper body clothing 4  -KF     Taking care of personal grooming (such as brushing teeth) 3  -KF     Eating meals 4  -KF     AM-PAC 6 Clicks Score (OT) 17  -KF       Row Name 06/26/24 0843          Functional Assessment    Outcome Measure Options AM-PAC 6 Clicks Daily Activity (OT)  -KF               User Key  (r) = Recorded By, (t) = Taken By, (c) = Cosigned By      Initials Name Provider Type    Sho Ugarte OT Occupational Therapist                    Occupational Therapy Education       Title: PT OT SLP Therapies (In Progress)       Topic: Occupational Therapy (Done)       Point: ADL training (Done)       Description:   Instruct learner(s) on proper safety adaptation and remediation techniques  during self care or transfers.   Instruct in proper use of assistive devices.                  Learning Progress Summary             Patient Acceptance, E,TB, VU,DU by KF at 6/26/2024 0754    Acceptance, TB,E, VU by AF at 6/22/2024 0955                         Point: Home exercise program (Done)       Description:   Instruct learner(s) on appropriate technique for monitoring, assisting and/or progressing therapeutic exercises/activities.                  Learning Progress Summary             Patient Acceptance, TB,E, VU by AF at 6/22/2024 0955                         Point: Precautions (Done)       Description:   Instruct learner(s) on prescribed precautions during self-care and functional transfers.                  Learning Progress Summary             Patient Acceptance, E,TB, VU,DU by KF at 6/26/2024 0754                         Point: Body mechanics (Done)       Description:   Instruct learner(s) on proper positioning and spine alignment during self-care, functional mobility activities and/or exercises.                  Learning Progress Summary             Patient Acceptance, E,TB, VU,DU by  at 6/26/2024 0754    Acceptance, TB,E, VU by AF at 6/22/2024 0955                                         User Key       Initials Effective Dates Name Provider Type Discipline     08/09/23 -  Sho Workman OT Occupational Therapist OT    AF 08/15/23 -  Roberta Corbin OT Occupational Therapist OT                  OT Recommendation and Plan  Therapy Frequency (OT): daily  Plan of Care Review  Plan of Care Reviewed With: patient  Progress: improving  Outcome Evaluation: Pt with good participation and progress toward goals during OT session. The pt ambulated a short HH distance using a RWx with CGA, nearing appropriateness for SBA. The pt does fatigue quickly however requiring extended seated rest. Grooming ADLs were performed seated secondary to fatigue. The pt remains below his functional baseline with generalized  weakness and decreased activity tolerance warranting continued IP OT services. Continue to recommend a d/c to IRF for best outcome, however will continue to adjust recommendation based on pt's continued progression.     Time Calculation:         Time Calculation- OT       Row Name 06/26/24 0845             Time Calculation- OT    OT Start Time 0754  -KF      OT Received On 06/26/24  -KF      OT Goal Re-Cert Due Date 07/02/24  -KF         Timed Charges    96948 - OT Therapeutic Activity Minutes 14  -KF      38743 - OT Self Care/Mgmt Minutes 10  -KF         Total Minutes    Timed Charges Total Minutes 24  -KF       Total Minutes 24  -KF                User Key  (r) = Recorded By, (t) = Taken By, (c) = Cosigned By      Initials Name Provider Type    KF Sho Workman OT Occupational Therapist                  Therapy Charges for Today       Code Description Service Date Service Provider Modifiers Qty    67131090043 HC OT THERAPEUTIC ACT EA 15 MIN 6/26/2024 Sho Workman OT GO 1    78418708146 HC OT SELF CARE/MGMT/TRAIN EA 15 MIN 6/26/2024 Sho Workman OT GO 1                 Sho Workman OT  6/26/2024

## 2024-06-26 NOTE — PLAN OF CARE
Goal Outcome Evaluation: On RA. V-paced rhythm, afib. Up in chair during day.

## 2024-06-26 NOTE — THERAPY WOUND CARE TREATMENT
Acute Care - Wound/Debridement Treatment Note  [facility]     Patient Name: Jaycob Ndiaye II  : 1960  MRN: [id number]  Today's Date: 2024                Admit Date: 2024    Visit Dx:    ICD-10-CM ICD-9-CM   1. Peripheral edema  R60.0 782.3   2. Congestive heart failure, unspecified HF chronicity, unspecified heart failure type  I50.9 428.0   3. Hypokalemia  E87.6 276.8       Patient Active Problem List   Diagnosis    Screen for colon cancer    Acute on chronic heart failure with preserved ejection fraction (HFpEF)    Elevated serum creatinine    Anemia    Hypokalemia    Cirrhosis of liver    HCV (hepatitis C virus)    Acute on chronic HFrEF (heart failure with reduced ejection fraction)        Past Medical History:   Diagnosis Date    Arthritis     Cancer     Constipation     Depression     Diabetes     type 2 dm, check blood sugar once a day    History of hepatitis C     History of prostate cancer     Hypertension     Irregular heartbeat     Pacemaker     home monitoring    Prostate CA     Requires supplemental oxygen     at night with cpap    Sleep apnea     wears a cpap    SOB (shortness of breath) on exertion     Wears glasses         Past Surgical History:   Procedure Laterality Date    CARDIAC ABLATION      COLONOSCOPY N/A 2021    Procedure: Colonoscopy with polypectomy;  Surgeon: Maurice Proctor MD;  Location: [facility] ENDOSCOPY;  Service: Gastroenterology;  Laterality: N/A;    COLONOSCOPY      ENDOSCOPY      KNEE SURGERY Right     1985    PACEMAKER IMPLANTATION      PROSTATE SURGERY      REPLACEMENT TOTAL KNEE Right            Wound 24 1540 Bilateral lower leg Blisters (Active)   Dressing Appearance open to air 24 1224   Closure Open to air 24 1224   Base dry;scab 24 1224      Lymphedema       Row Name 24 1330             Lymphedema Edema Assessment    Ptting Edema Category By severity  -MF      Pitting Edema Severe  -MF          Compression/Skin Care    Compression/Skin Care skin care;wrapping location;bandaging  -      Skin Care washed/dried;lotion applied  -      Wrapping Location lower extremity  -      Wrapping Location LE bilateral:;foot to knee  -      Wrapping Comments size 5/6/8 compressogrips doubled/overlapping for gradient compression  -                User Key  (r) = Recorded By, (t) = Taken By, (c) = Cosigned By      Initials Name Provider Type    MF Lloyd Reagan, PT Physical Therapist                    WOUND DEBRIDEMENT                     PT Assessment (Last 12 Hours)       PT Evaluation and Treatment       Row Name 06/26/24 1330          Physical Therapy Time and Intention    Subjective Information complains of;weakness;fatigue;pain  -     Document Type therapy note (daily note);wound care  -     Mode of Treatment individual therapy;physical therapy  -       Row Name 06/26/24 1330          Pain    Pretreatment Pain Rating 0/10 - no pain  -     Posttreatment Pain Rating 0/10 - no pain  -       Row Name             Wound 06/22/24 1540 Bilateral lower leg Blisters    Wound - Properties Group Placement Date: 06/22/24  - Placement Time: 1540  -JM Side: Bilateral  -JM Orientation: lower  -JM Location: leg  -JM Primary Wound Type: Blisters  -JM    Retired Wound - Properties Group Placement Date: 06/22/24  - Placement Time: 1540  -JM Side: Bilateral  -JM Orientation: lower  -JM Location: leg  -JM Primary Wound Type: Blisters  -JM    Retired Wound - Properties Group Date first assessed: 06/22/24  - Time first assessed: 1540  -JM Side: Bilateral  -JM Location: leg  -JM Primary Wound Type: Blisters  -JM      Row Name 06/26/24 1330          Coping    Observed Emotional State calm;cooperative;pleasant  -     Verbalized Emotional State acceptance  -     Trust Relationship/Rapport care explained  -       Row Name 06/26/24 1330          Plan of Care Review    Plan of Care Reviewed With patient  -      Outcome Evaluation BLE wraps replaced today with slight increase in compression size to help better manage edema and decrease discomfort / pain in LEs. PT will f/u with comrpession wrapping change in 2-3 days.  -       Row Name 06/26/24 1330          Positioning and Restraints    Pre-Treatment Position sitting in chair/recliner  -MF     Post Treatment Position chair  -MF     In Chair reclined;call light within reach  -MF               User Key  (r) = Recorded By, (t) = Taken By, (c) = Cosigned By      Initials Name Provider Type    MF Lloyd Reagan, PT Physical Therapist    Carolina Mccurdy PT Physical Therapist                  Physical Therapy Education       Title: PT OT SLP Therapies (In Progress)       Topic: Physical Therapy (In Progress)       Point: Mobility training (Done)       Learning Progress Summary             Patient Acceptance, E, VU by ND at 6/25/2024 1109    Acceptance, E, VU by ND at 6/22/2024 1006                         Point: Home exercise program (Not Started)       Learner Progress:  Not documented in this visit.              Point: Body mechanics (Done)       Learning Progress Summary             Patient Acceptance, E, VU by ND at 6/25/2024 1109    Acceptance, E, VU by ND at 6/22/2024 1006                         Point: Precautions (Done)       Learning Progress Summary             Patient Acceptance, E, VU by ND at 6/25/2024 1109    Acceptance, E, VU by ND at 6/22/2024 1006                                         User Key       Initials Effective Dates Name Provider Type Discipline    ND 11/16/23 -  Maryjane Das, PT Physical Therapist PT                    Recommendation and Plan  Anticipated Discharge Disposition (PT): inpatient rehabilitation facility  Planned Therapy Interventions (PT): wound care  Therapy Frequency (PT): daily  Plan of Care Reviewed With: patient           Outcome Evaluation: BLE wraps replaced today with slight increase in compression size to help  better manage edema and decrease discomfort / pain in LEs. PT will f/u with comrpession wrapping change in 2-3 days.  Plan of Care Reviewed With: patient            Time Calculation   PT Charges       Row Name 06/26/24 1330             Time Calculation    Start Time 1330  -MF      PT Goal Re-Cert Due Date 07/02/24  -MF         Untimed Charges    88703-[id number] comp below knee 25  -MF         Total Minutes    Untimed Charges Total Minutes 25  -MF       Total Minutes 25  -MF                User Key  (r) = Recorded By, (t) = Taken By, (c) = Cosigned By      Initials Name Provider Type    Lloyd Ngo, PT Physical Therapist                            PT G-Codes  Outcome Measure Options: AM-PAC 6 Clicks Daily Activity (OT)  AM-PAC 6 Clicks Score (PT): 19  AM-PAC 6 Clicks Score (OT): 17       Lloyd Reagan, PT  6/26/2024

## 2024-06-26 NOTE — PLAN OF CARE
Goal Outcome Evaluation:  Plan of Care Reviewed With: patient           Outcome Evaluation: BLE wraps replaced today with slight increase in compression size to help better manage edema and decrease discomfort / pain in LEs. PT will f/u with comrpession wrapping change in 2-3 days.

## 2024-06-26 NOTE — PLAN OF CARE
Goal Outcome Evaluation:  Plan of Care Reviewed With: patient        Progress: improving  Outcome Evaluation: Pt with good participation and progress toward goals during OT session. The pt ambulated a short HH distance using a RWx with CGA, nearing appropriateness for SBA. The pt does fatigue quickly however requiring extended seated rest. Grooming ADLs were performed seated secondary to fatigue. The pt remains below his functional baseline with generalized weakness and decreased activity tolerance warranting continued IP OT services. Continue to recommend a d/c to IRF for best outcome, however will continue to adjust recommendation based on pt's continued progression.      Anticipated Discharge Disposition (OT): inpatient rehabilitation facility

## 2024-06-26 NOTE — PROGRESS NOTES
Jaycob Laura [facility]  1960  N636/1      Cardiology will continue to follow from periphery.  Remains with controlled rates, permanent A-fib.  BP remains marginal.  Patient complains of continued edema despite getting 80 mg IV Lasix twice daily and metolazone 5 mg p.o. daily.  Will ask nephrology to consult with patient for CKD, anasarca and assistance with diuresis.    Vitals:    06/26/24 0833   BP: 105/67   Pulse: 81   Resp:    Temp:    SpO2:      , BUN 64, CR 2.01 (up from 1.98).  WBC 7.2, H GB 8.6, HCT 26.5, .    Dariana Miranda PA-C  Electronically signed by MATTHEW Zuñiga, 06/26/24, 10:14 AM EDT.

## 2024-06-26 NOTE — PROGRESS NOTES
[facility]  PROGRESS NOTE    Patient Name: Jaycob Ndiaye II  : 1960  MRN: [id number]    Date of Admission: 2024  Primary Care Physician: Lorena Chamberlain APRN    Subjective   Subjective     CC:  Heart failure    HPI:  Breathing ok, just can't walk very far.  Started having BM's with mg citrate so now having multiple BM's and belly feels better.  But still LE swelling.         Objective   Objective     Vital Signs:   Temp:  [97.1 °F (36.2 °C)-98.2 °F (36.8 °C)] 97.1 °F (36.2 °C)  Heart Rate:  [65-83] 77  Resp:  [16-18] 18  BP: ()/(59-72) 113/71     Physical Exam:  Constitutional: No acute distress, awake, alert, sitting up in chair, morbidly obese  HENT: NCAT, mucous membranes moist  Respiratory: Clear to auscultation bilaterally, respiratory effort normal   Cardiovascular: RRR, no murmurs, rubs, or gallops  Gastrointestinal: Positive bowel sounds, soft, nontender, nondistended  Musculoskeletal: venous stasis changes distal bilateral LE's with 3+pitting edema bilateral LE's  Psychiatric: Appropriate affect, cooperative  Neurologic: Oriented x 3, strength symmetric in all extremities, Cranial Nerves grossly intact to confrontation, speech clear  Skin: No rashes        Results Reviewed:  LAB RESULTS:      Lab 24  0454 24  0754 24  0500 24  0203 24  1430   WBC 7.28 8.77 9.32 10.18 11.72*   HEMOGLOBIN 8.6* 8.7* 9.2* 8.8* 9.2*   HEMATOCRIT 26.5* 27.2* 28.7* 27.6* 28.5*   PLATELETS 195 202 233 215 217   NEUTROS ABS  --   --   --  8.04* 9.39*   IMMATURE GRANS (ABS)  --   --   --  0.07* 0.16*   LYMPHS ABS  --   --   --  0.94 1.02   MONOS ABS  --   --   --  1.01* 1.06*   EOS ABS  --   --   --  0.07 0.03   MCV 95.0 96.1 96.0 94.5 93.8         Lab 24  0454 24  2115 24  0754 24  2114 24  0500 24  1756 24  0203 24  1430   SODIUM 134*  --  135*  --  133*  --  135* 134*    POTASSIUM 3.6 3.7 3.4* 3.7 3.5   < > 3.4* 3.2*   CHLORIDE 93*  --  93*  --  94*  --  96* 93*   CO2 27.0  --  27.0  --  26.0  --  27.0 27.0   ANION GAP 14.0  --  15.0  --  13.0  --  12.0 14.0   BUN 64*  --  66*  --  66*  --  63* 62*   CREATININE 2.01*  --  1.98*  --  2.11*  --  2.12* 2.11*   EGFR 36.1*  --  36.8*  --  34.1*  --  33.9* 34.1*   GLUCOSE 113*  --  132*  --  146*  --  116* 151*   CALCIUM 9.3  --  9.2  --  9.2  --  9.0 8.7   MAGNESIUM  --   --   --   --   --   --  2.1  --    PHOSPHORUS 4.7*  --   --   --   --   --   --   --    HEMOGLOBIN A1C  --   --   --   --   --   --  6.00*  --     < > = values in this interval not displayed.         Lab 06/26/24  0454 06/22/24  0203 06/21/24  1430   TOTAL PROTEIN  --  5.8* 6.2   ALBUMIN 3.4* 3.5 3.6   GLOBULIN  --  2.3 2.6   ALT (SGPT)  --  9 9   AST (SGOT)  --  13 14   BILIRUBIN  --  0.6 0.6   ALK PHOS  --  74 78         Lab 06/21/24  1627 06/21/24  1430   PROBNP  --  4,183.0*   HSTROP T 131* 137*             Lab 06/22/24  0203   IRON 30*   IRON SATURATION (TSAT) 10*   TIBC 301   TRANSFERRIN 202   FERRITIN 88.02   FOLATE 8.26   VITAMIN B 12 1,127*         Brief Urine Lab Results       None            Microbiology Results Abnormal       None            Duplex Venous Lower Extremity - Bilateral CAR    Result Date: 6/24/2024    Normal bilateral lower extremity venous duplex scan.          Current medications:  Scheduled Meds:albumin human, 25 g, Intravenous, BID  ferrous sulfate, 325 mg, Oral, Daily With Breakfast  furosemide, 80 mg, Intravenous, BID  heparin (porcine), 5,000 Units, Subcutaneous, Q8H  insulin lispro, 2-7 Units, Subcutaneous, 4x Daily AC & at Bedtime  ipratropium-albuterol, 3 mL, Nebulization, 4x Daily - RT  pantoprazole, 40 mg, Oral, Q AM  pharmacy consult - [place], , Does not apply, Daily  senna-docusate sodium, 2 tablet, Oral, BID   And  polyethylene glycol, 17 g, Oral, BID  sodium chloride, 10 mL, Intravenous, Q12H  spironolactone, 25 mg, Oral,  Daily      Continuous Infusions:   PRN Meds:.  acetaminophen **OR** acetaminophen **OR** acetaminophen    Albuterol Sulfate NEB Orderable    senna-docusate sodium **AND** polyethylene glycol **AND** bisacodyl **AND** bisacodyl    Calcium Replacement - Follow Nurse / BPA Driven Protocol    dextrose    dextrose    glucagon (human recombinant)    magnesium hydroxide    Magnesium Low Dose Replacement - Follow Nurse / BPA Driven Protocol    nitroglycerin    Phosphorus Replacement - Follow Nurse / BPA Driven Protocol    Potassium Replacement - Follow Nurse / BPA Driven Protocol    prochlorperazine    sodium chloride    sodium chloride    sodium chloride    Assessment & Plan   Assessment & Plan     Active Hospital Problems    Diagnosis  POA    **Acute on chronic heart failure with preserved ejection fraction (HFpEF) [I50.33]  Yes    Acute on chronic HFrEF (heart failure with reduced ejection fraction) [I50.23]  Yes    Elevated serum creatinine [R79.89]  Yes    Anemia [D64.9]  Yes    Hypokalemia [E87.6]  Yes    Cirrhosis of liver [K74.60]  Yes    HCV (hepatitis C virus) [B19.20]  Yes      Resolved Hospital Problems   No resolved problems to display.        Brief Hospital Course to date:  Jaycob Ndiaye II is a 64 y.o. male with past medical history significant for CHF, COPD, cirrhosis, prostate cancer, chronic hematuria, GI bleed, HCV, HTN, and HLD who presents to the ED due to worsening shortness of breath and lower extremity edema over the past week.    This patient's problems and plans were partially entered by my partner and updated as appropriate by me 06/26/24.    Assessment/plan:     A/C HFrEF  -Follows with Cardiologist Dr. Craig  -Echo just done at [facility], will defer additional echocardiogram  -Continue 80 mg of IV Lasix twice daily with metalozone  -Strict I&O, daily weights  -Monitor creatinine daily to check creatinine and electrolytes while on IV Lasix.   -Still with significant anasarca and lower  extremity edema.  Deferring further diuresis to cardiology service.  .    Significant BLE edema edema  Anasarca  --Initially had leg wraps but asked that they be removed because they were too tight.  Currently off.  --Edema worsening.  Cardiology managing diuretics  -- BLE venous duplex negative for DVT  --Will need to reach out to PT for replacement of leg wraps (may need slightly larger wrap or looser so that he will continue to wear)      Elevated Creatinine  -Baseline data deficit  -Creat 2.11 on presentation, stable this morning after diuresis  -avoid nephrotoxins as able  -Monitor creatinine with BMP daily.  generally stable for the third day.  Cardiology consulted nephrology to help with diuresis  -nephro recs continue lasix 80mg Bid, hold metolazone, and add spironolactone     Anemia  Recent GI Bleed  -s/p EGD and colonoscopy at [facility] on 6/13/2024, records requested  -Hgb fairly stable  -Continue PPI   -continue PO Iron   -Monitor hemoglobin/hematocrit daily     Hypokalemia  -Replace per protocol     New cirrhosis   HCV  -s/p US abd on 6/10/24 that revealed nodular liver consistent with cirrhosis and moderate ascites  -S/p US guided paracentesis on 6/12/2024 at [facility] with 200 mL of skylar-colored fluid removed.  No fluid was sent to lab.  -Consider repeat abdominal US ordered to evaluate ascites   -Will need referral to establish with [facility] upon discharge per patient request     T2DM with peripheral neuropathy  -holding home Mounjaro, Hg A1c is 6.0  -Continue low dose  SSI for now.  Glucoses stable running 137-209 last 24 hours     PAF  -Xarelto discontinued 6/13 at [facility] d/t persistent hematuria      HTN  -Continue holding home bystolic for now d/t borderline BP      DVT prophylaxis:  Heparin     Expected Discharge Location and Transportation: home once medically improved and cleared by cardiology/nephrology.  Expected Discharge   Expected Discharge Date: 6/27/2024; Expected Discharge Time:      VTE  Prophylaxis:  Pharmacologic VTE prophylaxis orders are present.         AM-PAC 6 Clicks Score (PT): 19 (06/25/24 1109)    CODE STATUS:   Code Status and Medical Interventions:   Ordered at: 06/21/24 1840     Level Of Support Discussed With:    Patient     Code Status (Patient has no pulse and is not breathing):    CPR (Attempt to Resuscitate)     Medical Interventions (Patient has pulse or is breathing):    Full Support       Malachi Regan MD  06/26/24

## 2024-06-27 LAB
ANION GAP SERPL CALCULATED.3IONS-SCNC: 14 MMOL/L (ref 5–15)
BUN SERPL-MCNC: 69 MG/DL (ref 8–23)
BUN/CREAT SERPL: 30.8 (ref 7–25)
CALCIUM SPEC-SCNC: 9.2 MG/DL (ref 8.6–10.5)
CHLORIDE SERPL-SCNC: 92 MMOL/L (ref 98–107)
CO2 SERPL-SCNC: 27 MMOL/L (ref 22–29)
CREAT SERPL-MCNC: 2.24 MG/DL (ref 0.76–1.27)
CREAT UR-MCNC: 39.5 MG/DL
DEPRECATED RDW RBC AUTO: 52.8 FL (ref 37–54)
EGFRCR SERPLBLD CKD-EPI 2021: 31.7 ML/MIN/1.73
ERYTHROCYTE [DISTWIDTH] IN BLOOD BY AUTOMATED COUNT: 15.3 % (ref 12.3–15.4)
GLUCOSE BLDC GLUCOMTR-MCNC: 156 MG/DL (ref 70–130)
GLUCOSE BLDC GLUCOMTR-MCNC: 199 MG/DL (ref 70–130)
GLUCOSE BLDC GLUCOMTR-MCNC: 212 MG/DL (ref 70–130)
GLUCOSE SERPL-MCNC: 147 MG/DL (ref 65–99)
HCT VFR BLD AUTO: 27.4 % (ref 37.5–51)
HGB BLD-MCNC: 8.7 G/DL (ref 13–17.7)
MCH RBC QN AUTO: 30.7 PG (ref 26.6–33)
MCHC RBC AUTO-ENTMCNC: 31.8 G/DL (ref 31.5–35.7)
MCV RBC AUTO: 96.8 FL (ref 79–97)
PLATELET # BLD AUTO: 186 10*3/MM3 (ref 140–450)
PMV BLD AUTO: 9.9 FL (ref 6–12)
POTASSIUM SERPL-SCNC: 3.7 MMOL/L (ref 3.5–5.2)
RBC # BLD AUTO: 2.83 10*6/MM3 (ref 4.14–5.8)
SODIUM SERPL-SCNC: 133 MMOL/L (ref 136–145)
WBC NRBC COR # BLD AUTO: 8.09 10*3/MM3 (ref 3.4–10.8)

## 2024-06-27 PROCEDURE — 82948 REAGENT STRIP/BLOOD GLUCOSE: CPT

## 2024-06-27 PROCEDURE — 94799 UNLISTED PULMONARY SVC/PX: CPT

## 2024-06-27 PROCEDURE — 25010000002 FUROSEMIDE PER 20 MG: Performed by: INTERNAL MEDICINE

## 2024-06-27 PROCEDURE — 99232 SBSQ HOSP IP/OBS MODERATE 35: CPT | Performed by: INTERNAL MEDICINE

## 2024-06-27 PROCEDURE — 80048 BASIC METABOLIC PNL TOTAL CA: CPT | Performed by: INTERNAL MEDICINE

## 2024-06-27 PROCEDURE — 94761 N-INVAS EAR/PLS OXIMETRY MLT: CPT

## 2024-06-27 PROCEDURE — 94003 VENT MGMT INPAT SUBQ DAY: CPT

## 2024-06-27 PROCEDURE — 85027 COMPLETE CBC AUTOMATED: CPT | Performed by: INTERNAL MEDICINE

## 2024-06-27 PROCEDURE — 63710000001 INSULIN LISPRO (HUMAN) PER 5 UNITS: Performed by: NURSE PRACTITIONER

## 2024-06-27 PROCEDURE — 25010000002 ALBUMIN HUMAN 25% PER 50 ML: Performed by: INTERNAL MEDICINE

## 2024-06-27 PROCEDURE — P9047 ALBUMIN (HUMAN), 25%, 50ML: HCPCS | Performed by: INTERNAL MEDICINE

## 2024-06-27 RX ORDER — ALBUMIN (HUMAN) 12.5 G/50ML
25 SOLUTION INTRAVENOUS 3 TIMES DAILY
Status: DISPENSED | OUTPATIENT
Start: 2024-06-27 | End: 2024-06-29

## 2024-06-27 RX ADMIN — FUROSEMIDE 80 MG: 10 INJECTION, SOLUTION INTRAMUSCULAR; INTRAVENOUS at 08:09

## 2024-06-27 RX ADMIN — SENNOSIDES AND DOCUSATE SODIUM 2 TABLET: 50; 8.6 TABLET ORAL at 08:11

## 2024-06-27 RX ADMIN — FERROUS SULFATE TAB 325 MG (65 MG ELEMENTAL FE) 325 MG: 325 (65 FE) TAB at 08:09

## 2024-06-27 RX ADMIN — ALBUMIN (HUMAN) 25 G: 0.25 INJECTION, SOLUTION INTRAVENOUS at 20:38

## 2024-06-27 RX ADMIN — POLYETHYLENE GLYCOL 3350 17 G: 17 POWDER, FOR SOLUTION ORAL at 20:38

## 2024-06-27 RX ADMIN — INSULIN LISPRO 2 UNITS: 100 INJECTION, SOLUTION INTRAVENOUS; SUBCUTANEOUS at 13:53

## 2024-06-27 RX ADMIN — PANTOPRAZOLE SODIUM 40 MG: 40 TABLET, DELAYED RELEASE ORAL at 06:02

## 2024-06-27 RX ADMIN — IPRATROPIUM BROMIDE AND ALBUTEROL SULFATE 3 ML: 2.5; .5 SOLUTION RESPIRATORY (INHALATION) at 19:06

## 2024-06-27 RX ADMIN — ALBUMIN (HUMAN) 25 G: 0.25 INJECTION, SOLUTION INTRAVENOUS at 17:15

## 2024-06-27 RX ADMIN — INSULIN LISPRO 3 UNITS: 100 INJECTION, SOLUTION INTRAVENOUS; SUBCUTANEOUS at 20:38

## 2024-06-27 RX ADMIN — SENNOSIDES AND DOCUSATE SODIUM 2 TABLET: 50; 8.6 TABLET ORAL at 20:38

## 2024-06-27 RX ADMIN — POLYETHYLENE GLYCOL 3350 17 G: 17 POWDER, FOR SOLUTION ORAL at 08:08

## 2024-06-27 RX ADMIN — ALBUMIN (HUMAN) 25 G: 0.25 INJECTION, SOLUTION INTRAVENOUS at 08:11

## 2024-06-27 RX ADMIN — SPIRONOLACTONE 25 MG: 25 TABLET ORAL at 08:09

## 2024-06-27 RX ADMIN — INSULIN LISPRO 2 UNITS: 100 INJECTION, SOLUTION INTRAVENOUS; SUBCUTANEOUS at 17:15

## 2024-06-27 RX ADMIN — IPRATROPIUM BROMIDE AND ALBUTEROL SULFATE 3 ML: 2.5; .5 SOLUTION RESPIRATORY (INHALATION) at 14:01

## 2024-06-27 RX ADMIN — FUROSEMIDE 80 MG: 10 INJECTION, SOLUTION INTRAMUSCULAR; INTRAVENOUS at 17:15

## 2024-06-27 RX ADMIN — Medication 10 ML: at 08:16

## 2024-06-27 NOTE — CASE MANAGEMENT/SOCIAL WORK
Continued Stay Note  [facility]     Patient Name: Jaycob Ndiaye II  MRN: [id number]  Today's Date: 6/27/2024    Admit Date: 6/21/2024    Plan: Home   Discharge Plan       Row Name 06/27/24 0849       Plan    Plan Home    Patient/Family in Agreement with Plan yes    Plan Comments Mr. Ndiaye declined rehab and declined home health services. He is agreeable to go outpatient PT for leg wraps. Patient discharge plan is home with outpatient PT for leg wraps. CM will follow for any discharge needs.    Final Discharge Disposition Code 01 - home or self-care                   Discharge Codes    No documentation.                 Expected Discharge Date and Time       Expected Discharge Date Expected Discharge Time    Jun 27, 2024               Arlene Hyde RN

## 2024-06-27 NOTE — PROGRESS NOTES
" LOS: 5 days   Patient Care Team:  Lorena Chamberlain APRN as PCP - General (Nurse Practitioner)  Lloyd Craig MD as Consulting Physician (Cardiology)    Chief Complaint: NASRIN    Subjective     No significant improvement in renal function. Generalize edema noted. No UOP documented. SBP ~ 114 on recent check.     Subjective:  Symptoms:  Stable.  No shortness of breath or chest pain.        History taken from: patient    Objective     Vital Sign Min/Max for last 24 hours  Temp  Min: 97.4 °F (36.3 °C)  Max: 98 °F (36.7 °C)   BP  Min: 96/69  Max: 114/67   Pulse  Min: 67  Max: 94   Resp  Min: 18  Max: 18   SpO2  Min: 92 %  Max: 100 %   No data recorded   No data recorded     Flowsheet Rows      Flowsheet Row First Filed Value   Admission Height 185.4 cm (73\") Documented at 06/21/2024 1409   Admission Weight 127 kg (279 lb) Documented at 06/21/2024 1409            No intake/output data recorded.  I/O last 3 completed shifts:  In: 100 [IV Piggyback:100]  Out: -     Objective:  General Appearance:  Comfortable.    Vital signs: (most recent): Blood pressure 103/69, pulse 67, temperature 98 °F (36.7 °C), temperature source Oral, resp. rate 18, height 185.4 cm (73\"), weight (!) 156 kg (343 lb), SpO2 94%.  Vital signs are normal.    Output: Minimal urine output.    HEENT: Normal HEENT exam.    Lungs:  Normal effort and normal respiratory rate.  Breath sounds clear to auscultation.  He is not in respiratory distress.  No stridor.    Heart: Normal rate.  Regular rhythm.  S1 normal and S2 normal.    Abdomen: Abdomen is distended.    Extremities: There is dependent edema and local swelling.    Pulses: Distal pulses are intact.    Neurological: Patient is alert.    Pupils:  Pupils are equal, round, and reactive to light.                Results Review:     I reviewed the patient's new clinical results.    WBC WBC   Date Value Ref Range Status   06/27/2024 8.09 3.40 - 10.80 10*3/mm3 Final   06/26/2024 7.28 3.40 - " "10.80 10*3/mm3 Final   06/25/2024 8.77 3.40 - 10.80 10*3/mm3 Final      HGB Hemoglobin   Date Value Ref Range Status   06/27/2024 8.7 (L) 13.0 - 17.7 g/dL Final   06/26/2024 8.6 (L) 13.0 - 17.7 g/dL Final   06/25/2024 8.7 (L) 13.0 - 17.7 g/dL Final      HCT Hematocrit   Date Value Ref Range Status   06/27/2024 27.4 (L) 37.5 - 51.0 % Final   06/26/2024 26.5 (L) 37.5 - 51.0 % Final   06/25/2024 27.2 (L) 37.5 - 51.0 % Final      Platlets No results found for: \"LABPLAT\"   MCV MCV   Date Value Ref Range Status   06/27/2024 96.8 79.0 - 97.0 fL Final   06/26/2024 95.0 79.0 - 97.0 fL Final   06/25/2024 96.1 79.0 - 97.0 fL Final          Sodium Sodium   Date Value Ref Range Status   06/27/2024 133 (L) 136 - 145 mmol/L Final   06/26/2024 134 (L) 136 - 145 mmol/L Final   06/25/2024 135 (L) 136 - 145 mmol/L Final      Potassium Potassium   Date Value Ref Range Status   06/27/2024 3.7 3.5 - 5.2 mmol/L Final   06/26/2024 3.9 3.5 - 5.2 mmol/L Final   06/26/2024 3.6 3.5 - 5.2 mmol/L Final     Comment:     Slight hemolysis detected by analyzer. Result may be falsely elevated.   06/25/2024 3.7 3.5 - 5.2 mmol/L Final     Comment:     Slight hemolysis detected by analyzer. Result may be falsely elevated.   06/25/2024 3.4 (L) 3.5 - 5.2 mmol/L Final      Chloride Chloride   Date Value Ref Range Status   06/27/2024 92 (L) 98 - 107 mmol/L Final   06/26/2024 93 (L) 98 - 107 mmol/L Final   06/25/2024 93 (L) 98 - 107 mmol/L Final      CO2 CO2   Date Value Ref Range Status   06/27/2024 27.0 22.0 - 29.0 mmol/L Final   06/26/2024 27.0 22.0 - 29.0 mmol/L Final   06/25/2024 27.0 22.0 - 29.0 mmol/L Final      BUN BUN   Date Value Ref Range Status   06/27/2024 69 (H) 8 - 23 mg/dL Final   06/26/2024 64 (H) 8 - 23 mg/dL Final   06/25/2024 66 (H) 8 - 23 mg/dL Final      Creatinine Creatinine   Date Value Ref Range Status   06/27/2024 2.24 (H) 0.76 - 1.27 mg/dL Final   06/26/2024 2.01 (H) 0.76 - 1.27 mg/dL Final   06/25/2024 1.98 (H) 0.76 - 1.27 mg/dL " "Final      Calcium Calcium   Date Value Ref Range Status   06/27/2024 9.2 8.6 - 10.5 mg/dL Final   06/26/2024 9.3 8.6 - 10.5 mg/dL Final   06/25/2024 9.2 8.6 - 10.5 mg/dL Final      PO4 No results found for: \"CAPO4\"   Albumin Albumin   Date Value Ref Range Status   06/26/2024 3.4 (L) 3.5 - 5.2 g/dL Final      Magnesium No results found for: \"MG\"   Uric Acid No results found for: \"URICACID\"     Medication Review: Yes    Assessment & Plan       Acute on chronic heart failure with preserved ejection fraction (HFpEF)    Elevated serum creatinine    Anemia    Hypokalemia    Cirrhosis of liver    HCV (hepatitis C virus)    Acute on chronic HFrEF (heart failure with reduced ejection fraction)      Assessment & Plan    Acute kidney disease: Last known stable cr 1.1 in 2023. Cr on recent admission at [facility] few weeks ago 1.6-1.9 mg/dl. Most recent cr 2.0-2.2 GFR 35-36ml/min. Urine sodium 20. Urine cr 39.5. Renal US no hydro     New onset liver cirrhosis: Complications include ascites and LE edema.      Volume status: Dependent edema b/l + ascites. Getting diuretics     Hypokalemia: In the setting of diuretics     Hyponatremia: Due to total body volume overload.      HFpEF: Dependent edema      Anemia: Recent hx of GI bleed. S/p EGD and colonoscopy.      Recs  Acute kidney disease etiology hemodynamic variation in renal function w diuretic use+ volume overload. HRS can't be entirely excluded.    Place foely cath ( for strict I/O) and to rule out retention  - Increase albumin 25 gm TID.   - Continue diuretics lasix 80 mg BID. Add spironolactone 25 mg daily. Hold metolazone for now  -  Need strict I/O.   - Avoid Nephrotoxic agents.   - IF SBP<110 will add midodrine.         I discussed the patients findings and my recommendations with patient and family    William Bartlett MD  06/27/24  13:04 EDT            "

## 2024-06-27 NOTE — PROGRESS NOTES
"    [facility] Medicine Services  PROGRESS NOTE    Patient Name: Jaycob Ndiaye II  : 1960  MRN: [id number]    Date of Admission: 2024  Primary Care Physician: Lorena Chamberlain APRN    Subjective   Subjective     CC:  Heart failure    HPI:  \"Not too good.\"  States that his arms and elbows \"are killing me.\"  Notes little calcium deposits developed on elbow. Already had several on his fingertips       Objective   Objective     Vital Signs:   Temp:  [97.1 °F (36.2 °C)-97.5 °F (36.4 °C)] 97.4 °F (36.3 °C)  Heart Rate:  [68-94] 69  Resp:  [18] 18  BP: ()/(65-74) 114/67     Physical Exam:  Constitutional: No acute distress, awake, alert, sitting up in chair, morbidly obese  HENT: NCAT, mucous membranes moist  Respiratory: Clear to auscultation bilaterally, respiratory effort normal   Cardiovascular: RRR, no murmurs, rubs, or gallops  Gastrointestinal: Positive bowel sounds, soft, nontender, nondistended  Musculoskeletal: venous stasis changes distal bilateral LE's with 3+pitting edema bilateral LE's  Psychiatric: Appropriate affect, cooperative  Neurologic: Oriented x 3, strength symmetric in all extremities, Cranial Nerves grossly intact to confrontation, speech clear  Skin: calcium deposits fingertips bilateral hands as well as multiple of these right elbow        Results Reviewed:  LAB RESULTS:      Lab 24  0440 24  0454 24  0754 24  0500 24  0203 24  1430   WBC 8.09 7.28 8.77 9.32 10.18 11.72*   HEMOGLOBIN 8.7* 8.6* 8.7* 9.2* 8.8* 9.2*   HEMATOCRIT 27.4* 26.5* 27.2* 28.7* 27.6* 28.5*   PLATELETS 186 195 202 233 215 217   NEUTROS ABS  --   --   --   --  8.04* 9.39*   IMMATURE GRANS (ABS)  --   --   --   --  0.07* 0.16*   LYMPHS ABS  --   --   --   --  0.94 1.02   MONOS ABS  --   --   --   --  1.01* 1.06*   EOS ABS  --   --   --   --  0.07 0.03   MCV 96.8 95.0 96.1 96.0 94.5 93.8         Lab 24  0440 24  1620 " 06/26/24 0454 06/25/24 2115 06/25/24  0754 06/23/24 2114 06/23/24  0500 06/22/24  1756 06/22/24  0203   SODIUM 133*  --  134*  --  135*  --  133*  --  135*   POTASSIUM 3.7 3.9 3.6 3.7 3.4*   < > 3.5   < > 3.4*   CHLORIDE 92*  --  93*  --  93*  --  94*  --  96*   CO2 27.0  --  27.0  --  27.0  --  26.0  --  27.0   ANION GAP 14.0  --  14.0  --  15.0  --  13.0  --  12.0   BUN 69*  --  64*  --  66*  --  66*  --  63*   CREATININE 2.24*  --  2.01*  --  1.98*  --  2.11*  --  2.12*   EGFR 31.7*  --  36.1*  --  36.8*  --  34.1*  --  33.9*   GLUCOSE 147*  --  113*  --  132*  --  146*  --  116*   CALCIUM 9.2  --  9.3  --  9.2  --  9.2  --  9.0   MAGNESIUM  --   --   --   --   --   --   --   --  2.1   PHOSPHORUS  --   --  4.7*  --   --   --   --   --   --    HEMOGLOBIN A1C  --   --   --   --   --   --   --   --  6.00*    < > = values in this interval not displayed.         Lab 06/26/24  0454 06/22/24  0203 06/21/24  1430   TOTAL PROTEIN  --  5.8* 6.2   ALBUMIN 3.4* 3.5 3.6   GLOBULIN  --  2.3 2.6   ALT (SGPT)  --  9 9   AST (SGOT)  --  13 14   BILIRUBIN  --  0.6 0.6   ALK PHOS  --  74 78         Lab 06/21/24  1627 06/21/24  1430   PROBNP  --  4,183.0*   HSTROP T 131* 137*             Lab 06/22/24  0203   IRON 30*   IRON SATURATION (TSAT) 10*   TIBC 301   TRANSFERRIN 202   FERRITIN 88.02   FOLATE 8.26   VITAMIN B 12 1,127*         Brief Urine Lab Results  (Last result in the past 365 days)        Color   Clarity   Blood   Leuk Est   Nitrite   Protein   CREAT   Urine HCG        06/26/24 1621             39.5                 Microbiology Results Abnormal       None            US Renal Limited    Result Date: 6/26/2024  US RENAL LIMITED Date of Exam: 6/26/2024 5:05 PM EDT Indication: NASRIN. Comparison: No comparisons available. Technique: Grayscale and color Doppler ultrasound evaluation of the kidneys and urinary bladder was performed. Findings: RIGHT kidney measures 9.5 x 5.5 x 5.9 cm.  Kidney echogenicity, size, and vascularity  appear within normal limits. There is no solid kidney mass.  No echogenic shadowing stone.  No hydronephrosis. LEFT kidney measures 12.0 6.1 x 6.1 cm. Kidney echogenicity, size, and vascularity appear within normal limits. There is no solid kidney mass.  No echogenic shadowing stone.  No hydronephrosis. Limited visualization of the urinary bladder is unremarkable. There is ascites.     Impression: Impression: 1.Unremarkable appearance of the kidneys. 2.Ascites Electronically Signed: Ken Lucas MD  6/26/2024 8:31 PM EDT  Workstation ID: [id number]         Current medications:  Scheduled Meds:albumin human, 25 g, Intravenous, BID  ferrous sulfate, 325 mg, Oral, Daily With Breakfast  furosemide, 80 mg, Intravenous, BID  heparin (porcine), 5,000 Units, Subcutaneous, Q8H  insulin lispro, 2-7 Units, Subcutaneous, 4x Daily AC & at Bedtime  ipratropium-albuterol, 3 mL, Nebulization, 4x Daily - RT  pantoprazole, 40 mg, Oral, Q AM  pharmacy consult - [place], , Does not apply, Daily  senna-docusate sodium, 2 tablet, Oral, BID   And  polyethylene glycol, 17 g, Oral, BID  sodium chloride, 10 mL, Intravenous, Q12H  spironolactone, 25 mg, Oral, Daily      Continuous Infusions:   PRN Meds:.  acetaminophen **OR** acetaminophen **OR** acetaminophen    Albuterol Sulfate NEB Orderable    senna-docusate sodium **AND** polyethylene glycol **AND** bisacodyl **AND** bisacodyl    Calcium Replacement - Follow Nurse / BPA Driven Protocol    dextrose    dextrose    glucagon (human recombinant)    magnesium hydroxide    Magnesium Low Dose Replacement - Follow Nurse / BPA Driven Protocol    nitroglycerin    Phosphorus Replacement - Follow Nurse / BPA Driven Protocol    Potassium Replacement - Follow Nurse / BPA Driven Protocol    prochlorperazine    sodium chloride    sodium chloride    sodium chloride    Assessment & Plan   Assessment & Plan     Active Hospital Problems    Diagnosis  POA    **Acute on chronic heart failure with preserved ejection  fraction (HFpEF) [I50.33]  Yes    Acute on chronic HFrEF (heart failure with reduced ejection fraction) [I50.23]  Yes    Elevated serum creatinine [R79.89]  Yes    Anemia [D64.9]  Yes    Hypokalemia [E87.6]  Yes    Cirrhosis of liver [K74.60]  Yes    HCV (hepatitis C virus) [B19.20]  Yes      Resolved Hospital Problems   No resolved problems to display.        Brief Hospital Course to date:  Jaycob Ndiaye II is a 64 y.o. male with past medical history significant for CHF, COPD, cirrhosis, prostate cancer, chronic hematuria, GI bleed, HCV, HTN, and HLD who presents to the ED due to worsening shortness of breath and lower extremity edema over the past week.    This patient's problems and plans were partially entered by my partner and updated as appropriate by me 06/27/24.    Assessment/plan:     A/C HFrEF  -Follows with Cardiologist Dr. Craig  -Echo just done at [facility], will defer additional echocardiogram  -Strict I&O, daily weights  -Still with significant anasarca and lower extremity edema. Cardiology asked nephrology to manage diuresis.  Stopped metalozone yesterday and added spironolactone and continued lasix 80mg BID with slight bump in creatinine today    Significant BLE edema edema  Anasarca  --Initially had leg wraps but asked that they be removed because they were too tight.  Currently off.  --Edema worsening.  Cardiology managing diuretics  -- BLE venous duplex negative for DVT  --Will need to reach out to PT for replacement of leg wraps (may need slightly larger wrap or looser so that he will continue to wear)      Elevated Creatinine  -Baseline data deficit  -Creat 2.11 on presentation, stable this morning after diuresis  -avoid nephrotoxins as able.  Cardiology consulted nephrology to help with diuresis  -nephro recs continue lasix 80mg Bid, hold metolazone, and add spironolactone yesterday, awaiting recs for today/note mild bump up in creatinine today    ?Calcium deposits in fingertips and right  elbow  --check uric level, ionized calcium, PTH, and vitamin D     Anemia  Recent GI Bleed  -s/p EGD and colonoscopy at [facility] on 6/13/2024, records requested  -Hgb fairly stable  -Continue PPI   -continue PO Iron   -Monitor hemoglobin/hematocrit daily     Hypokalemia  -Replace per protocol     New cirrhosis   HCV  -s/p US abd on 6/10/24 that revealed nodular liver consistent with cirrhosis and moderate ascites  -S/p US guided paracentesis on 6/12/2024 at [facility] with 200 mL of skylar-colored fluid removed.  No fluid was sent to lab.  -Consider repeat abdominal US ordered to evaluate ascites   -Will need referral to establish with [facility] upon discharge per patient request     T2DM with peripheral neuropathy  -holding home Mounjaro, Hg A1c is 6.0  -Continue low dose  SSI for now.  Glucoses stable running 137-209 last 24 hours     PAF  -Xarelto discontinued 6/13 at [facility] d/t persistent hematuria      HTN  -Continue holding home bystolic for now d/t borderline BP      DVT prophylaxis:  Heparin     Expected Discharge Location and Transportation: home once medically improved and cleared by cardiology/nephrology.  Expected Discharge   Expected Discharge Date: 6/29/2024; Expected Discharge Time:      VTE Prophylaxis:  Pharmacologic VTE prophylaxis orders are present.         AM-PAC 6 Clicks Score (PT): 19 (06/27/24 0802)    CODE STATUS:   Code Status and Medical Interventions:   Ordered at: 06/21/24 1840     Level Of Support Discussed With:    Patient     Code Status (Patient has no pulse and is not breathing):    CPR (Attempt to Resuscitate)     Medical Interventions (Patient has pulse or is breathing):    Full Support       Malachi Regan MD  06/27/24

## 2024-06-28 LAB
25(OH)D3 SERPL-MCNC: 21.6 NG/ML (ref 30–100)
CA-I SERPL ISE-MCNC: 1.23 MMOL/L (ref 1.12–1.32)
ERYTHROCYTE [SEDIMENTATION RATE] IN BLOOD: 9 MM/HR (ref 0–20)
GLUCOSE BLDC GLUCOMTR-MCNC: 147 MG/DL (ref 70–130)
GLUCOSE BLDC GLUCOMTR-MCNC: 219 MG/DL (ref 70–130)
GLUCOSE BLDC GLUCOMTR-MCNC: 223 MG/DL (ref 70–130)
PTH-INTACT SERPL-MCNC: 50.6 PG/ML (ref 15–65)
URATE SERPL-MCNC: 12 MG/DL (ref 3.4–7)

## 2024-06-28 PROCEDURE — 84550 ASSAY OF BLOOD/URIC ACID: CPT | Performed by: INTERNAL MEDICINE

## 2024-06-28 PROCEDURE — P9047 ALBUMIN (HUMAN), 25%, 50ML: HCPCS | Performed by: INTERNAL MEDICINE

## 2024-06-28 PROCEDURE — 94664 DEMO&/EVAL PT USE INHALER: CPT

## 2024-06-28 PROCEDURE — 83970 ASSAY OF PARATHORMONE: CPT | Performed by: INTERNAL MEDICINE

## 2024-06-28 PROCEDURE — 25010000002 FUROSEMIDE PER 20 MG: Performed by: INTERNAL MEDICINE

## 2024-06-28 PROCEDURE — 94799 UNLISTED PULMONARY SVC/PX: CPT

## 2024-06-28 PROCEDURE — 25010000002 ALBUMIN HUMAN 25% PER 50 ML: Performed by: INTERNAL MEDICINE

## 2024-06-28 PROCEDURE — 99232 SBSQ HOSP IP/OBS MODERATE 35: CPT | Performed by: INTERNAL MEDICINE

## 2024-06-28 PROCEDURE — 82948 REAGENT STRIP/BLOOD GLUCOSE: CPT

## 2024-06-28 PROCEDURE — 85652 RBC SED RATE AUTOMATED: CPT | Performed by: INTERNAL MEDICINE

## 2024-06-28 PROCEDURE — 94761 N-INVAS EAR/PLS OXIMETRY MLT: CPT

## 2024-06-28 PROCEDURE — 25010000002 NA FERRIC GLUC CPLX PER 12.5 MG: Performed by: INTERNAL MEDICINE

## 2024-06-28 PROCEDURE — 63710000001 INSULIN LISPRO (HUMAN) PER 5 UNITS: Performed by: NURSE PRACTITIONER

## 2024-06-28 PROCEDURE — 82306 VITAMIN D 25 HYDROXY: CPT | Performed by: INTERNAL MEDICINE

## 2024-06-28 PROCEDURE — 82330 ASSAY OF CALCIUM: CPT | Performed by: INTERNAL MEDICINE

## 2024-06-28 RX ORDER — CASTOR OIL AND BALSAM, PERU 788; 87 MG/G; MG/G
1 OINTMENT TOPICAL EVERY 12 HOURS SCHEDULED
Status: DISCONTINUED | OUTPATIENT
Start: 2024-06-28 | End: 2024-07-08 | Stop reason: HOSPADM

## 2024-06-28 RX ADMIN — IPRATROPIUM BROMIDE AND ALBUTEROL SULFATE 3 ML: 2.5; .5 SOLUTION RESPIRATORY (INHALATION) at 16:32

## 2024-06-28 RX ADMIN — IPRATROPIUM BROMIDE AND ALBUTEROL SULFATE 3 ML: 2.5; .5 SOLUTION RESPIRATORY (INHALATION) at 12:21

## 2024-06-28 RX ADMIN — Medication 10 ML: at 20:38

## 2024-06-28 RX ADMIN — INSULIN LISPRO 3 UNITS: 100 INJECTION, SOLUTION INTRAVENOUS; SUBCUTANEOUS at 12:35

## 2024-06-28 RX ADMIN — INSULIN LISPRO 3 UNITS: 100 INJECTION, SOLUTION INTRAVENOUS; SUBCUTANEOUS at 08:51

## 2024-06-28 RX ADMIN — Medication 1 APPLICATION: at 20:47

## 2024-06-28 RX ADMIN — SENNOSIDES AND DOCUSATE SODIUM 2 TABLET: 50; 8.6 TABLET ORAL at 08:50

## 2024-06-28 RX ADMIN — FUROSEMIDE 80 MG: 10 INJECTION, SOLUTION INTRAMUSCULAR; INTRAVENOUS at 17:12

## 2024-06-28 RX ADMIN — INSULIN LISPRO 3 UNITS: 100 INJECTION, SOLUTION INTRAVENOUS; SUBCUTANEOUS at 20:37

## 2024-06-28 RX ADMIN — IPRATROPIUM BROMIDE AND ALBUTEROL SULFATE 3 ML: 2.5; .5 SOLUTION RESPIRATORY (INHALATION) at 08:55

## 2024-06-28 RX ADMIN — ALBUMIN (HUMAN) 25 G: 0.25 INJECTION, SOLUTION INTRAVENOUS at 15:36

## 2024-06-28 RX ADMIN — IPRATROPIUM BROMIDE AND ALBUTEROL SULFATE 3 ML: 2.5; .5 SOLUTION RESPIRATORY (INHALATION) at 19:03

## 2024-06-28 RX ADMIN — POLYETHYLENE GLYCOL 3350 17 G: 17 POWDER, FOR SOLUTION ORAL at 20:37

## 2024-06-28 RX ADMIN — SENNOSIDES AND DOCUSATE SODIUM 2 TABLET: 50; 8.6 TABLET ORAL at 20:37

## 2024-06-28 RX ADMIN — Medication 10 ML: at 08:53

## 2024-06-28 RX ADMIN — Medication 1 APPLICATION: at 12:35

## 2024-06-28 RX ADMIN — SPIRONOLACTONE 25 MG: 25 TABLET ORAL at 08:50

## 2024-06-28 RX ADMIN — POLYETHYLENE GLYCOL 3350 17 G: 17 POWDER, FOR SOLUTION ORAL at 08:49

## 2024-06-28 RX ADMIN — FUROSEMIDE 80 MG: 10 INJECTION, SOLUTION INTRAMUSCULAR; INTRAVENOUS at 08:51

## 2024-06-28 RX ADMIN — FERROUS SULFATE TAB 325 MG (65 MG ELEMENTAL FE) 325 MG: 325 (65 FE) TAB at 08:51

## 2024-06-28 RX ADMIN — PANTOPRAZOLE SODIUM 40 MG: 40 TABLET, DELAYED RELEASE ORAL at 04:55

## 2024-06-28 RX ADMIN — ALBUMIN (HUMAN) 25 G: 0.25 INJECTION, SOLUTION INTRAVENOUS at 08:49

## 2024-06-28 RX ADMIN — SODIUM CHLORIDE 125 MG: 9 INJECTION, SOLUTION INTRAVENOUS at 17:13

## 2024-06-28 RX ADMIN — ALBUMIN (HUMAN) 25 G: 0.25 INJECTION, SOLUTION INTRAVENOUS at 20:37

## 2024-06-28 NOTE — PLAN OF CARE
Goal Outcome Evaluation:  Plan of Care Reviewed With: patient        Progress: no change  Outcome Evaluation: Rouse catheter in place, UOP 1550mL at this time. Urine noted to be red/pink early in shift. No noted clots. Rouse monitored and draining throughout the night. Vitals stable. Noted discoloration to Right Gluteal. Woc consult ordered.

## 2024-06-28 NOTE — CASE MANAGEMENT/SOCIAL WORK
Continued Stay Note  [facility]     Patient Name: Jaycob Ndiaye II  MRN: [id number]  Today's Date: 6/28/2024    Admit Date: 6/21/2024    Plan: Home   Discharge Plan       Row Name 06/28/24 1619       Plan    Plan Home    Patient/Family in Agreement with Plan yes    Plan Comments Spoke with Mr. Ndiaye at bedside. He declined rehab and home health. He wants to go home. States that he will do outpatient PT for Leg wraps. Explained to him that PT and OT are recommending rehab. He declined rehab. Patient discharge plan at current is home with outpateint PT. CM will follow.    Final Discharge Disposition Code 01 - home or self-care                   Discharge Codes    No documentation.                 Expected Discharge Date and Time       Expected Discharge Date Expected Discharge Time    Jun 29, 2024               Arlene Hyde RN

## 2024-06-28 NOTE — PROGRESS NOTES
Nutrition Services    Patient Name:  Jaycob Ndiaye II  YOB: 1960  MRN: [id number]  Admit Date:  6/21/2024    Pt noted to have adequate PO intake. RD will sign off at this time. If note that PO intake decrease to avg of <50%, please consult RD to re-evaluate interventions in place.     Electronically signed by:  Lenore Biggs MS,RD,LD  06/28/24 09:41 EDT

## 2024-06-28 NOTE — PROGRESS NOTES
" LOS: 6 days   Patient Care Team:  Lorena Chamberlain APRN as PCP - General (Nurse Practitioner)  Lloyd Craig MD as Consulting Physician (Cardiology)    Chief Complaint: NASRIN    Subjective     No new renal labs. Rouse cath placed yesterday. Feeling much better today. Edema improving. Good UOP. ~ 1.5 liter/day.    Subjective:  Symptoms:  Stable.  No shortness of breath or chest pain.        History taken from: patient    Objective     Vital Sign Min/Max for last 24 hours  Temp  Min: 97.6 °F (36.4 °C)  Max: 98.1 °F (36.7 °C)   BP  Min: 105/66  Max: 122/71   Pulse  Min: 69  Max: 87   Resp  Min: 18  Max: 19   SpO2  Min: 90 %  Max: 100 %   No data recorded   No data recorded     Flowsheet Rows      Flowsheet Row First Filed Value   Admission Height 185.4 cm (73\") Documented at 06/21/2024 1409   Admission Weight 127 kg (279 lb) Documented at 06/21/2024 1409            No intake/output data recorded.  I/O last 3 completed shifts:  In: 580 [P.O.:480; IV Piggyback:100]  Out: 1550 [Urine:1550]    Objective:  General Appearance:  Comfortable.    Vital signs: (most recent): Blood pressure 105/66, pulse 82, temperature 97.6 °F (36.4 °C), temperature source Axillary, resp. rate 19, height 185.4 cm (73\"), weight (!) 156 kg (343 lb), SpO2 92%.  Vital signs are normal.    Output: Minimal urine output.    HEENT: Normal HEENT exam.    Lungs:  Normal effort and normal respiratory rate.  Breath sounds clear to auscultation.  He is not in respiratory distress.  No stridor.    Heart: Normal rate.  Regular rhythm.  S1 normal and S2 normal.    Abdomen: Abdomen is distended.    Extremities: There is dependent edema and local swelling.    Pulses: Distal pulses are intact.    Neurological: Patient is alert.    Pupils:  Pupils are equal, round, and reactive to light.                Results Review:     I reviewed the patient's new clinical results.    WBC WBC   Date Value Ref Range Status   06/27/2024 8.09 3.40 - 10.80 " "10*3/mm3 Final   06/26/2024 7.28 3.40 - 10.80 10*3/mm3 Final      HGB Hemoglobin   Date Value Ref Range Status   06/27/2024 8.7 (L) 13.0 - 17.7 g/dL Final   06/26/2024 8.6 (L) 13.0 - 17.7 g/dL Final      HCT Hematocrit   Date Value Ref Range Status   06/27/2024 27.4 (L) 37.5 - 51.0 % Final   06/26/2024 26.5 (L) 37.5 - 51.0 % Final      Platlets No results found for: \"LABPLAT\"   MCV MCV   Date Value Ref Range Status   06/27/2024 96.8 79.0 - 97.0 fL Final   06/26/2024 95.0 79.0 - 97.0 fL Final          Sodium Sodium   Date Value Ref Range Status   06/27/2024 133 (L) 136 - 145 mmol/L Final   06/26/2024 134 (L) 136 - 145 mmol/L Final      Potassium Potassium   Date Value Ref Range Status   06/27/2024 3.7 3.5 - 5.2 mmol/L Final   06/26/2024 3.9 3.5 - 5.2 mmol/L Final   06/26/2024 3.6 3.5 - 5.2 mmol/L Final     Comment:     Slight hemolysis detected by analyzer. Result may be falsely elevated.   06/25/2024 3.7 3.5 - 5.2 mmol/L Final     Comment:     Slight hemolysis detected by analyzer. Result may be falsely elevated.      Chloride Chloride   Date Value Ref Range Status   06/27/2024 92 (L) 98 - 107 mmol/L Final   06/26/2024 93 (L) 98 - 107 mmol/L Final      CO2 CO2   Date Value Ref Range Status   06/27/2024 27.0 22.0 - 29.0 mmol/L Final   06/26/2024 27.0 22.0 - 29.0 mmol/L Final      BUN BUN   Date Value Ref Range Status   06/27/2024 69 (H) 8 - 23 mg/dL Final   06/26/2024 64 (H) 8 - 23 mg/dL Final      Creatinine Creatinine   Date Value Ref Range Status   06/27/2024 2.24 (H) 0.76 - 1.27 mg/dL Final   06/26/2024 2.01 (H) 0.76 - 1.27 mg/dL Final      Calcium Calcium   Date Value Ref Range Status   06/27/2024 9.2 8.6 - 10.5 mg/dL Final   06/26/2024 9.3 8.6 - 10.5 mg/dL Final      PO4 No results found for: \"CAPO4\"   Albumin Albumin   Date Value Ref Range Status   06/26/2024 3.4 (L) 3.5 - 5.2 g/dL Final      Magnesium No results found for: \"MG\"   Uric Acid Uric Acid   Date Value Ref Range Status   06/28/2024 12.0 (H) 3.4 - " 7.0 mg/dL Final     Comment:     Falsely depressed results may occur on samples drawn from patients receiving N-Acetylcysteine (NAC) or Metamizole.        Medication Review: Yes    Assessment & Plan       Acute on chronic heart failure with preserved ejection fraction (HFpEF)    Elevated serum creatinine    Anemia    Hypokalemia    Cirrhosis of liver    HCV (hepatitis C virus)    Acute on chronic HFrEF (heart failure with reduced ejection fraction)      Assessment & Plan    Acute kidney disease: Last known stable cr 1.1 in 2023. Cr on recent admission at [facility] few weeks ago 1.6-1.9 mg/dl. Most recent cr 2.0-2.2 GFR 35-36ml/min. Urine sodium 20. Urine cr 39.5. Renal US no hydro     New onset liver cirrhosis: Complications include ascites and LE edema.      Volume status: Dependent edema b/l + ascites. Getting diuretics     Hypokalemia: In the setting of diuretics     Hyponatremia: Due to total body volume overload.      HFpEF: Dependent edema      Anemia: Recent hx of GI bleed. S/p EGD and colonoscopy.      Recs  Acute kidney disease etiology hemodynamic variation in renal function w diuretic use+ volume overload. HRS can't be entirely excluded.    Place foely cath ( for strict I/O) and to rule out retention  - Continue albumin 25 gm TID. ( Stop 6/29/24)  - Continue diuretics lasix 80 mg BID. Add spironolactone 25 mg daily. Hold metolazone for now  -  Need strict I/O.   - Avoid Nephrotoxic agents.   - IF SBP<110 will add midodrine.         I discussed the patients findings and my recommendations with patient and family    William Bartlett MD  06/28/24  14:23 EDT

## 2024-06-28 NOTE — PROGRESS NOTES
[facility]  PROGRESS NOTE    Patient Name: Jaycob Ndiaye II  : 1960  MRN: [id number]    Date of Admission: 2024  Primary Care Physician: Lorena Chamberlain APRN    Subjective   Subjective     CC:  Heart failure    HPI:some better, reports inability to have urge to urinate wince prostate surgery but thinks he can make good urine without a catheter at home. He is NOT eager to go home until he is good and ready. HE still feels swollen in his abdomen and lower extremities      Objective   Objective     Vital Signs:   Temp:  [97.7 °F (36.5 °C)-98.1 °F (36.7 °C)] 97.7 °F (36.5 °C)  Heart Rate:  [67-87] 87  Resp:  [17-18] 18  BP: (103-122)/(69-79) 110/70     Physical Exam:  Constitutional: No acute distress, awake, alert, sitting up in chair, morbidly obese  HENT: NCAT, mucous membranes moist  Respiratory: Clear to auscultation bilaterally, respiratory effort normal   Cardiovascular: RRR, no murmurs, rubs, or gallops  Gastrointestinal: Positive bowel sounds, soft, nontender, nondistended  Musculoskeletal: venous stasis changes distal bilateral LE's with 2+pitting edema bilateral LE's  Psychiatric: Appropriate affect, cooperative  Neurologic: Oriented x 3, strength symmetric in all extremities, Cranial Nerves grossly intact to confrontation, speech clear  Skin: calcium deposits fingertips bilateral hands as well as multiple of these right elbow    More talkative today, improved from admission    Results Reviewed:  LAB RESULTS:      Lab 24  0440 24  0454 24  0754 24  0500 24  0203 24  1430   WBC 8.09 7.28 8.77 9.32 10.18 11.72*   HEMOGLOBIN 8.7* 8.6* 8.7* 9.2* 8.8* 9.2*   HEMATOCRIT 27.4* 26.5* 27.2* 28.7* 27.6* 28.5*   PLATELETS 186 195 202 233 215 217   NEUTROS ABS  --   --   --   --  8.04* 9.39*   IMMATURE GRANS (ABS)  --   --   --   --  0.07* 0.16*   LYMPHS ABS  --   --   --   --  0.94 1.02   MONOS ABS  --    --   --   --  1.01* 1.06*   EOS ABS  --   --   --   --  0.07 0.03   MCV 96.8 95.0 96.1 96.0 94.5 93.8         Lab 06/28/24 0448 06/27/24  0440 06/26/24  1620 06/26/24 0454 06/25/24 2115 06/25/24 0754 06/23/24 2114 06/23/24  0500 06/22/24  1756 06/22/24  0203   SODIUM  --  133*  --  134*  --  135*  --  133*  --  135*   POTASSIUM  --  3.7 3.9 3.6 3.7 3.4*   < > 3.5   < > 3.4*   CHLORIDE  --  92*  --  93*  --  93*  --  94*  --  96*   CO2  --  27.0  --  27.0  --  27.0  --  26.0  --  27.0   ANION GAP  --  14.0  --  14.0  --  15.0  --  13.0  --  12.0   BUN  --  69*  --  64*  --  66*  --  66*  --  63*   CREATININE  --  2.24*  --  2.01*  --  1.98*  --  2.11*  --  2.12*   EGFR  --  31.7*  --  36.1*  --  36.8*  --  34.1*  --  33.9*   GLUCOSE  --  147*  --  113*  --  132*  --  146*  --  116*   CALCIUM  --  9.2  --  9.3  --  9.2  --  9.2  --  9.0   IONIZED CALCIUM 1.23  --   --   --   --   --   --   --   --   --    MAGNESIUM  --   --   --   --   --   --   --   --   --  2.1   PHOSPHORUS  --   --   --  4.7*  --   --   --   --   --   --    HEMOGLOBIN A1C  --   --   --   --   --   --   --   --   --  6.00*    < > = values in this interval not displayed.         Lab 06/26/24 0454 06/22/24 0203 06/21/24  1430   TOTAL PROTEIN  --  5.8* 6.2   ALBUMIN 3.4* 3.5 3.6   GLOBULIN  --  2.3 2.6   ALT (SGPT)  --  9 9   AST (SGOT)  --  13 14   BILIRUBIN  --  0.6 0.6   ALK PHOS  --  74 78         Lab 06/21/24  1627 06/21/24  1430   PROBNP  --  4,183.0*   HSTROP T 131* 137*             Lab 06/22/24  0203   IRON 30*   IRON SATURATION (TSAT) 10*   TIBC 301   TRANSFERRIN 202   FERRITIN 88.02   FOLATE 8.26   VITAMIN B 12 1,127*         Brief Urine Lab Results  (Last result in the past 365 days)        Color   Clarity   Blood   Leuk Est   Nitrite   Protein   CREAT   Urine HCG        06/26/24 1621             39.5                 Microbiology Results Abnormal       None            US Renal Limited    Result Date: 6/26/2024  US RENAL LIMITED Date  of Exam: 6/26/2024 5:05 PM EDT Indication: NASRIN. Comparison: No comparisons available. Technique: Grayscale and color Doppler ultrasound evaluation of the kidneys and urinary bladder was performed. Findings: RIGHT kidney measures 9.5 x 5.5 x 5.9 cm.  Kidney echogenicity, size, and vascularity appear within normal limits. There is no solid kidney mass.  No echogenic shadowing stone.  No hydronephrosis. LEFT kidney measures 12.0 6.1 x 6.1 cm. Kidney echogenicity, size, and vascularity appear within normal limits. There is no solid kidney mass.  No echogenic shadowing stone.  No hydronephrosis. Limited visualization of the urinary bladder is unremarkable. There is ascites.     Impression: Impression: 1.Unremarkable appearance of the kidneys. 2.Ascites Electronically Signed: Ken Lucas MD  6/26/2024 8:31 PM EDT  Workstation ID: [id number]         Current medications:  Scheduled Meds:albumin human, 25 g, Intravenous, TID  ferrous sulfate, 325 mg, Oral, Daily With Breakfast  furosemide, 80 mg, Intravenous, BID  heparin (porcine), 5,000 Units, Subcutaneous, Q8H  insulin lispro, 2-7 Units, Subcutaneous, 4x Daily AC & at Bedtime  ipratropium-albuterol, 3 mL, Nebulization, 4x Daily - RT  pantoprazole, 40 mg, Oral, Q AM  pharmacy consult - MTM, , Does not apply, Daily  senna-docusate sodium, 2 tablet, Oral, BID   And  polyethylene glycol, 17 g, Oral, BID  sodium chloride, 10 mL, Intravenous, Q12H  spironolactone, 25 mg, Oral, Daily      Continuous Infusions:   PRN Meds:.  acetaminophen **OR** acetaminophen **OR** acetaminophen    Albuterol Sulfate NEB Orderable    senna-docusate sodium **AND** polyethylene glycol **AND** bisacodyl **AND** bisacodyl    Calcium Replacement - Follow Nurse / BPA Driven Protocol    dextrose    dextrose    glucagon (human recombinant)    magnesium hydroxide    Magnesium Low Dose Replacement - Follow Nurse / BPA Driven Protocol    nitroglycerin    Phosphorus Replacement - Follow Nurse / BPA Driven  Protocol    Potassium Replacement - Follow Nurse / BPA Driven Protocol    prochlorperazine    sodium chloride    sodium chloride    sodium chloride    Assessment & Plan   Assessment & Plan     Active Hospital Problems    Diagnosis  POA    **Acute on chronic heart failure with preserved ejection fraction (HFpEF) [I50.33]  Yes    Acute on chronic HFrEF (heart failure with reduced ejection fraction) [I50.23]  Yes    Elevated serum creatinine [R79.89]  Yes    Anemia [D64.9]  Yes    Hypokalemia [E87.6]  Yes    Cirrhosis of liver [K74.60]  Yes    HCV (hepatitis C virus) [B19.20]  Yes      Resolved Hospital Problems   No resolved problems to display.        Brief Hospital Course to date:  Jaycob Ndiaye II is a 64 y.o. male with past medical history significant for CHF, COPD, cirrhosis, prostate cancer, chronic hematuria, GI bleed, HCV, HTN, and HLD who presents to the ED due to worsening shortness of breath and lower extremity edema over the past week.    This patient's problems and plans were partially entered by my partner and updated as appropriate by me 06/28/24.    Assessment/plan:     A/C HFrEF  -Follows with Cardiologist Dr. Craig  -Echo just done at [facility], will defer additional echocardiogram  -Strict I&O, daily weights  -Still with significant anasarca and lower extremity edema. Cardiology asked nephrology to manage diuresis.  Stopped metalozone yesterday and added spironolactone and continued lasix 80mg BID with slight bump in creatinine today    Significant BLE edema edema  Anasarca  --Initially had leg wraps but asked that they be removed because they were too tight.  Currently off.  --Edema worsening.  Cardiology managing diuretics  -- BLE venous duplex negative for DVT  --Will need to reach out to PT for replacement of leg wraps (may need slightly larger wrap or looser so that he will continue to wear)      CKD  -Baseline data deficit  -Creat 2.11 on presentation,  Cardiology consulted nephrology to  help with diuresis  -nephro recs continue lasix 80mg Bid, hold metolazone, and add spironolactone yesterday, awaiting recs for today/note mild bump up in creatinine today    ?Calcium deposits in fingertips and right elbow  --check uric level (elevated), ionized calcium, PTH (normal)  and vitamin D (low)   -- sed rate normal    Anemia  Recent GI Bleed  -s/p EGD and colonoscopy at [facility] on 6/13/2024, records requested  -Hgb fairly stable  -Continue PPI   -continue PO Iron -- give dose of IV iron  -Monitor hemoglobin/hematocrit daily     Hypokalemia  -Replace per protocol     New cirrhosis   HCV  -s/p US abd on 6/10/24 that revealed nodular liver consistent with cirrhosis and moderate ascites  -S/p US guided paracentesis on 6/12/2024 at [facility] with 200 mL of skylar-colored fluid removed.  No fluid was sent to lab.  -Consider renal abdominal US showed ascites   -Will need referral to establish with [facility] upon discharge per patient request  -consider paracentesis     T2DM with peripheral neuropathy  -holding home Mounjaro, Hg A1c is 6.0  -Continue low dose  SSI for now.  Glucoses stable running 137-209 last 24 hours     PAF  -Xarelto discontinued 6/13 at [facility] d/t persistent hematuria      HTN  -Continue holding home bystolic for now d/t borderline BP      DVT prophylaxis:  Heparin     Expected Discharge Location and Transportation: home once medically improved and cleared by cardiology/nephrology.  Expected Discharge   Expected Discharge Date: 7/1/2024; Expected Discharge Time:      VTE Prophylaxis:  Pharmacologic VTE prophylaxis orders are present.         AM-PAC 6 Clicks Score (PT): 19 (06/27/24 2038)    CODE STATUS:   Code Status and Medical Interventions:   Ordered at: 06/21/24 1840     Level Of Support Discussed With:    Patient     Code Status (Patient has no pulse and is not breathing):    CPR (Attempt to Resuscitate)     Medical Interventions (Patient has pulse or is breathing):    Full Support       Edda MULLIGAN  MD Shahid  06/28/24

## 2024-06-28 NOTE — NURSING NOTE
"                             Wound, Ostomy and Continence (WOC) Note    Reason for WOC Consultation: Pressure injury to right gluteal, not POA    Patient awake, up in chair.  Family/support person not present.   Subjective: Patient states,\" I was not moving a lot when I first got here.\"    Skin/Wound Assessment:     Wound Type: Suspected deep tissue pressure injury versus deep bruise  Location: Right sacrum  Measurements: 1.5 x 1 x 0 cm  Wound Bed: non-blanchable and maroon/purple, not indurated  Wound Edges: Irregular  Periwound Skin: redness   Drainage Characteristics/Odor: none  Drainage Amount: none  Pain: No   Image:     Summary and Recommendation(s):     Area of concern at the right sacrum.  Possible deep tissue pressure injury versus contusion.  No Allevyn sacral dressing in place.  2.   Venelex ordered twice daily.  Should be covered with an Allevyn sacral dressing.  WOC will follow-up on Monday to reassess.  3.   Apply Allevyn sacral dressing and bilateral heel dressings.  4.   Refer to wound care instructions in the \"Orders\" tab  5.   Specialty support surface in place: Foam Mattress         Pressure Injury Prevention Protocol (initiate for a Shelton Scale Score of <18):     Most recent Shelton Scale score:  Sensory Perception: 3-->slightly limited  Moisture: 4-->rarely moist  Activity: 3-->walks occasionally  Mobility: 3-->slightly limited  Nutrition: 3-->adequate  Friction and Shear: 1-->problem  Shelton Score: 17 (06/27/24 2038)       -Apply Allevyn foam dressing to sacrum/coccyx and heels.     -Turn q 2 hr. using an offloading foam wedge/pillow.    -Apply moisture barrier cream to bottom BID & PRN, if incontinent.      Thank you for consulting the WOC Nurse.  WOC Team will continue to follow.  Please re consult if the wound(s) worsens.     Arnav Louis RN, BSN, CCRN, CWOCN  Wound, Ostomy and Continence (WOC) Department  [facility]          "

## 2024-06-28 NOTE — PAYOR COMM NOTE
"[facility]# [id number]   Clinical Update    Utilization Review  Phone [phone]  Fax [phone]    [facility]  [address] [place] [address]           Jaycob Scott II (64 y.o. Male)       Date of Birth   1960    Social Security Number       Address   [address] [place] [address]    Home Phone   [phone]    MRN   [id number]       Episcopalian   None    Marital Status                               Admission Date   6/21/24    Admission Type   Emergency    Admitting Provider   Edda Key MD    Attending Provider   Edda Key MD    Department, Room/Bed   [facility] 6B, N636/1       Discharge Date       Discharge Disposition       Discharge Destination                                 Attending Provider: Edda Key MD    Allergies: Ketamine, Nsaids, Contrast Dye (Echo Or Unknown Ct/mr)    Isolation: None   Infection: None   Code Status: CPR    Ht: 185.4 cm (73\")   Wt: 156 kg (343 lb)    Admission Cmt: None   Principal Problem: Acute on chronic heart failure with preserved ejection fraction (HFpEF) [I50.33]                   Active Insurance as of 6/21/2024       Primary Coverage       Payor Plan Insurance Group Employer/Plan Group    ANTHEM MEDICARE REPLACEMENT ANTHEM MEDICARE ADVANTAGE [id number]       Payor Plan Address Payor Plan Phone Number Payor Plan Fax Number Effective Dates    [address] [phone]  10/1/2020 - None Entered    [facility] [id number]         Subscriber Name Subscriber Birth Date Member ID       JAYCOB SCOTT II 1960 [id number]               Secondary Coverage       Payor Plan Insurance Group Employer/Plan Group    [place]        Payor Plan Address Payor Plan Phone Number Payor Plan Fax Number Effective Dates    [address] [phone]  6/21/2024 - None Entered    [facility] [id number]         Subscriber Name Subscriber Birth Date Member ID       " KIKI ELAM  1960 [id number]                     Emergency Contacts        (Rel.) Home Phone Work Phone Mobile Phone    Kiki Elam (Son) [phone] -- --    isidra elam (Son) -- -- [phone]    Rahul Elam (Son) -- -- [phone]              Vital Signs (last day)       Date/Time Temp Temp src Pulse Resp BP Patient Position SpO2    06/28/24 1450 97.7 (36.5) Axillary 85 18 103/66 Sitting 96    06/28/24 1221 -- -- 82 19 -- Sitting --    06/28/24 1040 97.6 (36.4) Axillary 81 18 105/66 Sitting 92    06/28/24 0858 -- -- 71 19 -- Sitting 100    06/28/24 0720 97.7 (36.5) Oral 87 18 110/70 Sitting --    06/28/24 0403 97.7 (36.5) Oral 81 18 114/79 Sitting 97    06/28/24 0300 -- -- 87 -- -- -- 94    06/28/24 0200 -- -- 77 -- -- -- 90    06/28/24 0100 -- -- 79 -- -- -- 94    06/28/24 0000 -- -- 73 -- -- -- 95    06/27/24 2301 98.1 (36.7) Oral 83 18 115/72 Sitting 98    06/27/24 2300 -- -- 82 -- 115/72 -- 97    06/27/24 1938 97.8 (36.6) Oral 81 18 122/71 Sitting 97    06/27/24 1906 -- -- -- 18 -- -- --    06/27/24 1800 -- -- 84 -- -- -- 97    06/27/24 1700 -- -- 77 -- -- -- 95    06/27/24 1600 -- -- 81 -- -- -- --    06/27/24 1500 -- -- 69 -- -- -- 93    06/27/24 1401 -- -- -- 17 -- -- --    06/27/24 1400 -- -- 78 -- -- -- 98    06/27/24 1105 98 (36.7) Oral 67 18 103/69 Lying 94    06/27/24 0809 -- -- 69 -- 114/67 -- --    06/27/24 0725 97.4 (36.3) Oral 84 18 114/67 Lying 96    06/27/24 0255 97.4 (36.3) Oral 94 18 111/72 Lying 98          Current Facility-Administered Medications   Medication Dose Route Frequency Provider Last Rate Last Admin    acetaminophen (TYLENOL) tablet 650 mg  650 mg Oral Q4H PRN Mihir Chacko APRN        Or    acetaminophen (TYLENOL) 160 MG/5ML oral solution 650 mg  650 mg Oral Q4H PRN Mihir Chacko APRN        Or    acetaminophen (TYLENOL) suppository 650 mg  650 mg Rectal Q4H PRN Mihir Chacko APRN        albumin human 25 % IV SOLN 25 g  25 g  Intravenous TID William Bartlett MD   25 g at 06/28/24 0849    albuterol (PROVENTIL) nebulizer solution 0.083% 2.5 mg/3mL  2.5 mg Nebulization Q6H PRN Edda Key MD        sennosides-docusate (PERICOLACE) 8.6-50 MG per tablet 2 tablet  2 tablet Oral BID Fariba Mckeon, APRN   2 tablet at 06/28/24 0850    And    polyethylene glycol (MIRALAX) packet 17 g  17 g Oral BID Fariba Mckeon, APRN   17 g at 06/28/24 0849    And    bisacodyl (DULCOLAX) EC tablet 5 mg  5 mg Oral Daily PRN Fariba Mckeon APRN   5 mg at 06/25/24 0534    And    bisacodyl (DULCOLAX) suppository 10 mg  10 mg Rectal Daily PRN Fariba Mckeon, APRN        Calcium Replacement - Follow Nurse / BPA Driven Protocol   Does not apply PRN Mihir Chacko APRN        castor oil-balsam peru (VENELEX) ointment 1 Application  1 Application Topical Q12H Edda Key MD   1 Application at 06/28/24 1235    dextrose (D50W) (25 g/50 mL) IV injection 25 g  25 g Intravenous Q15 Min PRN Mihir Chacko APRN        dextrose (GLUTOSE) oral gel 15 g  15 g Oral Q15 Min PRN Mihir Chacko APRN        ferrous sulfate tablet 325 mg  325 mg Oral Daily With Breakfast Mihir Chacko APRN   325 mg at 06/28/24 0851    furosemide (LASIX) injection 80 mg  80 mg Intravenous BID Edda Key MD   80 mg at 06/28/24 0851    glucagon (GLUCAGEN) injection 1 mg  1 mg Intramuscular Q15 Min PRN Mihir Chacko APRN        heparin (porcine) 5000 UNIT/ML injection 5,000 Units  5,000 Units Subcutaneous Q8H Mihir Chacko APRN   5,000 Units at 06/23/24 1332    Insulin Lispro (humaLOG) injection 2-7 Units  2-7 Units Subcutaneous 4x Daily AC & at Bedtime Mihir Chacko APRN   3 Units at 06/28/24 1235    ipratropium-albuterol (DUO-NEB) nebulizer solution 3 mL  3 mL Nebulization 4x Daily - RT Edda Key MD   3 mL at 06/28/24 1221    magnesium hydroxide (MILK OF MAGNESIA) 400 MG/5ML suspension 30 mL  30 mL Oral Daily PRN  Fariba Mckeon APRN   30 mL at 06/24/24 2205    Magnesium Low Dose Replacement - Follow Nurse / BPA Driven Protocol   Does not apply PRN Ginan, Mihir A, APRN        nitroglycerin (NITROSTAT) SL tablet 0.4 mg  0.4 mg Sublingual Q5 Min PRN Ginna, Mihir A, APRN        pantoprazole (PROTONIX) EC tablet 40 mg  40 mg Oral Q AM Ginna, Mihir A, APRN   40 mg at 06/28/24 0455    Pharmacy Consult - MTM   Does not apply Daily Jose Aleman RPH        Phosphorus Replacement - Follow Nurse / BPA Driven Protocol   Does not apply PRN Ginna, Mihir A, APRN        Potassium Replacement - Follow Nurse / BPA Driven Protocol   Does not apply PRN Ginna, Mihir A, APRN        prochlorperazine (COMPAZINE) injection 5 mg  5 mg Intravenous Q6H PRN Edda Key MD        sodium chloride 0.9 % flush 10 mL  10 mL Intravenous PRN Ginna, Mihir A, APRN        sodium chloride 0.9 % flush 10 mL  10 mL Intravenous Q12H Ginna, Mihir A, APRN   10 mL at 06/28/24 0853    sodium chloride 0.9 % flush 10 mL  10 mL Intravenous PRN Ginna, Mihir A, APRN        sodium chloride 0.9 % infusion 40 mL  40 mL Intravenous PRN Ginna, Mihir A, APRN        spironolactone (ALDACTONE) tablet 25 mg  25 mg Oral Daily William Bartlett MD   25 mg at 06/28/24 0850     Lab Results (last 48 hours)       Procedure Component Value Units Date/Time    POC Glucose Once [[id number]]  (Abnormal) Collected: 06/28/24 1124    Specimen: Blood Updated: 06/28/24 1126     Glucose 219 mg/dL     Vitamin D,25-Hydroxy [[id number]]  (Abnormal) Collected: 06/28/24 0448    Specimen: Blood Updated: 06/28/24 1000     25 Hydroxy, Vitamin D 21.6 ng/ml     Narrative:      Reference Range for Total Vitamin D 25(OH)     Deficiency <20.0 ng/mL   Insufficiency 21-29 ng/mL   Sufficiency  ng/mL  Toxicity >100 ng/ml      Sedimentation Rate [[id number]]  (Normal) Collected: 06/28/24 0843    Specimen: Blood Updated: 06/28/24 0925     Sed Rate 9 mm/hr     Calcium, Ionized [[id number]]   (Normal) Collected: 06/28/24 0448    Specimen: Blood Updated: 06/28/24 0738     Ionized Calcium 1.23 mmol/L     Uric Acid [[id number]]  (Abnormal) Collected: 06/28/24 0448    Specimen: Blood Updated: 06/28/24 0625     Uric Acid 12.0 mg/dL      Comment: Falsely depressed results may occur on samples drawn from patients receiving N-Acetylcysteine (NAC) or Metamizole.       PTH, Intact [[id number]]  (Normal) Collected: 06/28/24 0448    Specimen: Blood Updated: 06/28/24 0611     PTH, Intact 50.6 pg/mL     Narrative:      Results may be falsely decreased if patient taking Biotin.      POC Glucose Once [[id number]]  (Abnormal) Collected: 06/27/24 1941    Specimen: Blood Updated: 06/27/24 1942     Glucose 212 mg/dL     POC Glucose Once [[id number]]  (Abnormal) Collected: 06/27/24 1649    Specimen: Blood Updated: 06/27/24 1650     Glucose 156 mg/dL     POC Glucose Once [[id number]]  (Abnormal) Collected: 06/27/24 1123    Specimen: Blood Updated: 06/27/24 1125     Glucose 199 mg/dL     Basic Metabolic Panel [[id number]]  (Abnormal) Collected: 06/27/24 0440    Specimen: Blood Updated: 06/27/24 0556     Glucose 147 mg/dL      BUN 69 mg/dL      Creatinine 2.24 mg/dL      Sodium 133 mmol/L      Potassium 3.7 mmol/L      Chloride 92 mmol/L      CO2 27.0 mmol/L      Calcium 9.2 mg/dL      BUN/Creatinine Ratio 30.8     Anion Gap 14.0 mmol/L      eGFR 31.7 mL/min/1.73     Narrative:      GFR Normal >60  Chronic Kidney Disease <60  Kidney Failure <15      CBC (No Diff) [[id number]]  (Abnormal) Collected: 06/27/24 0440    Specimen: Blood Updated: 06/27/24 0525     WBC 8.09 10*3/mm3      RBC 2.83 10*6/mm3      Hemoglobin 8.7 g/dL      Hematocrit 27.4 %      MCV 96.8 fL      MCH 30.7 pg      MCHC 31.8 g/dL      RDW 15.3 %      RDW-SD 52.8 fl      MPV 9.9 fL      Platelets 186 10*3/mm3     Creatinine Urine Random (kidney function) GFR component - Urine, Clean Catch [[id number]] Collected: 06/26/24 1621    Specimen: Urine, Clean Catch Updated:  06/27/24 0043     Creatinine, Urine 39.5 mg/dL     Narrative:      Reference intervals for random urine have not been established.  Clinical usage is dependent upon physician's interpretation in combination with other laboratory tests.       POC Glucose Once [[id number]]  (Abnormal) Collected: 06/26/24 2010    Specimen: Blood Updated: 06/26/24 2011     Glucose 186 mg/dL     Sodium, Urine, Random - Urine, Clean Catch [[id number]] Collected: 06/26/24 1621    Specimen: Urine, Clean Catch Updated: 06/26/24 1722     Sodium, Urine 20 mmol/L     Narrative:      Reference intervals for random urine have not been established.  Clinical usage is dependent upon physician's interpretation in combination with other laboratory tests.       Potassium [[id number]]  (Normal) Collected: 06/26/24 1620    Specimen: Blood Updated: 06/26/24 1657     Potassium 3.9 mmol/L     POC Glucose Once [[id number]]  (Abnormal) Collected: 06/26/24 1632    Specimen: Blood Updated: 06/26/24 1633     Glucose 181 mg/dL           Imaging Results (Last 48 Hours)       Procedure Component Value Units Date/Time    US Renal Limited [[id number]] Collected: 06/26/24 2028     Updated: 06/26/24 2034    Narrative:      US RENAL LIMITED    Date of Exam: 6/26/2024 5:05 PM EDT    Indication: NASRIN.    Comparison: No comparisons available.    Technique: Grayscale and color Doppler ultrasound evaluation of the kidneys and urinary bladder was performed.      Findings:  RIGHT kidney measures 9.5 x 5.5 x 5.9 cm.  Kidney echogenicity, size, and vascularity appear within normal limits. There is no solid kidney mass.  No echogenic shadowing stone.  No hydronephrosis.    LEFT kidney measures 12.0 6.1 x 6.1 cm. Kidney echogenicity, size, and vascularity appear within normal limits. There is no solid kidney mass.  No echogenic shadowing stone.  No hydronephrosis.    Limited visualization of the urinary bladder is unremarkable.    There is ascites.        Impression:       "Impression:  1.Unremarkable appearance of the kidneys.  2.Ascites        Electronically Signed: Ken Lucas MD    6/26/2024 8:31 PM EDT    Workstation ID: [id number]             Physician Progress Notes (last 72 hours)        William Bartlett MD at 06/28/24 1423           LOS: 6 days   Patient Care Team:  Lorena Chamberlain APRN as PCP - General (Nurse Practitioner)  Lloyd Craig MD as Consulting Physician (Cardiology)    Chief Complaint: NASRIN    Subjective     No new renal labs. Rouse cath placed yesterday. Feeling much better today. Edema improving. Good UOP. ~ 1.5 liter/day.    Subjective:  Symptoms:  Stable.  No shortness of breath or chest pain.        History taken from: patient    Objective     Vital Sign Min/Max for last 24 hours  Temp  Min: 97.6 °F (36.4 °C)  Max: 98.1 °F (36.7 °C)   BP  Min: 105/66  Max: 122/71   Pulse  Min: 69  Max: 87   Resp  Min: 18  Max: 19   SpO2  Min: 90 %  Max: 100 %   No data recorded   No data recorded     Flowsheet Rows      Flowsheet Row First Filed Value   Admission Height 185.4 cm (73\") Documented at 06/21/2024 1409   Admission Weight 127 kg (279 lb) Documented at 06/21/2024 1409            No intake/output data recorded.  I/O last 3 completed shifts:  In: 580 [P.O.:480; IV Piggyback:100]  Out: 1550 [Urine:1550]    Objective:  General Appearance:  Comfortable.    Vital signs: (most recent): Blood pressure 105/66, pulse 82, temperature 97.6 °F (36.4 °C), temperature source Axillary, resp. rate 19, height 185.4 cm (73\"), weight (!) 156 kg (343 lb), SpO2 92%.  Vital signs are normal.    Output: Minimal urine output.    HEENT: Normal HEENT exam.    Lungs:  Normal effort and normal respiratory rate.  Breath sounds clear to auscultation.  He is not in respiratory distress.  No stridor.    Heart: Normal rate.  Regular rhythm.  S1 normal and S2 normal.    Abdomen: Abdomen is distended.    Extremities: There is dependent edema and local swelling.    Pulses: Distal " "pulses are intact.    Neurological: Patient is alert.    Pupils:  Pupils are equal, round, and reactive to light.                Results Review:     I reviewed the patient's new clinical results.    WBC WBC   Date Value Ref Range Status   06/27/2024 8.09 3.40 - 10.80 10*3/mm3 Final   06/26/2024 7.28 3.40 - 10.80 10*3/mm3 Final      HGB Hemoglobin   Date Value Ref Range Status   06/27/2024 8.7 (L) 13.0 - 17.7 g/dL Final   06/26/2024 8.6 (L) 13.0 - 17.7 g/dL Final      HCT Hematocrit   Date Value Ref Range Status   06/27/2024 27.4 (L) 37.5 - 51.0 % Final   06/26/2024 26.5 (L) 37.5 - 51.0 % Final      Platlets No results found for: \"LABPLAT\"   MCV MCV   Date Value Ref Range Status   06/27/2024 96.8 79.0 - 97.0 fL Final   06/26/2024 95.0 79.0 - 97.0 fL Final          Sodium Sodium   Date Value Ref Range Status   06/27/2024 133 (L) 136 - 145 mmol/L Final   06/26/2024 134 (L) 136 - 145 mmol/L Final      Potassium Potassium   Date Value Ref Range Status   06/27/2024 3.7 3.5 - 5.2 mmol/L Final   06/26/2024 3.9 3.5 - 5.2 mmol/L Final   06/26/2024 3.6 3.5 - 5.2 mmol/L Final     Comment:     Slight hemolysis detected by analyzer. Result may be falsely elevated.   06/25/2024 3.7 3.5 - 5.2 mmol/L Final     Comment:     Slight hemolysis detected by analyzer. Result may be falsely elevated.      Chloride Chloride   Date Value Ref Range Status   06/27/2024 92 (L) 98 - 107 mmol/L Final   06/26/2024 93 (L) 98 - 107 mmol/L Final      CO2 CO2   Date Value Ref Range Status   06/27/2024 27.0 22.0 - 29.0 mmol/L Final   06/26/2024 27.0 22.0 - 29.0 mmol/L Final      BUN BUN   Date Value Ref Range Status   06/27/2024 69 (H) 8 - 23 mg/dL Final   06/26/2024 64 (H) 8 - 23 mg/dL Final      Creatinine Creatinine   Date Value Ref Range Status   06/27/2024 2.24 (H) 0.76 - 1.27 mg/dL Final   06/26/2024 2.01 (H) 0.76 - 1.27 mg/dL Final      Calcium Calcium   Date Value Ref Range Status   06/27/2024 9.2 8.6 - 10.5 mg/dL Final   06/26/2024 9.3 8.6 - " "10.5 mg/dL Final      PO4 No results found for: \"CAPO4\"   Albumin Albumin   Date Value Ref Range Status   06/26/2024 3.4 (L) 3.5 - 5.2 g/dL Final      Magnesium No results found for: \"MG\"   Uric Acid Uric Acid   Date Value Ref Range Status   06/28/2024 12.0 (H) 3.4 - 7.0 mg/dL Final     Comment:     Falsely depressed results may occur on samples drawn from patients receiving N-Acetylcysteine (NAC) or Metamizole.        Medication Review: Yes    Assessment & Plan       Acute on chronic heart failure with preserved ejection fraction (HFpEF)    Elevated serum creatinine    Anemia    Hypokalemia    Cirrhosis of liver    HCV (hepatitis C virus)    Acute on chronic HFrEF (heart failure with reduced ejection fraction)      Assessment & Plan    Acute kidney disease: Last known stable cr 1.1 in 2023. Cr on recent admission at [facility] few weeks ago 1.6-1.9 mg/dl. Most recent cr 2.0-2.2 GFR 35-36ml/min. Urine sodium 20. Urine cr 39.5. Renal US no hydro     New onset liver cirrhosis: Complications include ascites and LE edema.      Volume status: Dependent edema b/l + ascites. Getting diuretics     Hypokalemia: In the setting of diuretics     Hyponatremia: Due to total body volume overload.      HFpEF: Dependent edema      Anemia: Recent hx of GI bleed. S/p EGD and colonoscopy.      Recs  Acute kidney disease etiology hemodynamic variation in renal function w diuretic use+ volume overload. HRS can't be entirely excluded.    Place foely cath ( for strict I/O) and to rule out retention  - Continue albumin 25 gm TID. ( Stop 6/29/24)  - Continue diuretics lasix 80 mg BID. Add spironolactone 25 mg daily. Hold metolazone for now  -  Need strict I/O.   - Avoid Nephrotoxic agents.   - IF SBP<110 will add midodrine.         I discussed the patients findings and my recommendations with patient and family    William Bartlett MD  06/28/24  14:23 EDT              Electronically signed by William Bartlett MD at 06/28/24 5961       William Bartlett MD " "at 06/27/24 1304           LOS: 5 days   Patient Care Team:  Lorena Chamberlain APRN as PCP - General (Nurse Practitioner)  Lloyd Craig MD as Consulting Physician (Cardiology)    Chief Complaint: NASRIN    Subjective     No significant improvement in renal function. Generalize edema noted. No UOP documented. SBP ~ 114 on recent check.     Subjective:  Symptoms:  Stable.  No shortness of breath or chest pain.        History taken from: patient    Objective     Vital Sign Min/Max for last 24 hours  Temp  Min: 97.4 °F (36.3 °C)  Max: 98 °F (36.7 °C)   BP  Min: 96/69  Max: 114/67   Pulse  Min: 67  Max: 94   Resp  Min: 18  Max: 18   SpO2  Min: 92 %  Max: 100 %   No data recorded   No data recorded     Flowsheet Rows      Flowsheet Row First Filed Value   Admission Height 185.4 cm (73\") Documented at 06/21/2024 1409   Admission Weight 127 kg (279 lb) Documented at 06/21/2024 1409            No intake/output data recorded.  I/O last 3 completed shifts:  In: 100 [IV Piggyback:100]  Out: -     Objective:  General Appearance:  Comfortable.    Vital signs: (most recent): Blood pressure 103/69, pulse 67, temperature 98 °F (36.7 °C), temperature source Oral, resp. rate 18, height 185.4 cm (73\"), weight (!) 156 kg (343 lb), SpO2 94%.  Vital signs are normal.    Output: Minimal urine output.    HEENT: Normal HEENT exam.    Lungs:  Normal effort and normal respiratory rate.  Breath sounds clear to auscultation.  He is not in respiratory distress.  No stridor.    Heart: Normal rate.  Regular rhythm.  S1 normal and S2 normal.    Abdomen: Abdomen is distended.    Extremities: There is dependent edema and local swelling.    Pulses: Distal pulses are intact.    Neurological: Patient is alert.    Pupils:  Pupils are equal, round, and reactive to light.                Results Review:     I reviewed the patient's new clinical results.    WBC WBC   Date Value Ref Range Status   06/27/2024 8.09 3.40 - 10.80 10*3/mm3 Final " "  06/26/2024 7.28 3.40 - 10.80 10*3/mm3 Final   06/25/2024 8.77 3.40 - 10.80 10*3/mm3 Final      HGB Hemoglobin   Date Value Ref Range Status   06/27/2024 8.7 (L) 13.0 - 17.7 g/dL Final   06/26/2024 8.6 (L) 13.0 - 17.7 g/dL Final   06/25/2024 8.7 (L) 13.0 - 17.7 g/dL Final      HCT Hematocrit   Date Value Ref Range Status   06/27/2024 27.4 (L) 37.5 - 51.0 % Final   06/26/2024 26.5 (L) 37.5 - 51.0 % Final   06/25/2024 27.2 (L) 37.5 - 51.0 % Final      Platlets No results found for: \"LABPLAT\"   MCV MCV   Date Value Ref Range Status   06/27/2024 96.8 79.0 - 97.0 fL Final   06/26/2024 95.0 79.0 - 97.0 fL Final   06/25/2024 96.1 79.0 - 97.0 fL Final          Sodium Sodium   Date Value Ref Range Status   06/27/2024 133 (L) 136 - 145 mmol/L Final   06/26/2024 134 (L) 136 - 145 mmol/L Final   06/25/2024 135 (L) 136 - 145 mmol/L Final      Potassium Potassium   Date Value Ref Range Status   06/27/2024 3.7 3.5 - 5.2 mmol/L Final   06/26/2024 3.9 3.5 - 5.2 mmol/L Final   06/26/2024 3.6 3.5 - 5.2 mmol/L Final     Comment:     Slight hemolysis detected by analyzer. Result may be falsely elevated.   06/25/2024 3.7 3.5 - 5.2 mmol/L Final     Comment:     Slight hemolysis detected by analyzer. Result may be falsely elevated.   06/25/2024 3.4 (L) 3.5 - 5.2 mmol/L Final      Chloride Chloride   Date Value Ref Range Status   06/27/2024 92 (L) 98 - 107 mmol/L Final   06/26/2024 93 (L) 98 - 107 mmol/L Final   06/25/2024 93 (L) 98 - 107 mmol/L Final      CO2 CO2   Date Value Ref Range Status   06/27/2024 27.0 22.0 - 29.0 mmol/L Final   06/26/2024 27.0 22.0 - 29.0 mmol/L Final   06/25/2024 27.0 22.0 - 29.0 mmol/L Final      BUN BUN   Date Value Ref Range Status   06/27/2024 69 (H) 8 - 23 mg/dL Final   06/26/2024 64 (H) 8 - 23 mg/dL Final   06/25/2024 66 (H) 8 - 23 mg/dL Final      Creatinine Creatinine   Date Value Ref Range Status   06/27/2024 2.24 (H) 0.76 - 1.27 mg/dL Final   06/26/2024 2.01 (H) 0.76 - 1.27 mg/dL Final   06/25/2024 " "1.98 (H) 0.76 - 1.27 mg/dL Final      Calcium Calcium   Date Value Ref Range Status   06/27/2024 9.2 8.6 - 10.5 mg/dL Final   06/26/2024 9.3 8.6 - 10.5 mg/dL Final   06/25/2024 9.2 8.6 - 10.5 mg/dL Final      PO4 No results found for: \"CAPO4\"   Albumin Albumin   Date Value Ref Range Status   06/26/2024 3.4 (L) 3.5 - 5.2 g/dL Final      Magnesium No results found for: \"MG\"   Uric Acid No results found for: \"URICACID\"     Medication Review: Yes    Assessment & Plan       Acute on chronic heart failure with preserved ejection fraction (HFpEF)    Elevated serum creatinine    Anemia    Hypokalemia    Cirrhosis of liver    HCV (hepatitis C virus)    Acute on chronic HFrEF (heart failure with reduced ejection fraction)      Assessment & Plan    Acute kidney disease: Last known stable cr 1.1 in 2023. Cr on recent [facility] at [facility] few weeks ago 1.6-1.9 mg/dl. Most recent cr 2.0-2.2 GFR 35-36ml/min. Urine sodium 20. Urine cr 39.5. Renal US no hydro     New onset liver cirrhosis: Complications include ascites and LE edema.      Volume status: Dependent edema b/l + ascites. Getting diuretics     Hypokalemia: In the setting of diuretics     Hyponatremia: Due to total body volume overload.      HFpEF: Dependent edema      Anemia: Recent hx of GI bleed. S/p EGD and colonoscopy.      Recs  Acute kidney disease etiology hemodynamic variation in renal function w diuretic use+ volume overload. HRS can't be entirely excluded.    Place foely cath ( for strict I/O) and to rule out retention  - Increase albumin 25 gm TID.   - Continue diuretics lasix 80 mg BID. Add spironolactone 25 mg daily. Hold metolazone for now  -  Need strict I/O.   - Avoid Nephrotoxic agents.   - IF SBP<110 will add midodrine.         I discussed the patients findings and my recommendations with patient and family    William Bartlett MD  06/27/24  13:04 EDT              Electronically signed by William Bartlett MD at 06/27/24 4559       Malachi Regan MD at 06/27/24 " "1019              [facility] Medicine Services  PROGRESS NOTE    Patient Name: Jaycob Ndiaye II  : 1960  MRN: [id number]    Date of Admission: 2024  Primary Care Physician: Lorena Chamberlain, GARRICK    Subjective   Subjective     CC:  Heart failure    HPI:  \"Not too good.\"  States that his arms and elbows \"are killing me.\"  Notes little calcium deposits developed on elbow. Already had several on his fingertips       Objective   Objective     Vital Signs:   Temp:  [97.1 °F (36.2 °C)-97.5 °F (36.4 °C)] 97.4 °F (36.3 °C)  Heart Rate:  [68-94] 69  Resp:  [18] 18  BP: ()/(65-74) 114/67     Physical Exam:  Constitutional: No acute distress, awake, alert, sitting up in chair, morbidly obese  HENT: NCAT, mucous membranes moist  Respiratory: Clear to auscultation bilaterally, respiratory effort normal   Cardiovascular: RRR, no murmurs, rubs, or gallops  Gastrointestinal: Positive bowel sounds, soft, nontender, nondistended  Musculoskeletal: venous stasis changes distal bilateral LE's with 3+pitting edema bilateral LE's  Psychiatric: Appropriate affect, cooperative  Neurologic: Oriented x 3, strength symmetric in all extremities, Cranial Nerves grossly intact to confrontation, speech clear  Skin: calcium deposits fingertips bilateral hands as well as multiple of these right elbow        Results Reviewed:  LAB RESULTS:      Lab 24  0440 24  0454 24  0754 24  0500 24  0203 24  1430   WBC 8.09 7.28 8.77 9.32 10.18 11.72*   HEMOGLOBIN 8.7* 8.6* 8.7* 9.2* 8.8* 9.2*   HEMATOCRIT 27.4* 26.5* 27.2* 28.7* 27.6* 28.5*   PLATELETS 186 195 202 233 215 217   NEUTROS ABS  --   --   --   --  8.04* 9.39*   IMMATURE GRANS (ABS)  --   --   --   --  0.07* 0.16*   LYMPHS ABS  --   --   --   --  0.94 1.02   MONOS ABS  --   --   --   --  1.01* 1.06*   EOS ABS  --   --   --   --  0.07 0.03   MCV 96.8 95.0 96.1 96.0 94.5 93.8         Lab 24  0440 " 06/26/24  1620 06/26/24  0454 06/25/24 2115 06/25/24  0754 06/23/24 2114 06/23/24  0500 06/22/24  1756 06/22/24  0203   SODIUM 133*  --  134*  --  135*  --  133*  --  135*   POTASSIUM 3.7 3.9 3.6 3.7 3.4*   < > 3.5   < > 3.4*   CHLORIDE 92*  --  93*  --  93*  --  94*  --  96*   CO2 27.0  --  27.0  --  27.0  --  26.0  --  27.0   ANION GAP 14.0  --  14.0  --  15.0  --  13.0  --  12.0   BUN 69*  --  64*  --  66*  --  66*  --  63*   CREATININE 2.24*  --  2.01*  --  1.98*  --  2.11*  --  2.12*   EGFR 31.7*  --  36.1*  --  36.8*  --  34.1*  --  33.9*   GLUCOSE 147*  --  113*  --  132*  --  146*  --  116*   CALCIUM 9.2  --  9.3  --  9.2  --  9.2  --  9.0   MAGNESIUM  --   --   --   --   --   --   --   --  2.1   PHOSPHORUS  --   --  4.7*  --   --   --   --   --   --    HEMOGLOBIN A1C  --   --   --   --   --   --   --   --  6.00*    < > = values in this interval not displayed.         Lab 06/26/24  0454 06/22/24  0203 06/21/24  1430   TOTAL PROTEIN  --  5.8* 6.2   ALBUMIN 3.4* 3.5 3.6   GLOBULIN  --  2.3 2.6   ALT (SGPT)  --  9 9   AST (SGOT)  --  13 14   BILIRUBIN  --  0.6 0.6   ALK PHOS  --  74 78         Lab 06/21/24  1627 06/21/24  1430   PROBNP  --  4,183.0*   HSTROP T 131* 137*             Lab 06/22/24  0203   IRON 30*   IRON SATURATION (TSAT) 10*   TIBC 301   TRANSFERRIN 202   FERRITIN 88.02   FOLATE 8.26   VITAMIN B 12 1,127*         Brief Urine Lab Results  (Last result in the past 365 days)        Color   Clarity   Blood   Leuk Est   Nitrite   Protein   CREAT   Urine HCG        06/26/24 1621             39.5                 Microbiology Results Abnormal       None            US Renal Limited    Result Date: 6/26/2024  US RENAL LIMITED Date of Exam: 6/26/2024 5:05 PM EDT Indication: NASRIN. Comparison: No comparisons available. Technique: Grayscale and color Doppler ultrasound evaluation of the kidneys and urinary bladder was performed. Findings: RIGHT kidney measures 9.5 x 5.5 x 5.9 cm.  Kidney echogenicity, size,  and vascularity appear within normal limits. There is no solid kidney mass.  No echogenic shadowing stone.  No hydronephrosis. LEFT kidney measures 12.0 6.1 x 6.1 cm. Kidney echogenicity, size, and vascularity appear within normal limits. There is no solid kidney mass.  No echogenic shadowing stone.  No hydronephrosis. Limited visualization of the urinary bladder is unremarkable. There is ascites.     Impression: Impression: 1.Unremarkable appearance of the kidneys. 2.Ascites Electronically Signed: Ken Lucas MD  6/26/2024 8:31 PM EDT  Workstation ID: [id number]         Current medications:  Scheduled Meds:albumin human, 25 g, Intravenous, BID  ferrous sulfate, 325 mg, Oral, Daily With Breakfast  furosemide, 80 mg, Intravenous, BID  heparin (porcine), 5,000 Units, Subcutaneous, Q8H  insulin lispro, 2-7 Units, Subcutaneous, 4x Daily AC & at Bedtime  ipratropium-albuterol, 3 mL, Nebulization, 4x Daily - RT  pantoprazole, 40 mg, Oral, Q AM  pharmacy consult - MTM, , Does not apply, Daily  senna-docusate sodium, 2 tablet, Oral, BID   And  polyethylene glycol, 17 g, Oral, BID  sodium chloride, 10 mL, Intravenous, Q12H  spironolactone, 25 mg, Oral, Daily      Continuous Infusions:   PRN Meds:.  acetaminophen **OR** acetaminophen **OR** acetaminophen    Albuterol Sulfate NEB Orderable    senna-docusate sodium **AND** polyethylene glycol **AND** bisacodyl **AND** bisacodyl    Calcium Replacement - Follow Nurse / BPA Driven Protocol    dextrose    dextrose    glucagon (human recombinant)    magnesium hydroxide    Magnesium Low Dose Replacement - Follow Nurse / BPA Driven Protocol    nitroglycerin    Phosphorus Replacement - Follow Nurse / BPA Driven Protocol    Potassium Replacement - Follow Nurse / BPA Driven Protocol    prochlorperazine    sodium chloride    sodium chloride    sodium chloride    Assessment & Plan   Assessment & Plan     Active Hospital Problems    Diagnosis  POA    **Acute on chronic heart failure with  preserved ejection fraction (HFpEF) [I50.33]  Yes    Acute on chronic HFrEF (heart failure with reduced ejection fraction) [I50.23]  Yes    Elevated serum creatinine [R79.89]  Yes    Anemia [D64.9]  Yes    Hypokalemia [E87.6]  Yes    Cirrhosis of liver [K74.60]  Yes    HCV (hepatitis C virus) [B19.20]  Yes      Resolved Hospital Problems   No resolved problems to display.        Brief Hospital Course to date:  Jaycob Ndiaye II is a 64 y.o. male with past medical history significant for CHF, COPD, cirrhosis, prostate cancer, chronic hematuria, GI bleed, HCV, HTN, and HLD who presents to the ED due to worsening shortness of breath and lower extremity edema over the past week.    This patient's problems and plans were partially entered by my partner and updated as appropriate by me 06/27/24.    Assessment/plan:     A/C HFrEF  -Follows with Cardiologist Dr. Craig  -Echo just done at [facility], will defer additional echocardiogram  -Strict I&O, daily weights  -Still with significant anasarca and lower extremity edema. Cardiology asked nephrology to manage diuresis.  Stopped metalozone yesterday and added spironolactone and continued lasix 80mg BID with slight bump in creatinine today    Significant BLE edema edema  Anasarca  --Initially had leg wraps but asked that they be removed because they were too tight.  Currently off.  --Edema worsening.  Cardiology managing diuretics  -- BLE venous duplex negative for DVT  --Will need to reach out to PT for replacement of leg wraps (may need slightly larger wrap or looser so that he will continue to wear)      Elevated Creatinine  -Baseline data deficit  -Creat 2.11 on presentation, stable this morning after diuresis  -avoid nephrotoxins as able.  Cardiology consulted nephrology to help with diuresis  -nephro recs continue lasix 80mg Bid, hold metolazone, and add spironolactone yesterday, awaiting recs for today/note mild bump up in creatinine today    ?Calcium deposits in  fingertips and right elbow  --check uric level, ionized calcium, PTH, and vitamin D     Anemia  Recent GI Bleed  -s/p EGD and colonoscopy at [facility] on 2024, records requested  -Hgb fairly stable  -Continue PPI   -continue PO Iron   -Monitor hemoglobin/hematocrit daily     Hypokalemia  -Replace per protocol     New cirrhosis   HCV  -s/p US abd on 6/10/24 that revealed nodular liver consistent with cirrhosis and moderate ascites  -S/p US guided paracentesis on 2024 at [facility] with 200 mL of skylar-colored fluid removed.  No fluid was sent to lab.  -Consider repeat abdominal US ordered to evaluate ascites   -Will need referral to establish with [facility] upon discharge per patient request     T2DM with peripheral neuropathy  -holding home Mounjaro, Hg A1c is 6.0  -Continue low dose  SSI for now.  Glucoses stable running 137-209 last 24 hours     PAF  -Xarelto discontinued  at [facility] d/t persistent hematuria      HTN  -Continue holding home bystolic for now d/t borderline BP      DVT prophylaxis:  Heparin     Expected Discharge Location and Transportation: home once medically improved and cleared by cardiology/nephrology.  Expected Discharge   Expected Discharge Date: 2024; Expected Discharge Time:      VTE Prophylaxis:  Pharmacologic VTE prophylaxis orders are present.         AM-PAC 6 Clicks Score (PT): 19 (24 0802)    CODE STATUS:   Code Status and Medical Interventions:   Ordered at: 24 1840     Level Of Support Discussed With:    Patient     Code Status (Patient has no pulse and is not breathing):    CPR (Attempt to Resuscitate)     Medical Interventions (Patient has pulse or is breathing):    Full Support       Malachi Regan MD  24        Electronically signed by Malachi Regan MD at 24 1029       Malachi Regan MD at 24 1336              [facility]  PROGRESS NOTE    Patient Name: Jaycob Ndiaye II  : 1960  MRN:  [id number]    Date of Admission: 6/21/2024  Primary Care Physician: Lorena Chamberlain, GARRICK    Subjective   Subjective     CC:  Heart failure    HPI:  Breathing ok, just can't walk very far.  Started having BM's with mg citrate so now having multiple BM's and belly feels better.  But still LE swelling.         Objective   Objective     Vital Signs:   Temp:  [97.1 °F (36.2 °C)-98.2 °F (36.8 °C)] 97.1 °F (36.2 °C)  Heart Rate:  [65-83] 77  Resp:  [16-18] 18  BP: ()/(59-72) 113/71     Physical Exam:  Constitutional: No acute distress, awake, alert, sitting up in chair, morbidly obese  HENT: NCAT, mucous membranes moist  Respiratory: Clear to auscultation bilaterally, respiratory effort normal   Cardiovascular: RRR, no murmurs, rubs, or gallops  Gastrointestinal: Positive bowel sounds, soft, nontender, nondistended  Musculoskeletal: venous stasis changes distal bilateral LE's with 3+pitting edema bilateral LE's  Psychiatric: Appropriate affect, cooperative  Neurologic: Oriented x 3, strength symmetric in all extremities, Cranial Nerves grossly intact to confrontation, speech clear  Skin: No rashes        Results Reviewed:  LAB RESULTS:      Lab 06/26/24  0454 06/25/24  0754 06/23/24  0500 06/22/24  0203 06/21/24  1430   WBC 7.28 8.77 9.32 10.18 11.72*   HEMOGLOBIN 8.6* 8.7* 9.2* 8.8* 9.2*   HEMATOCRIT 26.5* 27.2* 28.7* 27.6* 28.5*   PLATELETS 195 202 233 215 217   NEUTROS ABS  --   --   --  8.04* 9.39*   IMMATURE GRANS (ABS)  --   --   --  0.07* 0.16*   LYMPHS ABS  --   --   --  0.94 1.02   MONOS ABS  --   --   --  1.01* 1.06*   EOS ABS  --   --   --  0.07 0.03   MCV 95.0 96.1 96.0 94.5 93.8         Lab 06/26/24  0454 06/25/24 2115 06/25/24  0754 06/23/24 2114 06/23/24  0500 06/22/24  1756 06/22/24  0203 06/21/24  1430   SODIUM 134*  --  135*  --  133*  --  135* 134*   POTASSIUM 3.6 3.7 3.4* 3.7 3.5   < > 3.4* 3.2*   CHLORIDE 93*  --  93*  --  94*  --  96* 93*   CO2 27.0  --  27.0  --  26.0  --  27.0  27.0   ANION GAP 14.0  --  15.0  --  13.0  --  12.0 14.0   BUN 64*  --  66*  --  66*  --  63* 62*   CREATININE 2.01*  --  1.98*  --  2.11*  --  2.12* 2.11*   EGFR 36.1*  --  36.8*  --  34.1*  --  33.9* 34.1*   GLUCOSE 113*  --  132*  --  146*  --  116* 151*   CALCIUM 9.3  --  9.2  --  9.2  --  9.0 8.7   MAGNESIUM  --   --   --   --   --   --  2.1  --    PHOSPHORUS 4.7*  --   --   --   --   --   --   --    HEMOGLOBIN A1C  --   --   --   --   --   --  6.00*  --     < > = values in this interval not displayed.         Lab 06/26/24  0454 06/22/24  0203 06/21/24  1430   TOTAL PROTEIN  --  5.8* 6.2   ALBUMIN 3.4* 3.5 3.6   GLOBULIN  --  2.3 2.6   ALT (SGPT)  --  9 9   AST (SGOT)  --  13 14   BILIRUBIN  --  0.6 0.6   ALK PHOS  --  74 78         Lab 06/21/24  1627 06/21/24  1430   PROBNP  --  4,183.0*   HSTROP T 131* 137*             Lab 06/22/24  0203   IRON 30*   IRON SATURATION (TSAT) 10*   TIBC 301   TRANSFERRIN 202   FERRITIN 88.02   FOLATE 8.26   VITAMIN B 12 1,127*         Brief Urine Lab Results       None            Microbiology Results Abnormal       None            Duplex Venous Lower Extremity - Bilateral CAR    Result Date: 6/24/2024    Normal bilateral lower extremity venous duplex scan.          Current medications:  Scheduled Meds:albumin human, 25 g, Intravenous, BID  ferrous sulfate, 325 mg, Oral, Daily With Breakfast  furosemide, 80 mg, Intravenous, BID  heparin (porcine), 5,000 Units, Subcutaneous, Q8H  insulin lispro, 2-7 Units, Subcutaneous, 4x Daily AC & at Bedtime  ipratropium-albuterol, 3 mL, Nebulization, 4x Daily - RT  pantoprazole, 40 mg, Oral, Q AM  pharmacy consult - [place], , Does not apply, Daily  senna-docusate sodium, 2 tablet, Oral, BID   And  polyethylene glycol, 17 g, Oral, BID  sodium chloride, 10 mL, Intravenous, Q12H  spironolactone, 25 mg, Oral, Daily      Continuous Infusions:   PRN Meds:.  acetaminophen **OR** acetaminophen **OR** acetaminophen    Albuterol Sulfate NEB Orderable     senna-docusate sodium **AND** polyethylene glycol **AND** bisacodyl **AND** bisacodyl    Calcium Replacement - Follow Nurse / BPA Driven Protocol    dextrose    dextrose    glucagon (human recombinant)    magnesium hydroxide    Magnesium Low Dose Replacement - Follow Nurse / BPA Driven Protocol    nitroglycerin    Phosphorus Replacement - Follow Nurse / BPA Driven Protocol    Potassium Replacement - Follow Nurse / BPA Driven Protocol    prochlorperazine    sodium chloride    sodium chloride    sodium chloride    Assessment & Plan   Assessment & Plan     Active Hospital Problems    Diagnosis  POA    **Acute on chronic heart failure with preserved ejection fraction (HFpEF) [I50.33]  Yes    Acute on chronic HFrEF (heart failure with reduced ejection fraction) [I50.23]  Yes    Elevated serum creatinine [R79.89]  Yes    Anemia [D64.9]  Yes    Hypokalemia [E87.6]  Yes    Cirrhosis of liver [K74.60]  Yes    HCV (hepatitis C virus) [B19.20]  Yes      Resolved Hospital Problems   No resolved problems to display.        Brief Hospital Course to date:  Jaycob Ndiaye II is a 64 y.o. male with past medical history significant for CHF, COPD, cirrhosis, prostate cancer, chronic hematuria, GI bleed, HCV, HTN, and HLD who presents to the ED due to worsening shortness of breath and lower extremity edema over the past week.    This patient's problems and plans were partially entered by my partner and updated as appropriate by me 06/26/24.    Assessment/plan:     A/C HFrEF  -Follows with Cardiologist Dr. Craig  -Echo just done at [facility], will defer additional echocardiogram  -Continue 80 mg of IV Lasix twice daily with metalozone  -Strict I&O, daily weights  -Monitor creatinine daily to check creatinine and electrolytes while on IV Lasix.   -Still with significant anasarca and lower extremity edema.  Deferring further diuresis to cardiology service.  .    Significant BLE edema edema  Anasarca  --Initially had leg wraps but  asked that they be removed because they were too tight.  Currently off.  --Edema worsening.  Cardiology managing diuretics  -- BLE venous duplex negative for DVT  --Will need to reach out to PT for replacement of leg wraps (may need slightly larger wrap or looser so that he will continue to wear)      Elevated Creatinine  -Baseline data deficit  -Creat 2.11 on presentation, stable this morning after diuresis  -avoid nephrotoxins as able  -Monitor creatinine with BMP daily.  generally stable for the third day.  Cardiology consulted nephrology to help with diuresis  -nephro recs continue lasix 80mg Bid, hold metolazone, and add spironolactone     Anemia  Recent GI Bleed  -s/p EGD and colonoscopy at [facility] on 6/13/2024, records requested  -Hgb fairly stable  -Continue PPI   -continue PO Iron   -Monitor hemoglobin/hematocrit daily     Hypokalemia  -Replace per protocol     New cirrhosis   HCV  -s/p US abd on 6/10/24 that revealed nodular liver consistent with cirrhosis and moderate ascites  -S/p US guided paracentesis on 6/12/2024 at [facility] with 200 mL of skylar-colored fluid removed.  No fluid was sent to lab.  -Consider repeat abdominal US ordered to evaluate ascites   -Will need referral to establish with [facility] upon discharge per patient request     T2DM with peripheral neuropathy  -holding home Mounjaro, Hg A1c is 6.0  -Continue low dose  SSI for now.  Glucoses stable running 137-209 last 24 hours     PAF  -Xarelto discontinued 6/13 at [facility] d/t persistent hematuria      HTN  -Continue holding home bystolic for now d/t borderline BP      DVT prophylaxis:  Heparin     Expected Discharge Location and Transportation: home once medically improved and cleared by cardiology/nephrology.  Expected Discharge   Expected Discharge Date: 6/27/2024; Expected Discharge Time:      VTE Prophylaxis:  Pharmacologic VTE prophylaxis orders are present.         AM-PAC 6 Clicks Score (PT): 19 (06/25/24 1108)    CODE STATUS:   Code Status and  Medical Interventions:   Ordered at: 06/21/24 1840     Level Of Support Discussed With:    Patient     Code Status (Patient has no pulse and is not breathing):    CPR (Attempt to Resuscitate)     Medical Interventions (Patient has pulse or is breathing):    Full Support       Malachi Regan MD  06/26/24        Electronically signed by Malachi Regan MD at 06/26/24 1342       Lloyd Craig MD at 06/26/24 1012          Jaycob Ndiaye II  1960  [id number]      Cardiology will continue to follow from periphery.  Remains with controlled rates, permanent A-fib.  BP remains marginal.  Patient complains of continued edema despite getting 80 mg IV Lasix twice daily and metolazone 5 mg p.o. daily.  Will ask nephrology to consult with patient for CKD, anasarca and assistance with diuresis.    Vitals:    06/26/24 0833   BP: 105/67   Pulse: 81   Resp:    Temp:    SpO2:      , BUN 64, CR 2.01 (up from 1.98).  WBC 7.2, H GB 8.6, HCT 26.5, .    Dariana Miranda PA-C  Electronically signed by MATTHEW Zuñiga, 06/26/24, 10:14 AM EDT.      Electronically signed by Lloyd Craig MD at 06/26/24 1034          Consult Notes (last 72 hours)        William Bartlett MD at 06/26/24 1235            Patient Care Team:  Lorena Chamberlain APRN as PCP - General (Nurse Practitioner)  Lloyd Craig MD as Consulting Physician (Cardiology)    Chief complaint: Generalize edema    History of Present Illness: Jaycob Ndiaye II is a 64 y.o. male with past medical history significant for CHF, COPD, cirrhosis, prostate cancer, chronic hematuria, GI bleed, HCV, HTN, and HLD who presents to the ED due to worsening shortness of breath and lower extremity edema over the past week. Patient was recently admitted at [facility] with hematuria and possible GI bleed. Underwent EGD on that admission. Nephrology has been consulted for evaluation and management of abnormal renal function and volume issues.  Patient has no prior knowledge of low kidney function. He has hx of DM and HTN. Densie NSAID use. No family hx of ESRD. Per review of old records . Patient's serum cr ~ 1.6mg/dl during admission at [facility]. Cr peaked at 1.9mg/dl during that admission. Previous baseline cr ~ 1.1 in 2023. Cr on this admit  2.1mg/dl GFR 36. No UA.    Review of Systems   Constitutional: Negative.    HENT: Negative.     Cardiovascular:  Positive for leg swelling.   Gastrointestinal:  Positive for abdominal distention. Negative for abdominal pain.   Genitourinary:  Positive for hematuria.   Musculoskeletal: Negative.    Neurological: Negative.    Hematological: Negative.    Psychiatric/Behavioral: Negative.          Past Medical History:   Diagnosis Date    Arthritis     Cancer     Constipation     Depression     Diabetes     type 2 dm, check blood sugar once a day    History of hepatitis C     History of prostate cancer     Hypertension     Irregular heartbeat     Pacemaker     home monitoring    Prostate CA     Requires supplemental oxygen     at night with cpap    Sleep apnea     wears a cpap    SOB (shortness of breath) on exertion     Wears glasses    ,   Past Surgical History:   Procedure Laterality Date    CARDIAC ABLATION      COLONOSCOPY N/A 04/28/2021    Procedure: Colonoscopy with polypectomy;  Surgeon: Maurice Proctor MD;  Location: [facility] ENDOSCOPY;  Service: Gastroenterology;  Laterality: N/A;    COLONOSCOPY      ENDOSCOPY      KNEE SURGERY Right     1985    PACEMAKER IMPLANTATION      PROSTATE SURGERY      REPLACEMENT TOTAL KNEE Right    ,   Family History   Problem Relation Age of Onset    Breast cancer Mother     Ovarian cancer Mother     Heart failure Mother     Leukemia Father     Colon cancer Neg Hx     Colon polyps Neg Hx    ,   Social History     Socioeconomic History    Marital status:    Tobacco Use    Smoking status: Former     Types: Pipe, Cigars    Smokeless tobacco: Never   Vaping Use    Vaping  status: Former    Quit date: 9/1/2019    Substances: CBD    Devices: Refillable tank   Substance and Sexual Activity    Alcohol use: Yes     Comment: Rarely     Drug use: Never    Sexual activity: Defer     E-cigarette/Vaping    E-cigarette/Vaping Use Former User     Quit Date 9/1/19      E-cigarette/Vaping Substances    Nicotine No     THC No     CBD Yes     Flavoring No      E-cigarette/Vaping Devices    Disposable No     Refillable Tank Yes          ,   Medications Prior to Admission   Medication Sig Dispense Refill Last Dose    Budeson-Glycopyrrol-Formoterol (Breztri Aerosphere) 160-9-4.8 MCG/ACT aerosol inhaler Inhale 2 puffs 2 (Two) Times a Day.   6/21/2024    doxycycline (VIBRAMYICN) 100 MG tablet Take 1 tablet by mouth 2 (Two) Times a Day.   6/21/2024    ferrous gluconate 324 (37.5 Fe) MG tablet tablet Take 1 tablet by mouth Daily With Breakfast.   6/20/2024    furosemide (LASIX) 40 MG tablet Take 1 tablet by mouth 2 (two) times a day.   6/21/2024    linaclotide (Linzess) 72 MCG capsule capsule Take 1 capsule by mouth Daily.   6/20/2024    magnesium oxide (MAG-OX) 400 MG tablet Take 1 tablet by mouth Daily.   6/20/2024    metOLazone (ZAROXOLYN) 5 MG tablet Take 1 tablet by mouth Daily.   6/20/2024    nebivolol (BYSTOLIC) 5 MG tablet Take 1 tablet by mouth Daily.   6/20/2024    Tirzepatide (MOUNJARO) 10 MG/0.5ML solution pen-injector pen Inject 0.5 mL under the skin into the appropriate area as directed 1 (One) Time Per Week. Patient takes on Fridays 6/21/2024    spironolactone (ALDACTONE) 100 MG tablet Take 1 tablet by mouth Daily. (Patient not taking: Reported on 6/21/2024)   Not Taking   , Scheduled Meds:  ferrous sulfate, 325 mg, Oral, Daily With Breakfast  furosemide, 80 mg, Intravenous, BID  heparin (porcine), 5,000 Units, Subcutaneous, Q8H  insulin lispro, 2-7 Units, Subcutaneous, 4x Daily AC & at Bedtime  ipratropium-albuterol, 3 mL, Nebulization, 4x Daily - RT  metOLazone, 5 mg, Oral,  "Daily  pantoprazole, 40 mg, Oral, Q AM  pharmacy consult - [place], , Does not apply, Daily  senna-docusate sodium, 2 tablet, Oral, BID   And  polyethylene glycol, 17 g, Oral, BID  potassium chloride ER, 40 mEq, Oral, Q4H  sodium chloride, 10 mL, Intravenous, Q12H   , and Continuous Infusions:       Objective     Vital Signs  Temp:  [97.1 °F (36.2 °C)-98.2 °F (36.8 °C)] 97.1 °F (36.2 °C)  Heart Rate:  [65-83] 81  Resp:  [16-18] 18  BP: ()/(59-72) 113/71    No intake/output data recorded.  No intake/output data recorded.    Physical Exam    Results Review:    I reviewed the patient's new clinical results.    WBC WBC   Date Value Ref Range Status   06/26/2024 7.28 3.40 - 10.80 10*3/mm3 Final   06/25/2024 8.77 3.40 - 10.80 10*3/mm3 Final      HGB Hemoglobin   Date Value Ref Range Status   06/26/2024 8.6 (L) 13.0 - 17.7 g/dL Final   06/25/2024 8.7 (L) 13.0 - 17.7 g/dL Final      HCT Hematocrit   Date Value Ref Range Status   06/26/2024 26.5 (L) 37.5 - 51.0 % Final   06/25/2024 27.2 (L) 37.5 - 51.0 % Final      Platlets No results found for: \"LABPLAT\"   MCV MCV   Date Value Ref Range Status   06/26/2024 95.0 79.0 - 97.0 fL Final   06/25/2024 96.1 79.0 - 97.0 fL Final          Sodium Sodium   Date Value Ref Range Status   06/26/2024 134 (L) 136 - 145 mmol/L Final   06/25/2024 135 (L) 136 - 145 mmol/L Final      Potassium Potassium   Date Value Ref Range Status   06/26/2024 3.6 3.5 - 5.2 mmol/L Final     Comment:     Slight hemolysis detected by analyzer. Result may be falsely elevated.   06/25/2024 3.7 3.5 - 5.2 mmol/L Final     Comment:     Slight hemolysis detected by analyzer. Result may be falsely elevated.   06/25/2024 3.4 (L) 3.5 - 5.2 mmol/L Final   06/23/2024 3.7 3.5 - 5.2 mmol/L Final      Chloride Chloride   Date Value Ref Range Status   06/26/2024 93 (L) 98 - 107 mmol/L Final   06/25/2024 93 (L) 98 - 107 mmol/L Final      CO2 CO2   Date Value Ref Range Status   06/26/2024 27.0 22.0 - 29.0 mmol/L Final " "  06/25/2024 27.0 22.0 - 29.0 mmol/L Final      BUN BUN   Date Value Ref Range Status   06/26/2024 64 (H) 8 - 23 mg/dL Final   06/25/2024 66 (H) 8 - 23 mg/dL Final      Creatinine Creatinine   Date Value Ref Range Status   06/26/2024 2.01 (H) 0.76 - 1.27 mg/dL Final   06/25/2024 1.98 (H) 0.76 - 1.27 mg/dL Final      Calcium Calcium   Date Value Ref Range Status   06/26/2024 9.3 8.6 - 10.5 mg/dL Final   06/25/2024 9.2 8.6 - 10.5 mg/dL Final      PO4 No results found for: \"CAPO4\"   Albumin Albumin   Date Value Ref Range Status   06/26/2024 3.4 (L) 3.5 - 5.2 g/dL Final      Magnesium No results found for: \"MG\"   Uric Acid No results found for: \"URICACID\"         Assessment & Plan       Acute on chronic heart failure with preserved ejection fraction (HFpEF)    Elevated serum creatinine    Anemia    Hypokalemia    Cirrhosis of liver    HCV (hepatitis C virus)    Acute on chronic HFrEF (heart failure with reduced ejection fraction)      Assessment & Plan     Acute kidney disease: Last known stable cr 1.1 in 2023. Cr on recent admission at [facility] few weeks ago 1.6-1.9 mg/dl. Most recent cr 2.0-2.1 GFR 35-36ml/min. UA, Urine lytes pending.     New onset liver cirrhosis: Complications include ascites and LE edema.     Volume status: Dependent edema b/l + ascites. Getting diuretics    Hypokalemia: In the setting of diuretics    Hyponatremia: Due to total body volume overload.     HFpEF: Dependent edema      Anemia: Recent hx of GI bleed. S/p EGD and colonoscopy.     Recs  Acute kidney disease etiology hemodynamic variation in renal function w diuretic use+ volume overload. HRS can't be entirely excluded.   - Check urine Na, cl and FeNA  - Renal US  - Add albumin 25 gm TID  - Continue diuretics lasix 80 mg BID. Add spironolactone 25 mg daily. Hold metolazone for now  -  Need strict I/O.   - Avoid Nephrotoxic agents. .      I discussed the patients findings and my recommendations with patient and family    William Bartlett, " MD  06/26/24  12:35 EDT              Electronically signed by William Bartlett MD at 06/26/24 5892

## 2024-06-29 LAB
ANION GAP SERPL CALCULATED.3IONS-SCNC: 14 MMOL/L (ref 5–15)
BUN SERPL-MCNC: 76 MG/DL (ref 8–23)
BUN/CREAT SERPL: 35.5 (ref 7–25)
CALCIUM SPEC-SCNC: 9.3 MG/DL (ref 8.6–10.5)
CHLORIDE SERPL-SCNC: 91 MMOL/L (ref 98–107)
CO2 SERPL-SCNC: 28 MMOL/L (ref 22–29)
CREAT SERPL-MCNC: 2.14 MG/DL (ref 0.76–1.27)
EGFRCR SERPLBLD CKD-EPI 2021: 33.5 ML/MIN/1.73
GLUCOSE BLDC GLUCOMTR-MCNC: 187 MG/DL (ref 70–130)
GLUCOSE BLDC GLUCOMTR-MCNC: 247 MG/DL (ref 70–130)
GLUCOSE BLDC GLUCOMTR-MCNC: 248 MG/DL (ref 70–130)
GLUCOSE SERPL-MCNC: 184 MG/DL (ref 65–99)
HEMOCCULT STL QL: NEGATIVE
PHOSPHATE SERPL-MCNC: 4.7 MG/DL (ref 2.5–4.5)
POTASSIUM SERPL-SCNC: 3.4 MMOL/L (ref 3.5–5.2)
POTASSIUM SERPL-SCNC: 3.9 MMOL/L (ref 3.5–5.2)
SODIUM SERPL-SCNC: 133 MMOL/L (ref 136–145)

## 2024-06-29 PROCEDURE — 29580 STRAPPING UNNA BOOT: CPT

## 2024-06-29 PROCEDURE — 84100 ASSAY OF PHOSPHORUS: CPT | Performed by: INTERNAL MEDICINE

## 2024-06-29 PROCEDURE — 84132 ASSAY OF SERUM POTASSIUM: CPT | Performed by: INTERNAL MEDICINE

## 2024-06-29 PROCEDURE — 99232 SBSQ HOSP IP/OBS MODERATE 35: CPT | Performed by: INTERNAL MEDICINE

## 2024-06-29 PROCEDURE — 94799 UNLISTED PULMONARY SVC/PX: CPT

## 2024-06-29 PROCEDURE — 82272 OCCULT BLD FECES 1-3 TESTS: CPT | Performed by: INTERNAL MEDICINE

## 2024-06-29 PROCEDURE — 94761 N-INVAS EAR/PLS OXIMETRY MLT: CPT

## 2024-06-29 PROCEDURE — 82948 REAGENT STRIP/BLOOD GLUCOSE: CPT

## 2024-06-29 PROCEDURE — 94664 DEMO&/EVAL PT USE INHALER: CPT

## 2024-06-29 PROCEDURE — 63710000001 INSULIN LISPRO (HUMAN) PER 5 UNITS: Performed by: NURSE PRACTITIONER

## 2024-06-29 PROCEDURE — 80048 BASIC METABOLIC PNL TOTAL CA: CPT | Performed by: INTERNAL MEDICINE

## 2024-06-29 PROCEDURE — 63710000001 PREDNISONE PER 1 MG: Performed by: INTERNAL MEDICINE

## 2024-06-29 PROCEDURE — 25010000002 FUROSEMIDE PER 20 MG: Performed by: INTERNAL MEDICINE

## 2024-06-29 RX ORDER — AMOXICILLIN 250 MG
2 CAPSULE ORAL 2 TIMES DAILY
Status: DISCONTINUED | OUTPATIENT
Start: 2024-06-29 | End: 2024-07-08 | Stop reason: HOSPADM

## 2024-06-29 RX ORDER — POTASSIUM CHLORIDE 20 MEQ/1
40 TABLET, EXTENDED RELEASE ORAL EVERY 4 HOURS
Qty: 4 TABLET | Refills: 0 | Status: COMPLETED | OUTPATIENT
Start: 2024-06-29 | End: 2024-06-29

## 2024-06-29 RX ORDER — BISACODYL 10 MG
10 SUPPOSITORY, RECTAL RECTAL DAILY PRN
Status: DISCONTINUED | OUTPATIENT
Start: 2024-06-29 | End: 2024-07-08 | Stop reason: HOSPADM

## 2024-06-29 RX ORDER — BISACODYL 5 MG/1
5 TABLET, DELAYED RELEASE ORAL DAILY PRN
Status: DISCONTINUED | OUTPATIENT
Start: 2024-06-29 | End: 2024-07-08 | Stop reason: HOSPADM

## 2024-06-29 RX ORDER — POLYETHYLENE GLYCOL 3350 17 G/17G
17 POWDER, FOR SOLUTION ORAL 2 TIMES DAILY
Status: DISCONTINUED | OUTPATIENT
Start: 2024-06-29 | End: 2024-07-08 | Stop reason: HOSPADM

## 2024-06-29 RX ORDER — COLCHICINE 0.6 MG/1
0.6 TABLET ORAL DAILY
Status: DISCONTINUED | OUTPATIENT
Start: 2024-06-29 | End: 2024-07-08 | Stop reason: HOSPADM

## 2024-06-29 RX ORDER — PREDNISONE 20 MG/1
40 TABLET ORAL ONCE
Status: COMPLETED | OUTPATIENT
Start: 2024-06-29 | End: 2024-06-29

## 2024-06-29 RX ADMIN — INSULIN LISPRO 2 UNITS: 100 INJECTION, SOLUTION INTRAVENOUS; SUBCUTANEOUS at 09:25

## 2024-06-29 RX ADMIN — POTASSIUM CHLORIDE 40 MEQ: 1500 TABLET, EXTENDED RELEASE ORAL at 09:24

## 2024-06-29 RX ADMIN — FERROUS SULFATE TAB 325 MG (65 MG ELEMENTAL FE) 325 MG: 325 (65 FE) TAB at 09:25

## 2024-06-29 RX ADMIN — BISACODYL 5 MG: 5 TABLET, COATED ORAL at 02:23

## 2024-06-29 RX ADMIN — Medication 10 ML: at 09:26

## 2024-06-29 RX ADMIN — FUROSEMIDE 80 MG: 10 INJECTION, SOLUTION INTRAMUSCULAR; INTRAVENOUS at 17:44

## 2024-06-29 RX ADMIN — PANTOPRAZOLE SODIUM 40 MG: 40 TABLET, DELAYED RELEASE ORAL at 09:25

## 2024-06-29 RX ADMIN — SENNOSIDES AND DOCUSATE SODIUM 2 TABLET: 8.6; 5 TABLET ORAL at 21:22

## 2024-06-29 RX ADMIN — POLYETHYLENE GLYCOL 3350 17 G: 17 POWDER, FOR SOLUTION ORAL at 21:22

## 2024-06-29 RX ADMIN — IPRATROPIUM BROMIDE AND ALBUTEROL SULFATE 3 ML: 2.5; .5 SOLUTION RESPIRATORY (INHALATION) at 12:16

## 2024-06-29 RX ADMIN — INSULIN LISPRO 2 UNITS: 100 INJECTION, SOLUTION INTRAVENOUS; SUBCUTANEOUS at 12:12

## 2024-06-29 RX ADMIN — Medication 1 APPLICATION: at 09:26

## 2024-06-29 RX ADMIN — SPIRONOLACTONE 25 MG: 25 TABLET ORAL at 09:25

## 2024-06-29 RX ADMIN — ALBUTEROL SULFATE 2.5 MG: 2.5 SOLUTION RESPIRATORY (INHALATION) at 04:35

## 2024-06-29 RX ADMIN — IPRATROPIUM BROMIDE AND ALBUTEROL SULFATE 3 ML: 2.5; .5 SOLUTION RESPIRATORY (INHALATION) at 20:46

## 2024-06-29 RX ADMIN — POTASSIUM CHLORIDE 40 MEQ: 1500 TABLET, EXTENDED RELEASE ORAL at 12:12

## 2024-06-29 RX ADMIN — Medication 10 ML: at 21:22

## 2024-06-29 RX ADMIN — FUROSEMIDE 80 MG: 10 INJECTION, SOLUTION INTRAMUSCULAR; INTRAVENOUS at 09:25

## 2024-06-29 RX ADMIN — COLCHICINE 0.6 MG: 0.6 TABLET ORAL at 12:12

## 2024-06-29 RX ADMIN — INSULIN LISPRO 3 UNITS: 100 INJECTION, SOLUTION INTRAVENOUS; SUBCUTANEOUS at 17:44

## 2024-06-29 RX ADMIN — POLYETHYLENE GLYCOL 3350 17 G: 17 POWDER, FOR SOLUTION ORAL at 09:39

## 2024-06-29 RX ADMIN — PREDNISONE 40 MG: 20 TABLET ORAL at 12:12

## 2024-06-29 RX ADMIN — IPRATROPIUM BROMIDE AND ALBUTEROL SULFATE 3 ML: 2.5; .5 SOLUTION RESPIRATORY (INHALATION) at 09:03

## 2024-06-29 RX ADMIN — SENNOSIDES AND DOCUSATE SODIUM 2 TABLET: 8.6; 5 TABLET ORAL at 09:39

## 2024-06-29 RX ADMIN — INSULIN LISPRO 3 UNITS: 100 INJECTION, SOLUTION INTRAVENOUS; SUBCUTANEOUS at 21:22

## 2024-06-29 NOTE — PROGRESS NOTES
" LOS: 7 days   Patient Care Team:  Lorena Chamberlain APRN as PCP - General (Nurse Practitioner)  Lloyd Craig MD as Consulting Physician (Cardiology)    Chief Complaint: NASRIN    Subjective   Chart reviewed  Creatinine 2.14, sodium 133, potassium 3.4, uric acid 12.0, vitamin D 21.6.  Patient complains of ankle joint pain, right toe tenderness and pain.    History taken from: patient    Objective     Vital Sign Min/Max for last 24 hours  Temp  Min: 96 °F (35.6 °C)  Max: 97.8 °F (36.6 °C)   BP  Min: 97/66  Max: 111/75   Pulse  Min: 69  Max: 103   Resp  Min: 18  Max: 19   SpO2  Min: 91 %  Max: 100 %   No data recorded   No data recorded     Flowsheet Rows      Flowsheet Row First Filed Value   Admission Height 185.4 cm (73\") Documented at 06/21/2024 1409   Admission Weight 127 kg (279 lb) Documented at 06/21/2024 1409            No intake/output data recorded.  I/O last 3 completed shifts:  In: 480 [P.O.:480]  Out: 3875 [Urine:3875]    Objective:  Physical examination:  General Appearance: Alert, oriented, mild to moderate distress.  Eyes: PER, EOMI.  Neck: Supple no JVD.  Lungs: Clear auscultation, no rales rhonchi's, equal chest movement, nonlabored.  Heart: No gallop, murmur, rub, RRR.  Abdomen: Soft, nontender, positive bowel sounds, no organomegaly.  Extremities: Positive right ankle swelling and tenderness.  Positive right toe swelling and tenderness.  Positive edema, no cyanosis.  Neuro: No focal deficit, moving all extremities, alert oriented X 3    Results Review:     I reviewed the patient's new clinical results.    WBC WBC   Date Value Ref Range Status   06/27/2024 8.09 3.40 - 10.80 10*3/mm3 Final      HGB Hemoglobin   Date Value Ref Range Status   06/27/2024 8.7 (L) 13.0 - 17.7 g/dL Final      HCT Hematocrit   Date Value Ref Range Status   06/27/2024 27.4 (L) 37.5 - 51.0 % Final      Platlets No results found for: \"LABPLAT\"   MCV MCV   Date Value Ref Range Status   06/27/2024 96.8 79.0 - " "97.0 fL Final          Sodium Sodium   Date Value Ref Range Status   06/29/2024 133 (L) 136 - 145 mmol/L Final   06/27/2024 133 (L) 136 - 145 mmol/L Final      Potassium Potassium   Date Value Ref Range Status   06/29/2024 3.4 (L) 3.5 - 5.2 mmol/L Final   06/27/2024 3.7 3.5 - 5.2 mmol/L Final   06/26/2024 3.9 3.5 - 5.2 mmol/L Final      Chloride Chloride   Date Value Ref Range Status   06/29/2024 91 (L) 98 - 107 mmol/L Final   06/27/2024 92 (L) 98 - 107 mmol/L Final      CO2 CO2   Date Value Ref Range Status   06/29/2024 28.0 22.0 - 29.0 mmol/L Final   06/27/2024 27.0 22.0 - 29.0 mmol/L Final      BUN BUN   Date Value Ref Range Status   06/29/2024 76 (H) 8 - 23 mg/dL Final   06/27/2024 69 (H) 8 - 23 mg/dL Final      Creatinine Creatinine   Date Value Ref Range Status   06/29/2024 2.14 (H) 0.76 - 1.27 mg/dL Final   06/27/2024 2.24 (H) 0.76 - 1.27 mg/dL Final      Calcium Calcium   Date Value Ref Range Status   06/29/2024 9.3 8.6 - 10.5 mg/dL Final   06/27/2024 9.2 8.6 - 10.5 mg/dL Final      PO4 No results found for: \"CAPO4\"   Albumin No results found for: \"ALBUMIN\"     Magnesium No results found for: \"MG\"   Uric Acid Uric Acid   Date Value Ref Range Status   06/28/2024 12.0 (H) 3.4 - 7.0 mg/dL Final     Comment:     Falsely depressed results may occur on samples drawn from patients receiving N-Acetylcysteine (NAC) or Metamizole.        Medication Review: Yes    Assessment & Plan       Acute on chronic heart failure with preserved ejection fraction (HFpEF)    Elevated serum creatinine    Anemia    Hypokalemia    Cirrhosis of liver    HCV (hepatitis C virus)    Acute on chronic HFrEF (heart failure with reduced ejection fraction)           1.  Acute kidney disease: Last known stable cr 1.1 in 2023. Cr on recent admission at [facility] few weeks ago 1.6-1.9 mg/dl. Most recent cr 2.0-2.2 GFR 35-36ml/min. Urine sodium 20. Urine cr 39.5. Renal US no hydro     2.  New onset liver cirrhosis: Complications include ascites and LE " edema.      3.  Volume status: Dependent edema b/l + ascites. Getting diuretics     4.  Hypokalemia: In the setting of diuretics     5.  Hyponatremia: Due to total body volume overload.      6.  HFpEF: Dependent edema      7.  Anemia: Recent hx of GI bleed. S/p EGD and colonoscopy.      Recommendation:  Acute gout: Hold allopurinol, okay to give colchicine and prednisone at this time.    Creatinine improved 2.14, sodium 133 low, intact PTH 50.6, vitamin D3 21.6.  Hemoglobin 8.7.  Acute kidney disease etiology hemodynamic variation in renal function w diuretic use+ volume overload. HRS can't be entirely excluded.   - Continue albumin 25 gm TID. ( Stop 6/29/24)  - Continue diuretics lasix 80 mg BID. Add spironolactone 25 mg daily. Hold metolazone for now  -  Need strict I/O.   - Avoid Nephrotoxic agents.   - IF SBP<110 will add midodrine.   High risk complex patient with multiple medical problems     Discussed with RN in the room.    Milton Muñoz MD  06/29/24  11:13 EDT

## 2024-06-29 NOTE — THERAPY WOUND CARE TREATMENT
Acute Care - Wound/Debridement Treatment Note  [facility]     Patient Name: Jaycob Ndiaye II  : 1960  MRN: [id number]  Today's Date: 2024                Admit Date: 2024    Visit Dx:    ICD-10-CM ICD-9-CM   1. Peripheral edema  R60.0 782.3   2. Congestive heart failure, unspecified HF chronicity, unspecified heart failure type  I50.9 428.0   3. Hypokalemia  E87.6 276.8   4. Acute on chronic heart failure with preserved ejection fraction (HFpEF)  I50.33 428.23   5. Screen for colon cancer  Z12.11 V76.51     RLE      LLE        Patient Active Problem List   Diagnosis    Screen for colon cancer    Acute on chronic heart failure with preserved ejection fraction (HFpEF)    Elevated serum creatinine    Anemia    Hypokalemia    Cirrhosis of liver    HCV (hepatitis C virus)    Acute on chronic HFrEF (heart failure with reduced ejection fraction)        Past Medical History:   Diagnosis Date    Arthritis     Cancer     Constipation     Depression     Diabetes     type 2 dm, check blood sugar once a day    History of hepatitis C     History of prostate cancer     Hypertension     Irregular heartbeat     Pacemaker     home monitoring    Prostate CA     Requires supplemental oxygen     at night with cpap    Sleep apnea     wears a cpap    SOB (shortness of breath) on exertion     Wears glasses         Past Surgical History:   Procedure Laterality Date    CARDIAC ABLATION      COLONOSCOPY N/A 2021    Procedure: Colonoscopy with polypectomy;  Surgeon: Maurice Proctor MD;  Location: [facility];  Service: Gastroenterology;  Laterality: N/A;    COLONOSCOPY      ENDOSCOPY      KNEE SURGERY Right     1985    PACEMAKER IMPLANTATION      PROSTATE SURGERY      REPLACEMENT TOTAL KNEE Right            Wound 24 1540 Bilateral lower leg Blisters (Active)   Wound Image    24 0944   Dressing Appearance open to air 24 0944   Closure Open to air 24 0845   Base dry;scab  "06/29/24 0944   Periwound dry;redness;warm;edematous;swelling 06/29/24 0944   Periwound Temperature warm 06/29/24 0944   Periwound Skin Turgor firm 06/29/24 0944   Drainage Characteristics/Odor serous 06/29/24 0845   Drainage Amount none 06/29/24 0944   Care, Wound cleansed with;soap and water;orion boot 06/29/24 0944   Dressing Care dressing applied 06/29/24 0944   Periwound Care cleansed with pH balanced cleanser;dry periwound area maintained 06/29/24 0944       Wound 06/28/24 0000 Right medial gluteal Pressure Injury (Active)   Pressure Injury Stage DTPI 06/28/24 1500   Dressing Appearance open to air 06/28/24 1500   Base maroon/purple 06/28/24 1838   Care, Wound cleansed with;soap and water 06/28/24 1500   Dressing Care dressing applied 06/28/24 1500   Periwound Care absorptive dressing applied;topical treatment applied 06/28/24 1500      Lymphedema       Row Name 06/29/24 1100             Lymphedema Edema Assessment    Ptting Edema Category By severity  -      Pitting Edema Severe  -         Skin Changes/Observations    Location/Assessment Lower Extremity  -      Lower Extremity Conditions bilateral:;clean;dry;shiny;crust;fragile;inflamed  -      Lower Extremity Color/Pigment bilateral:;blanchable;hyperpigmented;red;erythema  -         Lymphedema Pulses/Capillary Refill    Capillary Refill lower extremity capillary refill  -      Lower Extremity Capillary Refill right:;left:;less than 3 seconds  -         Compression/Skin Care    Compression/Skin Care skin care;wrapping location;bandaging  -      Skin Care washed/dried;lotion applied  -      Wrapping Location lower extremity  -      Wrapping Location LE bilateral:;foot to knee  -      Wrapping Comments BLE Unna boot applied in clamshell pattern, 4\" coban, size 6 spandage to secure.  -                User Key  (r) = Recorded By, (t) = Taken By, (c) = Cosigned By      Initials Name Provider Type     Jadon Michel, PT Physical Therapist "                    WOUND DEBRIDEMENT                     PT Assessment (Last 12 Hours)       PT Evaluation and Treatment       [facility] Name 06/29/24 0944          Physical Therapy Time and Intention    Subjective Information complains of;weakness;fatigue;pain;swelling  -     Document Type therapy note (daily note);wound care  -     Mode of Treatment physical therapy;individual therapy  -[facility] Name 06/29/24 0944          General Information    Patient Observations alert;cooperative;agree to therapy  -[facility] Name 06/29/24 0944          Pain    Pain Intervention(s) Repositioned;Elevated  -     Additional Documentation Pain Scale: FACES Pre/Post-Treatment (Group)  -[facility] Name 06/29/24 0944          Pain Scale: FACES Pre/Post-Treatment    Pain: FACES Scale, Pretreatment 2-->hurts little bit  -LH     Posttreatment Pain Rating 2-->hurts little bit  -     Pain Location - Side/Orientation Bilateral  -     Pain Location lower  -     Pain Location - extremity  -LH       Row Name 06/29/24 0944          Cognition    Affect/Mental Status (Cognition) WNL  -     Orientation Status (Cognition) oriented x 3  -[facility] Name 06/29/24 0944          Wound 06/22/24 1540 Bilateral lower leg Blisters    Wound - Properties Group Placement Date: 06/22/24  - Placement Time: 1540  -JM Side: Bilateral  - Orientation: lower  - Location: leg  - Primary Wound Type: Blisters  -    Wound Image Images linked: 2  -LH     Dressing Appearance open to air  -     Base dry;scab  -     Periwound dry;redness;warm;edematous;swelling  -     Periwound Temperature warm  -     Periwound Skin Turgor firm  -     Drainage Amount none  -     Care, Wound cleansed with;soap and water;orion boot  -     Dressing Care dressing applied  Unna boot only  -     Periwound Care cleansed with pH balanced cleanser;dry periwound area maintained  -     Retired Wound - Properties Group Placement Date: 06/22/24  - Placement  Time: 1540  -JM Side: Bilateral  -JM Orientation: lower  -JM Location: leg  -JM Primary Wound Type: Blisters  -JM    Retired Wound - Properties Group Date first assessed: 06/22/24  -JM Time first assessed: 1540  -JM Side: Bilateral  -JM Location: leg  -JM Primary Wound Type: Blisters  -JM      Row Name             Wound 06/28/24 0000 Right medial gluteal Pressure Injury    Wound - Properties Group Placement Date: 06/28/24 -TH Placement Time: 0000  -TH Present on Original Admission: N  -TH Side: Right  -TH Orientation: medial  -TH Location: gluteal  -TH Primary Wound Type: Pressure inj  -TH    Retired Wound - Properties Group Placement Date: 06/28/24  -TH Placement Time: 0000  -TH Present on Original Admission: N  -TH Side: Right  -TH Orientation: medial  -TH Location: gluteal  -TH Primary Wound Type: Pressure inj  -TH    Retired Wound - Properties Group Date first assessed: 06/28/24  -TH Time first assessed: 0000  -TH Present on Original Admission: N  -TH Side: Right  -TH Location: gluteal  -TH Primary Wound Type: Pressure inj  -TH      Row Name 06/29/24 0944          Coping    Observed Emotional State calm;cooperative;pleasant  -     Verbalized Emotional State acceptance  -     Trust Relationship/Rapport care explained;questions answered  -       Row Name 06/29/24 0944          Plan of Care Review    Plan of Care Reviewed With patient  -     Progress no change  -     Outcome Evaluation Pt with BLEs WILLIAM upon entering room this date, pt reports he was only able to maintain previous BLE MLW for about a day before they became too tight and pt had to remove them. Pt's BLEs with severe edema, moderate erythema, poor skin integrity, and scattered scabbed/open wounds. PT trialed application of BLE Unna boots this date with coban for very light compression to attempt to help with pt's compliance with wearing of compression. PT plans to f/u in 2-3 days with UB/MLW changes to help improve venous return, improve  skin integrity, and promote optimal wound healing potential.  -       Row Name 06/29/24 0944          Positioning and Restraints    Pre-Treatment Position in bed  -     Post Treatment Position bed  -     In Bed supine;call light within reach;encouraged to call for assist  -               User Key  (r) = Recorded By, (t) = Taken By, (c) = Cosigned By      Initials Name Provider Type     Jenny Bejarano, RN Registered Nurse    Carolina Mccurdy, PT Physical Therapist    Jadon Sullivan, PT Physical Therapist                  Physical Therapy Education       Title: PT OT SLP Therapies (In Progress)       Topic: Physical Therapy (In Progress)       Point: Mobility training (Done)       Learning Progress Summary             Patient Acceptance, E, VU by ND at 6/25/2024 1109    Acceptance, E, VU by ND at 6/22/2024 1006                         Point: Home exercise program (Not Started)       Learner Progress:  Not documented in this visit.              Point: Body mechanics (Done)       Learning Progress Summary             Patient Acceptance, E, VU by ND at 6/25/2024 1109    Acceptance, E, VU by ND at 6/22/2024 1006                         Point: Precautions (Done)       Learning Progress Summary             Patient Acceptance, E, VU by ND at 6/25/2024 1109    Acceptance, E, VU by ND at 6/22/2024 1006                                         User Key       Initials Effective Dates Name Provider Type [facility] 11/16/23 -  Maryjane Das, PT Physical Therapist PT                    Recommendation and Plan  Anticipated Discharge Disposition (PT): inpatient rehabilitation facility  Planned Therapy Interventions (PT): wound care  Therapy Frequency (PT): daily  Plan of Care Reviewed With: patient   Progress: no change       Progress: no change  Outcome Evaluation: Pt with Epi THOMAS upon entering room this date, pt reports he was only able to maintain previous BLE MLW for about a day before they  became too tight and pt had to remove them. Pt's BLEs with severe edema, moderate erythema, poor skin integrity, and scattered scabbed/open wounds. PT trialed application of BLE Unna boots this date with coban for very light compression to attempt to help with pt's compliance with wearing of compression. PT plans to f/u in 2-3 days with UB/MLW changes to help improve venous return, improve skin integrity, and promote optimal wound healing potential.  Plan of Care Reviewed With: patient            Time Calculation   PT Charges       Row Name 06/29/24 1119             Time Calculation    Start Time 0944  -      PT Goal Re-Cert Due Date 07/02/24  -         Untimed Charges    Wound Care 52908 Unna boot  -LH      29580-Unna Boot 25  -LH         Total Minutes    Untimed Charges Total Minutes 25  -LH       Total Minutes 25  -LH                User Key  (r) = Recorded By, (t) = Taken By, (c) = Cosigned By      Initials Name Provider Type     Jadon Michel, PT Physical Therapist                      Therapy Charges for Today       Code Description Service Date Service Provider Modifiers Qty    97786701899 HC PT STAPPING UNNA BOOT 6/29/2024 Jadon Michel, PT GP 1              PT G-Codes  Outcome Measure Options: AM-PAC 6 Clicks Daily Activity (OT)  AM-PAC 6 Clicks Score (PT): 19  AM-PAC 6 Clicks Score (OT): 17       Jadon Michel PT  6/29/2024

## 2024-06-29 NOTE — PLAN OF CARE
Goal Outcome Evaluation:  Plan of Care Reviewed With: patient        Progress: no change  Outcome Evaluation: Pt with BLEs WILLIAM upon entering room this date, pt reports he was only able to maintain previous BLE MLW for about a day before they became too tight and pt had to remove them. Pt's BLEs with severe edema, moderate erythema, poor skin integrity, and scattered scabbed/open wounds. PT trialed application of BLE Unna boots this date with coban for very light compression to attempt to help with pt's compliance with wearing of compression. PT plans to f/u in 2-3 days with UB/MLW changes to help improve venous return, improve skin integrity, and promote optimal wound healing potential.

## 2024-06-29 NOTE — PROGRESS NOTES
[facility] Medicine Services  PROGRESS NOTE    Patient Name: Jaycob Ndiaye II  : 1960  MRN: [id number]    Date of Admission: 2024  Primary Care Physician: Lorena Chamberlain APRN    Subjective   Subjective     CC:  Heart failure    HPI:grumpy overnight, complaining of hurting all over unable to rest, frustrated with lack of progress    Objective   Objective     Vital Signs:   Temp:  [96 °F (35.6 °C)-97.8 °F (36.6 °C)] 97.2 °F (36.2 °C)  Heart Rate:  [] 77  Resp:  [18-19] 18  BP: ()/(66-75) 108/73     Physical Exam:  Constitutional: No acute distress, awake, alert, sitting up in chair, morbidly obese  HENT: NCAT, mucous membranes moist  Respiratory: Clear to auscultation bilaterally, respiratory effort normal   Cardiovascular: RRR, no murmurs, rubs, or gallops  Gastrointestinal: Positive bowel sounds, soft, nontender, nondistended  Musculoskeletal: venous stasis changes distal bilateral LE's with 2+pitting edema bilateral LE's  Psychiatric: Appropriate affect, cooperative  Neurologic: Oriented x 3, strength symmetric in all extremities, Cranial Nerves grossly intact to confrontation, speech clear  Skin: calcium deposits fingertips bilateral hands as well as multiple of these right elbow  TenDer all over  Results Reviewed:  LAB RESULTS:      Lab 24  0843 24  0440 24  0454 24  0754 24  0500   WBC  --  8.09 7.28 8.77 9.32   HEMOGLOBIN  --  8.7* 8.6* 8.7* 9.2*   HEMATOCRIT  --  27.4* 26.5* 27.2* 28.7*   PLATELETS  --  186 195 202 233   MCV  --  96.8 95.0 96.1 96.0   SED RATE 9  --   --   --   --          Lab 24  0559 24  0448 24  0440 24  1620 24  0454 24  2115 24  0754 24  2114 24  0500   SODIUM 133*  --  133*  --  134*  --  135*  --  133*   POTASSIUM 3.4*  --  3.7 3.9 3.6 3.7 3.4*   < > 3.5   CHLORIDE 91*  --  92*  --  93*  --  93*  --  94*   CO2 28.0  --  27.0  --   27.0  --  27.0  --  26.0   ANION GAP 14.0  --  14.0  --  14.0  --  15.0  --  13.0   BUN 76*  --  69*  --  64*  --  66*  --  66*   CREATININE 2.14*  --  2.24*  --  2.01*  --  1.98*  --  2.11*   EGFR 33.5*  --  31.7*  --  36.1*  --  36.8*  --  34.1*   GLUCOSE 184*  --  147*  --  113*  --  132*  --  146*   CALCIUM 9.3  --  9.2  --  9.3  --  9.2  --  9.2   IONIZED CALCIUM  --  1.23  --   --   --   --   --   --   --    PHOSPHORUS  --   --   --   --  4.7*  --   --   --   --     < > = values in this interval not displayed.         Lab 06/26/24  0454   ALBUMIN 3.4*                         Brief Urine Lab Results  (Last result in the past 365 days)        Color   Clarity   Blood   Leuk Est   Nitrite   Protein   CREAT   Urine HCG        06/26/24 1621             39.5                 Microbiology Results Abnormal       None            No radiology results from the last 24 hrs        Current medications:  Scheduled Meds:albumin human, 25 g, Intravenous, TID  castor oil-balsam peru, 1 Application, Topical, Q12H  ferrous sulfate, 325 mg, Oral, Daily With Breakfast  furosemide, 80 mg, Intravenous, BID  heparin (porcine), 5,000 Units, Subcutaneous, Q8H  insulin lispro, 2-7 Units, Subcutaneous, 4x Daily AC & at Bedtime  ipratropium-albuterol, 3 mL, Nebulization, 4x Daily - RT  pantoprazole, 40 mg, Oral, Q AM  pharmacy consult - [facility], , Does not apply, Daily  senna-docusate sodium, 2 tablet, Oral, BID   And  polyethylene glycol, 17 g, Oral, BID  potassium chloride ER, 40 mEq, Oral, Q4H  sodium chloride, 10 mL, Intravenous, Q12H  spironolactone, 25 mg, Oral, Daily      Continuous Infusions:   PRN Meds:.  acetaminophen **OR** acetaminophen **OR** acetaminophen    Albuterol Sulfate NEB Orderable    senna-docusate sodium **AND** polyethylene glycol **AND** bisacodyl **AND** bisacodyl    Calcium Replacement - Follow Nurse / BPA Driven Protocol    dextrose    dextrose    glucagon (human recombinant)    magnesium hydroxide    Magnesium Low  Dose Replacement - Follow Nurse / BPA Driven Protocol    nitroglycerin    Phosphorus Replacement - Follow Nurse / BPA Driven Protocol    Potassium Replacement - Follow Nurse / BPA Driven Protocol    prochlorperazine    sodium chloride    sodium chloride    sodium chloride    Assessment & Plan   Assessment & Plan     Active Hospital Problems    Diagnosis  POA    **Acute on chronic heart failure with preserved ejection fraction (HFpEF) [I50.33]  Yes    Acute on chronic HFrEF (heart failure with reduced ejection fraction) [I50.23]  Yes    Elevated serum creatinine [R79.89]  Yes    Anemia [D64.9]  Yes    Hypokalemia [E87.6]  Yes    Cirrhosis of liver [K74.60]  Yes    HCV (hepatitis C virus) [B19.20]  Yes      Resolved Hospital Problems   No resolved problems to display.        Brief Hospital Course to date:  Jaycob Ndiaye II is a 64 y.o. male with past medical history significant for CHF, COPD, cirrhosis, prostate cancer, chronic hematuria, GI bleed, HCV, HTN, and HLD who presents to the ED due to worsening shortness of breath and lower extremity edema over the past week.    This patient's problems and plans were partially entered by my partner and updated as appropriate by me 06/29/24.    Assessment/plan:     A/C HFrEF  -Follows with Cardiologist Dr. Craig  -Echo just done at [facility], will defer additional echocardiogram  -Strict I&O, daily weights  -Still with significant anasarca and lower extremity edema. Cardiology asked nephrology to manage diuresis.  Stopped metalozone  Significant BLE edema edema/Anasarca    --Initially had leg wraps but asked that they be removed because they were too tight.  Currently off.  --Edema worsening.  Cardiology managing diuretics  -- BLE venous duplex negative for DVT  --Will need to reach out to PT for replacement of leg wraps (may need slightly larger wrap or looser so that he will continue to wear)      CKD  -Baseline data deficit  -Creat 2.11 on presentation,  Cardiology  consulted nephrology to help with diuresis  -nephro recs continue lasix 80mg Bid, hold metolazone, and add spironolactone yesterday, awaiting recs for today/note mild bump up in creatinine today    ?Calcium deposits in fingertips and right elbow  --check uric level (elevated), ionized calcium, PTH (normal)  and vitamin D (low)   -- sed rate normal, uric acid elevated - will try prednisone and colchicine    Anemia  Recent GI Bleed  -s/p EGD and colonoscopy at [facility] on 6/13/2024, records requested  -Hgb fairly stable  -Continue PPI   -continue PO Iron -- give dose of IV iron  -Monitor hemoglobin/hematocrit daily     Hypokalemia  -Replace per protocol     New cirrhosis   HCV  -s/p US abd on 6/10/24 that revealed nodular liver consistent with cirrhosis and moderate ascites  -S/p US guided paracentesis on 6/12/2024 at [facility] with 200 mL of skylar-colored fluid removed.  No fluid was sent to lab.  -Consider renal abdominal US showed ascites   -Will need referral to establish with [facility] upon discharge per patient request  -consider paracentesis     T2DM with peripheral neuropathy  -holding home Mounjaro, Hg A1c is 6.0  -Continue low dose  SSI for now.  Glucoses stable running 137-209 last 24 hours     PAF  -Xarelto discontinued 6/13 at [facility] d/t persistent hematuria      HTN  -Continue holding home bystolic for now d/t borderline BP      DVT prophylaxis:  Heparin     Expected Discharge Location and Transportation: home once medically improved and cleared by cardiology/nephrology.  Expected Discharge   Expected Discharge Date: 7/1/2024; Expected Discharge Time:      VTE Prophylaxis:  Pharmacologic VTE prophylaxis orders are present.         AM-PAC 6 Clicks Score (PT): 19 (06/28/24 2036)    CODE STATUS:   Code Status and Medical Interventions:   Ordered at: 06/21/24 1840     Level Of Support Discussed With:    Patient     Code Status (Patient has no pulse and is not breathing):    CPR (Attempt to Resuscitate)     Medical  Interventions (Patient has pulse or is breathing):    Full Support       Edda Key MD  06/29/24

## 2024-06-29 NOTE — PLAN OF CARE
"Goal Outcome Evaluation:  Plan of Care Reviewed With: patient        Progress: declining  Outcome Evaluation: Patient c/o generalized aching arms and legs. PRN tylenol offers several times this shift. Patient refused all prn pain medication. Patient requested \"something to knock me out\" No prn sleeping medications orders noted. Patient education provider on sedatives and low BP. RN offer to call provider for prn sleeping medication. Patient stated \" I know what they are going to say, don't bother\". 0200 patient states \"Im sending all my food back tomorrow. Stating reason being he has not had a BM. \"No use adding on top if it\". Bowel sounds WNL. PRN doculax given. Patient called RN to room at around 0430. Patient in bed. Moaning with movement states he has a stabbing pain in left leg when repositioning in bed. Skin intact with no discoloration. Patient offered tylenol and alternatives discussed such as lidocaine patch. Patient declined RN calling for lidocaine patch. Refusing to use if ordered. Patient noted to be wheezing with activity. Oxygen saturation 95%. Lungs clear and diminishes. PRN breathing treatment given. Patient awake throughout the night. Patient repeatedly making requests such as \"knock me out\", \"hit me with a club\" to nursing staff. Stating \" I'm just bone deep tired\". Patient removed Alleyvn dressing to gluteal wound refused to let RN replace. Patient demanded heel Allevyn be removed.                                "

## 2024-06-30 LAB
ALBUMIN SERPL-MCNC: 4.1 G/DL (ref 3.5–5.2)
ANION GAP SERPL CALCULATED.3IONS-SCNC: 17 MMOL/L (ref 5–15)
BUN SERPL-MCNC: 86 MG/DL (ref 8–23)
BUN/CREAT SERPL: 38.1 (ref 7–25)
CALCIUM SPEC-SCNC: 9.4 MG/DL (ref 8.6–10.5)
CHLORIDE SERPL-SCNC: 89 MMOL/L (ref 98–107)
CO2 SERPL-SCNC: 26 MMOL/L (ref 22–29)
CREAT SERPL-MCNC: 2.26 MG/DL (ref 0.76–1.27)
EGFRCR SERPLBLD CKD-EPI 2021: 31.4 ML/MIN/1.73
GLUCOSE BLDC GLUCOMTR-MCNC: 226 MG/DL (ref 70–130)
GLUCOSE BLDC GLUCOMTR-MCNC: 229 MG/DL (ref 70–130)
GLUCOSE BLDC GLUCOMTR-MCNC: 247 MG/DL (ref 70–130)
GLUCOSE BLDC GLUCOMTR-MCNC: 252 MG/DL (ref 70–130)
GLUCOSE SERPL-MCNC: 180 MG/DL (ref 65–99)
PHOSPHATE SERPL-MCNC: 3.9 MG/DL (ref 2.5–4.5)
POTASSIUM SERPL-SCNC: 4.4 MMOL/L (ref 3.5–5.2)
PSA SERPL-MCNC: <0.014 NG/ML (ref 0–4)
SODIUM SERPL-SCNC: 132 MMOL/L (ref 136–145)

## 2024-06-30 PROCEDURE — 94799 UNLISTED PULMONARY SVC/PX: CPT

## 2024-06-30 PROCEDURE — 63710000001 INSULIN LISPRO (HUMAN) PER 5 UNITS: Performed by: NURSE PRACTITIONER

## 2024-06-30 PROCEDURE — 63710000001 PREDNISONE PER 1 MG: Performed by: INTERNAL MEDICINE

## 2024-06-30 PROCEDURE — 84153 ASSAY OF PSA TOTAL: CPT | Performed by: INTERNAL MEDICINE

## 2024-06-30 PROCEDURE — 82948 REAGENT STRIP/BLOOD GLUCOSE: CPT

## 2024-06-30 PROCEDURE — 94664 DEMO&/EVAL PT USE INHALER: CPT

## 2024-06-30 PROCEDURE — 80069 RENAL FUNCTION PANEL: CPT | Performed by: INTERNAL MEDICINE

## 2024-06-30 PROCEDURE — 99233 SBSQ HOSP IP/OBS HIGH 50: CPT | Performed by: INTERNAL MEDICINE

## 2024-06-30 PROCEDURE — 63710000001 INSULIN LISPRO (HUMAN) PER 5 UNITS: Performed by: INTERNAL MEDICINE

## 2024-06-30 PROCEDURE — 94761 N-INVAS EAR/PLS OXIMETRY MLT: CPT

## 2024-06-30 PROCEDURE — 25010000002 FUROSEMIDE PER 20 MG: Performed by: INTERNAL MEDICINE

## 2024-06-30 RX ORDER — INSULIN LISPRO 100 [IU]/ML
4 INJECTION, SOLUTION INTRAVENOUS; SUBCUTANEOUS
Status: DISCONTINUED | OUTPATIENT
Start: 2024-06-30 | End: 2024-07-08 | Stop reason: HOSPADM

## 2024-06-30 RX ORDER — COLCHICINE 0.6 MG/1
0.6 TABLET ORAL ONCE
Status: COMPLETED | OUTPATIENT
Start: 2024-06-30 | End: 2024-06-30

## 2024-06-30 RX ORDER — PREDNISONE 20 MG/1
20 TABLET ORAL
Status: COMPLETED | OUTPATIENT
Start: 2024-06-30 | End: 2024-07-03

## 2024-06-30 RX ORDER — LACTULOSE 10 G/15ML
10 SOLUTION ORAL 3 TIMES DAILY
Status: DISCONTINUED | OUTPATIENT
Start: 2024-06-30 | End: 2024-07-08 | Stop reason: HOSPADM

## 2024-06-30 RX ORDER — MELATONIN
1000 DAILY
Status: DISCONTINUED | OUTPATIENT
Start: 2024-06-30 | End: 2024-07-08 | Stop reason: HOSPADM

## 2024-06-30 RX ADMIN — FUROSEMIDE 80 MG: 10 INJECTION, SOLUTION INTRAMUSCULAR; INTRAVENOUS at 17:18

## 2024-06-30 RX ADMIN — FERROUS SULFATE TAB 325 MG (65 MG ELEMENTAL FE) 325 MG: 325 (65 FE) TAB at 09:15

## 2024-06-30 RX ADMIN — LACTULOSE 10 G: 20 SOLUTION ORAL at 17:18

## 2024-06-30 RX ADMIN — POLYETHYLENE GLYCOL 3350 17 G: 17 POWDER, FOR SOLUTION ORAL at 09:15

## 2024-06-30 RX ADMIN — LACTULOSE 10 G: 20 SOLUTION ORAL at 21:47

## 2024-06-30 RX ADMIN — PANTOPRAZOLE SODIUM 40 MG: 40 TABLET, DELAYED RELEASE ORAL at 05:24

## 2024-06-30 RX ADMIN — INSULIN LISPRO 3 UNITS: 100 INJECTION, SOLUTION INTRAVENOUS; SUBCUTANEOUS at 12:39

## 2024-06-30 RX ADMIN — IPRATROPIUM BROMIDE AND ALBUTEROL SULFATE 3 ML: 2.5; .5 SOLUTION RESPIRATORY (INHALATION) at 15:23

## 2024-06-30 RX ADMIN — SENNOSIDES AND DOCUSATE SODIUM 2 TABLET: 8.6; 5 TABLET ORAL at 09:15

## 2024-06-30 RX ADMIN — SENNOSIDES AND DOCUSATE SODIUM 2 TABLET: 8.6; 5 TABLET ORAL at 21:47

## 2024-06-30 RX ADMIN — INSULIN LISPRO 4 UNITS: 100 INJECTION, SOLUTION INTRAVENOUS; SUBCUTANEOUS at 12:40

## 2024-06-30 RX ADMIN — SPIRONOLACTONE 25 MG: 25 TABLET ORAL at 09:15

## 2024-06-30 RX ADMIN — IPRATROPIUM BROMIDE AND ALBUTEROL SULFATE 3 ML: 2.5; .5 SOLUTION RESPIRATORY (INHALATION) at 12:56

## 2024-06-30 RX ADMIN — IPRATROPIUM BROMIDE AND ALBUTEROL SULFATE 3 ML: 2.5; .5 SOLUTION RESPIRATORY (INHALATION) at 07:38

## 2024-06-30 RX ADMIN — INSULIN LISPRO 4 UNITS: 100 INJECTION, SOLUTION INTRAVENOUS; SUBCUTANEOUS at 17:20

## 2024-06-30 RX ADMIN — Medication 1000 UNITS: at 09:15

## 2024-06-30 RX ADMIN — Medication 10 ML: at 09:16

## 2024-06-30 RX ADMIN — COLCHICINE 0.6 MG: 0.6 TABLET ORAL at 10:31

## 2024-06-30 RX ADMIN — FUROSEMIDE 80 MG: 10 INJECTION, SOLUTION INTRAMUSCULAR; INTRAVENOUS at 09:15

## 2024-06-30 RX ADMIN — INSULIN LISPRO 4 UNITS: 100 INJECTION, SOLUTION INTRAVENOUS; SUBCUTANEOUS at 17:19

## 2024-06-30 RX ADMIN — INSULIN LISPRO 3 UNITS: 100 INJECTION, SOLUTION INTRAVENOUS; SUBCUTANEOUS at 21:47

## 2024-06-30 RX ADMIN — BISACODYL 5 MG: 5 TABLET, COATED ORAL at 00:11

## 2024-06-30 RX ADMIN — Medication 1 APPLICATION: at 00:08

## 2024-06-30 RX ADMIN — Medication 1 APPLICATION: at 21:47

## 2024-06-30 RX ADMIN — COLCHICINE 0.6 MG: 0.6 TABLET ORAL at 09:15

## 2024-06-30 RX ADMIN — LACTULOSE 10 G: 20 SOLUTION ORAL at 10:31

## 2024-06-30 RX ADMIN — POLYETHYLENE GLYCOL 3350 17 G: 17 POWDER, FOR SOLUTION ORAL at 21:47

## 2024-06-30 RX ADMIN — Medication 1 APPLICATION: at 09:16

## 2024-06-30 RX ADMIN — INSULIN LISPRO 3 UNITS: 100 INJECTION, SOLUTION INTRAVENOUS; SUBCUTANEOUS at 09:16

## 2024-06-30 RX ADMIN — IPRATROPIUM BROMIDE AND ALBUTEROL SULFATE 3 ML: 2.5; .5 SOLUTION RESPIRATORY (INHALATION) at 19:42

## 2024-06-30 RX ADMIN — PREDNISONE 20 MG: 20 TABLET ORAL at 12:40

## 2024-06-30 NOTE — PLAN OF CARE
Goal Outcome Evaluation:  Plan of Care Reviewed With: patient        Progress: no change  Outcome Evaluation: Patient rested this shift. Continues to c/o generalized discomfort. Vitals stable. Erika boot removed at 0230 on patient request r/t discomfort.

## 2024-06-30 NOTE — PROGRESS NOTES
[facility] Medicine Services  PROGRESS NOTE    Patient Name: Jaycob Ndiaye II  : 1960  MRN: [id number]    Date of Admission: 2024  Primary Care Physician: Lorena Chamberlain APRN    Subjective   Subjective     CC:  Heart failure    HPI:patient is feeling better today, joint pain is improved, still with scrotal edema, making ambulation difficult    Objective   Objective     Vital Signs:   Temp:  [97.7 °F (36.5 °C)-98.2 °F (36.8 °C)] 97.7 °F (36.5 °C)  Heart Rate:  [] 68  Resp:  [18-22] 18  BP: (100-114)/(63-88) 114/68     Physical Exam:  Constitutional: No acute distress, awake, alert, sitting up in chair, morbidly obese  HENT: NCAT, mucous membranes moist  Respiratory: Clear to auscultation bilaterally, respiratory effort normal   Cardiovascular: RRR, no murmurs, rubs, or gallops  Gastrointestinal: Positive bowel sounds, soft, nontender, nondistended  Musculoskeletal: venous stasis changes distal bilateral LE's with 2+pitting edema bilateral LE's  Psychiatric: Appropriate affect, cooperative  Neurologic: Oriented x 3, strength symmetric in all extremities, Cranial Nerves grossly intact to confrontation, speech clear  Skin: calcium deposits fingertips bilateral hands as well as multiple of these right elbow  LESS TenDer all over  Scrotum very edematous    Results Reviewed:  LAB RESULTS:      Lab 24  0843 24  0440 24  0454 24  0754   WBC  --  8.09 7.28 8.77   HEMOGLOBIN  --  8.7* 8.6* 8.7*   HEMATOCRIT  --  27.4* 26.5* 27.2*   PLATELETS  --  186 195 202   MCV  --  96.8 95.0 96.1   SED RATE 9  --   --   --          Lab 24  0504 24  1606 24  0559 24  0448 24  0440 24  1620 24  0454 24  2115 24  0754   SODIUM 132*  --  133*  --  133*  --  134*  --  135*   POTASSIUM 4.4 3.9 3.4*  --  3.7 3.9 3.6   < > 3.4*   CHLORIDE 89*  --  91*  --  92*  --  93*  --  93*   CO2 26.0  --  28.0  --   27.0  --  27.0  --  27.0   ANION GAP 17.0*  --  14.0  --  14.0  --  14.0  --  15.0   BUN 86*  --  76*  --  69*  --  64*  --  66*   CREATININE 2.26*  --  2.14*  --  2.24*  --  2.01*  --  1.98*   EGFR 31.4*  --  33.5*  --  31.7*  --  36.1*  --  36.8*   GLUCOSE 180*  --  184*  --  147*  --  113*  --  132*   CALCIUM 9.4  --  9.3  --  9.2  --  9.3  --  9.2   IONIZED CALCIUM  --   --   --  1.23  --   --   --   --   --    PHOSPHORUS 3.9  --  4.7*  --   --   --  4.7*  --   --     < > = values in this interval not displayed.         Lab 06/30/24  0504 06/26/24  0454   ALBUMIN 4.1 3.4*                         Brief Urine Lab Results  (Last result in the past 365 days)        Color   Clarity   Blood   Leuk Est   Nitrite   Protein   CREAT   Urine HCG        06/26/24 1621             39.5                 Microbiology Results Abnormal       None            No radiology results from the last 24 hrs        Current medications:  Scheduled Meds:castor oil-balsam peru, 1 Application, Topical, Q12H  cholecalciferol, 1,000 Units, Oral, Daily  colchicine, 0.6 mg, Oral, Daily  ferrous sulfate, 325 mg, Oral, Daily With Breakfast  furosemide, 80 mg, Intravenous, BID  heparin (porcine), 5,000 Units, Subcutaneous, Q8H  insulin lispro, 2-7 Units, Subcutaneous, 4x Daily AC & at Bedtime  ipratropium-albuterol, 3 mL, Nebulization, 4x Daily - RT  pantoprazole, 40 mg, Oral, Q AM  pharmacy consult - [facility], , Does not apply, Daily  senna-docusate sodium, 2 tablet, Oral, BID   And  polyethylene glycol, 17 g, Oral, BID  sodium chloride, 10 mL, Intravenous, Q12H  spironolactone, 25 mg, Oral, Daily      Continuous Infusions:   PRN Meds:.  acetaminophen **OR** acetaminophen **OR** acetaminophen    Albuterol Sulfate NEB Orderable    senna-docusate sodium **AND** polyethylene glycol **AND** bisacodyl **AND** bisacodyl    Calcium Replacement - Follow Nurse / BPA Driven Protocol    dextrose    dextrose    glucagon (human recombinant)    magnesium hydroxide     Magnesium Low Dose Replacement - Follow Nurse / BPA Driven Protocol    nitroglycerin    Phosphorus Replacement - Follow Nurse / BPA Driven Protocol    Potassium Replacement - Follow Nurse / BPA Driven Protocol    prochlorperazine    sodium chloride    sodium chloride    sodium chloride    Assessment & Plan   Assessment & Plan     Active Hospital Problems    Diagnosis  POA    **Acute on chronic heart failure with preserved ejection fraction (HFpEF) [I50.33]  Yes    Acute on chronic HFrEF (heart failure with reduced ejection fraction) [I50.23]  Yes    Elevated serum creatinine [R79.89]  Yes    Anemia [D64.9]  Yes    Hypokalemia [E87.6]  Yes    Cirrhosis of liver [K74.60]  Yes    HCV (hepatitis C virus) [B19.20]  Yes      Resolved Hospital Problems   No resolved problems to display.        Brief Hospital Course to date:  Jaycob Ndiaye II is a 64 y.o. male with past medical history significant for CHF, COPD, cirrhosis, prostate cancer, chronic hematuria, GI bleed, HCV, HTN, and HLD who presents to the ED due to worsening shortness of breath and lower extremity edema over the past week.    This patient's problems and plans were partially entered by my partner and updated as appropriate by me 06/30/24.    Assessment/plan:     A/C HFrEF  -Follows with Cardiologist Dr. Craig  -Echo just done at [facility], will defer additional echocardiogram  -Strict I&O, daily weights  -Still with significant anasarca and lower extremity edema. Cardiology asked nephrology to manage diuresis.  Stopped metalozone  Significant BLE edema and scrotal edema/Anasarca    --Initially had leg wraps but asked that they be removed because they were too tight.  Currently off.  --Edema worsening.  Cardiology managing diuretics  -- BLE venous duplex negative for DVT  --WOC to see about scrotal wrap, PT to see about leg wraps tomorrow     CKD  -Baseline data deficit  -Creat 2.11 on presentation,  Cardiology consulted nephrology to help with diuresis,  nephrology following  -nephro recs continue lasix 80mg Bid, hold metolazone, and add spironolactone yesterday, awaiting recs for today/note mild bump up in creatinine today    ?Calcium deposits in fingertips and right elbow  Hyperphosphatemia  --check uric level (elevated), ionized calcium, PTH (normal)  and vitamin D (low)   -- sed rate normal, uric acid elevated - will try prednisone and colchicine  -- cont colchicine and low dose steroids which seem to have helped significantly    Anemia  Recent GI Bleed  -s/p EGD and colonoscopy at [facility] on 6/13/2024, records requested  -Hgb fairly stable  -Continue PPI   -continue PO Iron -- give dose of IV iron  -Monitor hemoglobin/hematocrit daily     Hypokalemia  -Replace per protocol     New cirrhosis   HCV  -s/p US abd on 6/10/24 that revealed nodular liver consistent with cirrhosis and moderate ascites  -S/p US guided paracentesis on 6/12/2024 at [facility] with 200 mL of skylar-colored fluid removed.  No fluid was sent to lab.  -Consider renal abdominal US showed ascites   -Will need referral to establish with [facility] upon discharge per patient request  -consider paracentesis-- though he is not eager because it hurt so badly at [facility]. Our CT does not show a large voume of ascites- he think a good BM will help with abdominal distension, lactulose added     T2DM with peripheral neuropathy  -holding home Mounjaro, Hg A1c is 6.0  -Continue low dose  SSI for now.  Glucoses stable running 137-209 last 24 hours-- jaimee QAC insulin while on steroids     PAF  -Xarelto discontinued 6/13 at [facility] d/t persistent hematuria      HTN  -Continue holding home bystolic for now d/t borderline BP      HO prostate cancer  -patient want PSA checked and thinks he will not need DC at home    DVT prophylaxis:  Heparin     Expected Discharge Location and Transportation: home once medically improved and cleared by cardiology/nephrology.  Expected Discharge   Expected Discharge Date: 7/1/2024; Expected  Discharge Time:    Awaiting placement- he is not eager to leave until he is good and ready    VTE Prophylaxis:  Pharmacologic VTE prophylaxis orders are present.         AM-PAC 6 Clicks Score (PT): 19 (06/29/24 2122)    CODE STATUS:   Code Status and Medical Interventions:   Ordered at: 06/21/24 1840     Level Of Support Discussed With:    Patient     Code Status (Patient has no pulse and is not breathing):    CPR (Attempt to Resuscitate)     Medical Interventions (Patient has pulse or is breathing):    Full Support       Edda Key MD  06/30/24

## 2024-06-30 NOTE — PLAN OF CARE
Goal Outcome Evaluation: Pt up in chair, afib/ paced occasionally, RA, cont POC

## 2024-06-30 NOTE — PROGRESS NOTES
" LOS: 8 days   Patient Care Team:  Lorena Chamberlain APRN as PCP - General (Nurse Practitioner)  Lloyd Craig MD as Consulting Physician (Cardiology)    Chief Complaint: NASRIN    Subjective   Mild increase in creatinine from 2.14 to creatinine 2.26, sodium 132, vitamin D 21.6, recent gout attack.  Good urine output.    History taken from: patient    Objective     Vital Sign Min/Max for last 24 hours  Temp  Min: 97.7 °F (36.5 °C)  Max: 98.2 °F (36.8 °C)   BP  Min: 100/67  Max: 114/68   Pulse  Min: 67  Max: 100   Resp  Min: 18  Max: 22   SpO2  Min: 92 %  Max: 98 %   No data recorded   Weight  Min: 163 kg (358 lb 11.2 oz)  Max: 163 kg (358 lb 11.2 oz)     Flowsheet Rows      Flowsheet Row First Filed Value   Admission Height 185.4 cm (73\") Documented at 06/21/2024 1409   Admission Weight 127 kg (279 lb) Documented at 06/21/2024 1409            No intake/output data recorded.  I/O last 3 completed shifts:  In: -   Out: 2450 [Urine:2450]    Objective:  Physical examination:    General Appearance: Alert, oriented, no obvious distress.  Eyes: PER, EOMI.  Neck: Supple no JVD.  Lungs: Clear auscultation, no rales rhonchi's, equal chest movement, nonlabored.  Heart: No gallop, murmur, rub, RRR.  Abdomen: Soft, nontender, positive bowel sounds, no organomegaly.  Extremities: Positive right ankle swelling and tenderness improved, positive right toe swelling and tenderness improved  Neuro: No focal deficit, moving all extremities, alert oriented X 3      Results Review:     I reviewed the patient's new clinical results.    WBC No results found for: \"WBC\"     HGB No results found for: \"HGB\"     HCT No results found for: \"HCT\"     Platlets No results found for: \"LABPLAT\"   MCV No results found for: \"MCV\"         Sodium Sodium   Date Value Ref Range Status   06/30/2024 132 (L) 136 - 145 mmol/L Final   06/29/2024 133 (L) 136 - 145 mmol/L Final      Potassium Potassium   Date Value Ref Range Status   06/30/2024 4.4 " "3.5 - 5.2 mmol/L Final   06/29/2024 3.9 3.5 - 5.2 mmol/L Final   06/29/2024 3.4 (L) 3.5 - 5.2 mmol/L Final      Chloride Chloride   Date Value Ref Range Status   06/30/2024 89 (L) 98 - 107 mmol/L Final   06/29/2024 91 (L) 98 - 107 mmol/L Final      CO2 CO2   Date Value Ref Range Status   06/30/2024 26.0 22.0 - 29.0 mmol/L Final   06/29/2024 28.0 22.0 - 29.0 mmol/L Final      BUN BUN   Date Value Ref Range Status   06/30/2024 86 (H) 8 - 23 mg/dL Final   06/29/2024 76 (H) 8 - 23 mg/dL Final      Creatinine Creatinine   Date Value Ref Range Status   06/30/2024 2.26 (H) 0.76 - 1.27 mg/dL Final   06/29/2024 2.14 (H) 0.76 - 1.27 mg/dL Final      Calcium Calcium   Date Value Ref Range Status   06/30/2024 9.4 8.6 - 10.5 mg/dL Final   06/29/2024 9.3 8.6 - 10.5 mg/dL Final      PO4 No results found for: \"CAPO4\"   Albumin Albumin   Date Value Ref Range Status   06/30/2024 4.1 3.5 - 5.2 g/dL Final        Magnesium No results found for: \"MG\"   Uric Acid Uric Acid   Date Value Ref Range Status   06/28/2024 12.0 (H) 3.4 - 7.0 mg/dL Final     Comment:     Falsely depressed results may occur on samples drawn from patients receiving N-Acetylcysteine (NAC) or Metamizole.        Medication Review: Yes    Assessment & Plan       Acute on chronic heart failure with preserved ejection fraction (HFpEF)    Elevated serum creatinine    Anemia    Hypokalemia    Cirrhosis of liver    HCV (hepatitis C virus)    Acute on chronic HFrEF (heart failure with reduced ejection fraction)           1.  Acute kidney disease: Last known stable cr 1.1 in 2023. Cr on recent admission at [facility] few weeks ago 1.6-1.9 mg/dl. Most recent cr 2.0-2.2 GFR 35-36ml/min. Urine sodium 20. Urine cr 39.5. Renal US no hydro     2.  New onset liver cirrhosis: Complications include ascites and LE edema.      3.  Volume status: Dependent edema b/l + ascites. Getting diuretics     4.  Hypokalemia: In the setting of diuretics     5.  Hyponatremia: Due to total body volume " overload.      6.  HFpEF: Dependent edema      7.  Anemia: Recent hx of GI bleed. S/p EGD and colonoscopy.      Recommendation:  Acute gout: Hold allopurinol, okay to give colchicine and prednisone at this time.  Vitamin D 1000 IU daily ordered  Creatinine slightly worse than yesterday.  Good urine output will monitor renal function on Lasix and spironolactone.  If the blood pressure below 110 will add midodrine  Avoid nephrotoxic medication.  High risk complex patient multiple medical problems.       Milton Muñoz MD  06/30/24  08:37 EDT

## 2024-07-01 LAB
ALBUMIN SERPL-MCNC: 3.9 G/DL (ref 3.5–5.2)
ANION GAP SERPL CALCULATED.3IONS-SCNC: 15 MMOL/L (ref 5–15)
BUN SERPL-MCNC: 91 MG/DL (ref 8–23)
BUN/CREAT SERPL: 42.7 (ref 7–25)
CALCIUM SPEC-SCNC: 9.3 MG/DL (ref 8.6–10.5)
CHLORIDE SERPL-SCNC: 91 MMOL/L (ref 98–107)
CO2 SERPL-SCNC: 26 MMOL/L (ref 22–29)
CREAT SERPL-MCNC: 2.13 MG/DL (ref 0.76–1.27)
EGFRCR SERPLBLD CKD-EPI 2021: 33.7 ML/MIN/1.73
GLUCOSE BLDC GLUCOMTR-MCNC: 194 MG/DL (ref 70–130)
GLUCOSE BLDC GLUCOMTR-MCNC: 195 MG/DL (ref 70–130)
GLUCOSE BLDC GLUCOMTR-MCNC: 212 MG/DL (ref 70–130)
GLUCOSE BLDC GLUCOMTR-MCNC: 276 MG/DL (ref 70–130)
GLUCOSE SERPL-MCNC: 159 MG/DL (ref 65–99)
PHOSPHATE SERPL-MCNC: 4.4 MG/DL (ref 2.5–4.5)
POTASSIUM SERPL-SCNC: 3.7 MMOL/L (ref 3.5–5.2)
SODIUM SERPL-SCNC: 132 MMOL/L (ref 136–145)

## 2024-07-01 PROCEDURE — 25010000002 FUROSEMIDE PER 20 MG: Performed by: INTERNAL MEDICINE

## 2024-07-01 PROCEDURE — 97110 THERAPEUTIC EXERCISES: CPT

## 2024-07-01 PROCEDURE — 94761 N-INVAS EAR/PLS OXIMETRY MLT: CPT

## 2024-07-01 PROCEDURE — 94799 UNLISTED PULMONARY SVC/PX: CPT

## 2024-07-01 PROCEDURE — 29581 APPL MULTLAYER CMPRN SYS LEG: CPT

## 2024-07-01 PROCEDURE — 80069 RENAL FUNCTION PANEL: CPT | Performed by: INTERNAL MEDICINE

## 2024-07-01 PROCEDURE — 63710000001 INSULIN LISPRO (HUMAN) PER 5 UNITS: Performed by: INTERNAL MEDICINE

## 2024-07-01 PROCEDURE — 99232 SBSQ HOSP IP/OBS MODERATE 35: CPT | Performed by: INTERNAL MEDICINE

## 2024-07-01 PROCEDURE — 63710000001 PREDNISONE PER 1 MG: Performed by: INTERNAL MEDICINE

## 2024-07-01 PROCEDURE — 82948 REAGENT STRIP/BLOOD GLUCOSE: CPT

## 2024-07-01 PROCEDURE — 63710000001 INSULIN LISPRO (HUMAN) PER 5 UNITS: Performed by: NURSE PRACTITIONER

## 2024-07-01 RX ORDER — SPIRONOLACTONE 25 MG/1
50 TABLET ORAL DAILY
Status: DISCONTINUED | OUTPATIENT
Start: 2024-07-02 | End: 2024-07-02

## 2024-07-01 RX ADMIN — Medication 10 ML: at 20:55

## 2024-07-01 RX ADMIN — SENNOSIDES AND DOCUSATE SODIUM 2 TABLET: 8.6; 5 TABLET ORAL at 08:17

## 2024-07-01 RX ADMIN — Medication 1 APPLICATION: at 08:16

## 2024-07-01 RX ADMIN — IPRATROPIUM BROMIDE AND ALBUTEROL SULFATE 3 ML: 2.5; .5 SOLUTION RESPIRATORY (INHALATION) at 12:03

## 2024-07-01 RX ADMIN — INSULIN LISPRO 4 UNITS: 100 INJECTION, SOLUTION INTRAVENOUS; SUBCUTANEOUS at 12:16

## 2024-07-01 RX ADMIN — INSULIN LISPRO 4 UNITS: 100 INJECTION, SOLUTION INTRAVENOUS; SUBCUTANEOUS at 17:50

## 2024-07-01 RX ADMIN — FUROSEMIDE 80 MG: 10 INJECTION, SOLUTION INTRAMUSCULAR; INTRAVENOUS at 08:16

## 2024-07-01 RX ADMIN — LACTULOSE 10 G: 20 SOLUTION ORAL at 16:00

## 2024-07-01 RX ADMIN — PANTOPRAZOLE SODIUM 40 MG: 40 TABLET, DELAYED RELEASE ORAL at 08:17

## 2024-07-01 RX ADMIN — POLYETHYLENE GLYCOL 3350 17 G: 17 POWDER, FOR SOLUTION ORAL at 08:19

## 2024-07-01 RX ADMIN — INSULIN LISPRO 4 UNITS: 100 INJECTION, SOLUTION INTRAVENOUS; SUBCUTANEOUS at 08:17

## 2024-07-01 RX ADMIN — LACTULOSE 10 G: 20 SOLUTION ORAL at 08:16

## 2024-07-01 RX ADMIN — FERROUS SULFATE TAB 325 MG (65 MG ELEMENTAL FE) 325 MG: 325 (65 FE) TAB at 08:17

## 2024-07-01 RX ADMIN — PREDNISONE 20 MG: 20 TABLET ORAL at 08:17

## 2024-07-01 RX ADMIN — IPRATROPIUM BROMIDE AND ALBUTEROL SULFATE 3 ML: 2.5; .5 SOLUTION RESPIRATORY (INHALATION) at 19:46

## 2024-07-01 RX ADMIN — INSULIN LISPRO 3 UNITS: 100 INJECTION, SOLUTION INTRAVENOUS; SUBCUTANEOUS at 17:48

## 2024-07-01 RX ADMIN — INSULIN LISPRO 2 UNITS: 100 INJECTION, SOLUTION INTRAVENOUS; SUBCUTANEOUS at 08:19

## 2024-07-01 RX ADMIN — POLYETHYLENE GLYCOL 3350 17 G: 17 POWDER, FOR SOLUTION ORAL at 20:51

## 2024-07-01 RX ADMIN — SENNOSIDES AND DOCUSATE SODIUM 2 TABLET: 8.6; 5 TABLET ORAL at 20:51

## 2024-07-01 RX ADMIN — INSULIN LISPRO 4 UNITS: 100 INJECTION, SOLUTION INTRAVENOUS; SUBCUTANEOUS at 12:15

## 2024-07-01 RX ADMIN — LACTULOSE 10 G: 20 SOLUTION ORAL at 20:51

## 2024-07-01 RX ADMIN — IPRATROPIUM BROMIDE AND ALBUTEROL SULFATE 3 ML: 2.5; .5 SOLUTION RESPIRATORY (INHALATION) at 16:39

## 2024-07-01 RX ADMIN — INSULIN LISPRO 2 UNITS: 100 INJECTION, SOLUTION INTRAVENOUS; SUBCUTANEOUS at 20:51

## 2024-07-01 RX ADMIN — FUROSEMIDE 80 MG: 10 INJECTION, SOLUTION INTRAMUSCULAR; INTRAVENOUS at 17:48

## 2024-07-01 RX ADMIN — Medication 1000 UNITS: at 08:17

## 2024-07-01 RX ADMIN — SPIRONOLACTONE 25 MG: 25 TABLET ORAL at 08:17

## 2024-07-01 RX ADMIN — Medication 10 ML: at 08:17

## 2024-07-01 RX ADMIN — COLCHICINE 0.6 MG: 0.6 TABLET ORAL at 08:17

## 2024-07-01 NOTE — PLAN OF CARE
Goal Outcome Evaluation:  Plan of Care Reviewed With: patient           Outcome Evaluation: BLE wraps off due to c/o pain and discomfort from pt. PT reapplied compressogrip with foam to ant ankles to help improve tolerance and wear time.

## 2024-07-01 NOTE — THERAPY WOUND CARE TREATMENT
Acute Care - Wound/Debridement Treatment Note  [facility]     Patient Name: Jaycob Ndiaye II  : 1960  MRN: [id number]  Today's Date: 2024                Admit Date: 2024    Visit Dx:    ICD-10-CM ICD-9-CM   1. Peripheral edema  R60.0 782.3   2. Congestive heart failure, unspecified HF chronicity, unspecified heart failure type  I50.9 428.0   3. Hypokalemia  E87.6 276.8   4. Acute on chronic heart failure with preserved ejection fraction (HFpEF)  I50.33 428.23   5. Screen for colon cancer  Z12.11 V76.51       Patient Active Problem List   Diagnosis    Screen for colon cancer    Acute on chronic heart failure with preserved ejection fraction (HFpEF)    Elevated serum creatinine    Anemia    Hypokalemia    Cirrhosis of liver    HCV (hepatitis C virus)    Acute on chronic HFrEF (heart failure with reduced ejection fraction)        Past Medical History:   Diagnosis Date    Arthritis     Cancer     Constipation     Depression     Diabetes     type 2 dm, check blood sugar once a day    History of hepatitis C     History of prostate cancer     Hypertension     Irregular heartbeat     Pacemaker     home monitoring    Prostate CA     Requires supplemental oxygen     at night with cpap    Sleep apnea     wears a cpap    SOB (shortness of breath) on exertion     Wears glasses         Past Surgical History:   Procedure Laterality Date    CARDIAC ABLATION      COLONOSCOPY N/A 2021    Procedure: Colonoscopy with polypectomy;  Surgeon: Maurice Proctor MD;  Location: [facility] ENDOSCOPY;  Service: Gastroenterology;  Laterality: N/A;    COLONOSCOPY      ENDOSCOPY      KNEE SURGERY Right     1985    PACEMAKER IMPLANTATION      PROSTATE SURGERY      REPLACEMENT TOTAL KNEE Right            Wound 24 1540 Bilateral lower leg Blisters (Active)   Dressing Appearance open to air 24   Closure Open to air 24   Drainage Characteristics/Odor serous 24   Drainage Amount  scant 06/30/24 2147       Wound 06/28/24 0000 Right medial gluteal Pressure Injury (Active)   Closure Open to air 06/30/24 2147   Base maroon/purple 06/30/24 2147       Wound 06/30/24 0000 Right posterior greater trochanter Skin Tear (Active)   Dressing Appearance open to air 06/30/24 2147   Closure Open to air 06/30/24 2147       Wound 06/30/24 0000 Left posterior greater trochanter (Active)   Dressing Appearance open to air 06/30/24 2147   Closure Open to air 06/30/24 2147       Wound 06/30/24 2146 scrotum Skin Tear (Active)   Dressing Appearance open to air 06/30/24 2147   Closure Open to air 06/30/24 2147   Drainage Characteristics/Odor sanguineous 06/30/24 2147   Drainage Amount scant 06/30/24 2147      Lymphedema       Row Name 07/01/24 1015             Lymphedema Edema Assessment    Ptting Edema Category By severity  -      Pitting Edema Severe  -         Compression/Skin Care    Compression/Skin Care skin care;wrapping location;bandaging  -      Skin Care washed/dried;lotion applied  -      Wrapping Location lower extremity  -      Wrapping Location LE bilateral:;foot to knee  -      Wrapping Comments optifoam Ag foam to ant ankles with size 5/6/8 compressogrips doubled/overlapping for gradient compression  -                User Key  (r) = Recorded By, (t) = Taken By, (c) = Cosigned By      Initials Name Provider Type    MF Lloyd Reagan, PT Physical Therapist                    WOUND DEBRIDEMENT                     PT Assessment (Last 12 Hours)       PT Evaluation and Treatment       Row Name 07/01/24 1015          Physical Therapy Time and Intention    Subjective Information complains of;weakness;fatigue;pain  -     Document Type therapy note (daily note);wound care  -     Mode of Treatment individual therapy;physical therapy  -       Row Name 07/01/24 1015          Pain    Pretreatment Pain Rating 3/10  -     Posttreatment Pain Rating 3/10  -     Pain Location -  Side/Orientation Bilateral  -MF     Pain Location lower  -MF     Pain Location - extremity  -MF     Pain Intervention(s) Repositioned  -MF       Row Name             Wound 06/22/24 1540 Bilateral lower leg Blisters    Wound - Properties Group Placement Date: 06/22/24  -JM Placement Time: 1540  -JM Side: Bilateral  -JM Orientation: lower  -JM Location: leg  -JM Primary Wound Type: Blisters  -JM    Retired Wound - Properties Group Placement Date: 06/22/24  -JM Placement Time: 1540  -JM Side: Bilateral  -JM Orientation: lower  -JM Location: leg  -JM Primary Wound Type: Blisters  -JM    Retired Wound - Properties Group Date first assessed: 06/22/24  - Time first assessed: 1540  -JM Side: Bilateral  -JM Location: leg  -JM Primary Wound Type: Blisters  -JM      Row Name             Wound 06/28/24 0000 Right medial gluteal Pressure Injury    Wound - Properties Group Placement Date: 06/28/24  -TH Placement Time: 0000  -TH Present on Original Admission: N  -TH Side: Right  -TH Orientation: medial  -TH Location: gluteal  -TH Primary Wound Type: Pressure inj  -TH    Retired Wound - Properties Group Placement Date: 06/28/24  -TH Placement Time: 0000  -TH Present on Original Admission: N  -TH Side: Right  -TH Orientation: medial  -TH Location: gluteal  -TH Primary Wound Type: Pressure inj  -TH    Retired Wound - Properties Group Date first assessed: 06/28/24  -TH Time first assessed: 0000  -TH Present on Original Admission: N  -TH Side: Right  -TH Location: gluteal  -TH Primary Wound Type: Pressure inj  -TH      Row Name             Wound 06/30/24 0000 Right posterior greater trochanter Skin Tear    Wound - Properties Group Placement Date: 06/30/24  -TH Placement Time: 0000  -TH Side: Right  -TH Orientation: posterior  -TH Location: greater trochanter  -TH Primary Wound Type: Skin tear  -TH    Retired Wound - Properties Group Placement Date: 06/30/24  -TH Placement Time: 0000  -TH Side: Right  -TH Orientation: posterior  -TH  Location: greater trochanter  -TH Primary Wound Type: Skin tear  -TH    Retired Wound - Properties Group Date first assessed: 06/30/24 -TH Time first assessed: 0000 -TH Side: Right  -TH Location: greater trochanter  -TH Primary Wound Type: Skin tear  -TH      Row Name             Wound 06/30/24 0000 Left posterior greater trochanter    Wound - Properties Group Placement Date: 06/30/24 -TH Placement Time: 0000  -TH Side: Left  -TH Orientation: posterior  -TH Location: greater trochanter  -TH    Retired Wound - Properties Group Placement Date: 06/30/24 -TH Placement Time: 0000  -TH Side: Left  -TH Orientation: posterior  -TH Location: greater trochanter  -TH    Retired Wound - Properties Group Date first assessed: 06/30/24 -TH Time first assessed: 0000 -TH Side: Left  -TH Location: greater trochanter  -TH      Row Name             Wound 06/30/24 2146 scrotum Skin Tear    Wound - Properties Group Placement Date: 06/30/24 -TH Placement Time: 2146 -TH Location: scrotum  -TH Primary Wound Type: Skin tear  -TH    Retired Wound - Properties Group Placement Date: 06/30/24 -TH Placement Time: 2146 -TH Location: scrotum  -TH Primary Wound Type: Skin tear  -TH    Retired Wound - Properties Group Date first assessed: 06/30/24 -TH Time first assessed: 2146 -TH Location: scrotum  -TH Primary Wound Type: Skin tear  -TH      Row Name 07/01/24 1015          Coping    Observed Emotional State calm;cooperative;pleasant  -     Verbalized Emotional State acceptance  -     Trust Relationship/Rapport care explained  -       Row Name 07/01/24 1015          Plan of Care Review    Plan of Care Reviewed With patient  -MF     Outcome Evaluation BLE wraps off due to c/o pain and discomfort from pt. PT reapplied compressogrip with foam to ant ankles to help improve tolerance and wear time.  -       Row Name 07/01/24 1015          Positioning and Restraints    Pre-Treatment Position sitting in chair/recliner  -     Post  Treatment Position chair  -MF     In Chair reclined;call light within reach  -MF               User Key  (r) = Recorded By, (t) = Taken By, (c) = Cosigned By      Initials Name Provider Type    MF Lloyd Reagan, PT Physical Therapist    Jenny Salazar, RN Registered Nurse    Carolina Mccurdy, PT Physical Therapist                  Physical Therapy Education       Title: PT OT SLP Therapies (Done)       Topic: Physical Therapy (Done)       Point: Mobility training (Done)       Learning Progress Summary             Patient Acceptance, E, VU,DU,NR by ML at 7/1/2024 1154    Acceptance, E, VU by ND at 6/25/2024 1109    Acceptance, E, VU by ND at 6/22/2024 1006                         Point: Home exercise program (Done)       Learning Progress Summary             Patient Acceptance, E, VU,DU,NR by ML at 7/1/2024 1154                         Point: Body mechanics (Done)       Learning Progress Summary             Patient Acceptance, E, VU by ND at 6/25/2024 1109    Acceptance, E, VU by ND at 6/22/2024 1006                         Point: Precautions (Done)       Learning Progress Summary             Patient Acceptance, E, VU,DU,NR by ML at 7/1/2024 1154    Acceptance, E, VU by ND at 6/25/2024 1109    Acceptance, E, VU by ND at 6/22/2024 1006                                         User Key       Initials Effective Dates Name Provider Type Discipline     04/22/21 -  Sally Perez Physical Therapist PT    ND 11/16/23 -  Maryjane Das PT Physical Therapist PT                    Recommendation and Plan  Anticipated Discharge Disposition (PT): home with assist, home with home health  Planned Therapy Interventions (PT): balance training, bed mobility training, gait training, neuromuscular re-education, patient/family education, home exercise program, postural re-education, ROM (range of motion), strengthening, stretching, transfer training  Therapy Frequency (PT): daily  Plan of Care Reviewed With: patient            Outcome Evaluation: BLE wraps off due to c/o pain and discomfort from pt. PT reapplied compressogrip with foam to ant ankles to help improve tolerance and wear time.  Plan of Care Reviewed With: patient            Time Calculation   PT Charges       Row Name 07/01/24 1155 07/01/24 1015          Time Calculation    Start Time 0926  -ML 1015  -MF     PT Received On 07/01/24  -ML --     PT Goal Re-Cert Due Date 07/11/24  -ML 07/11/24  -MF        Timed Charges    87441 - PT Therapeutic Exercise Minutes 23  -ML --     30589 - PT Therapeutic Activity Minutes 5  -ML --        Untimed Charges    88494-[place] comp below knee -- 25  -MF        Total Minutes    Timed Charges Total Minutes 28  -ML --     Untimed Charges Total Minutes -- 25  -MF      Total Minutes 28  -ML 25  -MF               User Key  (r) = Recorded By, (t) = Taken By, (c) = Cosigned By      Initials Name Provider Type    Lloyd Ngo, PT Physical Therapist    ML Sally Perez Physical Therapist                            PT G-Codes  Outcome Measure Options: AM-PAC 6 Clicks Basic Mobility (PT)  AM-PAC 6 Clicks Score (PT): 21  AM-PAC 6 Clicks Score (OT): 17       Lloyd Reagan, PT  7/1/2024

## 2024-07-01 NOTE — NURSING NOTE
Wocn follow up for: Suspected deep tissue injury to the right gluteal    Assessment: Area is now still maroon and purple but defusing.  You can tell from the picture that this is a bruise.    Improvement: Yes    Recommendations/ changes: Keep covered with barrier cream and/or silicone foam which ever works best.    Areas of concern/ new issues: Woc also consulted for scrotal edema however due to sensitivity and pain most likely not going to do compression wraps on the scrotum.  Did discuss with PT wound care we are just going to order a sling you can use a pillowcase or dry go under the scrotum and above both thighs to kind of Scrotum like a hammock.    Skin interventions in place.    Specialty bed: Patient would benefit from an NASRA pump on his bed.    Pressure Injury Protocol (initiate for Shelton Score of 18 or less):   *Maintain good skin care, keep dry, turn q 2 hr, keep heels elevated and offloaded with heel boots.    *Apply z-guard to sacrococcygeal area/ perineal area BID or daily and PRN per incontinent episodes.  *Follow C.A.R.E protocol if medical devices (Bipap, rodrigues, Ng tube, etc) are being used.     Woc will sign off.    Please contact with questions or concerns.

## 2024-07-01 NOTE — PLAN OF CARE
Problem: Adult Inpatient Plan of Care  Goal: Plan of Care Review  7/1/2024 1845 by Aniceto Mark RN  Flowsheets (Taken 7/1/2024 1845)  Progress: no change  Plan of Care Reviewed With: patient  Outcome Evaluation: Patient denies pain but states having discomfort in LE from severe edema. Patient remains on RA, VSS, SR on tele. Patient being diuresed with IV lasix. Continue with POC.   Goal Outcome Evaluation:  Plan of Care Reviewed With: patient        Progress: no change  Outcome Evaluation: Patient denies pain but states having discomfort in LE from severe edema. Patient remains on RA, VSS, SR on tele. Patient being diuresed with IV lasix. Continue with POC.

## 2024-07-01 NOTE — THERAPY TREATMENT NOTE
Patient Name: Jaycob Ndiaye II  : 1960    MRN: [id number]                              Today's Date: 2024       Admit Date: 2024    Visit Dx:     ICD-10-CM ICD-9-CM   1. Peripheral edema  R60.0 782.3   2. Congestive heart failure, unspecified HF chronicity, unspecified heart failure type  I50.9 428.0   3. Hypokalemia  E87.6 276.8   4. Acute on chronic heart failure with preserved ejection fraction (HFpEF)  I50.33 428.23   5. Screen for colon cancer  Z12.11 V76.51     Patient Active Problem List   Diagnosis    Screen for colon cancer    Acute on chronic heart failure with preserved ejection fraction (HFpEF)    Elevated serum creatinine    Anemia    Hypokalemia    Cirrhosis of liver    HCV (hepatitis C virus)    Acute on chronic HFrEF (heart failure with reduced ejection fraction)     Past Medical History:   Diagnosis Date    Arthritis     Cancer     Constipation     Depression     Diabetes     type 2 dm, check blood sugar once a day    History of hepatitis C     History of prostate cancer     Hypertension     Irregular heartbeat     Pacemaker     home monitoring    Prostate CA     Requires supplemental oxygen     at night with cpap    Sleep apnea     wears a cpap    SOB (shortness of breath) on exertion     Wears glasses      Past Surgical History:   Procedure Laterality Date    CARDIAC ABLATION      COLONOSCOPY N/A 2021    Procedure: Colonoscopy with polypectomy;  Surgeon: Maurice Proctor MD;  Location: [facility] ENDOSCOPY;  Service: Gastroenterology;  Laterality: N/A;    COLONOSCOPY      ENDOSCOPY      KNEE SURGERY Right     1985    PACEMAKER IMPLANTATION      PROSTATE SURGERY      REPLACEMENT TOTAL KNEE Right       General Information       Row Name 24 1141          Physical Therapy Time and Intention    Document Type progress note/recertification  -ML     Mode of Treatment physical therapy  -ML       Row Name 24 1141          General Information    Patient Profile  Reviewed yes  -ML     Existing Precautions/Restrictions fall;other (see comments)  BLE edema  -ML     Barriers to Rehab medically complex;previous functional deficit;physical barrier  -ML       Row Name 07/01/24 1141          Cognition    Orientation Status (Cognition) oriented x 4  -ML       Row Name 07/01/24 1141          Safety Issues, Functional Mobility    Safety Issues Affecting Function (Mobility) insight into deficits/self-awareness;safety precaution awareness  -ML     Impairments Affecting Function (Mobility) balance;endurance/activity tolerance;shortness of breath;strength;range of motion (ROM)  -ML               User Key  (r) = Recorded By, (t) = Taken By, (c) = Cosigned By      Initials Name Provider Type    ML Sally Perez Physical Therapist                   Mobility       Row Name 07/01/24 1148          Bed Mobility    Comment, (Bed Mobility) patient found and left seated in bedside chair  -ML       Row Name 07/01/24 1148          Bed-Chair Transfer    Bed-Chair [place] (Transfers) contact guard  -ML     Assistive Device (Bed-Chair Transfers) walker, front-wheeled  -ML     Comment, (Bed-Chair Transfer) patient transferred from chair to BSC  -ML       Row Name 07/01/24 1148          Sit-Stand Transfer    Sit-Stand [place] (Transfers) standby assist  -ML     Assistive Device (Sit-Stand Transfers) walker, front-wheeled  -ML     Comment, (Sit-Stand Transfer) patient requries BUE support to complete sit to stand transfer  -ML       Row Name 07/01/24 1148          Gait/Stairs (Locomotion)    [place] Level (Gait) unable to assess  -ML     Comment, (Gait/Stairs) patient declined ambulation today due to scrotal edema  -ML               User Key  (r) = Recorded By, (t) = Taken By, (c) = Cosigned By      Initials Name Provider Type    ML Sally Perez Physical Therapist                   Obj/Interventions       Row Name 07/01/24 1148          Range of Motion Comprehensive    General Range of Motion  bilateral lower extremity ROM WFL  -ML       Row Name 07/01/24 1148          Strength Comprehensive (MMT)    General Manual Muscle Testing (MMT) Assessment lower extremity strength deficits identified  -       Row Name 07/01/24 1148          Motor Skills    Therapeutic Exercise shoulder;hip;knee  -       Row Name 07/01/24 1148          Shoulder (Therapeutic Exercise)    Shoulder (Therapeutic Exercise) AROM (active range of motion)  -ML     Shoulder AROM (Therapeutic Exercise) bilateral;scapular retraction;15 repititions  -       Row Name 07/01/24 1148          Hip (Therapeutic Exercise)    Hip (Therapeutic Exercise) strengthening exercise;isometric exercises  -     Hip Isometrics (Therapeutic Exercise) gluteal sets;10 repetitions;5 second hold  -ML     Hip Strengthening (Therapeutic Exercise) bilateral;marching while seated;15 repititions  -       Row Name 07/01/24 1148          Knee (Therapeutic Exercise)    Knee (Therapeutic Exercise) strengthening exercise  -     Knee Strengthening (Therapeutic Exercise) bilateral;LAQ (long arc quad);15 repititions  -       Row Name 07/01/24 1148          Balance    Balance Assessment sitting static balance;sitting dynamic balance;sit to stand dynamic balance;standing static balance;standing dynamic balance  -ML     Static Sitting Balance independent  -ML     Dynamic Sitting Balance independent  -ML     Position, Sitting Balance unsupported;sitting in chair;other (see comments)  sitting on BSC  -ML     Sit to Stand Dynamic Balance standby assist  -ML     Static Standing Balance standby assist  -ML     Dynamic Standing Balance contact guard  -ML     Position/Device Used, Standing Balance supported;walker, front-wheeled  -ML     Balance Interventions sitting;standing;sit to stand;supported;occupation based/functional task  -ML               User Key  (r) = Recorded By, (t) = Taken By, (c) = Cosigned By      Initials Name Provider Type    Sally Benitez  Therapist                   Goals/Plan       Row Name 07/01/24 1153          Bed Mobility Goal 1 (PT)    Activity/Assistive Device (Bed Mobility Goal 1, PT) sit to supine/supine to sit  -ML     [place] Level/Cues Needed (Bed Mobility Goal 1, PT) modified independence  -ML     Time Frame (Bed Mobility Goal 1, PT) short term goal (STG);5 days  -ML     Progress/Outcomes (Bed Mobility Goal 1, PT) goal ongoing  -ML       Row Name 07/01/24 1153          Transfer Goal 1 (PT)    Activity/Assistive Device (Transfer Goal 1, PT) sit-to-stand/stand-to-sit;bed-to-chair/chair-to-bed;walker, rolling  -ML     [place] Level/Cues Needed (Transfer Goal 1, PT) modified independence  -ML     Time Frame (Transfer Goal 1, PT) long term goal (LTG);10 days  -ML     Progress/Outcome (Transfer Goal 1, PT) goal ongoing  -ML       [facility] Name 07/01/24 1153          Gait Training Goal 1 (PT)    Activity/Assistive Device (Gait Training Goal 1, PT) gait (walking locomotion);increase endurance/gait distance;decrease fall risk;assistive device use;walker, rolling  -ML     [place] Level (Gait Training Goal 1, PT) modified independence  -ML     Distance (Gait Training Goal 1, PT) 150  -ML     Time Frame (Gait Training Goal 1, PT) long term goal (LTG);10 days  -ML     Progress/Outcome (Gait Training Goal 1, PT) goal ongoing  -ML       [facility] Name 07/01/24 1153          Stairs Goal 1 (PT)    Activity/Assistive Device (Stairs Goal 1, PT) ascending stairs;descending stairs  -ML     [place] Level/Cues Needed (Stairs Goal 1, PT) independent  -ML     Number of Stairs (Stairs Goal 1, PT) 6  -ML     Time Frame (Stairs Goal 1, PT) long term goal (LTG);1 week  -ML     Progress/Outcome (Stairs Goal 1, PT) goal ongoing  -ML       Row Name 07/01/24 1153          Therapy Assessment/Plan (PT)    Planned Therapy Interventions (PT) balance training;bed mobility training;gait training;neuromuscular re-education;patient/family education;home exercise  program;postural re-education;ROM (range of motion);strengthening;stretching;transfer training  -ML               User Key  (r) = Recorded By, (t) = Taken By, (c) = Cosigned By      Initials Name Provider Type    ML Sally Perez Physical Therapist                   Clinical Impression       Row Name 07/01/24 1149          Pain    Pretreatment Pain Rating 3/10  -ML     Posttreatment Pain Rating 3/10  -ML     Pain Location - Side/Orientation Bilateral  -ML     Pain Location - foot;hand  -ML     Pain Intervention(s) Repositioned;Ambulation/increased activity  -ML       Row Name 07/01/24 1149          Plan of Care Review    Plan of Care Reviewed With patient  -ML     Progress no change  -ML     Outcome Evaluation Patient declined attempt at ambulation today due to scrotal edema. Patient agreeable to seated therex, encouraged to complete outside of time with PT. Patient continues to present below baseline for mobility and would continue to benefit from skilled PT to address strength, balance and activity tolerance deficits.  -[facility] Name 07/01/24 1149          Therapy Assessment/Plan (PT)    Patient/Family Therapy Goals Statement (PT) return to OF  -ML     Rehab Potential (PT) fair, will monitor progress closely  -     Criteria for Skilled Interventions Met (PT) yes;skilled treatment is necessary  -ML     Therapy Frequency (PT) daily  -ML     Predicted Duration of Therapy Intervention (PT) 10 days  -ML       Row Name 07/01/24 1149          Vital Signs    Pre Patient Position Sitting  -ML     Intra Patient Position Standing  -ML     Post Patient Position Sitting  -ML       [facility] Name 07/01/24 1149          Positioning and Restraints    Pre-Treatment Position sitting in chair/recliner  -ML     Post Treatment Position bsc  -ML     On BS commode notified nsg;sitting;call light within reach;encouraged to call for assist  -ML               User Key  (r) = Recorded By, (t) = Taken By, (c) = Cosigned By      Initials  Name Provider Type    Sally Benitez Physical Therapist                   Outcome Measures       Row Name 07/01/24 1154 07/01/24 0815       How much help from another person do you currently need...    Turning from your back to your side while in flat bed without using bedrails? 4  -ML 3  -LANEY    Moving from lying on back to sitting on the side of a flat bed without bedrails? 4  -ML 3  -LANEY    Moving to and from a bed to a chair (including a wheelchair)? 3  -ML 3  -LANEY    Standing up from a chair using your arms (e.g., wheelchair, bedside chair)? 4  -ML 3  -LANEY    Climbing 3-5 steps with a railing? 3  -ML 2  -LANEY    To walk in hospital room? 3  -ML 3  -LANEY    AM-PAC 6 Clicks Score (PT) 21  -ML 17  -LANEY    Highest Level of Mobility Goal 6 --> Walk 10 steps or more  -ML 5 --> Static standing  -LANEY      Row Name 07/01/24 1154          Functional Assessment    Outcome Measure Options AM-PAC 6 Clicks Basic Mobility (PT)  -ML               User Key  (r) = Recorded By, (t) = Taken By, (c) = Cosigned By      Initials Name Provider Type    Sally Benitez Physical Therapist    [employer], RN Registered Nurse                                 Physical Therapy Education       Title: PT OT SLP Therapies (Done)       Topic: Physical Therapy (Done)       Point: Mobility training (Done)       Learning Progress Summary             Patient Acceptance, E, VU,DU,NR by  at 7/1/2024 1154    Acceptance, E, VU by ND at 6/25/2024 1109    Acceptance, E, VU by ND at 6/22/2024 1006                         Point: Home exercise program (Done)       Learning Progress Summary             Patient Acceptance, E, VU,DU,NR by  at 7/1/2024 1154                         Point: Body mechanics (Done)       Learning Progress Summary             Patient Acceptance, E, VU by ND at 6/25/2024 1109    Acceptance, E, VU by ND at 6/22/2024 1006                         Point: Precautions (Done)       Learning Progress Summary             Patient Acceptance, E,  VU,DU,NR by  at 7/1/2024 1154    Acceptance, E, VU by ND at 6/25/2024 1109    Acceptance, E, VU by ND at 6/22/2024 1006                                         User Key       Initials Effective Dates Name Provider Type Discipline     04/22/21 -  Sally Perez Physical Therapist PT    ND 11/16/23 -  Maryjane Das, PT Physical Therapist PT                  PT Recommendation and Plan  Planned Therapy Interventions (PT): balance training, bed mobility training, gait training, neuromuscular re-education, patient/family education, home exercise program, postural re-education, ROM (range of motion), strengthening, stretching, transfer training  Plan of Care Reviewed With: patient  Progress: no change  Outcome Evaluation: Patient declined attempt at ambulation today due to scrotal edema. Patient agreeable to seated therex, encouraged to complete outside of time with PT. Patient continues to present below baseline for mobility and would continue to benefit from skilled PT to address strength, balance and activity tolerance deficits.     Time Calculation:         PT Charges       Row Name 07/01/24 1155             Time Calculation    Start Time 0926  -ML      PT Received On 07/01/24  -ML      PT Goal Re-Cert Due Date 07/11/24  -ML         Timed Charges    04826 - PT Therapeutic Exercise Minutes 23  -ML      05915 - PT Therapeutic Activity Minutes 5  -ML         Total Minutes    Timed Charges Total Minutes 28  -ML       Total Minutes 28  -ML                User Key  (r) = Recorded By, (t) = Taken By, (c) = Cosigned By      Initials Name Provider Type     Sally Perez Physical Therapist                  Therapy Charges for Today       Code Description Service Date Service Provider Modifiers Qty    80408270166 HC PT THER PROC EA 15 MIN 7/1/2024 Sally Perez GP 2            PT G-Codes  Outcome Measure Options: AM-PAC 6 Clicks Basic Mobility (PT)  AM-PAC 6 Clicks Score (PT): 21  AM-PAC 6 Clicks Score (OT): 17  PT  Discharge Summary  Anticipated Discharge Disposition (PT): home with assist, home with home health    Sally Perez  7/1/2024

## 2024-07-01 NOTE — PLAN OF CARE
Goal Outcome Evaluation:  Plan of Care Reviewed With: patient        Progress: no change  Outcome Evaluation: Patient awake in chair throughout the night. New IV started per protocol. Vitals stable. Several small BM this shift.

## 2024-07-01 NOTE — PROGRESS NOTES
[facility] Medicine Services  PROGRESS NOTE    Patient Name: Jaycob Ndiaye II  : 1960  MRN: [id number]    Date of Admission: 2024  Primary Care Physician: Lorena Chamberlain APRN    Subjective   Subjective     CC:  Follow-up for heart failure    HPI:  Patient was seen and examined this morning.  Sitting comfortably in recliner at bedside.  Still with extensive lower extremity and scrotal edema.  No shortness of breath or chest pain.    Objective   Objective     Vital Signs:   Temp:  [97.3 °F (36.3 °C)-98.4 °F (36.9 °C)] 97.3 °F (36.3 °C)  Heart Rate:  [69-92] 69  Resp:  [16-19] 18  BP: (114-133)/(68-78) 120/72     Physical Exam:  General: Comfortable, not in distress, conversant and cooperative  Head: Atraumatic and normocephalic  Eyes: No Icterus. No pallor  Ears:  Ears appear intact with no abnormalities noted  Throat: No oral lesions, no thrush  Neck: Supple, trachea midline  Lungs: Clear to auscultation bilaterally, equal air entry, no wheezing or crackles  Heart:  Normal S1 and S2, no murmur, no gallop, No JVD, 3+ lower extremity swelling  Abdomen:  Soft, no tenderness, no organomegaly, normal bowel sounds, no organomegaly.  Scrotal edema  Extremities: pulses equal bilaterally  Skin: No bleeding, bruising or rash, normal skin turgor and elasticity  Neurologic: Cranial nerves appear intact with no evidence of facial asymmetry, normal motor and sensory functions in all 4 extremities  Psych: Alert and oriented x 3, normal mood    Results Reviewed:  LAB RESULTS:      Lab 24  0843 24  0440 24  0454 24  0754   WBC  --  8.09 7.28 8.77   HEMOGLOBIN  --  8.7* 8.6* 8.7*   HEMATOCRIT  --  27.4* 26.5* 27.2*   PLATELETS  --  186 195 202   MCV  --  96.8 95.0 96.1   SED RATE 9  --   --   --          Lab 24  0627 24  0504 24  1606 24  0559 24  0448 24  0440 24  1620 24  0454   SODIUM 132* 132*   --  133*  --  133*  --  134*   POTASSIUM 3.7 4.4 3.9 3.4*  --  3.7   < > 3.6   CHLORIDE 91* 89*  --  91*  --  92*  --  93*   CO2 26.0 26.0  --  28.0  --  27.0  --  27.0   ANION GAP 15.0 17.0*  --  14.0  --  14.0  --  14.0   BUN 91* 86*  --  76*  --  69*  --  64*   CREATININE 2.13* 2.26*  --  2.14*  --  2.24*  --  2.01*   EGFR 33.7* 31.4*  --  33.5*  --  31.7*  --  36.1*   GLUCOSE 159* 180*  --  184*  --  147*  --  113*   CALCIUM 9.3 9.4  --  9.3  --  9.2  --  9.3   IONIZED CALCIUM  --   --   --   --  1.23  --   --   --    PHOSPHORUS 4.4 3.9  --  4.7*  --   --   --  4.7*    < > = values in this interval not displayed.         Lab 07/01/24  0627 06/30/24  0504 06/26/24  0454   ALBUMIN 3.9 4.1 3.4*                         Brief Urine Lab Results  (Last result in the past 365 days)        Color   Clarity   Blood   Leuk Est   Nitrite   Protein   CREAT   Urine HCG        06/26/24 1621             39.5                 Microbiology Results Abnormal       None            No radiology results from the last 24 hrs        Current medications:  Scheduled Meds:castor oil-balsam peru, 1 Application, Topical, Q12H  cholecalciferol, 1,000 Units, Oral, Daily  colchicine, 0.6 mg, Oral, Daily  ferrous sulfate, 325 mg, Oral, Daily With Breakfast  furosemide, 80 mg, Intravenous, BID  heparin (porcine), 5,000 Units, Subcutaneous, Q8H  insulin lispro, 2-7 Units, Subcutaneous, 4x Daily AC & at Bedtime  Insulin Lispro, 4 Units, Subcutaneous, TID With Meals  ipratropium-albuterol, 3 mL, Nebulization, 4x Daily - RT  lactulose, 10 g, Oral, TID  pantoprazole, 40 mg, Oral, Q AM  pharmacy consult - [facility], , Does not apply, Daily  senna-docusate sodium, 2 tablet, Oral, BID   And  polyethylene glycol, 17 g, Oral, BID  predniSONE, 20 mg, Oral, Daily With Breakfast  sodium chloride, 10 mL, Intravenous, Q12H  [START ON 7/2/2024] spironolactone, 50 mg, Oral, Daily      Continuous Infusions:   PRN Meds:.  acetaminophen **OR** acetaminophen **OR**  acetaminophen    Albuterol Sulfate NEB Orderable    senna-docusate sodium **AND** polyethylene glycol **AND** bisacodyl **AND** bisacodyl    Calcium Replacement - Follow Nurse / BPA Driven Protocol    dextrose    dextrose    glucagon (human recombinant)    magnesium hydroxide    Magnesium Low Dose Replacement - Follow Nurse / BPA Driven Protocol    nitroglycerin    Phosphorus Replacement - Follow Nurse / BPA Driven Protocol    Potassium Replacement - Follow Nurse / BPA Driven Protocol    prochlorperazine    sodium chloride    sodium chloride    sodium chloride    Assessment & Plan   Assessment & Plan     Active Hospital Problems    Diagnosis  POA    **Acute on chronic heart failure with preserved ejection fraction (HFpEF) [I50.33]  Yes    Acute on chronic HFrEF (heart failure with reduced ejection fraction) [I50.23]  Yes    Elevated serum creatinine [R79.89]  Yes    Anemia [D64.9]  Yes    Hypokalemia [E87.6]  Yes    Cirrhosis of liver [K74.60]  Yes    HCV (hepatitis C virus) [B19.20]  Yes      Resolved Hospital Problems   No resolved problems to display.        Brief Hospital Course to date:  Jaycob Ndiaye II is a 64 y.o. male with past medical history significant for CHF, COPD, cirrhosis, prostate cancer, chronic hematuria, GI bleed, HCV, HTN, and HLD who presents to the ED due to worsening shortness of breath and lower extremity edema over the past week.    A/C HFrEF  Significant BLE edema and scrotal edema/Anasarca  Patient with extensive lower extremity and anterior abdominal wall edema  Nephrology team managing diuretic therapy.  Currently on IV Lasix 80 mg twice daily  Monitor kidney functions with ongoing aggressive diuresis  He follows with Cardiologist Dr. Craig  BLE venous duplex negative for DVT  WOC to see about scrotal wrap, PT to see about leg wraps tomorrow     CKD  Creatinine stable around 2  I will likely I will have biclonal antibody antibody thyroid  Nephrology managing diuretic  therapy    ?Calcium deposits in fingertips and right elbow  Hyperphosphatemia  Check uric level (elevated), ionized calcium, PTH (normal)  and vitamin D (low)  Sed rate normal, uric acid elevated - will try prednisone and colchicine  Continue colchicine and low dose steroids which seem to have helped significantly    Anemia  Recent GI Bleed  S/p EGD and colonoscopy at [facility] on 6/13/2024, records requested  Hgb stable  Continue PPI   Continue PO Iron -- give dose of IV iron     Hypokalemia  Replace per protocol     Newly diagnosed liver cirrhosis   HCV  S/p US abd on 6/10/24 that revealed nodular liver consistent with cirrhosis and moderate ascites  S/p US guided paracentesis on 6/12/2024 at [facility] with 200 mL of skylar-colored fluid removed.  No fluid was sent to lab.  Will need referral to establish with [facility] upon discharge per patient request  Consider paracentesis-- though he is not eager because it hurt so badly at [facility]. Our CT does not show a large voume of ascites- he think a good BM will help with abdominal distension, lactulose added     Type 2 diabetes with peripheral neuropathy  Holding home Mounjaro, Hg A1c is 6.0  Continue low dose  SSI for now.  Glucoses stable running 137-209 last 24 hours-- jaimee QAC insulin while on steroids     PAF  Xarelto discontinued 6/13 at [facility] d/t persistent hematuria      HTN  Continue holding home bystolic for now d/t borderline BP      HO prostate cancer  Patient want PSA checked and thinks he will not need DC at home    DVT prophylaxis:  Heparin     Expected Discharge Location and Transportation: home once medically improved and cleared by cardiology/nephrology.  Expected Discharge   Expected Discharge Date: 7/5/2024; Expected Discharge Time:    Awaiting placement- he is not eager to leave until he is good and ready    VTE Prophylaxis:  Pharmacologic VTE prophylaxis orders are present.         AM-PAC 6 Clicks Score (PT): 21 (07/01/24 0776)    CODE STATUS:   Code Status and  Medical Interventions:   Ordered at: 06/21/24 1840     Level Of Support Discussed With:    Patient     Code Status (Patient has no pulse and is not breathing):    CPR (Attempt to Resuscitate)     Medical Interventions (Patient has pulse or is breathing):    Full Support       Kaylen Huerta MD  07/01/24

## 2024-07-01 NOTE — PROGRESS NOTES
" LOS: 9 days   Patient Care Team:  Lorena Chamberlain APRN as PCP - General (Nurse Practitioner)  Lloyd Craig MD as Consulting Physician (Cardiology)    Chief Complaint: NASRIN    Subjective   No new events no added distress.  Continues to have significant edema.  Creatinine improved 2.13, BUN 91, sodium 132    History taken from: patient    Objective     Vital Sign Min/Max for last 24 hours  Temp  Min: 97.3 °F (36.3 °C)  Max: 98.4 °F (36.9 °C)   BP  Min: 114/77  Max: 133/68   Pulse  Min: 69  Max: 92   Resp  Min: 16  Max: 19   SpO2  Min: 94 %  Max: 97 %   No data recorded   No data recorded     Flowsheet Rows      Flowsheet Row First Filed Value   Admission Height 185.4 cm (73\") Documented at 06/21/2024 1409   Admission Weight 127 kg (279 lb) Documented at 06/21/2024 1409            I/O this shift:  In: 236 [P.O.:236]  Out: -   I/O last 3 completed shifts:  In: -   Out: 3600 [Urine:3600]    Objective:  Physical examination:    General Appearance: Alert, oriented, no obvious distress.  Morbid obesity  Eyes: PER, EOMI.  Neck: Supple no JVD.  Lungs: Clear auscultation, no rales rhonchi's, equal chest movement, nonlabored.  Heart: No gallop, murmur, rub, RRR.  Abdomen: Soft, nontender, positive bowel sounds, morbid obesity with abdominal wall edema  Extremities: Significant bilateral lower extremity edema 3-4+.  Lower extremity tenderness to improve.  No cyanosis.  Neuro: No focal deficit, moving all extremities, alert oriented X 3    : Rouse catheter in place.  Urine slightly bloody    Results Review:     I reviewed the patient's new clinical results.    WBC No results found for: \"WBC\"     HGB No results found for: \"HGB\"     HCT No results found for: \"HCT\"     Platlets No results found for: \"LABPLAT\"   MCV No results found for: \"MCV\"         Sodium Sodium   Date Value Ref Range Status   07/01/2024 132 (L) 136 - 145 mmol/L Final   06/30/2024 132 (L) 136 - 145 mmol/L Final   06/29/2024 133 (L) 136 - " "145 mmol/L Final      Potassium Potassium   Date Value Ref Range Status   07/01/2024 3.7 3.5 - 5.2 mmol/L Final   06/30/2024 4.4 3.5 - 5.2 mmol/L Final   06/29/2024 3.9 3.5 - 5.2 mmol/L Final   06/29/2024 3.4 (L) 3.5 - 5.2 mmol/L Final      Chloride Chloride   Date Value Ref Range Status   07/01/2024 91 (L) 98 - 107 mmol/L Final   06/30/2024 89 (L) 98 - 107 mmol/L Final   06/29/2024 91 (L) 98 - 107 mmol/L Final      CO2 CO2   Date Value Ref Range Status   07/01/2024 26.0 22.0 - 29.0 mmol/L Final   06/30/2024 26.0 22.0 - 29.0 mmol/L Final   06/29/2024 28.0 22.0 - 29.0 mmol/L Final      BUN BUN   Date Value Ref Range Status   07/01/2024 91 (H) 8 - 23 mg/dL Final   06/30/2024 86 (H) 8 - 23 mg/dL Final   06/29/2024 76 (H) 8 - 23 mg/dL Final      Creatinine Creatinine   Date Value Ref Range Status   07/01/2024 2.13 (H) 0.76 - 1.27 mg/dL Final   06/30/2024 2.26 (H) 0.76 - 1.27 mg/dL Final   06/29/2024 2.14 (H) 0.76 - 1.27 mg/dL Final      Calcium Calcium   Date Value Ref Range Status   07/01/2024 9.3 8.6 - 10.5 mg/dL Final   06/30/2024 9.4 8.6 - 10.5 mg/dL Final   06/29/2024 9.3 8.6 - 10.5 mg/dL Final      PO4 No results found for: \"CAPO4\"   Albumin Albumin   Date Value Ref Range Status   07/01/2024 3.9 3.5 - 5.2 g/dL Final   06/30/2024 4.1 3.5 - 5.2 g/dL Final        Magnesium No results found for: \"MG\"   Uric Acid No results found for: \"URICACID\"       Medication Review: Yes    Assessment & Plan       Acute on chronic heart failure with preserved ejection fraction (HFpEF)    Elevated serum creatinine    Anemia    Hypokalemia    Cirrhosis of liver    HCV (hepatitis C virus)    Acute on chronic HFrEF (heart failure with reduced ejection fraction)           1.  Acute kidney disease: Last known stable cr 1.1 in 2023. Cr on recent admission at [facility] few weeks ago 1.6-1.9 mg/dl. Most recent cr 2.0-2.2 GFR 35-36ml/min. Urine sodium 20. Urine cr 39.5. Renal US no hydro     2.  New onset liver cirrhosis: Complications include " ascites and LE edema.      3.  Volume status: Dependent edema b/l + ascites. Getting diuretics     4.  Hypokalemia: In the setting of diuretics     5.  Hyponatremia: Due to total body volume overload.      6.  HFpEF: Dependent edema      7.  Anemia: Recent hx of GI bleed. S/p EGD and colonoscopy.      Recommendation:  Microscopic hematuria is noted with Rosue catheter in place.    Acute gout: Hold allopurinol, okay to give colchicine and prednisone at this time.  Vitamin D 1000 IU daily ordered  Good urine output continue with the Lasix 80 mg twice daily, increase spironolactone 50 mg daily  If the blood pressure below 110 will add midodrine  Avoid nephrotoxic medication.  High risk complex patient.       Milton Muñoz MD  07/01/24  11:30 EDT

## 2024-07-01 NOTE — PLAN OF CARE
Goal Outcome Evaluation:  Plan of Care Reviewed With: patient        Progress: no change  Outcome Evaluation: Patient declined attempt at ambulation today due to scrotal edema. Patient agreeable to seated therex, encouraged to complete outside of time with PT. Patient continues to present below baseline for mobility and would continue to benefit from skilled PT to address strength, balance and activity tolerance deficits.      Anticipated Discharge Disposition (PT): home with assist, home with home health

## 2024-07-01 NOTE — CASE MANAGEMENT/SOCIAL WORK
Continued Stay Note   Bre     Patient Name: Jaycob Ndiaye II  MRN: [id number]  Today's Date: 7/1/2024    Admit Date: 6/21/2024    Plan: Home   Discharge Plan       Row Name 07/01/24 1621       Plan    Plan Home    Patient/Family in Agreement with Plan yes    Plan Comments Spoke with patient at bedside. He continous to declines rehab and home health. He is agreeable to outpatient PT for leg wraps. CM will follow for any discharge needs.    Final Discharge Disposition Code 01 - home or self-care                   Discharge Codes    No documentation.                 Expected Discharge Date and Time       Expected Discharge Date Expected Discharge Time    Jul 5, 2024               Arlene Hyde RN

## 2024-07-01 NOTE — PROGRESS NOTES
" [facility] - Progress Note    Jaycob Ndiaye II  1960  [id number]    07/01/24, 08:50 EDT      Chief Complaint: Following for acute on chronic HFpEF, PAF    Subjective:   Sitting on BSC.  States all of \"his problems are due to constipation.\"  Denies chest pain.  Dyspnea is stable.  Still with significant BLE, scrotal edema.  FC anchored with blood tinged UOP.    Pain from gout in hands/elbows is improving.    Review of Systems:  Pertinent positives are listed above and in physical exam.  All others have been reviewed and are negative.    castor oil-balsam peru, 1 Application, Topical, Q12H  cholecalciferol, 1,000 Units, Oral, Daily  colchicine, 0.6 mg, Oral, Daily  ferrous sulfate, 325 mg, Oral, Daily With Breakfast  furosemide, 80 mg, Intravenous, BID  heparin (porcine), 5,000 Units, Subcutaneous, Q8H  insulin lispro, 2-7 Units, Subcutaneous, 4x Daily AC & at Bedtime  Insulin Lispro, 4 Units, Subcutaneous, TID With Meals  ipratropium-albuterol, 3 mL, Nebulization, 4x Daily - RT  lactulose, 10 g, Oral, TID  pantoprazole, 40 mg, Oral, Q AM  pharmacy consult - [place], , Does not apply, Daily  senna-docusate sodium, 2 tablet, Oral, BID   And  polyethylene glycol, 17 g, Oral, BID  predniSONE, 20 mg, Oral, Daily With Breakfast  sodium chloride, 10 mL, Intravenous, Q12H  spironolactone, 25 mg, Oral, Daily        Objective:  Vitals:   height is 185.4 cm (73\") and weight is 163 kg (358 lb 11.2 oz) (abnormal). His oral temperature is 98.4 °F (36.9 °C). His blood pressure is 133/68 and his pulse is 79. His respiration is 18 and oxygen saturation is 96%.     Intake/Output Summary (Last 24 hours) at 7/1/2024 0912  Last data filed at 7/1/2024 0345  Gross per 24 hour   Intake --   Output 1900 ml   Net -1900 ml         Physical Exam:  General:  WN, NAD, A and O x3.  CV: Irregular. No murmur, rub, or gallop.  Resp:  CTA Tomi, equal, nonlabored.  Abd:  Soft, + BS, no organomegaly. Nontender to palpation.  " Obese  Extrem: 2-3+ edema BLE, 2+ pedal/PT pulses.            Results from last 7 days   Lab Units 06/27/24  0440   WBC 10*3/mm3 8.09   HEMOGLOBIN g/dL 8.7*   HEMATOCRIT % 27.4*   PLATELETS 10*3/mm3 186     Results from last 7 days   Lab Units 07/01/24  0627   SODIUM mmol/L 132*   POTASSIUM mmol/L 3.7   CHLORIDE mmol/L 91*   CO2 mmol/L 26.0   BUN mg/dL 91*   CREATININE mg/dL 2.13*   CALCIUM mg/dL 9.3   GLUCOSE mg/dL 159*                         Tele: Atrial fibrillation    Assessment:  -64-year-old CM with known HFpEF presents to [facility] ED with progressive dyspnea, BLE edema.  Recently admitted to [facility] over urologic issues.  Multiple changes to diuretic therapy and antihypertensives at that time.  -Acute on chronic HFpEF with recent echo 6/10/2024: Normal EF, severe septal hypertrophy, grade 2 diastolic dysfunction with no significant valvular abnormalities.  -PAF with remote ablation and [employer] pacemaker.    -Recent discontinuation of anticoagulation secondary to GIB  -CKD stage III  -Cirrhosis status post recent paracentesis at [facility].  -Anemia of chronic disease  -History of compliance issues secondary to insurance coverage.  -Constipation  -Gout          Plan:  -Admitted to hospitalist services, appreciate their assistance.  -Nephrology  managing diuretics, appreciate their assistance.  -NOAC on hold with anemia.  - BSC rep interrogated device 6/24, normal interrogation.  -Bilateral lower extremity venous duplex negative  -Cardiology will continue to follow.        I discussed the patient's findings and my recommendations with the patient, any present family members, and the nursing staff.  Lloyd Craig MD saw and examined patient, verified hx and PE, read all radiographic studies, reviewed labs and micro data, and formulated dx, plan for treatment and all medical decision making.      Dariana Miranda PA-C  07/01/24, 09:50 EDT

## 2024-07-02 LAB
ALBUMIN SERPL-MCNC: 4 G/DL (ref 3.5–5.2)
ALBUMIN/GLOB SERPL: 1.7 G/DL
ALP SERPL-CCNC: 66 U/L (ref 39–117)
ALT SERPL W P-5'-P-CCNC: 14 U/L (ref 1–41)
ANION GAP SERPL CALCULATED.3IONS-SCNC: 15 MMOL/L (ref 5–15)
AST SERPL-CCNC: 22 U/L (ref 1–40)
BASOPHILS # BLD AUTO: 0.03 10*3/MM3 (ref 0–0.2)
BASOPHILS NFR BLD AUTO: 0.3 % (ref 0–1.5)
BILIRUB SERPL-MCNC: 0.5 MG/DL (ref 0–1.2)
BUN SERPL-MCNC: 87 MG/DL (ref 8–23)
BUN/CREAT SERPL: 39.7 (ref 7–25)
CALCIUM SPEC-SCNC: 8.9 MG/DL (ref 8.6–10.5)
CHLORIDE SERPL-SCNC: 92 MMOL/L (ref 98–107)
CO2 SERPL-SCNC: 26 MMOL/L (ref 22–29)
CREAT SERPL-MCNC: 2.19 MG/DL (ref 0.76–1.27)
CRP SERPL-MCNC: 0.31 MG/DL (ref 0–0.5)
DEPRECATED RDW RBC AUTO: 57.5 FL (ref 37–54)
EGFRCR SERPLBLD CKD-EPI 2021: 32.6 ML/MIN/1.73
EOSINOPHIL # BLD AUTO: 0.04 10*3/MM3 (ref 0–0.4)
EOSINOPHIL NFR BLD AUTO: 0.4 % (ref 0.3–6.2)
ERYTHROCYTE [DISTWIDTH] IN BLOOD BY AUTOMATED COUNT: 16.1 % (ref 12.3–15.4)
ERYTHROCYTE [SEDIMENTATION RATE] IN BLOOD: 11 MM/HR (ref 0–20)
GLOBULIN UR ELPH-MCNC: 2.4 GM/DL
GLUCOSE BLDC GLUCOMTR-MCNC: 191 MG/DL (ref 70–130)
GLUCOSE BLDC GLUCOMTR-MCNC: 196 MG/DL (ref 70–130)
GLUCOSE BLDC GLUCOMTR-MCNC: 213 MG/DL (ref 70–130)
GLUCOSE BLDC GLUCOMTR-MCNC: 277 MG/DL (ref 70–130)
GLUCOSE SERPL-MCNC: 236 MG/DL (ref 65–99)
HCT VFR BLD AUTO: 28.2 % (ref 37.5–51)
HGB BLD-MCNC: 8.9 G/DL (ref 13–17.7)
IMM GRANULOCYTES # BLD AUTO: 0.04 10*3/MM3 (ref 0–0.05)
IMM GRANULOCYTES NFR BLD AUTO: 0.4 % (ref 0–0.5)
LYMPHOCYTES # BLD AUTO: 0.58 10*3/MM3 (ref 0.7–3.1)
LYMPHOCYTES NFR BLD AUTO: 6.2 % (ref 19.6–45.3)
MAGNESIUM SERPL-MCNC: 2.2 MG/DL (ref 1.6–2.4)
MCH RBC QN AUTO: 30.8 PG (ref 26.6–33)
MCHC RBC AUTO-ENTMCNC: 31.6 G/DL (ref 31.5–35.7)
MCV RBC AUTO: 97.6 FL (ref 79–97)
MONOCYTES # BLD AUTO: 0.96 10*3/MM3 (ref 0.1–0.9)
MONOCYTES NFR BLD AUTO: 10.3 % (ref 5–12)
NEUTROPHILS NFR BLD AUTO: 7.64 10*3/MM3 (ref 1.7–7)
NEUTROPHILS NFR BLD AUTO: 82.4 % (ref 42.7–76)
NRBC BLD AUTO-RTO: 0 /100 WBC (ref 0–0.2)
PHOSPHATE SERPL-MCNC: 3.9 MG/DL (ref 2.5–4.5)
PLATELET # BLD AUTO: 196 10*3/MM3 (ref 140–450)
PMV BLD AUTO: 10.2 FL (ref 6–12)
POTASSIUM SERPL-SCNC: 3.5 MMOL/L (ref 3.5–5.2)
POTASSIUM SERPL-SCNC: 3.9 MMOL/L (ref 3.5–5.2)
PROT SERPL-MCNC: 6.4 G/DL (ref 6–8.5)
RBC # BLD AUTO: 2.89 10*6/MM3 (ref 4.14–5.8)
SODIUM SERPL-SCNC: 133 MMOL/L (ref 136–145)
WBC NRBC COR # BLD AUTO: 9.29 10*3/MM3 (ref 3.4–10.8)

## 2024-07-02 PROCEDURE — 63710000001 PREDNISONE PER 1 MG: Performed by: INTERNAL MEDICINE

## 2024-07-02 PROCEDURE — 80053 COMPREHEN METABOLIC PANEL: CPT | Performed by: INTERNAL MEDICINE

## 2024-07-02 PROCEDURE — 25010000002 CHLOROTHIAZIDE PER 500 MG: Performed by: INTERNAL MEDICINE

## 2024-07-02 PROCEDURE — 25010000002 BUMETANIDE PER 0.5 MG: Performed by: INTERNAL MEDICINE

## 2024-07-02 PROCEDURE — 84100 ASSAY OF PHOSPHORUS: CPT | Performed by: INTERNAL MEDICINE

## 2024-07-02 PROCEDURE — 29581 APPL MULTLAYER CMPRN SYS LEG: CPT

## 2024-07-02 PROCEDURE — 82948 REAGENT STRIP/BLOOD GLUCOSE: CPT

## 2024-07-02 PROCEDURE — 86140 C-REACTIVE PROTEIN: CPT | Performed by: INTERNAL MEDICINE

## 2024-07-02 PROCEDURE — 25010000002 HEPARIN (PORCINE) PER 1000 UNITS: Performed by: NURSE PRACTITIONER

## 2024-07-02 PROCEDURE — 84132 ASSAY OF SERUM POTASSIUM: CPT | Performed by: INTERNAL MEDICINE

## 2024-07-02 PROCEDURE — 85652 RBC SED RATE AUTOMATED: CPT | Performed by: INTERNAL MEDICINE

## 2024-07-02 PROCEDURE — 85025 COMPLETE CBC W/AUTO DIFF WBC: CPT | Performed by: INTERNAL MEDICINE

## 2024-07-02 PROCEDURE — 83735 ASSAY OF MAGNESIUM: CPT | Performed by: INTERNAL MEDICINE

## 2024-07-02 PROCEDURE — 94799 UNLISTED PULMONARY SVC/PX: CPT

## 2024-07-02 PROCEDURE — 94664 DEMO&/EVAL PT USE INHALER: CPT

## 2024-07-02 PROCEDURE — 63710000001 INSULIN LISPRO (HUMAN) PER 5 UNITS: Performed by: INTERNAL MEDICINE

## 2024-07-02 PROCEDURE — 63710000001 INSULIN LISPRO (HUMAN) PER 5 UNITS: Performed by: NURSE PRACTITIONER

## 2024-07-02 PROCEDURE — 25010000002 FUROSEMIDE PER 20 MG: Performed by: INTERNAL MEDICINE

## 2024-07-02 PROCEDURE — 99232 SBSQ HOSP IP/OBS MODERATE 35: CPT | Performed by: INTERNAL MEDICINE

## 2024-07-02 RX ORDER — LACTULOSE 10 G/15ML
30 SOLUTION ORAL
Status: COMPLETED | OUTPATIENT
Start: 2024-07-02 | End: 2024-07-02

## 2024-07-02 RX ORDER — PREGABALIN 75 MG/1
75 CAPSULE ORAL DAILY
Status: DISCONTINUED | OUTPATIENT
Start: 2024-07-02 | End: 2024-07-03

## 2024-07-02 RX ORDER — BISACODYL 5 MG/1
20 TABLET, DELAYED RELEASE ORAL ONCE
Status: COMPLETED | OUTPATIENT
Start: 2024-07-02 | End: 2024-07-02

## 2024-07-02 RX ORDER — SPIRONOLACTONE 25 MG/1
100 TABLET ORAL DAILY
Status: DISCONTINUED | OUTPATIENT
Start: 2024-07-03 | End: 2024-07-06

## 2024-07-02 RX ORDER — BUMETANIDE 0.25 MG/ML
2 INJECTION INTRAMUSCULAR; INTRAVENOUS 3 TIMES DAILY
Status: DISCONTINUED | OUTPATIENT
Start: 2024-07-02 | End: 2024-07-08 | Stop reason: HOSPADM

## 2024-07-02 RX ORDER — POTASSIUM CHLORIDE 20 MEQ/1
40 TABLET, EXTENDED RELEASE ORAL EVERY 4 HOURS
Qty: 4 TABLET | Refills: 0 | Status: COMPLETED | OUTPATIENT
Start: 2024-07-02 | End: 2024-07-02

## 2024-07-02 RX ADMIN — INSULIN LISPRO 4 UNITS: 100 INJECTION, SOLUTION INTRAVENOUS; SUBCUTANEOUS at 17:20

## 2024-07-02 RX ADMIN — INSULIN LISPRO 2 UNITS: 100 INJECTION, SOLUTION INTRAVENOUS; SUBCUTANEOUS at 08:36

## 2024-07-02 RX ADMIN — Medication 1 APPLICATION: at 08:35

## 2024-07-02 RX ADMIN — LACTULOSE 30 G: 20 SOLUTION ORAL at 10:57

## 2024-07-02 RX ADMIN — SPIRONOLACTONE 50 MG: 25 TABLET ORAL at 08:35

## 2024-07-02 RX ADMIN — POLYETHYLENE GLYCOL 3350 17 G: 17 POWDER, FOR SOLUTION ORAL at 08:35

## 2024-07-02 RX ADMIN — LACTULOSE 30 G: 20 SOLUTION ORAL at 13:01

## 2024-07-02 RX ADMIN — Medication 1 APPLICATION: at 21:33

## 2024-07-02 RX ADMIN — FERROUS SULFATE TAB 325 MG (65 MG ELEMENTAL FE) 325 MG: 325 (65 FE) TAB at 08:35

## 2024-07-02 RX ADMIN — PREDNISONE 20 MG: 20 TABLET ORAL at 08:35

## 2024-07-02 RX ADMIN — IPRATROPIUM BROMIDE AND ALBUTEROL SULFATE 3 ML: 2.5; .5 SOLUTION RESPIRATORY (INHALATION) at 07:52

## 2024-07-02 RX ADMIN — SENNOSIDES AND DOCUSATE SODIUM 2 TABLET: 8.6; 5 TABLET ORAL at 21:27

## 2024-07-02 RX ADMIN — CHLOROTHIAZIDE SODIUM 500 MG: 500 INJECTION, POWDER, LYOPHILIZED, FOR SOLUTION INTRAVENOUS at 19:30

## 2024-07-02 RX ADMIN — BUMETANIDE 2 MG: 0.25 INJECTION, SOLUTION INTRAMUSCULAR; INTRAVENOUS at 21:28

## 2024-07-02 RX ADMIN — INSULIN LISPRO 2 UNITS: 100 INJECTION, SOLUTION INTRAVENOUS; SUBCUTANEOUS at 17:20

## 2024-07-02 RX ADMIN — IPRATROPIUM BROMIDE AND ALBUTEROL SULFATE 3 ML: 2.5; .5 SOLUTION RESPIRATORY (INHALATION) at 11:42

## 2024-07-02 RX ADMIN — POTASSIUM CHLORIDE 40 MEQ: 1500 TABLET, EXTENDED RELEASE ORAL at 17:34

## 2024-07-02 RX ADMIN — INSULIN LISPRO 4 UNITS: 100 INJECTION, SOLUTION INTRAVENOUS; SUBCUTANEOUS at 13:02

## 2024-07-02 RX ADMIN — POLYETHYLENE GLYCOL 3350 17 G: 17 POWDER, FOR SOLUTION ORAL at 21:27

## 2024-07-02 RX ADMIN — BUMETANIDE 2 MG: 0.25 INJECTION, SOLUTION INTRAMUSCULAR; INTRAVENOUS at 17:19

## 2024-07-02 RX ADMIN — Medication 1000 UNITS: at 08:35

## 2024-07-02 RX ADMIN — INSULIN LISPRO 4 UNITS: 100 INJECTION, SOLUTION INTRAVENOUS; SUBCUTANEOUS at 08:35

## 2024-07-02 RX ADMIN — POTASSIUM CHLORIDE 40 MEQ: 1500 TABLET, EXTENDED RELEASE ORAL at 14:50

## 2024-07-02 RX ADMIN — INSULIN LISPRO 3 UNITS: 100 INJECTION, SOLUTION INTRAVENOUS; SUBCUTANEOUS at 21:27

## 2024-07-02 RX ADMIN — LACTULOSE 10 G: 20 SOLUTION ORAL at 08:35

## 2024-07-02 RX ADMIN — SENNOSIDES AND DOCUSATE SODIUM 2 TABLET: 8.6; 5 TABLET ORAL at 08:35

## 2024-07-02 RX ADMIN — PANTOPRAZOLE SODIUM 40 MG: 40 TABLET, DELAYED RELEASE ORAL at 05:20

## 2024-07-02 RX ADMIN — FUROSEMIDE 80 MG: 10 INJECTION, SOLUTION INTRAMUSCULAR; INTRAVENOUS at 08:35

## 2024-07-02 RX ADMIN — COLCHICINE 0.6 MG: 0.6 TABLET ORAL at 08:35

## 2024-07-02 RX ADMIN — Medication 10 ML: at 21:33

## 2024-07-02 RX ADMIN — LACTULOSE 10 G: 20 SOLUTION ORAL at 21:27

## 2024-07-02 RX ADMIN — IPRATROPIUM BROMIDE AND ALBUTEROL SULFATE 3 ML: 2.5; .5 SOLUTION RESPIRATORY (INHALATION) at 16:23

## 2024-07-02 RX ADMIN — Medication 10 ML: at 08:35

## 2024-07-02 RX ADMIN — PREGABALIN 75 MG: 75 CAPSULE ORAL at 10:57

## 2024-07-02 RX ADMIN — LACTULOSE 10 G: 20 SOLUTION ORAL at 17:20

## 2024-07-02 RX ADMIN — IPRATROPIUM BROMIDE AND ALBUTEROL SULFATE 3 ML: 2.5; .5 SOLUTION RESPIRATORY (INHALATION) at 20:38

## 2024-07-02 RX ADMIN — BISACODYL 20 MG: 5 TABLET, COATED ORAL at 10:58

## 2024-07-02 NOTE — PROGRESS NOTES
" LOS: 10 days   Patient Care Team:  Lorena Chamberlain APRN as PCP - General (Nurse Practitioner)  Lloyd Craig MD as Consulting Physician (Cardiology)    Chief Complaint: NASRIN    Subjective   Stable renal function but persistent generalize edema no significant improvement.     History taken from: patient    Objective     Vital Sign Min/Max for last 24 hours  Temp  Min: 97.1 °F (36.2 °C)  Max: 97.4 °F (36.3 °C)   BP  Min: 109/70  Max: 120/73   Pulse  Min: 69  Max: 96   Resp  Min: 16  Max: 20   SpO2  Min: 94 %  Max: 97 %   No data recorded   No data recorded     Flowsheet Rows      Flowsheet Row First Filed Value   Admission Height 185.4 cm (73\") Documented at 06/21/2024 1409   Admission Weight 127 kg (279 lb) Documented at 06/21/2024 1409            I/O this shift:  In: 2012 [P.O.:2012]  Out: 800 [Urine:800]  I/O last 3 completed shifts:  In: 2305 [P.O.:2305]  Out: 3575 [Urine:3575]    Objective:  Physical examination:    General Appearance: Alert, oriented, no obvious distress.  Morbid obesity  Eyes: PER, EOMI.  Neck: Supple no JVD.  Lungs: Clear auscultation, no rales rhonchi's, equal chest movement, nonlabored.  Heart: No gallop, murmur, rub, RRR.  Abdomen: Soft, nontender, positive bowel sounds, morbid obesity with abdominal wall edema  Extremities: Significant bilateral lower extremity edema 3-4+.  Lower extremity tenderness to improve.  No cyanosis.  Neuro: No focal deficit, moving all extremities, alert oriented X 3    : Rouse catheter in place.  Urine slightly bloody    Results Review:     I reviewed the patient's new clinical results.    WBC WBC   Date Value Ref Range Status   07/02/2024 9.29 3.40 - 10.80 10*3/mm3 Final        HGB Hemoglobin   Date Value Ref Range Status   07/02/2024 8.9 (L) 13.0 - 17.7 g/dL Final        HCT Hematocrit   Date Value Ref Range Status   07/02/2024 28.2 (L) 37.5 - 51.0 % Final        Platlets No results found for: \"LABPLAT\"   MCV MCV   Date Value Ref Range " "Status   07/02/2024 97.6 (H) 79.0 - 97.0 fL Final            Sodium Sodium   Date Value Ref Range Status   07/02/2024 133 (L) 136 - 145 mmol/L Final   07/01/2024 132 (L) 136 - 145 mmol/L Final   06/30/2024 132 (L) 136 - 145 mmol/L Final      Potassium Potassium   Date Value Ref Range Status   07/02/2024 3.5 3.5 - 5.2 mmol/L Final   07/01/2024 3.7 3.5 - 5.2 mmol/L Final   06/30/2024 4.4 3.5 - 5.2 mmol/L Final      Chloride Chloride   Date Value Ref Range Status   07/02/2024 92 (L) 98 - 107 mmol/L Final   07/01/2024 91 (L) 98 - 107 mmol/L Final   06/30/2024 89 (L) 98 - 107 mmol/L Final      CO2 CO2   Date Value Ref Range Status   07/02/2024 26.0 22.0 - 29.0 mmol/L Final   07/01/2024 26.0 22.0 - 29.0 mmol/L Final   06/30/2024 26.0 22.0 - 29.0 mmol/L Final      BUN BUN   Date Value Ref Range Status   07/02/2024 87 (H) 8 - 23 mg/dL Final   07/01/2024 91 (H) 8 - 23 mg/dL Final   06/30/2024 86 (H) 8 - 23 mg/dL Final      Creatinine Creatinine   Date Value Ref Range Status   07/02/2024 2.19 (H) 0.76 - 1.27 mg/dL Final   07/01/2024 2.13 (H) 0.76 - 1.27 mg/dL Final   06/30/2024 2.26 (H) 0.76 - 1.27 mg/dL Final      Calcium Calcium   Date Value Ref Range Status   07/02/2024 8.9 8.6 - 10.5 mg/dL Final   07/01/2024 9.3 8.6 - 10.5 mg/dL Final   06/30/2024 9.4 8.6 - 10.5 mg/dL Final      PO4 No results found for: \"CAPO4\"   Albumin Albumin   Date Value Ref Range Status   07/02/2024 4.0 3.5 - 5.2 g/dL Final   07/01/2024 3.9 3.5 - 5.2 g/dL Final   06/30/2024 4.1 3.5 - 5.2 g/dL Final        Magnesium Magnesium   Date Value Ref Range Status   07/02/2024 2.2 1.6 - 2.4 mg/dL Final      Uric Acid No results found for: \"URICACID\"       Medication Review: Yes    Assessment & Plan       Acute on chronic heart failure with preserved ejection fraction (HFpEF)    Elevated serum creatinine    Anemia    Hypokalemia    Cirrhosis of liver    HCV (hepatitis C virus)    Acute on chronic HFrEF (heart failure with reduced ejection fraction)       "     1.  Acute kidney disease: Last known stable cr 1.1 in 2023. Cr on recent admission at [facility] few weeks ago 1.6-1.9 mg/dl. Most recent cr 2.0-2.2 GFR 35-36ml/min. Urine sodium 20. Urine cr 39.5. Renal US no hydro     2.  New onset liver cirrhosis: Complications include ascites and LE edema.      3.  Volume status: Dependent edema b/l + ascites. Getting diuretics     4.  Hypokalemia: In the setting of diuretics     5.  Hyponatremia: Due to total body volume overload.      6.  HFpEF: Dependent edema      7.  Anemia: Recent hx of GI bleed. S/p EGD and colonoscopy.      Recommendation:  Microscopic hematuria is noted with Rouse catheter in place.  Switch lasix to bumex 2  mg TID. Add diuril 500mg IV x 1 ( 30 min prior to bumex)   Continue spironolactone 50 mg daily. Will increase it to 100 mg daily ( tomorrow)     William Bartlett MD  07/02/24  16:19 EDT

## 2024-07-02 NOTE — PLAN OF CARE
Problem: Adult Inpatient Plan of Care  Goal: Plan of Care Review  Flowsheets (Taken 7/2/2024 2228)  Progress: no change  Plan of Care Reviewed With: patient  Outcome Evaluation: patient stated having discomfort in hands and legs and pt refused pain medication. Remains on RA, VSS, SR on tele. Patient now being diuresed with IV bumex and diuril. Continue with POC.   Goal Outcome Evaluation:  Plan of Care Reviewed With: patient        Progress: no change  Outcome Evaluation: patient stated having discomfort in hands and legs and pt refused pain medication. Remains on RA, VSS, SR on tele. Patient now being diuresed with IV bumex and diuril. Continue with POC.

## 2024-07-02 NOTE — PROGRESS NOTES
" [facility] - Progress Note    Jaycob Ndiaye II  1960  [id number]/1    07/02/24, 08:58 EDT      Chief Complaint: Following for acute on chronic HFpEF, PAF    Subjective:   Sitting up in BSC.  Reports a few BMs yesterday which helps.  Still with significant scrotal edema - uncomfortable with  movement.  Denies chest pain.    FC anchored.    Pain from gout in hands/elbows is improving.    Review of Systems:  Pertinent positives are listed above and in physical exam.  All others have been reviewed and are negative.    castor oil-balsam peru, 1 Application, Topical, Q12H  cholecalciferol, 1,000 Units, Oral, Daily  colchicine, 0.6 mg, Oral, Daily  ferrous sulfate, 325 mg, Oral, Daily With Breakfast  furosemide, 80 mg, Intravenous, BID  heparin (porcine), 5,000 Units, Subcutaneous, Q8H  insulin lispro, 2-7 Units, Subcutaneous, 4x Daily AC & at Bedtime  Insulin Lispro, 4 Units, Subcutaneous, TID With Meals  ipratropium-albuterol, 3 mL, Nebulization, 4x Daily - RT  lactulose, 10 g, Oral, TID  pantoprazole, 40 mg, Oral, Q AM  pharmacy consult - [facility], , Does not apply, Daily  senna-docusate sodium, 2 tablet, Oral, BID   And  polyethylene glycol, 17 g, Oral, BID  predniSONE, 20 mg, Oral, Daily With Breakfast  sodium chloride, 10 mL, Intravenous, Q12H  spironolactone, 50 mg, Oral, Daily        Objective:  Vitals:   height is 185.4 cm (73\") and weight is 163 kg (358 lb 11.2 oz) (abnormal). His oral temperature is 97.3 °F (36.3 °C). His blood pressure is 119/78 and his pulse is 88. His respiration is 20 and oxygen saturation is 96%.     Intake/Output Summary (Last 24 hours) at 7/2/2024 0858  Last data filed at 7/2/2024 0340  Gross per 24 hour   Intake 2069 ml   Output 2625 ml   Net -556 ml         Physical Exam:  General:  WN, NAD, A and O x3.  CV: Irregular. No murmur, rub, or gallop.  Resp:  CTA Tomi, equal, nonlabored.  Abd:  Soft, + BS, no organomegaly. Nontender to palpation.  Obese  Extrem: 2-3+ edema " BLE, 2+ pedal/PT pulses.            Results from last 7 days   Lab Units 06/27/24  0440   WBC 10*3/mm3 8.09   HEMOGLOBIN g/dL 8.7*   HEMATOCRIT % 27.4*   PLATELETS 10*3/mm3 186     Results from last 7 days   Lab Units 07/01/24  0627   SODIUM mmol/L 132*   POTASSIUM mmol/L 3.7   CHLORIDE mmol/L 91*   CO2 mmol/L 26.0   BUN mg/dL 91*   CREATININE mg/dL 2.13*   CALCIUM mg/dL 9.3   GLUCOSE mg/dL 159*                         Tele: Atrial fibrillation    Assessment:  -64-year-old CM with known HFpEF presents to [facility] ED with progressive dyspnea, BLE edema.  Recently admitted to [facility] over urologic issues.  Multiple changes to diuretic therapy and antihypertensives at that time.  -Acute on chronic HFpEF with recent echo 6/10/2024: Normal EF, severe septal hypertrophy, grade 2 diastolic dysfunction with no significant valvular abnormalities.  -PAF with remote ablation and [employer] pacemaker.    -Recent discontinuation of anticoagulation secondary to GIB  -CKD stage III  -Cirrhosis status post recent paracentesis at [facility].  -Anemia of chronic disease  -History of compliance issues secondary to insurance coverage.  -Constipation  -Gout          Plan:  -Admitted to hospitalist services, appreciate their assistance.  -Nephrology  managing diuretics, appreciate their assistance.  -NOAC on hold with anemia.  - BSC rep interrogated device 6/24, normal interrogation.  -Bilateral lower extremity venous duplex negative  -Cardiology will continue to follow from periphery.  We will plan on seeing patient again on Monday, July 8.  Please call on-call services over the holiday/weekend if needed for new cardiac issues.        I discussed the patient's findings and my recommendations with the patient, any present family members, and the nursing staff.  Lloyd Craig MD saw and examined patient, verified hx and PE, read all radiographic studies, reviewed labs and micro data, and formulated dx, plan for treatment and all medical  decision making.      Dariana Miranda PA-C  07/02/24, 09:00 EDT

## 2024-07-02 NOTE — PLAN OF CARE
Goal Outcome Evaluation:              Outcome Evaluation: VSS, RA. Patient denies pain. Up in chair.

## 2024-07-02 NOTE — PLAN OF CARE
Goal Outcome Evaluation:  Plan of Care Reviewed With: patient           Outcome Evaluation: Pt cont to have difficulty at night with feelings of restless legs with use of compression wrapping. PT encouraged pt to cont with compression wrapping as long as possible and to have nursing remove wraps if pain / discomfort becomes too much. Pt verb understanding.

## 2024-07-02 NOTE — THERAPY WOUND CARE TREATMENT
Acute Care - Wound/Debridement Treatment Note  [facility]     Patient Name: Jaycob Ndiaye II  : 1960  MRN: [id number]  Today's Date: 2024                Admit Date: 2024    Visit Dx:    ICD-10-CM ICD-9-CM   1. Peripheral edema  R60.0 782.3   2. Congestive heart failure, unspecified HF chronicity, unspecified heart failure type  I50.9 428.0   3. Hypokalemia  E87.6 276.8   4. Acute on chronic heart failure with preserved ejection fraction (HFpEF)  I50.33 428.23   5. Screen for colon cancer  Z12.11 V76.51       Patient Active Problem List   Diagnosis    Screen for colon cancer    Acute on chronic heart failure with preserved ejection fraction (HFpEF)    Elevated serum creatinine    Anemia    Hypokalemia    Cirrhosis of liver    HCV (hepatitis C virus)    Acute on chronic HFrEF (heart failure with reduced ejection fraction)        Past Medical History:   Diagnosis Date    Arthritis     Cancer     Constipation     Depression     Diabetes     type 2 dm, check blood sugar once a day    History of hepatitis C     History of prostate cancer     Hypertension     Irregular heartbeat     Pacemaker     home monitoring    Prostate CA     Requires supplemental oxygen     at night with cpap    Sleep apnea     wears a cpap    SOB (shortness of breath) on exertion     Wears glasses         Past Surgical History:   Procedure Laterality Date    CARDIAC ABLATION      COLONOSCOPY N/A 2021    Procedure: Colonoscopy with polypectomy;  Surgeon: Maurice Proctor MD;  Location: [facility];  Service: Gastroenterology;  Laterality: N/A;    COLONOSCOPY      ENDOSCOPY      KNEE SURGERY Right     1985    PACEMAKER IMPLANTATION      PROSTATE SURGERY      REPLACEMENT TOTAL KNEE Right            Wound 24 1540 Bilateral lower leg Blisters (Active)   Base pink;scab 24   Drainage Amount none 24   Dressing Care dressing removed 24   Periwound Care dry periwound area  maintained 07/01/24 2050       Wound 06/28/24 0000 Right medial gluteal Pressure Injury (Active)   Base maroon/purple 07/01/24 2050   Dressing Care open to air 07/01/24 2050       Wound 06/30/24 0000 Right posterior greater trochanter Skin Tear (Active)   Closure Open to air 07/01/24 2050       Wound 06/30/24 0000 Left posterior greater trochanter (Active)   Closure Open to air 07/01/24 2050   Periwound Care dry periwound area maintained 07/01/24 2050       Wound 06/30/24 2146 scrotum Skin Tear (Active)   Closure Open to air 07/01/24 2050   Periwound Care dry periwound area maintained 07/01/24 2050      Lymphedema       Row Name 07/02/24 0830             Lymphedema Edema Assessment    Ptting Edema Category By severity  -      Pitting Edema Severe  -         Compression/Skin Care    Compression/Skin Care skin care;wrapping location;bandaging  -      Skin Care washed/dried;lotion applied  -      Wrapping Location lower extremity  -      Wrapping Location LE bilateral:;foot to knee  -      Wrapping Comments optifoam Ag foam to ant ankles with size 5/6/8 compressogrips doubled/overlapping for gradient compression  -                User Key  (r) = Recorded By, (t) = Taken By, (c) = Cosigned By      Initials Name Provider Type    MF Lloyd Reagan, PT Physical Therapist                    WOUND DEBRIDEMENT                     PT Assessment (Last 12 Hours)       PT Evaluation and Treatment       Row Name 07/02/24 0830          Physical Therapy Time and Intention    Subjective Information complains of;weakness;fatigue;pain  -     Document Type therapy note (daily note);wound care  -     Mode of Treatment individual therapy;physical therapy  -       Row Name 07/02/24 0830          Pain Scale: FACES Pre/Post-Treatment    Pain: FACES Scale, Pretreatment 2-->hurts little bit  -     Posttreatment Pain Rating 2-->hurts little bit  -     Pain Location - Side/Orientation Bilateral  -     Pain Location  lower  -MF     Pain Location - extremity  -MF       Row Name             Wound 06/22/24 1540 Bilateral lower leg Blisters    Wound - Properties Group Placement Date: 06/22/24  -JM Placement Time: 1540  -JM Side: Bilateral  -JM Orientation: lower  -JM Location: leg  -JM Primary Wound Type: Blisters  -JM    Retired Wound - Properties Group Placement Date: 06/22/24  -JM Placement Time: 1540  -JM Side: Bilateral  -JM Orientation: lower  -JM Location: leg  -JM Primary Wound Type: Blisters  -JM    Retired Wound - Properties Group Date first assessed: 06/22/24  - Time first assessed: 1540  -JM Side: Bilateral  -JM Location: leg  -JM Primary Wound Type: Blisters  -JM      Row Name             Wound 06/28/24 0000 Right medial gluteal Pressure Injury    Wound - Properties Group Placement Date: 06/28/24  -TH Placement Time: 0000  -TH Present on Original Admission: N  -TH Side: Right  -TH Orientation: medial  -TH Location: gluteal  -TH Primary Wound Type: Pressure inj  -TH    Retired Wound - Properties Group Placement Date: 06/28/24  -TH Placement Time: 0000  -TH Present on Original Admission: N  -TH Side: Right  -TH Orientation: medial  -TH Location: gluteal  -TH Primary Wound Type: Pressure inj  -TH    Retired Wound - Properties Group Date first assessed: 06/28/24  -TH Time first assessed: 0000  -TH Present on Original Admission: N  -TH Side: Right  -TH Location: gluteal  -TH Primary Wound Type: Pressure inj  -TH      Row Name             Wound 06/30/24 0000 Right posterior greater trochanter Skin Tear    Wound - Properties Group Placement Date: 06/30/24  -TH Placement Time: 0000  -TH Side: Right  -TH Orientation: posterior  -TH Location: greater trochanter  -TH Primary Wound Type: Skin tear  -TH    Retired Wound - Properties Group Placement Date: 06/30/24  -TH Placement Time: 0000  -TH Side: Right  -TH Orientation: posterior  -TH Location: greater trochanter  -TH Primary Wound Type: Skin tear  -TH    Retired Wound -  Properties Group Date first assessed: 06/30/24 -TH Time first assessed: 0000  -TH Side: Right  -TH Location: greater trochanter  -TH Primary Wound Type: Skin tear  -TH      Row Name             Wound 06/30/24 0000 Left posterior greater trochanter    Wound - Properties Group Placement Date: 06/30/24 -TH Placement Time: 0000  -TH Side: Left  -TH Orientation: posterior  -TH Location: greater trochanter  -TH    Retired Wound - Properties Group Placement Date: 06/30/24 -TH Placement Time: 0000  -TH Side: Left  -TH Orientation: posterior  -TH Location: greater trochanter  -TH    Retired Wound - Properties Group Date first assessed: 06/30/24 -TH Time first assessed: 0000  -TH Side: Left  -TH Location: greater trochanter  -TH      Row Name             Wound 06/30/24 2146 scrotum Skin Tear    Wound - Properties Group Placement Date: 06/30/24 -TH Placement Time: 2146 -TH Location: scrotum  -TH Primary Wound Type: Skin tear  -TH    Retired Wound - Properties Group Placement Date: 06/30/24 -TH Placement Time: 2146 -TH Location: scrotum  -TH Primary Wound Type: Skin tear  -TH    Retired Wound - Properties Group Date first assessed: 06/30/24 -TH Time first assessed: 2146 -TH Location: scrotum  -TH Primary Wound Type: Skin tear  -TH      Row Name 07/02/24 0830          Coping    Observed Emotional State calm;cooperative  -     Verbalized Emotional State acceptance  -     Trust Relationship/Rapport care explained  -       Row Name 07/02/24 0830          Plan of Care Review    Plan of Care Reviewed With patient  -     Outcome Evaluation Pt cont to have difficulty at night with feelings of restless legs with use of compression wrapping. PT encouraged pt to cont with compression wrapping as long as possible and to have nursing remove wraps if pain / discomfort becomes too much. Pt verb understanding.  -MF       Row Name 07/02/24 0830          Positioning and Restraints    Pre-Treatment Position sitting in  chair/recliner  -MF     Post Treatment Position chair  -MF     In Chair reclined;call light within reach  -MF               User Key  (r) = Recorded By, (t) = Taken By, (c) = Cosigned By      Initials Name Provider Type    MF Lloyd Reagan, PT Physical Therapist    eJnny Salazar, RN Registered Nurse    Carolina Mccurdy, PT Physical Therapist                  Physical Therapy Education       Title: PT OT SLP Therapies (Done)       Topic: Physical Therapy (Done)       Point: Mobility training (Done)       Learning Progress Summary             Patient Acceptance, E, VU,DU,NR by ML at 7/1/2024 1154    Acceptance, E, VU by ND at 6/25/2024 1109    Acceptance, E, VU by ND at 6/22/2024 1006                         Point: Home exercise program (Done)       Learning Progress Summary             Patient Acceptance, E, VU,DU,NR by ML at 7/1/2024 1154                         Point: Body mechanics (Done)       Learning Progress Summary             Patient Acceptance, E, VU by ND at 6/25/2024 1109    Acceptance, E, VU by ND at 6/22/2024 1006                         Point: Precautions (Done)       Learning Progress Summary             Patient Acceptance, E, VU,DU,NR by ML at 7/1/2024 1154    Acceptance, E, VU by ND at 6/25/2024 1109    Acceptance, E, VU by ND at 6/22/2024 1006                                         User Key       Initials Effective Dates Name Provider Type Discipline     04/22/21 -  Sally Perez Physical Therapist PT    ND 11/16/23 -  Maryjane Das PT Physical Therapist PT                    Recommendation and Plan  Anticipated Discharge Disposition (PT): home with assist, home with home health  Planned Therapy Interventions (PT): balance training, bed mobility training, gait training, neuromuscular re-education, patient/family education, home exercise program, postural re-education, ROM (range of motion), strengthening, stretching, transfer training  Therapy Frequency (PT): daily  Plan  of Care Reviewed With: patient           Outcome Evaluation: Pt cont to have difficulty at night with feelings of restless legs with use of compression wrapping. PT encouraged pt to cont with compression wrapping as long as possible and to have nursing remove wraps if pain / discomfort becomes too much. Pt verb understanding.  Plan of Care Reviewed With: patient            Time Calculation   PT Charges       Row Name 07/02/24 0830             Time Calculation    Start Time 0830  -MF      PT Goal Re-Cert Due Date 07/11/24  -MF         Untimed Charges    16736-[id number] comp below knee 25  -MF         Total Minutes    Untimed Charges Total Minutes 25  -MF       Total Minutes 25  -MF                User Key  (r) = Recorded By, (t) = Taken By, (c) = Cosigned By      Initials Name Provider Type     Lloyd Reagan, PT Physical Therapist                      Therapy Charges for Today       Code Description Service Date Service Provider Modifiers Qty    11965625126 HC PT MULTI LAYER COMP SYS BELOW KNEE 7/1/2024 Lloyd Reagan, PT GP 1              PT G-Codes  Outcome Measure Options: AM-PAC 6 Clicks Basic Mobility (PT)  AM-PAC 6 Clicks Score (PT): 21  AM-PAC 6 Clicks Score (OT): 17       Lloyd Reagan PT  7/2/2024

## 2024-07-02 NOTE — PROGRESS NOTES
[facility] Medicine Services  PROGRESS NOTE    Patient Name: Jaycob Ndiaye II  : 1960  MRN: [id number]    Date of Admission: 2024  Primary Care Physician: Lorena Chamberlain APRN    Subjective   Subjective     CC:  Follow-up for heart failure    HPI:  And examined this morning.  Diuresed 2 L yesterday, negative balance of 0.5 L only.  Complaining of persistent pain in his both hands and worsening neuropathy in both lower extremities.    Objective   Objective     Vital Signs:   Temp:  [97.1 °F (36.2 °C)-98.4 °F (36.9 °C)] 97.2 °F (36.2 °C)  Heart Rate:  [69-96] 71  Resp:  [16-18] 18  BP: (109-133)/(68-88) 120/73     Physical Exam:  General: Comfortable, not in distress, conversant and cooperative  Head: Atraumatic and normocephalic  Eyes: No Icterus. No pallor  Ears:  Ears appear intact with no abnormalities noted  Throat: No oral lesions, no thrush  Neck: Supple, trachea midline  Lungs: Clear to auscultation bilaterally, equal air entry, no wheezing or crackles  Heart:  Normal S1 and S2, no murmur, no gallop, No JVD, 3+ lower extremity swelling  Abdomen:  Soft, no tenderness, no organomegaly, normal bowel sounds, no organomegaly.  Scrotal edema  Extremities: pulses equal bilaterally  Skin: No bleeding, bruising or rash, normal skin turgor and elasticity  Neurologic: Cranial nerves appear intact with no evidence of facial asymmetry, normal motor and sensory functions in all 4 extremities  Psych: Alert and oriented x 3, normal mood    Results Reviewed:  LAB RESULTS:      Lab 24  0843 24  0440 24  0454 24  0754   WBC  --  8.09 7.28 8.77   HEMOGLOBIN  --  8.7* 8.6* 8.7*   HEMATOCRIT  --  27.4* 26.5* 27.2*   PLATELETS  --  186 195 202   MCV  --  96.8 95.0 96.1   SED RATE 9  --   --   --          Lab 24  0627 24  0504 24  1606 24  0559 24  0448 24  0440 24  1620 24  0454   SODIUM 132* 132*   --  133*  --  133*  --  134*   POTASSIUM 3.7 4.4 3.9 3.4*  --  3.7   < > 3.6   CHLORIDE 91* 89*  --  91*  --  92*  --  93*   CO2 26.0 26.0  --  28.0  --  27.0  --  27.0   ANION GAP 15.0 17.0*  --  14.0  --  14.0  --  14.0   BUN 91* 86*  --  76*  --  69*  --  64*   CREATININE 2.13* 2.26*  --  2.14*  --  2.24*  --  2.01*   EGFR 33.7* 31.4*  --  33.5*  --  31.7*  --  36.1*   GLUCOSE 159* 180*  --  184*  --  147*  --  113*   CALCIUM 9.3 9.4  --  9.3  --  9.2  --  9.3   IONIZED CALCIUM  --   --   --   --  1.23  --   --   --    PHOSPHORUS 4.4 3.9  --  4.7*  --   --   --  4.7*    < > = values in this interval not displayed.         Lab 07/01/24  0627 06/30/24  0504 06/26/24  0454   ALBUMIN 3.9 4.1 3.4*                         Brief Urine Lab Results  (Last result in the past 365 days)        Color   Clarity   Blood   Leuk Est   Nitrite   Protein   CREAT   Urine HCG        06/26/24 1621             39.5                 Microbiology Results Abnormal       None            No radiology results from the last 24 hrs        Current medications:  Scheduled Meds:castor oil-balsam peru, 1 Application, Topical, Q12H  cholecalciferol, 1,000 Units, Oral, Daily  colchicine, 0.6 mg, Oral, Daily  ferrous sulfate, 325 mg, Oral, Daily With Breakfast  furosemide, 80 mg, Intravenous, BID  heparin (porcine), 5,000 Units, Subcutaneous, Q8H  insulin lispro, 2-7 Units, Subcutaneous, 4x Daily AC & at Bedtime  Insulin Lispro, 4 Units, Subcutaneous, TID With Meals  ipratropium-albuterol, 3 mL, Nebulization, 4x Daily - RT  lactulose, 10 g, Oral, TID  pantoprazole, 40 mg, Oral, Q AM  pharmacy consult - [facility], , Does not apply, Daily  senna-docusate sodium, 2 tablet, Oral, BID   And  polyethylene glycol, 17 g, Oral, BID  predniSONE, 20 mg, Oral, Daily With Breakfast  sodium chloride, 10 mL, Intravenous, Q12H  spironolactone, 50 mg, Oral, Daily      Continuous Infusions:   PRN Meds:.  acetaminophen **OR** acetaminophen **OR** acetaminophen    Albuterol  Sulfate NEB Orderable    senna-docusate sodium **AND** polyethylene glycol **AND** bisacodyl **AND** bisacodyl    Calcium Replacement - Follow Nurse / BPA Driven Protocol    dextrose    dextrose    glucagon (human recombinant)    magnesium hydroxide    Magnesium Low Dose Replacement - Follow Nurse / BPA Driven Protocol    nitroglycerin    Phosphorus Replacement - Follow Nurse / BPA Driven Protocol    Potassium Replacement - Follow Nurse / BPA Driven Protocol    prochlorperazine    sodium chloride    sodium chloride    sodium chloride    Assessment & Plan   Assessment & Plan     Active Hospital Problems    Diagnosis  POA    **Acute on chronic heart failure with preserved ejection fraction (HFpEF) [I50.33]  Yes    Acute on chronic HFrEF (heart failure with reduced ejection fraction) [I50.23]  Yes    Elevated serum creatinine [R79.89]  Yes    Anemia [D64.9]  Yes    Hypokalemia [E87.6]  Yes    Cirrhosis of liver [K74.60]  Yes    HCV (hepatitis C virus) [B19.20]  Yes      Resolved Hospital Problems   No resolved problems to display.        Brief Hospital Course to date:  Jaycob Ndiaye II is a 64 y.o. male with past medical history significant for CHF, COPD, cirrhosis, prostate cancer, chronic hematuria, GI bleed, HCV, HTN, and HLD who presents to the ED due to worsening shortness of breath and lower extremity edema over the past week.    A/C HFrEF  Significant BLE edema and scrotal edema/Anasarca  Patient with extensive lower extremity and anterior abdominal wall edema  Nephrology team managing diuretic therapy.  Currently on IV Lasix 80 mg twice daily  Monitor kidney functions with ongoing aggressive diuresis  He follows with Cardiologist Dr. Craig  BLE venous duplex negative for DVT  WOC to see about scrotal wrap, PT to see about leg wraps tomorrow     CKD  Creatinine stable around 2  I will likely I will have biclonal antibody antibody thyroid  Nephrology managing diuretic therapy    ?Calcium deposits in  fingertips and right elbow  Hyperphosphatemia  Check uric level (elevated), ionized calcium, PTH (normal) and vitamin D (low)  Normal sed rate   Continue colchicine and low dose steroids which seem to have helped significantly    Anemia  Recent GI Bleed  S/p EGD and colonoscopy at [facility] on 6/13/2024, records requested  Hgb stable  Continue PPI   Continue PO Iron -- give dose of IV iron     Hypokalemia  Replace per protocol     Newly diagnosed liver cirrhosis   HCV  S/p US abd on 6/10/24 that revealed nodular liver consistent with cirrhosis and moderate ascites  S/p US guided paracentesis on 6/12/2024 at [facility] with 200 mL of skylar-colored fluid removed.  No fluid was sent to lab.  Will need referral to establish with [facility] upon discharge per patient request  Consider paracentesis-- though he is not eager because it hurt so badly at [facility]. Our CT does not show a large voume of ascites- he think a good BM will help with abdominal distension, lactulose added     Type 2 diabetes with peripheral neuropathy  Holding home Mounjaro, Hg A1c is 6.0  Continue low dose  SSI for now.  Glucoses stable running 137-209 last 24 hours-- jaimee QAC insulin while on steroids     PAF  Xarelto discontinued 6/13 at [facility] d/t persistent hematuria      HTN  Continue holding home bystolic for now d/t borderline BP      HO prostate cancer  Patient want PSA checked and thinks he will not need DC at home    Neuropathy  Trial Lyrica    DVT prophylaxis:  Heparin     Expected Discharge Location and Transportation: home once medically improved and cleared by cardiology/nephrology.  Expected Discharge   Expected Discharge Date: 7/5/2024; Expected Discharge Time:    Awaiting placement- he is not eager to leave until he is good and ready    VTE Prophylaxis:  Pharmacologic VTE prophylaxis orders are present.         AM-PAC 6 Clicks Score (PT): 21 (07/01/24 4898)    CODE STATUS:   Code Status and Medical Interventions:   Ordered at: 06/21/24 1840     Level  Of Support Discussed With:    Patient     Code Status (Patient has no pulse and is not breathing):    CPR (Attempt to Resuscitate)     Medical Interventions (Patient has pulse or is breathing):    Full Support       Kaylen Huerta MD  07/02/24

## 2024-07-03 LAB
ALBUMIN SERPL-MCNC: 4 G/DL (ref 3.5–5.2)
ALBUMIN/GLOB SERPL: 1.9 G/DL
ALP SERPL-CCNC: 71 U/L (ref 39–117)
ALT SERPL W P-5'-P-CCNC: 14 U/L (ref 1–41)
ANION GAP SERPL CALCULATED.3IONS-SCNC: 12 MMOL/L (ref 5–15)
AST SERPL-CCNC: 20 U/L (ref 1–40)
BASOPHILS # BLD AUTO: 0.02 10*3/MM3 (ref 0–0.2)
BASOPHILS NFR BLD AUTO: 0.3 % (ref 0–1.5)
BILIRUB SERPL-MCNC: 0.6 MG/DL (ref 0–1.2)
BUN SERPL-MCNC: 87 MG/DL (ref 8–23)
BUN/CREAT SERPL: 43.7 (ref 7–25)
CALCIUM SPEC-SCNC: 9.5 MG/DL (ref 8.6–10.5)
CHLORIDE SERPL-SCNC: 92 MMOL/L (ref 98–107)
CO2 SERPL-SCNC: 30 MMOL/L (ref 22–29)
CREAT SERPL-MCNC: 1.99 MG/DL (ref 0.76–1.27)
DEPRECATED RDW RBC AUTO: 57.4 FL (ref 37–54)
EGFRCR SERPLBLD CKD-EPI 2021: 36.6 ML/MIN/1.73
EOSINOPHIL # BLD AUTO: 0.02 10*3/MM3 (ref 0–0.4)
EOSINOPHIL NFR BLD AUTO: 0.3 % (ref 0.3–6.2)
ERYTHROCYTE [DISTWIDTH] IN BLOOD BY AUTOMATED COUNT: 16.1 % (ref 12.3–15.4)
GLOBULIN UR ELPH-MCNC: 2.1 GM/DL
GLUCOSE BLDC GLUCOMTR-MCNC: 138 MG/DL (ref 70–130)
GLUCOSE BLDC GLUCOMTR-MCNC: 178 MG/DL (ref 70–130)
GLUCOSE BLDC GLUCOMTR-MCNC: 201 MG/DL (ref 70–130)
GLUCOSE BLDC GLUCOMTR-MCNC: 278 MG/DL (ref 70–130)
GLUCOSE SERPL-MCNC: 151 MG/DL (ref 65–99)
HCT VFR BLD AUTO: 27.4 % (ref 37.5–51)
HGB BLD-MCNC: 8.8 G/DL (ref 13–17.7)
IMM GRANULOCYTES # BLD AUTO: 0.03 10*3/MM3 (ref 0–0.05)
IMM GRANULOCYTES NFR BLD AUTO: 0.4 % (ref 0–0.5)
LYMPHOCYTES # BLD AUTO: 0.66 10*3/MM3 (ref 0.7–3.1)
LYMPHOCYTES NFR BLD AUTO: 8.7 % (ref 19.6–45.3)
MCH RBC QN AUTO: 30.8 PG (ref 26.6–33)
MCHC RBC AUTO-ENTMCNC: 32.1 G/DL (ref 31.5–35.7)
MCV RBC AUTO: 95.8 FL (ref 79–97)
MONOCYTES # BLD AUTO: 0.87 10*3/MM3 (ref 0.1–0.9)
MONOCYTES NFR BLD AUTO: 11.4 % (ref 5–12)
NEUTROPHILS NFR BLD AUTO: 6.02 10*3/MM3 (ref 1.7–7)
NEUTROPHILS NFR BLD AUTO: 78.9 % (ref 42.7–76)
NRBC BLD AUTO-RTO: 0 /100 WBC (ref 0–0.2)
PLATELET # BLD AUTO: 190 10*3/MM3 (ref 140–450)
PMV BLD AUTO: 10.3 FL (ref 6–12)
POTASSIUM SERPL-SCNC: 3.6 MMOL/L (ref 3.5–5.2)
POTASSIUM SERPL-SCNC: 4.4 MMOL/L (ref 3.5–5.2)
PROT SERPL-MCNC: 6.1 G/DL (ref 6–8.5)
RBC # BLD AUTO: 2.86 10*6/MM3 (ref 4.14–5.8)
SODIUM SERPL-SCNC: 134 MMOL/L (ref 136–145)
WBC NRBC COR # BLD AUTO: 7.62 10*3/MM3 (ref 3.4–10.8)

## 2024-07-03 PROCEDURE — 80053 COMPREHEN METABOLIC PANEL: CPT | Performed by: INTERNAL MEDICINE

## 2024-07-03 PROCEDURE — 29581 APPL MULTLAYER CMPRN SYS LEG: CPT

## 2024-07-03 PROCEDURE — 94799 UNLISTED PULMONARY SVC/PX: CPT

## 2024-07-03 PROCEDURE — 94664 DEMO&/EVAL PT USE INHALER: CPT

## 2024-07-03 PROCEDURE — 84132 ASSAY OF SERUM POTASSIUM: CPT | Performed by: INTERNAL MEDICINE

## 2024-07-03 PROCEDURE — 63710000001 INSULIN LISPRO (HUMAN) PER 5 UNITS: Performed by: INTERNAL MEDICINE

## 2024-07-03 PROCEDURE — 97597 DBRDMT OPN WND 1ST 20 CM/<: CPT

## 2024-07-03 PROCEDURE — 63710000001 INSULIN LISPRO (HUMAN) PER 5 UNITS: Performed by: NURSE PRACTITIONER

## 2024-07-03 PROCEDURE — 25010000002 BUMETANIDE PER 0.5 MG: Performed by: INTERNAL MEDICINE

## 2024-07-03 PROCEDURE — 97110 THERAPEUTIC EXERCISES: CPT

## 2024-07-03 PROCEDURE — 94761 N-INVAS EAR/PLS OXIMETRY MLT: CPT

## 2024-07-03 PROCEDURE — 63710000001 PREDNISONE PER 1 MG: Performed by: INTERNAL MEDICINE

## 2024-07-03 PROCEDURE — 82948 REAGENT STRIP/BLOOD GLUCOSE: CPT

## 2024-07-03 PROCEDURE — 99232 SBSQ HOSP IP/OBS MODERATE 35: CPT | Performed by: INTERNAL MEDICINE

## 2024-07-03 PROCEDURE — 85025 COMPLETE CBC W/AUTO DIFF WBC: CPT | Performed by: INTERNAL MEDICINE

## 2024-07-03 RX ORDER — PREGABALIN 100 MG/1
100 CAPSULE ORAL DAILY
Status: DISCONTINUED | OUTPATIENT
Start: 2024-07-04 | End: 2024-07-08 | Stop reason: HOSPADM

## 2024-07-03 RX ORDER — POTASSIUM CHLORIDE 20 MEQ/1
40 TABLET, EXTENDED RELEASE ORAL EVERY 4 HOURS
Status: COMPLETED | OUTPATIENT
Start: 2024-07-03 | End: 2024-07-03

## 2024-07-03 RX ORDER — POTASSIUM CHLORIDE 20 MEQ/1
40 TABLET, EXTENDED RELEASE ORAL EVERY 4 HOURS
Status: CANCELLED | OUTPATIENT
Start: 2024-07-03 | End: 2024-07-03

## 2024-07-03 RX ADMIN — PREDNISONE 20 MG: 20 TABLET ORAL at 09:05

## 2024-07-03 RX ADMIN — INSULIN LISPRO 4 UNITS: 100 INJECTION, SOLUTION INTRAVENOUS; SUBCUTANEOUS at 12:26

## 2024-07-03 RX ADMIN — Medication 1 APPLICATION: at 21:25

## 2024-07-03 RX ADMIN — SPIRONOLACTONE 100 MG: 25 TABLET ORAL at 09:05

## 2024-07-03 RX ADMIN — Medication 1 APPLICATION: at 09:04

## 2024-07-03 RX ADMIN — INSULIN LISPRO 3 UNITS: 100 INJECTION, SOLUTION INTRAVENOUS; SUBCUTANEOUS at 17:55

## 2024-07-03 RX ADMIN — Medication 10 ML: at 09:05

## 2024-07-03 RX ADMIN — BUMETANIDE 2 MG: 0.25 INJECTION, SOLUTION INTRAMUSCULAR; INTRAVENOUS at 17:02

## 2024-07-03 RX ADMIN — INSULIN LISPRO 4 UNITS: 100 INJECTION, SOLUTION INTRAVENOUS; SUBCUTANEOUS at 21:24

## 2024-07-03 RX ADMIN — IPRATROPIUM BROMIDE AND ALBUTEROL SULFATE 3 ML: 2.5; .5 SOLUTION RESPIRATORY (INHALATION) at 07:17

## 2024-07-03 RX ADMIN — IPRATROPIUM BROMIDE AND ALBUTEROL SULFATE 3 ML: 2.5; .5 SOLUTION RESPIRATORY (INHALATION) at 13:46

## 2024-07-03 RX ADMIN — POTASSIUM CHLORIDE 40 MEQ: 1500 TABLET, EXTENDED RELEASE ORAL at 09:12

## 2024-07-03 RX ADMIN — BUMETANIDE 2 MG: 0.25 INJECTION, SOLUTION INTRAMUSCULAR; INTRAVENOUS at 09:06

## 2024-07-03 RX ADMIN — INSULIN LISPRO 4 UNITS: 100 INJECTION, SOLUTION INTRAVENOUS; SUBCUTANEOUS at 09:04

## 2024-07-03 RX ADMIN — FERROUS SULFATE TAB 325 MG (65 MG ELEMENTAL FE) 325 MG: 325 (65 FE) TAB at 09:05

## 2024-07-03 RX ADMIN — INSULIN LISPRO 2 UNITS: 100 INJECTION, SOLUTION INTRAVENOUS; SUBCUTANEOUS at 09:05

## 2024-07-03 RX ADMIN — SENNOSIDES AND DOCUSATE SODIUM 2 TABLET: 8.6; 5 TABLET ORAL at 21:25

## 2024-07-03 RX ADMIN — POTASSIUM CHLORIDE 40 MEQ: 1500 TABLET, EXTENDED RELEASE ORAL at 12:25

## 2024-07-03 RX ADMIN — IPRATROPIUM BROMIDE AND ALBUTEROL SULFATE 3 ML: 2.5; .5 SOLUTION RESPIRATORY (INHALATION) at 15:14

## 2024-07-03 RX ADMIN — Medication 1000 UNITS: at 09:05

## 2024-07-03 RX ADMIN — PANTOPRAZOLE SODIUM 40 MG: 40 TABLET, DELAYED RELEASE ORAL at 06:17

## 2024-07-03 RX ADMIN — LACTULOSE 10 G: 20 SOLUTION ORAL at 09:06

## 2024-07-03 RX ADMIN — BUMETANIDE 2 MG: 0.25 INJECTION, SOLUTION INTRAMUSCULAR; INTRAVENOUS at 21:24

## 2024-07-03 RX ADMIN — LACTULOSE 10 G: 20 SOLUTION ORAL at 17:02

## 2024-07-03 RX ADMIN — POLYETHYLENE GLYCOL 3350 17 G: 17 POWDER, FOR SOLUTION ORAL at 21:25

## 2024-07-03 RX ADMIN — Medication 10 ML: at 21:25

## 2024-07-03 RX ADMIN — COLCHICINE 0.6 MG: 0.6 TABLET ORAL at 09:05

## 2024-07-03 RX ADMIN — SENNOSIDES AND DOCUSATE SODIUM 2 TABLET: 8.6; 5 TABLET ORAL at 09:05

## 2024-07-03 RX ADMIN — PREGABALIN 75 MG: 75 CAPSULE ORAL at 09:05

## 2024-07-03 RX ADMIN — INSULIN LISPRO 4 UNITS: 100 INJECTION, SOLUTION INTRAVENOUS; SUBCUTANEOUS at 17:54

## 2024-07-03 RX ADMIN — POLYETHYLENE GLYCOL 3350 17 G: 17 POWDER, FOR SOLUTION ORAL at 09:04

## 2024-07-03 RX ADMIN — LACTULOSE 10 G: 20 SOLUTION ORAL at 21:26

## 2024-07-03 RX ADMIN — IPRATROPIUM BROMIDE AND ALBUTEROL SULFATE 3 ML: 2.5; .5 SOLUTION RESPIRATORY (INHALATION) at 19:55

## 2024-07-03 NOTE — DISCHARGE INSTR - OTHER ORDERS
[facility]  [address], [address], [place] [id number] [phone]  Outpatient PT for Leg Wraps and PT. They will call for appointment.

## 2024-07-03 NOTE — DISCHARGE PLACEMENT REQUEST
"Jaycob Scott II (64 y.o. Male)       Date of Birth   1960    Social Security Number       Address   [address] [place], [place] [address]    Home Phone   [phone]    MRN   [id number]       Shinto   None    Marital Status                               Admission Date   6/21/24    Admission Type   Emergency    Admitting Provider   Kaylen Huerta MD    Attending Provider   Kaylen Huerta MD    Department, Room/Bed   [address], [address]/1       Discharge Date       Discharge Disposition       Discharge Destination                                 Attending Provider: Kaylen Huerta MD    Allergies: Ketamine, Nsaids, Contrast Dye (Echo Or Unknown Ct/mr)    Isolation: None   Infection: None   Code Status: CPR    Ht: 185.4 cm (73\")   Wt: 124 kg (273 lb 8 oz)    Admission Cmt: None   Principal Problem: Acute on chronic heart failure with preserved ejection fraction (HFpEF) [I50.33]                   Active Insurance as of 6/21/2024       Primary Coverage       Payor Plan Insurance Group Employer/Plan Group    ANTHEM MEDICARE REPLACEMENT ANTHEM MEDICARE ADVANTAGE [id number]       Payor Plan Address Payor Plan Phone Number Payor Plan Fax Number Effective Dates    [address] [phone]  10/1/2020 - None Entered    [facility] [address]         Subscriber Name Subscriber Birth Date Member ID       JAYCOB SCOTT II 1960 [id number]               Secondary Coverage       Payor Plan Insurance Group Employer/Plan Group    [place]        Payor Plan Address Payor Plan Phone Number Payor Plan Fax Number Effective Dates    [address] [phone]  6/21/2024 - None Entered    [facility] [id number]         Subscriber Name Subscriber Birth Date Member ID       JAYCOB SCOTT II 1960 [id number]                     Emergency Contacts        (Rel.) Home Phone Work Phone Mobile Phone    Eusebio Scottn " (Son) [phone] -- --    isidra elam (Son) -- -- [phone]    Rahul Elam (Son) -- -- [phone]             [address]  [facility], located within [facility] [address]  Phone:  [phone]  Fax:  [phone] Date: Jul 3, 2024      Ambulatory Referral to Physical Therapy     Patient:  Jaycob Elam II MRN:  [id number]   [address] [place] [address] :  1960  SSN:    Phone: [phone] Sex:  M      INSURANCE PAYOR PLAN GROUP # SUBSCRIBER ID   Primary:  Secondary:    ANTHEM MEDICARE REPLACEMENT  [place] [id number]  [id number]    [id number]  [id number]      Referring Provider Information:  MAX PATEL Phone: [phone] Fax: [phone]       Referral Information:   # Visits:  1 Referral Type: Physical Therapy [AE1]   Urgency:  Routine Referral Reason: Specialty Services Required   Start Date: Jul 3, 2024 End Date:  To be determined by Insurer   Diagnosis: Peripheral edema (R60.0 [ICD-10-CM] 782.3 [ICD-9-CM])  Congestive heart failure, unspecified HF chronicity, unspecified heart failure type (I50.9 [ICD-10-CM] 428.0 [ICD-9-CM])  Hypokalemia (E87.6 [ICD-10-CM] 276.8 [ICD-9-CM])  Acute on chronic heart failure with preserved ejection fraction (HFpEF) (I50.33 [ICD-10-CM] 428.23 [ICD-9-CM])  Screen for colon cancer (Z12.11 [ICD-10-CM] V76.51 [ICD-9-CM])  Acute on chronic HFrEF (heart failure with reduced ejection fraction) (I50.23 [ICD-10-CM] 428.23 [ICD-9-CM])      Refer to Dept:   Refer to Provider:   Refer to Provider Phone:   Refer to Facility:    Comments: Light compression wraps for BLE edema  Reason for consult: Compression Wrap/Unnaboot  Specialty needed: Lymphedema  Specialty needed: Evaluate and treat  Exercises: Stretching  Exercises: Strengthening  Weight Bearing Status: Full weight bearing  Follow-up needed: Yes     This document serves as a request of services and does not constitute Insurance authorization or approval  of services.  To determine eligibility, please contact the members Insurance carrier to verify and review coverage.     If you have medical questions regarding this request for services. Please contact [address] at [phone] during normal business hours.        Verbal Order Mode: Verbal with readback   Authorizing Provider: Kaylen Huerta MD  Authorizing Provider's NPI: [id number]     Order Entered By: Chaitanya Ribeiro RN 7/3/2024 10:03 AM               Physical Therapy Notes (most recent note)        Lloyd Reagan, PT at 24 0941  Version 1 of 1         Acute Care - Wound/Debridement Treatment Note  [facility]     Patient Name: Jaycob Ndiaye II  : 1960  MRN: [id number]  Today's Date: 7/3/2024                Admit Date: 2024    Visit Dx:    ICD-10-CM ICD-9-CM   1. Peripheral edema  R60.0 782.3   2. Congestive heart failure, unspecified HF chronicity, unspecified heart failure type  I50.9 428.0   3. Hypokalemia  E87.6 276.8   4. Acute on chronic heart failure with preserved ejection fraction (HFpEF)  I50.33 428.23   5. Screen for colon cancer  Z12.11 V76.51       Patient Active Problem List   Diagnosis    Screen for colon cancer    Acute on chronic heart failure with preserved ejection fraction (HFpEF)    Elevated serum creatinine    Anemia    Hypokalemia    Cirrhosis of liver    HCV (hepatitis C virus)    Acute on chronic HFrEF (heart failure with reduced ejection fraction)        Past Medical History:   Diagnosis Date    Arthritis     Cancer     Constipation     Depression     Diabetes     type 2 dm, check blood sugar once a day    History of hepatitis C     History of prostate cancer     Hypertension     Irregular heartbeat     Pacemaker     home monitoring    Prostate CA     Requires supplemental oxygen     at night with cpap    Sleep apnea     wears a cpap    SOB (shortness of breath) on exertion     Wears glasses         Past Surgical History:    Procedure Laterality Date    CARDIAC ABLATION      COLONOSCOPY N/A 04/28/2021    Procedure: Colonoscopy with polypectomy;  Surgeon: Maurice Proctor MD;  Location: [facility] ENDOSCOPY;  Service: Gastroenterology;  Laterality: N/A;    COLONOSCOPY      ENDOSCOPY      KNEE SURGERY Right     1985    PACEMAKER IMPLANTATION      PROSTATE SURGERY      REPLACEMENT TOTAL KNEE Right            Wound 06/22/24 1540 Bilateral lower leg Blisters (Active)   Dressing Appearance open to air 07/03/24 0830   Base scab 07/03/24 0830   Periwound dry;redness;warm;edematous;swelling 07/03/24 0830   Periwound Temperature warm 07/03/24 0830   Periwound Skin Turgor firm 07/03/24 0830   Edges irregular 07/03/24 0830   Drainage Amount none 07/03/24 0830   Care, Wound cleansed with;soap and water;debrided 07/03/24 0830   Dressing Care open to air 07/03/24 0830   Periwound Care dry periwound area maintained 07/03/24 0830      Lymphedema       Row Name 07/03/24 0830             Lymphedema Edema Assessment    Ptting Edema Category By severity  -MF      Pitting Edema Severe  -MF         Compression/Skin Care    Compression/Skin Care skin care;wrapping location;bandaging  -MF      Skin Care washed/dried;lotion applied  -MF      Wrapping Location lower extremity  -MF      Wrapping Location LE bilateral:;foot to knee  -MF      Wrapping Comments optifoam Ag foam to ant ankles with size 5/6/8 compressogrips doubled/overlapping for gradient compression  -MF                User Key  (r) = Recorded By, (t) = Taken By, (c) = Cosigned By      Initials Name Provider Type    Lloyd Ngo, PT Physical Therapist                    WOUND DEBRIDEMENT  Total area of Debridement: ~5cm2  Debridement Site 1  Location- Site 1: R ant Le  Selective Debridement- Site 1: Wound Surface <20cmsq  Instruments- Site 1: tweezers  Excised Tissue Description- Site 1: minimum, other (comment) (crusted nonviable skin / scabs)  Bleeding- Site 1: none               PT  Assessment (Last 12 Hours)       PT Evaluation and Treatment       Row Name 07/03/24 0830          Physical Therapy Time and Intention    Subjective Information complains of;weakness;fatigue  -     Document Type therapy note (daily note);wound care  -     Mode of Treatment individual therapy;physical therapy  -       Row Name 07/03/24 0830          Pain Scale: FACES Pre/Post-Treatment    Pain: FACES Scale, Pretreatment 2-->hurts little bit  -MF     Posttreatment Pain Rating 2-->hurts little bit  -MF       Row Name 07/03/24 0830          Wound 06/22/24 1540 Bilateral lower leg Blisters    Wound - Properties Group Placement Date: 06/22/24  - Placement Time: 1540  -JM Side: Bilateral  - Orientation: lower  - Location: leg  -JM Primary Wound Type: Blisters  -JM    Dressing Appearance open to air  -     Base scab  -     Periwound dry;redness;warm;edematous;swelling  -     Periwound Temperature warm  -     Periwound Skin Turgor firm  -     Edges irregular  -     Drainage Amount none  -     Care, Wound cleansed with;soap and water;debrided  -     Dressing Care open to air  -     Periwound Care dry periwound area maintained  -     Retired Wound - Properties Group Placement Date: 06/22/24  - Placement Time: 1540  -JM Side: Bilateral  - Orientation: lower  - Location: leg  -JM Primary Wound Type: Blisters  -JM    Retired Wound - Properties Group Date first assessed: 06/22/24  - Time first assessed: 1540  -JM Side: Bilateral  - Location: leg  -JM Primary Wound Type: Blisters  -JM      Row Name             Wound 06/28/24 0000 Right medial gluteal Pressure Injury    Wound - Properties Group Placement Date: 06/28/24 -TH Placement Time: 0000  -TH Present on Original Admission: N  -TH Side: Right  -TH Orientation: medial  -TH Location: gluteal  -TH Primary Wound Type: Pressure inj  -TH    Retired Wound - Properties Group Placement Date: 06/28/24 -TH Placement Time: 0000  -TH Present on  Original Admission: N  -TH Side: Right  -TH Orientation: medial  -TH Location: gluteal  -TH Primary Wound Type: Pressure inj  -TH    Retired Wound - Properties Group Date first assessed: 06/28/24 -TH Time first assessed: 0000  -TH Present on Original Admission: N  -TH Side: Right  -TH Location: gluteal  -TH Primary Wound Type: Pressure inj  -TH      Row Name             Wound 06/30/24 0000 Right posterior greater trochanter Skin Tear    Wound - Properties Group Placement Date: 06/30/24 -TH Placement Time: 0000  -TH Side: Right  -TH Orientation: posterior  -TH Location: greater trochanter  -TH Primary Wound Type: Skin tear  -TH    Retired Wound - Properties Group Placement Date: 06/30/24 -TH Placement Time: 0000  -TH Side: Right  -TH Orientation: posterior  -TH Location: greater trochanter  -TH Primary Wound Type: Skin tear  -TH    Retired Wound - Properties Group Date first assessed: 06/30/24 -TH Time first assessed: 0000  -TH Side: Right  -TH Location: greater trochanter  -TH Primary Wound Type: Skin tear  -TH      Row Name             Wound 06/30/24 0000 Left posterior greater trochanter    Wound - Properties Group Placement Date: 06/30/24 -TH Placement Time: 0000  -TH Side: Left  -TH Orientation: posterior  -TH Location: greater trochanter  -TH    Retired Wound - Properties Group Placement Date: 06/30/24 -TH Placement Time: 0000  -TH Side: Left  -TH Orientation: posterior  -TH Location: greater trochanter  -TH    Retired Wound - Properties Group Date first assessed: 06/30/24 -TH Time first assessed: 0000  -TH Side: Left  -TH Location: greater trochanter  -TH      Row Name             Wound 06/30/24 2146 scrotum Skin Tear    Wound - Properties Group Placement Date: 06/30/24 -TH Placement Time: 2146 -TH Location: scrotum  -TH Primary Wound Type: Skin tear  -TH    Retired Wound - Properties Group Placement Date: 06/30/24 -TH Placement Time: 2146 -TH Location: scrotum  -TH Primary Wound Type: Skin tear   -TH    Retired Wound - Properties Group Date first assessed: 06/30/24  -TH Time first assessed: 2146 -TH Location: scrotum  -TH Primary Wound Type: Skin tear  -TH      Row Name 07/03/24 0830          Coping    Observed Emotional State calm;cooperative;pleasant  -     Verbalized Emotional State acceptance  -     Trust Relationship/Rapport care explained  -       Row Name 07/03/24 0830          Plan of Care Review    Plan of Care Reviewed With patient  -MF     Outcome Evaluation Pt cont to have moderate difficulty maintaining compression wrapping overnight due to pain / restlessness in LEs. PT will cont with daily compression wrapping changes until edema decreases enough to allow for longer wear times. PT able to debride a small amount crusted nonviable scabs to improve skin integrity.  -       Row Name 07/03/24 0830          Positioning and Restraints    Pre-Treatment Position sitting in chair/recliner  -     Post Treatment Position chair  -MF     In Chair sitting;call light within reach  -               User Key  (r) = Recorded By, (t) = Taken By, (c) = Cosigned By      Initials Name Provider Type    Lloyd Ngo, PT Physical Therapist    Jenny Salazar RN Registered Nurse    Carolina Mccurdy PT Physical Therapist                  Physical Therapy Education       Title: PT OT SLP Therapies (Done)       Topic: Physical Therapy (Done)       Point: Mobility training (Done)       Learning Progress Summary             Patient Acceptance, E, VU,DU,NR by ML at 7/1/2024 1154    Acceptance, E, VU by ND at 6/25/2024 1109    Acceptance, E, VU by ND at 6/22/2024 1006                         Point: Home exercise program (Done)       Learning Progress Summary             Patient Acceptance, E, VU,DU,NR by ML at 7/1/2024 1154                         Point: Body mechanics (Done)       Learning Progress Summary             Patient Acceptance, E, VU by ND at 6/25/2024 1109    Acceptance, E, VU by  ND at 6/22/2024 1006                         Point: Precautions (Done)       Learning Progress Summary             Patient Acceptance, E, VU,DU,NR by ML at 7/1/2024 1154    Acceptance, E, VU by ND at 6/25/2024 1109    Acceptance, E, VU by ND at 6/22/2024 1006                                         User Key       Initials Effective Dates Name Provider Type Discipline     04/22/21 -  Sally Perez Physical Therapist PT    ND 11/16/23 -  Maryjane Das, AR Physical Therapist PT                    Recommendation and Plan  Anticipated Discharge Disposition (PT): home with assist, home with home health  Planned Therapy Interventions (PT): balance training, bed mobility training, gait training, neuromuscular re-education, patient/family education, home exercise program, postural re-education, ROM (range of motion), strengthening, stretching, transfer training  Therapy Frequency (PT): daily  Plan of Care Reviewed With: patient           Outcome Evaluation: Pt cont to have moderate difficulty maintaining compression wrapping overnight due to pain / restlessness in LEs. PT will cont with daily compression wrapping changes until edema decreases enough to allow for longer wear times. PT able to debride a small amount crusted nonviable scabs to improve skin integrity.  Plan of Care Reviewed With: patient            Time Calculation   PT Charges       Row Name 07/03/24 0830             Time Calculation    Start Time 0830  -MF      PT Goal Re-Cert Due Date 07/11/24  -MF         Untimed Charges    Wound Care 29041 Selective debridement  -MF      14350-Rxtoyhairb comp below knee 25  -MF      37869-Iiopmgxvi debridement 10  -MF         Total Minutes    Untimed Charges Total Minutes 35  -MF       Total Minutes 35  -MF                User Key  (r) = Recorded By, (t) = Taken By, (c) = Cosigned By      Initials Name Provider Type    Lloyd Ngo, PT Physical Therapist                      Therapy Charges for Today        Code Description Service Date Service Provider Modifiers Qty    96497544672 HC PT MULTI LAYER COMP SYS BELOW KNEE 2024 Lloyd Reagan, PT GP 1              PT G-Codes  Outcome Measure Options: AM-PAC 6 Clicks Basic Mobility (PT)  AM-PAC 6 Clicks Score (PT): 21  AM-PAC 6 Clicks Score (OT): 17       Lloyd Reagan, PT  7/3/2024      Electronically signed by Lloyd Reagan, PT at 24 0941          Occupational Therapy Notes (most recent note)        Nelly Echeverria, OT at 24 0840          Patient Name: Jaycob Ndiaye II  : 1960    MRN: [id number]                              Today's Date: 7/3/2024       Admit Date: 2024    Visit Dx:     ICD-10-CM ICD-9-CM   1. Peripheral edema  R60.0 782.3   2. Congestive heart failure, unspecified HF chronicity, unspecified heart failure type  I50.9 428.0   3. Hypokalemia  E87.6 276.8   4. Acute on chronic heart failure with preserved ejection fraction (HFpEF)  I50.33 428.23   5. Screen for colon cancer  Z12.11 V76.51     Patient Active Problem List   Diagnosis    Screen for colon cancer    Acute on chronic heart failure with preserved ejection fraction (HFpEF)    Elevated serum creatinine    Anemia    Hypokalemia    Cirrhosis of liver    HCV (hepatitis C virus)    Acute on chronic HFrEF (heart failure with reduced ejection fraction)     Past Medical History:   Diagnosis Date    Arthritis     Cancer     Constipation     Depression     Diabetes     type 2 dm, check blood sugar once a day    History of hepatitis C     History of prostate cancer     Hypertension     Irregular heartbeat     Pacemaker     home monitoring    Prostate CA     Requires supplemental oxygen     at night with cpap    Sleep apnea     wears a cpap    SOB (shortness of breath) on exertion     Wears glasses      Past Surgical History:   Procedure Laterality Date    CARDIAC ABLATION      COLONOSCOPY N/A 2021    Procedure: Colonoscopy with polypectomy;  Surgeon:  Maurice Proctor MD;  [facility] ENDOSCOPY;  Service: Gastroenterology;  Laterality: N/A;    COLONOSCOPY      ENDOSCOPY      KNEE SURGERY Right     1985    PACEMAKER IMPLANTATION      PROSTATE SURGERY      REPLACEMENT TOTAL KNEE Right       General Information       Row Name 07/03/24 0927          OT Time and Intention    Document Type progress note/recertification  -LANEY     Mode of Treatment individual therapy;occupational therapy  -LANEY       Row Name 07/03/24 0927          General Information    Patient Profile Reviewed yes  -LANEY     Existing Precautions/Restrictions fall;other (see comments)  BLE edema  -LANEY     Barriers to Rehab medically complex;previous functional deficit;physical barrier  -LANEY       Row Name 07/03/24 0927          Cognition    Orientation Status (Cognition) oriented x 4  -LANEY       Row Name 07/03/24 0927          Safety Issues, Functional Mobility    Impairments Affecting Function (Mobility) strength;range of motion (ROM);other (see comments)  -LANEY     Comment, Safety Issues/Impairments (Mobility) scrotal edema  -LANEY               User Key  (r) = Recorded By, (t) = Taken By, (c) = Cosigned By      Initials Name Provider Type    Nelly Esparza, OT Occupational Therapist                   Lymphedema       Row Name 07/03/24 0830 07/02/24 0830 07/01/24 1015       Lymphedema Edema Assessment    Ptting Edema Category By severity  -MF By severity  -MF By severity  -MF    Pitting Edema Severe  -MF Severe  -MF Severe  -MF    Recorded by [MF] Lloyd Reagan, PT [MF] Lloyd Reagan, PT [MF] Lloyd Reagan, PT       Compression/Skin Care    Compression/Skin Care skin care;wrapping location;bandaging  -MF skin care;wrapping location;bandaging  -MF skin care;wrapping location;bandaging  -MF    Skin Care washed/dried;lotion applied  -MF washed/dried;lotion applied  -MF washed/dried;lotion applied  -MF    Wrapping Location lower extremity  -MF lower extremity  -MF lower extremity  -MF     Wrapping Location LE bilateral:;foot to knee  -MF bilateral:;foot to knee  -MF bilateral:;foot to knee  -MF    Wrapping Comments optifoam Ag foam to ant ankles with size 5/6/8 compressogrips doubled/overlapping for gradient compression  -MF optifoam Ag foam to ant ankles with size 5/6/8 compressogrips doubled/overlapping for gradient compression  -MF optifoam Ag foam to ant ankles with size 5/6/8 compressogrips doubled/overlapping for gradient compression  -MF    Recorded by [] Lloyd Reagan, PT [MF] Lloyd Reagan, PT [] Lloyd Reagan, PT              User Key  (r) = Recorded By, (t) = Taken By, (c) = Cosigned By      Initials Name Effective Dates    Lloyd Ngo, PT 02/03/23 -                    Mobility/ADL's       Row Name 07/03/24 0928          Bed Mobility    Comment, (Bed Mobility) patient in chair on arrival and left in recliner  -       Row Name 07/03/24 0928          Transfers    Comment, (Transfers) with first attempt to see pt. he was on BSC and asked OT to return, when returned pt. in recliner, per pt. he transfers with SBA, declined at this time to work on movement due to scrotal edema  -       Row Name 07/03/24 0928          Activities of Daily Living    BADL Assessment/Intervention lower body dressing;toileting;bathing  -       Row Name 07/03/24 0928          Lower Body Dressing Assessment/Training    Position (Lower Body Dressing) supported sitting  -     Comment, (Lower Body Dressing) shoe horn issued to assist with shoe donning, per pt. he uses his cane to help gio pants and side sits on bed to gio socks with difficulty, pt. declined other dressing AE at this time, wound care arrived while getting AE so did not get to practice  -       Row Name 07/03/24 0928          Grooming Assessment/Training    Comment, (Grooming) wound  are arrived, did not get to attempt  -       Row Name 07/03/24 0928          Toileting Assessment/Training    Comment, (Toileting) per  pt. he wipes himself post BMs on BSC and does not need help from nursing  -       Row Name 07/03/24 0928          Bathing Assessment/Intervention    Comment, (Bathing)  bath sponge issued and OT simulated for pt. how to use  -LANEY               User Key  (r) = Recorded By, (t) = Taken By, (c) = Cosigned By      Initials Name Provider Type    Nelly Esparza OT Occupational Therapist                   Obj/Interventions       Row Name 07/03/24 0932          Shoulder (Therapeutic Exercise)    Shoulder AROM (Therapeutic Exercise) --  pt. completed 5 wand UE TE 10 reps each using cane post OT demonstration, rest needed every 5-10 reps  -       Row Name 07/03/24 0932          Elbow/Forearm (Therapeutic Exercise)    Elbow/Forearm (Therapeutic Exercise) strengthening exercise  -LANEY     Elbow/Forearm Strengthening (Therapeutic Exercise) bilateral;flexion;extension;sitting  mod resistance given  -       Row Name 07/03/24 0932          Motor Skills    Therapeutic Exercise shoulder;elbow/forearm  -       Row Name 07/03/24 0932          Balance    Static Sitting Balance independent  -LANEY     Dynamic Sitting Balance independent  -LANEY     Position, Sitting Balance unsupported;sitting in chair  pt. sits at edge of chair  -LANEY     Comment, Balance UE TE  -LANEY               User Key  (r) = Recorded By, (t) = Taken By, (c) = Cosigned By      Initials Name Provider Type    Nelly Esparza, OT Occupational Therapist                   Goals/Plan       Row Name 07/03/24 0940          Transfer Goal 1 (OT)    Progress/Outcome (Transfer Goal 1, OT) goal ongoing  met pt report only  -LANEY       Row Name 07/03/24 0940          Toileting Goal 1 (OT)    Progress/Outcome (Toileting Goal 1, OT) goal ongoing  met hygiene pt. report only  -LANEY       Row Name 07/03/24 0940          Grooming Goal 1 (OT)    Progress/Outcome (Grooming Goal 1, OT) goal ongoing  -LANEY       Row Name 07/03/24 0940          Strength Goal 1 (OT)    Strength Goal 1 (OT)  Pt. will demonstrate independence with wand, tband and  sponge HEP to support return to PLOF ADLs.  -LANEY     Time Frame (Strength Goal 1, OT) long term goal (LTG);10 days  -LANEY     Progress/Outcome (Strength Goal 1, OT) new goal  -LANEY       Row Name 07/03/24 4009          Problem Specific Goal 1 (OT)    Problem Specific Goal 1 (OT) Pt. will demonstrate good balance and endurance to complete home distance to and from bathroom to support ADL independence.  -LANEY     Time Frame (Problem Specific Goal 1, OT) short term goal (STG);5 days  -LANEY     Progress/Outcome (Problem Specific Goal 1, OT) new goal  -LANEY               User Key  (r) = Recorded By, (t) = Taken By, (c) = Cosigned By      Initials Name Provider Type    Nelly Esparza, OT Occupational Therapist                   Clinical Impression       Row Name 07/03/24 5951          Pain Assessment    Pretreatment Pain Rating 2/10  -LANEY     Posttreatment Pain Rating 2/10  -LANEY     Pain Location - Side/Orientation Bilateral  -LANEY     Pain Location - hand  -LANEY     Pain Intervention(s) Ambulation/increased activity  -LANEY       Row Name 07/03/24 3753          Plan of Care Review    Plan of Care Reviewed With patient  -LANEY     Progress improving  -LANEY     Outcome Evaluation Per pt. report he has been able to wipe himself post BM. Good progress with UE TE. Recommend wand HEP handout next session.  Pt. would also like to attempt tband with HEP handout and  sponge TE with handout issue.  Pt. wants HEP plans for independent home use due to limited therapy option in his town per pt. report.  LH shoe horn and  bath sponge issued to increase independence with LBB and LBD.  Per pt. he uses his cane to assist with pants.  With foot/leg wound/edema issues  a sock-aid was not issued at this time. Limited progress fully to goals due to scrotal edema and wound care interuption this session.  Goals updated.  Continue to recommend IRF due to lives alone.  -LANEY       Row Name 07/03/24 2962           Therapy Assessment/Plan (OT)    Therapy Frequency (OT) daily  -LANEY       Row Name 07/03/24 0934          Therapy Plan Review/Discharge Plan (OT)    Anticipated Discharge Disposition (OT) inpatient rehabilitation facility  per pt. he plans to go home at discharge  -LANEY       Row Name 07/03/24 0934          Vital Signs    Pre Systolic BP Rehab 117  -LANEY     Pre Treatment Diastolic BP 68  -LANEY     Pretreatment Heart Rate (beats/min) 84  -LANEY     Posttreatment Heart Rate (beats/min) 77  -LANEY     O2 Delivery Pre Treatment room air  -LANEY     O2 Delivery Intra Treatment room air  -LANEY     O2 Delivery Post Treatment room air  -LANEY     Pre Patient Position Sitting  -LANEY     Intra Patient Position Sitting  -LANEY     Post Patient Position Sitting  -LANEY       Row Name 07/03/24 0934          Positioning and Restraints    Pre-Treatment Position sitting in chair/recliner  -LANEY     Post Treatment Position chair  -LANEY     In Chair sitting;call light within reach;with PT;with nsg  -LANEY               User Key  (r) = Recorded By, (t) = Taken By, (c) = Cosigned By      Initials Name Provider Type    Nelly Esparza OT Occupational Therapist                   Outcome Measures       Row Name 07/03/24 0942          How much help from another is currently needed...    Putting on and taking off regular lower body clothing? 2  -LANEY     Bathing (including washing, rinsing, and drying) 2  -LANEY     Toileting (which includes using toilet bed pan or urinal) 3  -LANEY     Putting on and taking off regular upper body clothing 4  -LANEY     Taking care of personal grooming (such as brushing teeth) 3  -LANEY     Eating meals 4  -LANEY     AM-PAC 6 Clicks Score (OT) 18  -LANEY       Row Name 07/03/24 0942          Functional Assessment    Outcome Measure Options AM-PAC 6 Clicks Daily Activity (OT)  -LANEY               User Key  (r) = Recorded By, (t) = Taken By, (c) = Cosigned By      Initials Name Provider Type    Nelly Esparza OT Occupational Therapist                     Occupational Therapy Education       Title: PT OT SLP Therapies (Done)       Topic: Occupational Therapy (Done)       Point: ADL training (Done)       Description:   Instruct learner(s) on proper safety adaptation and remediation techniques during self care or transfers.   Instruct in proper use of assistive devices.                  Learning Progress Summary             Patient Acceptance, E,D, VU,NR by LANEY at 7/3/2024 0943    Comment: AE use to improve LBB/LBD, wand UE TE    Acceptance, E,TB, VU,DU by KF at 6/26/2024 0754    Acceptance, TB,E, VU by AF at 6/22/2024 0955                         Point: Home exercise program (Done)       Description:   Instruct learner(s) on appropriate technique for monitoring, assisting and/or progressing therapeutic exercises/activities.                  Learning Progress Summary             Patient Acceptance, E,D, VU,NR by LANEY at 7/3/2024 0943    Comment: AE use to improve LBB/LBD, wand UE TE    Acceptance, TB,E, VU by AF at 6/22/2024 0955                         Point: Precautions (Done)       Description:   Instruct learner(s) on prescribed precautions during self-care and functional transfers.                  Learning Progress Summary             Patient Acceptance, E,TB, VU,DU by KF at 6/26/2024 0754                         Point: Body mechanics (Done)       Description:   Instruct learner(s) on proper positioning and spine alignment during self-care, functional mobility activities and/or exercises.                  Learning Progress Summary             Patient Acceptance, E,TB, VU,DU by KF at 6/26/2024 0754    Acceptance, TB,E, VU by AF at 6/22/2024 0955                                         User Key       Initials Effective Dates Name Provider Type Discipline    LANEY 07/11/23 -  Nelly Echeverria, OT Occupational Therapist OT    KF 08/09/23 -  Sho Workman OT Occupational Therapist OT    AF 08/15/23 -  Roberta Corbin, OT Occupational Therapist OT                  OT  Recommendation and Plan  Therapy Frequency (OT): daily  Plan of Care Review  Plan of Care Reviewed With: patient  Progress: improving  Outcome Evaluation: Per pt. report he has been able to wipe himself post BM. Good progress with UE TE. Recommend wand HEP handout next session.  Pt. would also like to attempt tband with HEP handout and  sponge TE with handout issue.  Pt. wants HEP plans for independent home use due to limited therapy option in his town per pt. report.  LH shoe horn and LH bath sponge issued to increase independence with LBB and LBD.  Per pt. he uses his cane to assist with pants.  With foot/leg wound/edema issues  a sock-aid was not issued at this time. Limited progress fully to goals due to scrotal edema and wound care interuption this session.  Goals updated.  Continue to recommend IRF due to lives alone.     Time Calculation:         Time Calculation- OT       Row Name 07/03/24 0944             Time Calculation- OT    OT Start Time 0840  -LANEY      OT Received On 07/03/24  -LANEY      OT Goal Re-Cert Due Date 07/13/24  -LANEY         Timed Charges    67432 - OT Therapeutic Exercise Minutes 17  -LANEY      62966 - OT Self Care/Mgmt Minutes 4  -LANEY         Total Minutes    Timed Charges Total Minutes 21  -LANEY       Total Minutes 21  -LANEY                User Key  (r) = Recorded By, (t) = Taken By, (c) = Cosigned By      Initials Name Provider Type    Nelly Esparza OT Occupational Therapist                  Therapy Charges for Today       Code Description Service Date Service Provider Modifiers Qty    77776873834 HC OT THER PROC EA 15 MIN 7/3/2024 Nelly Echeverria OT GO 1                 Nelly Echeverria OT  7/3/2024    Electronically signed by Nelly Echeverria OT at 07/03/24 0999

## 2024-07-03 NOTE — PLAN OF CARE
Problem: Adult Inpatient Plan of Care  Goal: Plan of Care Review  Flowsheets (Taken 7/3/2024 7843)  Progress: no change  Plan of Care Reviewed With: patient  Outcome Evaluation: Patient remains on RA, SR and occasionally v-paced on tele, and VSS. Patient experienced some discomfort in hands, no interventions were done per patient's request. Patient being diuresed with IV bumex and PO spironolactone. Continue with POC.   Goal Outcome Evaluation:  Plan of Care Reviewed With: patient        Progress: no change  Outcome Evaluation: Patient remains on RA, SR and occasionally v-paced on tele, and VSS. Patient experienced some discomfort in hands, no interventions were done per patient's request. Patient being diuresed with IV bumex and PO spironolactone. Continue with POC.

## 2024-07-03 NOTE — THERAPY TREATMENT NOTE
Patient Name: Jaycob Ndiaye II  : 1960    MRN: [id number]                              Today's Date: 7/3/2024       Admit Date: 2024    Visit Dx:     ICD-10-CM ICD-9-CM   1. Peripheral edema  R60.0 782.3   2. Congestive heart failure, unspecified HF chronicity, unspecified heart failure type  I50.9 428.0   3. Hypokalemia  E87.6 276.8   4. Acute on chronic heart failure with preserved ejection fraction (HFpEF)  I50.33 428.23   5. Screen for colon cancer  Z12.11 V76.51   6. Acute on chronic HFrEF (heart failure with reduced ejection fraction)  I50.23 428.23   7. Elevated serum creatinine  R79.89 790.99     Patient Active Problem List   Diagnosis    Screen for colon cancer    Acute on chronic heart failure with preserved ejection fraction (HFpEF)    Elevated serum creatinine    Anemia    Hypokalemia    Cirrhosis of liver    HCV (hepatitis C virus)    Acute on chronic HFrEF (heart failure with reduced ejection fraction)     Past Medical History:   Diagnosis Date    Arthritis     Cancer     Constipation     Depression     Diabetes     type 2 dm, check blood sugar once a day    History of hepatitis C     History of prostate cancer     Hypertension     Irregular heartbeat     Pacemaker     home monitoring    Prostate CA     Requires supplemental oxygen     at night with cpap    Sleep apnea     wears a cpap    SOB (shortness of breath) on exertion     Wears glasses      Past Surgical History:   Procedure Laterality Date    CARDIAC ABLATION      COLONOSCOPY N/A 2021    Procedure: Colonoscopy with polypectomy;  Surgeon: Maurice Proctor MD;  Location: [facility] ENDOSCOPY;  Service: Gastroenterology;  Laterality: N/A;    COLONOSCOPY      ENDOSCOPY      KNEE SURGERY Right     1985    PACEMAKER IMPLANTATION      PROSTATE SURGERY      REPLACEMENT TOTAL KNEE Right       General Information       Row Name 24 1547          Physical Therapy Time and Intention    Document Type therapy note (daily  note)  -ES     Mode of Treatment physical therapy  -ES       Row Name 07/03/24 1547          General Information    Patient Profile Reviewed yes  -ES     Existing Precautions/Restrictions fall;other (see comments)  BLE, scrotal edema  -ES     Barriers to Rehab medically complex;previous functional deficit;physical barrier  -ES       Row Name 07/03/24 1547          Cognition    Orientation Status (Cognition) oriented x 4  -ES       Row Name 07/03/24 1547          Safety Issues, Functional Mobility    Safety Issues Affecting Function (Mobility) awareness of need for assistance;insight into deficits/self-awareness;safety precautions follow-through/compliance  -ES     Impairments Affecting Function (Mobility) strength;range of motion (ROM);other (see comments)  scrotal edema  -ES               User Key  (r) = Recorded By, (t) = Taken By, (c) = Cosigned By      Initials Name Provider Type    Ning Elizabeth PT Physical Therapist                   Mobility       Row Name 07/03/24 1548          Bed Mobility    Comment, (Bed Mobility) UIC  -ES       Row Name 07/03/24 1548          Transfers    Comment, (Transfers) Pt declined further mob due to scrotal edema/pain despite encouragement  -ES       Row Name 07/03/24 1548          Sit-Stand Transfer    Comment, (Sit-Stand Transfer) Pt declined further mob due to scrotal edema/pain despite encouragement  -ES       Row Name 07/03/24 1548          Gait/Stairs (Locomotion)    Comment, (Gait/Stairs) Pt declined further mob due to scrotal edema/pain despite encouragement  -ES               User Key  (r) = Recorded By, (t) = Taken By, (c) = Cosigned By      Initials Name Provider Type    Ning Elizabeth PT Physical Therapist                   Obj/Interventions       Row Name 07/03/24 1549          Motor Skills    Therapeutic Exercise hip;knee;ankle  -ES       Row Name 07/03/24 1549          Hip (Therapeutic Exercise)    Hip Strengthening (Therapeutic Exercise) bilateral;heel  slides;marching while seated;10 repetitions  -ES       Row Name 07/03/24 1549          Knee (Therapeutic Exercise)    Knee (Therapeutic Exercise) strengthening exercise  -ES     Knee Strengthening (Therapeutic Exercise) bilateral;heel slides;marching while seated;SLR (straight leg raise);LAQ (long arc quad);10 repetitions  -ES       Row Name 07/03/24 1549          Ankle (Therapeutic Exercise)    Ankle (Therapeutic Exercise) AROM (active range of motion)  -ES     Ankle AROM (Therapeutic Exercise) dorsiflexion;plantarflexion;bilateral;10 repetitions  -ES       Row Name 07/03/24 1549          Balance    Balance Assessment sitting static balance  -ES     Static Sitting Balance independent  -ES     Position, Sitting Balance supported;sitting in chair  -ES     Balance Interventions sitting;supported;static;occupation based/functional task  -ES               User Key  (r) = Recorded By, (t) = Taken By, (c) = Cosigned By      Initials Name Provider Type    ES Ning Carrillo, PT Physical Therapist                   Goals/Plan    No documentation.                  Clinical Impression       Row Name 07/03/24 1549          Pain    Pain Intervention(s) Repositioned;Ambulation/increased activity  -ES     Additional Documentation Pain Scale: FACES Pre/Post-Treatment (Group)  -ES       Row Name 07/03/24 1549          Pain Scale: FACES Pre/Post-Treatment    Pain: FACES Scale, Pretreatment 2-->hurts little bit  -ES     Posttreatment Pain Rating 2-->hurts little bit  -ES     Pain Location generalized  -ES     Pain Location - other (see comments)  scrotal edema  -ES       Row Name 07/03/24 1549          Plan of Care Review    Plan of Care Reviewed With patient  -ES     Progress no change  -ES     Outcome Evaluation Pt limited this date by c/o pain with mobility caused by scrotal edema. Pt educated on generalized strengthening HEP, verbalized understanding and demonstrated good teach back. PT rec IRF at d/c, but if pt refuses rec home  with 24/7 assist and HHPT.  -ES       Row Name 07/03/24 1549          Therapy Assessment/Plan (PT)    Rehab Potential (PT) fair, will monitor progress closely  -ES     Criteria for Skilled Interventions Met (PT) yes;skilled treatment is necessary  -ES     Therapy Frequency (PT) daily  -ES     Predicted Duration of Therapy Intervention (PT) 10 days  -ES       Row Name 07/03/24 1549          Vital Signs    O2 Delivery Pre Treatment room air  -ES     O2 Delivery Intra Treatment room air  -ES     O2 Delivery Post Treatment room air  -ES     Pre Patient Position Sitting  -ES     Intra Patient Position Sitting  -ES     Post Patient Position Sitting  -ES       Row Name 07/03/24 1549          Positioning and Restraints    Pre-Treatment Position sitting in chair/recliner  -ES     Post Treatment Position chair  -ES     In Chair notified nsg;reclined;sitting;call light within reach;encouraged to call for assist;exit alarm on;waffle cushion;legs elevated  -ES               User Key  (r) = Recorded By, (t) = Taken By, (c) = Cosigned By      Initials Name Provider Type    ES Ning Carrillo, PT Physical Therapist                   Outcome Measures       Row Name 07/03/24 1551 07/03/24 0830       How much help from another person do you currently need...    Turning from your back to your side while in flat bed without using bedrails? 4  -ES 4  -LANEY    Moving from lying on back to sitting on the side of a flat bed without bedrails? 4  -ES 4  -LANEY    Moving to and from a bed to a chair (including a wheelchair)? 3  -ES 3  -LANEY    Standing up from a chair using your arms (e.g., wheelchair, bedside chair)? 3  -ES 4  -LANEY    Climbing 3-5 steps with a railing? 2  -ES 2  -LANEY    To walk in hospital room? 3  -ES 3  -LANEY    AM-PAC 6 Clicks Score (PT) 19  -ES 20  -LANEY    Highest Level of Mobility Goal 6 --> Walk 10 steps or more  -ES 6 --> Walk 10 steps or more  -LANEY      Row Name 07/03/24 1551 07/03/24 0942       Functional Assessment    Outcome  Measure Options AM-PAC 6 Clicks Basic Mobility (PT)  -JACK AM-PAC 6 Clicks Daily Activity (OT)  -JBA              User Key  (r) = Recorded By, (t) = Taken By, (c) = Cosigned By      Initials Name Provider Type    Nelly Eddy, OT Occupational Therapist    Ning Elizabeth, PT Physical Therapist    Aniceto Brothers, RN Registered Nurse                                 Physical Therapy Education       Title: PT OT SLP Therapies (Done)       Topic: Physical Therapy (Done)       Point: Mobility training (Done)       Learning Progress Summary             Patient Acceptance, E, VU,DU,NR by ML at 7/1/2024 1154    Acceptance, E, VU by ND at 6/25/2024 1109    Acceptance, E, VU by ND at 6/22/2024 1006                         Point: Home exercise program (Done)       Learning Progress Summary             Patient Acceptance, E, VU,DU,NR by ML at 7/1/2024 1154                         Point: Body mechanics (Done)       Learning Progress Summary             Patient Acceptance, E, VU by ND at 6/25/2024 1109    Acceptance, E, VU by ND at 6/22/2024 1006                         Point: Precautions (Done)       Learning Progress Summary             Patient Acceptance, E, VU,DU,NR by ML at 7/1/2024 1154    Acceptance, E, VU by ND at 6/25/2024 1109    Acceptance, E, VU by ND at 6/22/2024 1006                                         User Key       Initials Effective Dates Name Provider Type Discipline     04/22/21 -  Sally Perez Physical Therapist PT    ND 11/16/23 -  Maryjane Das PT Physical Therapist PT                  PT Recommendation and Plan     Plan of Care Reviewed With: patient  Progress: no change  Outcome Evaluation: Pt limited this date by c/o pain with mobility caused by scrotal edema. Pt educated on generalized strengthening HEP, verbalized understanding and demonstrated good teach back. PT rec IRF at d/c, but if pt refuses rec home with 24/7 assist and HHPT.     Time Calculation:         PT Charges        Row Name 07/03/24 1551 07/03/24 0830          Time Calculation    Start Time 1430  -ES 0830  -MF     PT Received On 07/03/24  -ES --     PT Goal Re-Cert Due Date 07/11/24  -ES 07/11/24  -MF        Time Calculation- PT    Total Timed Code Minutes- PT 33 minute(s)  -ES --        Timed Charges    07424 - PT Therapeutic Exercise Minutes 33  -ES --        Untimed Charges    Wound Care -- 64161 Selective debridement  -MF     48905-Ovvkumziul comp below knee -- 25  -MF     92677-Urffhipmz debridement -- 10  -MF        Total Minutes    Timed Charges Total Minutes 33  -ES --     Untimed Charges Total Minutes -- 35  -MF      Total Minutes 33  -ES 35  -MF               User Key  (r) = Recorded By, (t) = Taken By, (c) = Cosigned By      Initials Name Provider Type     Lloyd Reagan, PT Physical Therapist    ES Ning Carrillo, AR Physical Therapist                  Therapy Charges for Today       Code Description Service Date Service Provider Modifiers Qty    00348496450 HC PT THER PROC EA 15 MIN 7/3/2024 Ning Carrillo PT GP 2            PT G-Codes  Outcome Measure Options: AM-PAC 6 Clicks Basic Mobility (PT)  AM-PAC 6 Clicks Score (PT): 19  AM-PAC 6 Clicks Score (OT): 18  PT Discharge Summary  Anticipated Discharge Disposition (PT): inpatient rehabilitation facility    Ning Carrillo PT  7/3/2024

## 2024-07-03 NOTE — PLAN OF CARE
Goal Outcome Evaluation:  Plan of Care Reviewed With: patient        Progress: no change  Outcome Evaluation: Pt limited this date by c/o pain with mobility caused by scrotal edema. Pt educated on generalized strengthening HEP, verbalized understanding and demonstrated good teach back. PT rec IRF at d/c, but if pt refuses rec home with 24/7 assist and HHPT.      Anticipated Discharge Disposition (PT): inpatient rehabilitation facility

## 2024-07-03 NOTE — PLAN OF CARE
Goal Outcome Evaluation:  Plan of Care Reviewed With: patient        Progress: improving  Outcome Evaluation: Per pt. report he has been able to wipe himself post BM. Good progress with UE TE. Recommend wand HEP handout next session.  Pt. would also like to attempt tband with HEP handout and  sponge TE with handout issue.  Pt. wants HEP plans for independent home use due to limited therapy option in his town per pt. report.  LH shoe horn and LH bath sponge issued to increase independence with LBB and LBD.  Per pt. he uses his cane to assist with pants.  With foot/leg wound/edema issues  a sock-aid was not issued at this time. Limited progress fully to goals due to scrotal edema and wound care interuption this session.  Goals updated.  Continue to recommend IRF due to lives alone.      Anticipated Discharge Disposition (OT): inpatient rehabilitation facility (per pt. he plans to go home at discharge)

## 2024-07-03 NOTE — PAYOR COMM NOTE
"  [facility]# [id number]   Clinical Update    Utilization Review  Phone [phone]  Fax [phone]    [facility]  [address] [place] [address]           Jaycob Scott II (64 y.o. Male)       Date of Birth   1960    Social Security Number       Address   [address] [place] [address]    Home Phone   [phone]    MRN   [id number]       Yarsanism   None    Marital Status                               Admission Date   6/21/24    Admission Type   Emergency    Admitting Provider   Kaylen Huerta MD    Attending Provider   Kaylen Huerta MD    Department, Room/Bed   [facility] 6B, N636/1       Discharge Date       Discharge Disposition       Discharge Destination                                 Attending Provider: Kaylen Huerta MD    Allergies: Ketamine, Nsaids, Contrast Dye (Echo Or Unknown Ct/mr)    Isolation: None   Infection: None   Code Status: CPR    Ht: 185.4 cm (73\")   Wt: 124 kg (273 lb 8 oz)    Admission Cmt: None   Principal Problem: Acute on chronic heart failure with preserved ejection fraction (HFpEF) [I50.33]                   Active Insurance as of 6/21/2024       Primary Coverage       Payor Plan Insurance Group Employer/Plan Group    ANTHEM MEDICARE REPLACEMENT ANTHEM MEDICARE ADVANTAGE [id number]       Payor Plan Address Payor Plan Phone Number Payor Plan Fax Number Effective Dates    [address] [phone]  10/1/2020 - None Entered    [facility] [id number]         Subscriber Name Subscriber Birth Date Member ID       JAYCOB SCOTT II 1960 [id number]               Secondary Coverage       Payor Plan Insurance Group Employer/Plan Group    [place]        Payor Plan Address Payor Plan Phone Number Payor Plan Fax Number Effective Dates    [address] [phone]  6/21/2024 - None Entered    [facility] [id number]         Subscriber Name Subscriber Birth Date " Member ID       KIKI SCOTT II 1960 [id number]                     Emergency Contacts        (Rel.) Home Phone Work Phone Mobile Phone    Kiki Scott (Son) [phone] -- --    isidra scott (Son) -- -- [phone]    Rahul Scott (Son) -- -- [phone]              Current Facility-Administered Medications   Medication Dose Route Frequency Provider Last Rate Last Admin    acetaminophen (TYLENOL) tablet 650 mg  650 mg Oral Q4H PRN Mihir Chacko APRN        Or    acetaminophen (TYLENOL) 160 MG/5ML oral solution 650 mg  650 mg Oral Q4H PRN Mihir Chacko APRN        Or    acetaminophen (TYLENOL) suppository 650 mg  650 mg Rectal Q4H PRN Mihir Chacko APRN        albuterol (PROVENTIL) nebulizer solution 0.083% 2.5 mg/3mL  2.5 mg Nebulization Q6H PRN Edda Key MD   2.5 mg at 06/29/24 0435    sennosides-docusate (PERICOLACE) 8.6-50 MG per tablet 2 tablet  2 tablet Oral BID Edda Key MD   2 tablet at 07/03/24 0905    And    polyethylene glycol (MIRALAX) packet 17 g  17 g Oral BID Edda Key MD   17 g at 07/03/24 0904    And    bisacodyl (DULCOLAX) EC tablet 5 mg  5 mg Oral Daily PRN Edda Key MD   5 mg at 06/30/24 0011    And    bisacodyl (DULCOLAX) suppository 10 mg  10 mg Rectal Daily PRN Edda Key MD        bumetanide (BUMEX) injection 2 mg  2 mg Intravenous TID William Bartlett MD   2 mg at 07/03/24 0906    Calcium Replacement - Follow Nurse / BPA Driven Protocol   Does not apply PRN Mihir Chacko APRN        castor oil-balsam peru (VENELEX) ointment 1 Application  1 Application Topical Q12H Edda Key MD   1 Application at 07/03/24 0904    cholecalciferol (VITAMIN D3) tablet 1,000 Units  1,000 Units Oral Daily Milton Muñoz MD   1,000 Units at 07/03/24 0905    colchicine tablet 0.6 mg  0.6 mg Oral Daily Edda Key MD   0.6 mg at 07/03/24 0905    dextrose (D50W) (25 g/50 mL) IV injection 25 g  25 g Intravenous Q15 Min  PRN Mihir Chacko APRN        dextrose (GLUTOSE) oral gel 15 g  15 g Oral Q15 Min PRN Mihir Chacko APRN        ferrous sulfate tablet 325 mg  325 mg Oral Daily With Breakfast Mihir Chacko APRN   325 mg at 07/03/24 0905    glucagon (GLUCAGEN) injection 1 mg  1 mg Intramuscular Q15 Min PRN Mihir Chacko APRN        heparin (porcine) 5000 UNIT/ML injection 5,000 Units  5,000 Units Subcutaneous Q8H Mihir Chacko APRN   5,000 Units at 06/23/24 1332    Insulin Lispro (humaLOG) injection 2-7 Units  2-7 Units Subcutaneous 4x Daily AC & at Bedtime Mihir Chacko APRN   2 Units at 07/03/24 0905    Insulin Lispro (humaLOG) injection 4 Units  4 Units Subcutaneous TID With Meals Edda Key MD   4 Units at 07/03/24 1226    ipratropium-albuterol (DUO-NEB) nebulizer solution 3 mL  3 mL Nebulization 4x Daily - RT Edda Key MD   3 mL at 07/03/24 1514    lactulose (CHRONULAC) 10 GM/15ML solution 10 g  10 g Oral TID Edda Key MD   10 g at 07/03/24 0906    magnesium hydroxide (MILK OF MAGNESIA) 400 MG/5ML suspension 30 mL  30 mL Oral Daily PRN Fariba Mckeon APRN   30 mL at 06/24/24 2205    Magnesium Low Dose Replacement - Follow Nurse / BPA Driven Protocol   Does not apply PRN Mihir Chacko APRN        nitroglycerin (NITROSTAT) SL tablet 0.4 mg  0.4 mg Sublingual Q5 Min PRN Mihir Chacko APRN        pantoprazole (PROTONIX) EC tablet 40 mg  40 mg Oral Q AM Mihir Chacko APRN   40 mg at 07/03/24 0617    Pharmacy Consult - MTM   Does not apply Daily AshJose locke, RPH        Phosphorus Replacement - Follow Nurse / BPA Driven Protocol   Does not apply PRN Mihir Chacko APRN        Potassium Replacement - Follow Nurse / BPA Driven Protocol   Does not apply PRN Mihir Chacko APRN        [START ON 7/4/2024] pregabalin (LYRICA) capsule 100 mg  100 mg Oral Daily Kaylen Huerta MD        prochlorperazine (COMPAZINE) injection 5 mg  5 mg Intravenous Q6H PRN Shahid,  Edda BIRCH MD        sodium chloride 0.9 % flush 10 mL  10 mL Intravenous PRN Ginna, Mihir A, APRN        sodium chloride 0.9 % flush 10 mL  10 mL Intravenous Q12H Ginna, Mihir A, APRN   10 mL at 07/03/24 0905    sodium chloride 0.9 % flush 10 mL  10 mL Intravenous PRN Ginna, Mihir A, APRN        sodium chloride 0.9 % infusion 40 mL  40 mL Intravenous PRN Ginna, Mihir A, APRN        spironolactone (ALDACTONE) tablet 100 mg  100 mg Oral Daily William Bartlett MD   100 mg at 07/03/24 0905     Lab Results (last 48 hours)       Procedure Component Value Units Date/Time    POC Glucose Once [[id number]]  (Abnormal) Collected: 07/03/24 1634    Specimen: Blood Updated: 07/03/24 1635     Glucose 201 mg/dL     POC Glucose Once [[id number]]  (Abnormal) Collected: 07/03/24 1118    Specimen: Blood Updated: 07/03/24 1124     Glucose 138 mg/dL     POC Glucose Once [[id number]]  (Abnormal) Collected: 07/03/24 0716    Specimen: Blood Updated: 07/03/24 0719     Glucose 178 mg/dL     Comprehensive Metabolic Panel [[id number]]  (Abnormal) Collected: 07/03/24 0403    Specimen: Blood Updated: 07/03/24 0449     Glucose 151 mg/dL      BUN 87 mg/dL      Creatinine 1.99 mg/dL      Sodium 134 mmol/L      Potassium 3.6 mmol/L      Chloride 92 mmol/L      CO2 30.0 mmol/L      Calcium 9.5 mg/dL      Total Protein 6.1 g/dL      Albumin 4.0 g/dL      ALT (SGPT) 14 U/L      AST (SGOT) 20 U/L      Alkaline Phosphatase 71 U/L      Total Bilirubin 0.6 mg/dL      Globulin 2.1 gm/dL      Comment: Calculated Result        A/G Ratio 1.9 g/dL      BUN/Creatinine Ratio 43.7     Anion Gap 12.0 mmol/L      eGFR 36.6 mL/min/1.73     Narrative:      GFR Normal >60  Chronic Kidney Disease <60  Kidney Failure <15      CBC & Differential [[id number]]  (Abnormal) Collected: 07/03/24 0403    Specimen: Blood Updated: 07/03/24 0433    Narrative:      The following orders were created for panel order CBC & Differential.  Procedure                               Abnormality          Status                     ---------                               -----------         ------                     CBC Auto Differential[[id number]]        Abnormal            Final result                 Please view results for these tests on the individual orders.    CBC Auto Differential [[id number]]  (Abnormal) Collected: 07/03/24 0403    Specimen: Blood Updated: 07/03/24 0433     WBC 7.62 10*3/mm3      RBC 2.86 10*6/mm3      Hemoglobin 8.8 g/dL      Hematocrit 27.4 %      MCV 95.8 fL      MCH 30.8 pg      MCHC 32.1 g/dL      RDW 16.1 %      RDW-SD 57.4 fl      MPV 10.3 fL      Platelets 190 10*3/mm3      Neutrophil % 78.9 %      Lymphocyte % 8.7 %      Monocyte % 11.4 %      Eosinophil % 0.3 %      Basophil % 0.3 %      Immature Grans % 0.4 %      Neutrophils, Absolute 6.02 10*3/mm3      Lymphocytes, Absolute 0.66 10*3/mm3      Monocytes, Absolute 0.87 10*3/mm3      Eosinophils, Absolute 0.02 10*3/mm3      Basophils, Absolute 0.02 10*3/mm3      Immature Grans, Absolute 0.03 10*3/mm3      nRBC 0.0 /100 WBC     Potassium [[id number]]  (Normal) Collected: 07/02/24 2243    Specimen: Blood Updated: 07/02/24 2322     Potassium 3.9 mmol/L     POC Glucose Once [[id number]]  (Abnormal) Collected: 07/02/24 2057    Specimen: Blood Updated: 07/02/24 2058     Glucose 213 mg/dL     POC Glucose Once [[id number]]  (Abnormal) Collected: 07/02/24 1642    Specimen: Blood Updated: 07/02/24 1643     Glucose 191 mg/dL     Comprehensive Metabolic Panel [[id number]]  (Abnormal) Collected: 07/02/24 1117    Specimen: Blood Updated: 07/02/24 1201     Glucose 236 mg/dL      BUN 87 mg/dL      Creatinine 2.19 mg/dL      Sodium 133 mmol/L      Potassium 3.5 mmol/L      Chloride 92 mmol/L      CO2 26.0 mmol/L      Calcium 8.9 mg/dL      Total Protein 6.4 g/dL      Albumin 4.0 g/dL      ALT (SGPT) 14 U/L      AST (SGOT) 22 U/L      Alkaline Phosphatase 66 U/L      Total Bilirubin 0.5 mg/dL      Globulin 2.4 gm/dL      Comment: Calculated  Result        A/G Ratio 1.7 g/dL      BUN/Creatinine Ratio 39.7     Anion Gap 15.0 mmol/L      eGFR 32.6 mL/min/1.73     Narrative:      GFR Normal >60  Chronic Kidney Disease <60  Kidney Failure <15      Magnesium [[id number]]  (Normal) Collected: 07/02/24 1117    Specimen: Blood Updated: 07/02/24 1201     Magnesium 2.2 mg/dL     Phosphorus [[id number]]  (Normal) Collected: 07/02/24 1117    Specimen: Blood Updated: 07/02/24 1201     Phosphorus 3.9 mg/dL     C-reactive Protein [[id number]]  (Normal) Collected: 07/02/24 1117    Specimen: Blood Updated: 07/02/24 1201     C-Reactive Protein 0.31 mg/dL     Sedimentation Rate [[id number]]  (Normal) Collected: 07/02/24 1117    Specimen: Blood Updated: 07/02/24 1138     Sed Rate 11 mm/hr     POC Glucose Once [[id number]]  (Abnormal) Collected: 07/02/24 1135    Specimen: Blood Updated: 07/02/24 1137     Glucose 277 mg/dL     CBC & Differential [[id number]]  (Abnormal) Collected: 07/02/24 1117    Specimen: Blood Updated: 07/02/24 1133    Narrative:      The following orders were created for panel order CBC & Differential.  Procedure                               Abnormality         Status                     ---------                               -----------         ------                     CBC Auto Differential[[id number]]        Abnormal            Final result                 Please view results for these tests on the individual orders.    CBC Auto Differential [[id number]]  (Abnormal) Collected: 07/02/24 1117    Specimen: Blood Updated: 07/02/24 1133     WBC 9.29 10*3/mm3      RBC 2.89 10*6/mm3      Hemoglobin 8.9 g/dL      Hematocrit 28.2 %      MCV 97.6 fL      MCH 30.8 pg      MCHC 31.6 g/dL      RDW 16.1 %      RDW-SD 57.5 fl      MPV 10.2 fL      Platelets 196 10*3/mm3      Neutrophil % 82.4 %      Lymphocyte % 6.2 %      Monocyte % 10.3 %      Eosinophil % 0.4 %      Basophil % 0.3 %      Immature Grans % 0.4 %      Neutrophils, Absolute 7.64 10*3/mm3      Lymphocytes,  "Absolute 0.58 10*3/mm3      Monocytes, Absolute 0.96 10*3/mm3      Eosinophils, Absolute 0.04 10*3/mm3      Basophils, Absolute 0.03 10*3/mm3      Immature Grans, Absolute 0.04 10*3/mm3      nRBC 0.0 /100 WBC     POC Glucose Once [[id number]]  (Abnormal) Collected: 07/02/24 0717    Specimen: Blood Updated: 07/02/24 0722     Glucose 196 mg/dL     POC Glucose Once [[id number]]  (Abnormal) Collected: 07/01/24 2024    Specimen: Blood Updated: 07/01/24 2026     Glucose 195 mg/dL     POC Glucose Once [[id number]]  (Abnormal) Collected: 07/01/24 1704    Specimen: Blood Updated: 07/01/24 1705     Glucose 212 mg/dL           Imaging Results (Last 48 Hours)       ** No results found for the last 48 hours. **             Physician Progress Notes (last 72 hours)        William Bartlett MD at 07/03/24 1213           LOS: 11 days   Patient Care Team:  Lorena Chamberlain APRN as PCP - General (Nurse Practitioner)  Lloyd Craig MD as Consulting Physician (Cardiology)    Chief Complaint: NASRIN    Subjective   Excellent response to diuretics with recent changes. Cr ~ 1.9 mg/dl. LE edema improving but persistent.     History taken from: patient    Objective     Vital Sign Min/Max for last 24 hours  Temp  Min: 97.2 °F (36.2 °C)  Max: 98.2 °F (36.8 °C)   BP  Min: 113/69  Max: 119/69   Pulse  Min: 71  Max: 88   Resp  Min: 18  Max: 18   SpO2  Min: 94 %  Max: 98 %   No data recorded   No data recorded     Flowsheet Rows      Flowsheet Row First Filed Value   Admission Height 185.4 cm (73\") Documented at 06/21/2024 1409   Admission Weight 127 kg (279 lb) Documented at 06/21/2024 1409            I/O this shift:  In: 1192 [P.O.:1192]  Out: 1500 [Urine:1500]  I/O last 3 completed shifts:  In: 2252 [P.O.:2252]  Out: 5625 [Urine:5625]    Objective:  Physical examination:    General Appearance: Alert, oriented, no obvious distress.  Morbid obesity  Eyes: PER, EOMI.  Neck: Supple no JVD.  Lungs: Clear auscultation, no rales rhonchi's, " "equal chest movement, nonlabored.  Heart: No gallop, murmur, rub, RRR.  Abdomen: Soft, nontender, positive bowel sounds, morbid obesity with abdominal wall edema  Extremities: Significant bilateral lower extremity edema 3-4+.  Lower extremity tenderness to improve.  No cyanosis.  Neuro: No focal deficit, moving all extremities, alert oriented X 3    : Rouse catheter in place.  Urine slightly bloody    Results Review:     I reviewed the patient's new clinical results.    WBC WBC   Date Value Ref Range Status   07/03/2024 7.62 3.40 - 10.80 10*3/mm3 Final   07/02/2024 9.29 3.40 - 10.80 10*3/mm3 Final        HGB Hemoglobin   Date Value Ref Range Status   07/03/2024 8.8 (L) 13.0 - 17.7 g/dL Final   07/02/2024 8.9 (L) 13.0 - 17.7 g/dL Final        HCT Hematocrit   Date Value Ref Range Status   07/03/2024 27.4 (L) 37.5 - 51.0 % Final   07/02/2024 28.2 (L) 37.5 - 51.0 % Final        Platlets No results found for: \"LABPLAT\"   MCV MCV   Date Value Ref Range Status   07/03/2024 95.8 79.0 - 97.0 fL Final   07/02/2024 97.6 (H) 79.0 - 97.0 fL Final            Sodium Sodium   Date Value Ref Range Status   07/03/2024 134 (L) 136 - 145 mmol/L Final   07/02/2024 133 (L) 136 - 145 mmol/L Final   07/01/2024 132 (L) 136 - 145 mmol/L Final      Potassium Potassium   Date Value Ref Range Status   07/03/2024 3.6 3.5 - 5.2 mmol/L Final   07/02/2024 3.9 3.5 - 5.2 mmol/L Final   07/02/2024 3.5 3.5 - 5.2 mmol/L Final   07/01/2024 3.7 3.5 - 5.2 mmol/L Final      Chloride Chloride   Date Value Ref Range Status   07/03/2024 92 (L) 98 - 107 mmol/L Final   07/02/2024 92 (L) 98 - 107 mmol/L Final   07/01/2024 91 (L) 98 - 107 mmol/L Final      CO2 CO2   Date Value Ref Range Status   07/03/2024 30.0 (H) 22.0 - 29.0 mmol/L Final   07/02/2024 26.0 22.0 - 29.0 mmol/L Final   07/01/2024 26.0 22.0 - 29.0 mmol/L Final      BUN BUN   Date Value Ref Range Status   07/03/2024 87 (H) 8 - 23 mg/dL Final   07/02/2024 87 (H) 8 - 23 mg/dL Final   07/01/2024 91 " "(H) 8 - 23 mg/dL Final      Creatinine Creatinine   Date Value Ref Range Status   07/03/2024 1.99 (H) 0.76 - 1.27 mg/dL Final   07/02/2024 2.19 (H) 0.76 - 1.27 mg/dL Final   07/01/2024 2.13 (H) 0.76 - 1.27 mg/dL Final      Calcium Calcium   Date Value Ref Range Status   07/03/2024 9.5 8.6 - 10.5 mg/dL Final   07/02/2024 8.9 8.6 - 10.5 mg/dL Final   07/01/2024 9.3 8.6 - 10.5 mg/dL Final      PO4 No results found for: \"CAPO4\"   Albumin Albumin   Date Value Ref Range Status   07/03/2024 4.0 3.5 - 5.2 g/dL Final   07/02/2024 4.0 3.5 - 5.2 g/dL Final   07/01/2024 3.9 3.5 - 5.2 g/dL Final        Magnesium Magnesium   Date Value Ref Range Status   07/02/2024 2.2 1.6 - 2.4 mg/dL Final      Uric Acid No results found for: \"URICACID\"       Medication Review: Yes    Assessment & Plan       Acute on chronic heart failure with preserved ejection fraction (HFpEF)    Elevated serum creatinine    Anemia    Hypokalemia    Cirrhosis of liver    HCV (hepatitis C virus)    Acute on chronic HFrEF (heart failure with reduced ejection fraction)           1.  Acute kidney disease: Last known stable cr 1.1 in 2023. Cr on recent admission at [facility] few weeks ago 1.6-1.9 mg/dl. Most recent cr 2.0-2.2 GFR 35-36ml/min. Urine sodium 20. Urine cr 39.5. Renal US no hydro     2.  New onset liver cirrhosis: Complications include ascites and LE edema.      3.  Volume status: Dependent edema b/l + ascites. Getting diuretics     4.  Hypokalemia: In the setting of diuretics     5.  Hyponatremia: Due to total body volume overload.      6.  HFpEF: Dependent edema      7.  Anemia: Recent hx of GI bleed. S/p EGD and colonoscopy.      Recommendation:  Continue bumex 2 mg TID.   Increase spironolactone to 100mg daily     William Bartlett MD  07/03/24  12:13 EDT              Electronically signed by William Bartlett MD at 07/03/24 1214       Kaylen Huerta MD at 07/03/24 0618          1.  Today first-line      [facility] Medicine " Services  PROGRESS NOTE    Patient Name: Jaycob Ndiaye II  : 1960  MRN: [id number]    Date of Admission: 2024  Primary Care Physician: Lorena Chamberlain APRN    Subjective   Subjective     CC:  Follow-up for heart failure    HPI:  And examined this morning.  Diuresed 4 L yesterday, negative balance of 2 L .  Pain in both hands and legs better after adding lidocaine yesterday.    Objective   Objective     Vital Signs:   Temp:  [97.2 °F (36.2 °C)-98.2 °F (36.8 °C)] 98.2 °F (36.8 °C)  Heart Rate:  [71-92] 82  Resp:  [18-20] 18  BP: (113-119)/(62-78) 113/69     Physical Exam:  General: Comfortable, not in distress, conversant and cooperative  Head: Atraumatic and normocephalic  Eyes: No Icterus. No pallor  Ears:  Ears appear intact with no abnormalities noted  Throat: No oral lesions, no thrush  Neck: Supple, trachea midline  Lungs: Clear to auscultation bilaterally, equal air entry, no wheezing or crackles  Heart:  Normal S1 and S2, no murmur, no gallop, No JVD, 3+ lower extremity swelling  Abdomen:  Soft, no tenderness, no organomegaly, normal bowel sounds, no organomegaly.  Scrotal edema  Extremities: pulses equal bilaterally  Skin: No bleeding, bruising or rash, normal skin turgor and elasticity  Neurologic: Cranial nerves appear intact with no evidence of facial asymmetry, normal motor and sensory functions in all 4 extremities  Psych: Alert and oriented x 3, normal mood    Results Reviewed:  LAB RESULTS:      Lab 24  0403 24  1117 24  0843 24  0440   WBC 7.62 9.29  --  8.09   HEMOGLOBIN 8.8* 8.9*  --  8.7*   HEMATOCRIT 27.4* 28.2*  --  27.4*   PLATELETS 190 196  --  186   NEUTROS ABS 6.02 7.64*  --   --    IMMATURE GRANS (ABS) 0.03 0.04  --   --    LYMPHS ABS 0.66* 0.58*  --   --    MONOS ABS 0.87 0.96*  --   --    EOS ABS 0.02 0.04  --   --    MCV 95.8 97.6*  --  96.8   SED RATE  --  11 9  --    CRP  --  0.31  --   --          Lab 24  0404  07/02/24  2243 07/02/24  1117 07/01/24 0627 06/30/24  0504 06/29/24  1606 06/29/24  0559 06/28/24  0448   SODIUM 134*  --  133* 132* 132*  --  133*  --    POTASSIUM 3.6 3.9 3.5 3.7 4.4   < > 3.4*  --    CHLORIDE 92*  --  92* 91* 89*  --  91*  --    CO2 30.0*  --  26.0 26.0 26.0  --  28.0  --    ANION GAP 12.0  --  15.0 15.0 17.0*  --  14.0  --    BUN 87*  --  87* 91* 86*  --  76*  --    CREATININE 1.99*  --  2.19* 2.13* 2.26*  --  2.14*  --    EGFR 36.6*  --  32.6* 33.7* 31.4*  --  33.5*  --    GLUCOSE 151*  --  236* 159* 180*  --  184*  --    CALCIUM 9.5  --  8.9 9.3 9.4  --  9.3  --    IONIZED CALCIUM  --   --   --   --   --   --   --  1.23   MAGNESIUM  --   --  2.2  --   --   --   --   --    PHOSPHORUS  --   --  3.9 4.4 3.9  --  4.7*  --     < > = values in this interval not displayed.         Lab 07/03/24  0403 07/02/24  1117 07/01/24 0627 06/30/24  0504   TOTAL PROTEIN 6.1 6.4  --   --    ALBUMIN 4.0 4.0 3.9 4.1   GLOBULIN 2.1 2.4  --   --    ALT (SGPT) 14 14  --   --    AST (SGOT) 20 22  --   --    BILIRUBIN 0.6 0.5  --   --    ALK PHOS 71 66  --   --                          Brief Urine Lab Results  (Last result in the past 365 days)        Color   Clarity   Blood   Leuk Est   Nitrite   Protein   CREAT   Urine HCG        06/26/24 1621             39.5                 Microbiology Results Abnormal       None            No radiology results from the last 24 hrs        Current medications:  Scheduled Meds:bumetanide, 2 mg, Intravenous, TID  castor oil-balsam peru, 1 Application, Topical, Q12H  cholecalciferol, 1,000 Units, Oral, Daily  colchicine, 0.6 mg, Oral, Daily  ferrous sulfate, 325 mg, Oral, Daily With Breakfast  heparin (porcine), 5,000 Units, Subcutaneous, Q8H  insulin lispro, 2-7 Units, Subcutaneous, 4x Daily AC & at Bedtime  Insulin Lispro, 4 Units, Subcutaneous, TID With Meals  ipratropium-albuterol, 3 mL, Nebulization, 4x Daily - RT  lactulose, 10 g, Oral, TID  pantoprazole, 40 mg, Oral, Q  AM  pharmacy consult - [facility], , Does not apply, Daily  senna-docusate sodium, 2 tablet, Oral, BID   And  polyethylene glycol, 17 g, Oral, BID  predniSONE, 20 mg, Oral, Daily With Breakfast  pregabalin, 75 mg, Oral, Daily  sodium chloride, 10 mL, Intravenous, Q12H  spironolactone, 100 mg, Oral, Daily      Continuous Infusions:   PRN Meds:.  acetaminophen **OR** acetaminophen **OR** acetaminophen    Albuterol Sulfate NEB Orderable    senna-docusate sodium **AND** polyethylene glycol **AND** bisacodyl **AND** bisacodyl    Calcium Replacement - Follow Nurse / BPA Driven Protocol    dextrose    dextrose    glucagon (human recombinant)    magnesium hydroxide    Magnesium Low Dose Replacement - Follow Nurse / BPA Driven Protocol    nitroglycerin    Phosphorus Replacement - Follow Nurse / BPA Driven Protocol    Potassium Replacement - Follow Nurse / BPA Driven Protocol    prochlorperazine    sodium chloride    sodium chloride    sodium chloride    Assessment & Plan   Assessment & Plan     Active Hospital Problems    Diagnosis  POA    **Acute on chronic heart failure with preserved ejection fraction (HFpEF) [I50.33]  Yes    Acute on chronic HFrEF (heart failure with reduced ejection fraction) [I50.23]  Yes    Elevated serum creatinine [R79.89]  Yes    Anemia [D64.9]  Yes    Hypokalemia [E87.6]  Yes    Cirrhosis of liver [K74.60]  Yes    HCV (hepatitis C virus) [B19.20]  Yes      Resolved Hospital Problems   No resolved problems to display.        Brief Hospital Course to date:  Jaycob Ndiaye II is a 64 y.o. male with past medical history significant for CHF, COPD, cirrhosis, prostate cancer, chronic hematuria, GI bleed, HCV, HTN, and HLD who presents to the ED due to worsening shortness of breath and lower extremity edema over the past week.    A/C HFrEF  Significant BLE edema and scrotal edema/Anasarca  Patient with extensive lower extremity and anterior abdominal wall edema  Nephrology team managing diuretic therapy.   Currently on IV Bumex 2 mg 3 times daily.  Status post 1 dose of Diuril  Monitor kidney functions with ongoing aggressive diuresis  He follows with Cardiologist Dr. Craig  BLE venous duplex negative for DVT  WOC to see about scrotal wrap, PT to see about leg wraps tomorrow     CKD  Creatinine stable around 1.9  Nephrology managing diuretic therapy    ?Calcium deposits in fingertips and right elbow  Hyperphosphatemia  Check uric level (elevated), ionized calcium, PTH (normal) and vitamin D (low)  Normal sed rate   Continue colchicine and low dose steroids which seem to have helped significantly    Anemia  Recent GI Bleed  S/p EGD and colonoscopy at [facility] on 6/13/2024, records requested  Hgb stable  Continue PPI   Continue PO Iron -- give dose of IV iron     Hypokalemia  Replace per protocol     Newly diagnosed liver cirrhosis   HCV  S/p US abd on 6/10/24 that revealed nodular liver consistent with cirrhosis and moderate ascites  S/p US guided paracentesis on 6/12/2024 at [facility] with 200 mL of skylar-colored fluid removed.  No fluid was sent to lab.  Will need referral to establish with [facility] upon discharge per patient request  Consider paracentesis-- though he is not eager because it hurt so badly at [facility]. Our CT does not show a large voume of ascites- he think a good BM will help with abdominal distension, lactulose added     Type 2 diabetes with peripheral neuropathy  Holding home Mounjaro, Hg A1c is 6.0  Continue low dose  SSI for now.  Glucoses stable running 137-209 last 24 hours-- jaimee QAC insulin while on steroids     PAF  Xarelto discontinued 6/13 at [facility] d/t persistent hematuria      HTN  Continue holding home bystolic for now d/t borderline BP      HO prostate cancer  Patient want PSA checked and thinks he will not need DC at home    Neuropathy  Continue Lyrica    DVT prophylaxis:  Heparin     Expected Discharge Location and Transportation: home once medically improved and cleared by  "cardiology/nephrology.  Expected Discharge   Expected Discharge Date: 7/5/2024; Expected Discharge Time:    Awaiting placement- he is not eager to leave until he is good and ready    VTE Prophylaxis:  Pharmacologic VTE prophylaxis orders are present.         AM-PAC 6 Clicks Score (PT): 21 (07/02/24 0832)    CODE STATUS:   Code Status and Medical Interventions:   Ordered at: 06/21/24 1840     Level Of Support Discussed With:    Patient     Code Status (Patient has no pulse and is not breathing):    CPR (Attempt to Resuscitate)     Medical Interventions (Patient has pulse or is breathing):    Full Support       Kaylen Huerta MD  07/03/24        Electronically signed by Kaylen Huerta MD at 07/03/24 1432       William Bartlett MD at 07/02/24 1619           LOS: 10 days   Patient Care Team:  Lorena Chamberlain APRN as PCP - General (Nurse Practitioner)  Lloyd Craig MD as Consulting Physician (Cardiology)    Chief Complaint: NASRIN    Subjective   Stable renal function but persistent generalize edema no significant improvement.     History taken from: patient    Objective     Vital Sign Min/Max for last 24 hours  Temp  Min: 97.1 °F (36.2 °C)  Max: 97.4 °F (36.3 °C)   BP  Min: 109/70  Max: 120/73   Pulse  Min: 69  Max: 96   Resp  Min: 16  Max: 20   SpO2  Min: 94 %  Max: 97 %   No data recorded   No data recorded     Flowsheet Rows      Flowsheet Row First Filed Value   Admission Height 185.4 cm (73\") Documented at 06/21/2024 1409   Admission Weight 127 kg (279 lb) Documented at 06/21/2024 1409            I/O this shift:  In: 2012 [P.O.:2012]  Out: 800 [Urine:800]  I/O last 3 completed shifts:  In: 2305 [P.O.:2305]  Out: 3575 [Urine:3575]    Objective:  Physical examination:    General Appearance: Alert, oriented, no obvious distress.  Morbid obesity  Eyes: PER, EOMI.  Neck: Supple no JVD.  Lungs: Clear auscultation, no rales rhonchi's, equal chest movement, nonlabored.  Heart: No gallop, " "murmur, rub, RRR.  Abdomen: Soft, nontender, positive bowel sounds, morbid obesity with abdominal wall edema  Extremities: Significant bilateral lower extremity edema 3-4+.  Lower extremity tenderness to improve.  No cyanosis.  Neuro: No focal deficit, moving all extremities, alert oriented X 3    : Rouse catheter in place.  Urine slightly bloody    Results Review:     I reviewed the patient's new clinical results.    WBC WBC   Date Value Ref Range Status   07/02/2024 9.29 3.40 - 10.80 10*3/mm3 Final        HGB Hemoglobin   Date Value Ref Range Status   07/02/2024 8.9 (L) 13.0 - 17.7 g/dL Final        HCT Hematocrit   Date Value Ref Range Status   07/02/2024 28.2 (L) 37.5 - 51.0 % Final        Platlets No results found for: \"LABPLAT\"   MCV MCV   Date Value Ref Range Status   07/02/2024 97.6 (H) 79.0 - 97.0 fL Final            Sodium Sodium   Date Value Ref Range Status   07/02/2024 133 (L) 136 - 145 mmol/L Final   07/01/2024 132 (L) 136 - 145 mmol/L Final   06/30/2024 132 (L) 136 - 145 mmol/L Final      Potassium Potassium   Date Value Ref Range Status   07/02/2024 3.5 3.5 - 5.2 mmol/L Final   07/01/2024 3.7 3.5 - 5.2 mmol/L Final   06/30/2024 4.4 3.5 - 5.2 mmol/L Final      Chloride Chloride   Date Value Ref Range Status   07/02/2024 92 (L) 98 - 107 mmol/L Final   07/01/2024 91 (L) 98 - 107 mmol/L Final   06/30/2024 89 (L) 98 - 107 mmol/L Final      CO2 CO2   Date Value Ref Range Status   07/02/2024 26.0 22.0 - 29.0 mmol/L Final   07/01/2024 26.0 22.0 - 29.0 mmol/L Final   06/30/2024 26.0 22.0 - 29.0 mmol/L Final      BUN BUN   Date Value Ref Range Status   07/02/2024 87 (H) 8 - 23 mg/dL Final   07/01/2024 91 (H) 8 - 23 mg/dL Final   06/30/2024 86 (H) 8 - 23 mg/dL Final      Creatinine Creatinine   Date Value Ref Range Status   07/02/2024 2.19 (H) 0.76 - 1.27 mg/dL Final   07/01/2024 2.13 (H) 0.76 - 1.27 mg/dL Final   06/30/2024 2.26 (H) 0.76 - 1.27 mg/dL Final      Calcium Calcium   Date Value Ref Range Status " "  07/02/2024 8.9 8.6 - 10.5 mg/dL Final   07/01/2024 9.3 8.6 - 10.5 mg/dL Final   06/30/2024 9.4 8.6 - 10.5 mg/dL Final      PO4 No results found for: \"CAPO4\"   Albumin Albumin   Date Value Ref Range Status   07/02/2024 4.0 3.5 - 5.2 g/dL Final   07/01/2024 3.9 3.5 - 5.2 g/dL Final   06/30/2024 4.1 3.5 - 5.2 g/dL Final        Magnesium Magnesium   Date Value Ref Range Status   07/02/2024 2.2 1.6 - 2.4 mg/dL Final      Uric Acid No results found for: \"URICACID\"       Medication Review: Yes    Assessment & Plan       Acute on chronic heart failure with preserved ejection fraction (HFpEF)    Elevated serum creatinine    Anemia    Hypokalemia    Cirrhosis of liver    HCV (hepatitis C virus)    Acute on chronic HFrEF (heart failure with reduced ejection fraction)           1.  Acute kidney disease: Last known stable cr 1.1 in 2023. Cr on recent admission at [facility] few weeks ago 1.6-1.9 mg/dl. Most recent cr 2.0-2.2 GFR 35-36ml/min. Urine sodium 20. Urine cr 39.5. Renal US no hydro     2.  New onset liver cirrhosis: Complications include ascites and LE edema.      3.  Volume status: Dependent edema b/l + ascites. Getting diuretics     4.  Hypokalemia: In the setting of diuretics     5.  Hyponatremia: Due to total body volume overload.      6.  HFpEF: Dependent edema      7.  Anemia: Recent hx of GI bleed. S/p EGD and colonoscopy.      Recommendation:  Microscopic hematuria is noted with Rouse catheter in place.  Switch lasix to bumex 2  mg TID. Add diuril 500mg IV x 1 ( 30 min prior to bumex)   Continue spironolactone 50 mg daily. Will increase it to 100 mg daily ( tomorrow)     William Bartlett MD  07/02/24  16:19 EDT              Electronically signed by William Bartlett MD at 07/02/24 2801       Lloyd Craig MD at 07/02/24 0833           [facility] - Progress Note    Jaycob Ndiaye II  1960  [id number]    07/02/24, 08:58 EDT      Chief Complaint: Following for acute on chronic HFpEF, " "PAF    Subjective:   Sitting up in BSC.  Reports a few BMs yesterday which helps.  Still with significant scrotal edema - uncomfortable with  movement.  Denies chest pain.    FC anchored.    Pain from gout in hands/elbows is improving.    Review of Systems:  Pertinent positives are listed above and in physical exam.  All others have been reviewed and are negative.    castor oil-balsam peru, 1 Application, Topical, Q12H  cholecalciferol, 1,000 Units, Oral, Daily  colchicine, 0.6 mg, Oral, Daily  ferrous sulfate, 325 mg, Oral, Daily With Breakfast  furosemide, 80 mg, Intravenous, BID  heparin (porcine), 5,000 Units, Subcutaneous, Q8H  insulin lispro, 2-7 Units, Subcutaneous, 4x Daily AC & at Bedtime  Insulin Lispro, 4 Units, Subcutaneous, TID With Meals  ipratropium-albuterol, 3 mL, Nebulization, 4x Daily - RT  lactulose, 10 g, Oral, TID  pantoprazole, 40 mg, Oral, Q AM  pharmacy consult - [facility], , Does not apply, Daily  senna-docusate sodium, 2 tablet, Oral, BID   And  polyethylene glycol, 17 g, Oral, BID  predniSONE, 20 mg, Oral, Daily With Breakfast  sodium chloride, 10 mL, Intravenous, Q12H  spironolactone, 50 mg, Oral, Daily        Objective:  Vitals:   height is 185.4 cm (73\") and weight is 163 kg (358 lb 11.2 oz) (abnormal). His oral temperature is 97.3 °F (36.3 °C). His blood pressure is 119/78 and his pulse is 88. His respiration is 20 and oxygen saturation is 96%.     Intake/Output Summary (Last 24 hours) at 7/2/2024 0858  Last data filed at 7/2/2024 0340  Gross per 24 hour   Intake 2069 ml   Output 2625 ml   Net -556 ml         Physical Exam:  General:  WN, NAD, A and O x3.  CV: Irregular. No murmur, rub, or gallop.  Resp:  CTA Tomi, equal, nonlabored.  Abd:  Soft, + BS, no organomegaly. Nontender to palpation.  Obese  Extrem: 2-3+ edema BLE, 2+ pedal/PT pulses.            Results from last 7 days   Lab Units 06/27/24  0440   WBC 10*3/mm3 8.09   HEMOGLOBIN g/dL 8.7*   HEMATOCRIT % 27.4*   PLATELETS 10*3/mm3 " 186     Results from last 7 days   Lab Units 07/01/24  0627   SODIUM mmol/L 132*   POTASSIUM mmol/L 3.7   CHLORIDE mmol/L 91*   CO2 mmol/L 26.0   BUN mg/dL 91*   CREATININE mg/dL 2.13*   CALCIUM mg/dL 9.3   GLUCOSE mg/dL 159*                         Tele: Atrial fibrillation    Assessment:  -64-year-old CM with known HFpEF presents to [facility] ED with progressive dyspnea, BLE edema.  Recently admitted to [facility] over urologic issues.  Multiple changes to diuretic therapy and antihypertensives at that time.  -Acute on chronic HFpEF with recent echo 6/10/2024: Normal EF, severe septal hypertrophy, grade 2 diastolic dysfunction with no significant valvular abnormalities.  -PAF with remote ablation and [employer] pacemaker.    -Recent discontinuation of anticoagulation secondary to GIB  -CKD stage III  -Cirrhosis status post recent paracentesis at [facility].  -Anemia of chronic disease  -History of compliance issues secondary to insurance coverage.  -Constipation  -Gout          Plan:  -Admitted to hospitalist services, appreciate their assistance.  -Nephrology  managing diuretics, appreciate their assistance.  -NOAC on hold with anemia.  - BSC rep interrogated device 6/24, normal interrogation.  -Bilateral lower extremity venous duplex negative  -Cardiology will continue to follow from periphery.  We will plan on seeing patient again on Monday, July 8.  Please call on-call services over the holiday/weekend if needed for new cardiac issues.        I discussed the patient's findings and my recommendations with the patient, any present family members, and the nursing staff.  Lloyd Craig MD saw and examined patient, verified hx and PE, read all radiographic studies, reviewed labs and micro data, and formulated dx, plan for treatment and all medical decision making.      Dariana Miranda PA-C  07/02/24, 09:00 EDT                      Electronically signed by Lloyd Craig MD at 07/03/24 0842       Kaylen Huerta  MD Carlos at 24 0628                [facility] Medicine Services  PROGRESS NOTE    Patient Name: Jaycob Ndiaye II  : 1960  MRN: [id number]    Date of Admission: 2024  Primary Care Physician: Lorena Chamberlain, GARRICK    Subjective   Subjective     CC:  Follow-up for heart failure    HPI:  And examined this morning.  Diuresed 2 L yesterday, negative balance of 0.5 L only.  Complaining of persistent pain in his both hands and worsening neuropathy in both lower extremities.    Objective   Objective     Vital Signs:   Temp:  [97.1 °F (36.2 °C)-98.4 °F (36.9 °C)] 97.2 °F (36.2 °C)  Heart Rate:  [69-96] 71  Resp:  [16-18] 18  BP: (109-133)/(68-88) 120/73     Physical Exam:  General: Comfortable, not in distress, conversant and cooperative  Head: Atraumatic and normocephalic  Eyes: No Icterus. No pallor  Ears:  Ears appear intact with no abnormalities noted  Throat: No oral lesions, no thrush  Neck: Supple, trachea midline  Lungs: Clear to auscultation bilaterally, equal air entry, no wheezing or crackles  Heart:  Normal S1 and S2, no murmur, no gallop, No JVD, 3+ lower extremity swelling  Abdomen:  Soft, no tenderness, no organomegaly, normal bowel sounds, no organomegaly.  Scrotal edema  Extremities: pulses equal bilaterally  Skin: No bleeding, bruising or rash, normal skin turgor and elasticity  Neurologic: Cranial nerves appear intact with no evidence of facial asymmetry, normal motor and sensory functions in all 4 extremities  Psych: Alert and oriented x 3, normal mood    Results Reviewed:  LAB RESULTS:      Lab 24  0843 24  0440 24  0454 24  0754   WBC  --  8.09 7.28 8.77   HEMOGLOBIN  --  8.7* 8.6* 8.7*   HEMATOCRIT  --  27.4* 26.5* 27.2*   PLATELETS  --  186 195 202   MCV  --  96.8 95.0 96.1   SED RATE 9  --   --   --          Lab 24  0627 24  0504 24  1606 24  0559 24  0448 24  0440  06/26/24  1620 06/26/24  0454   SODIUM 132* 132*  --  133*  --  133*  --  134*   POTASSIUM 3.7 4.4 3.9 3.4*  --  3.7   < > 3.6   CHLORIDE 91* 89*  --  91*  --  92*  --  93*   CO2 26.0 26.0  --  28.0  --  27.0  --  27.0   ANION GAP 15.0 17.0*  --  14.0  --  14.0  --  14.0   BUN 91* 86*  --  76*  --  69*  --  64*   CREATININE 2.13* 2.26*  --  2.14*  --  2.24*  --  2.01*   EGFR 33.7* 31.4*  --  33.5*  --  31.7*  --  36.1*   GLUCOSE 159* 180*  --  184*  --  147*  --  113*   CALCIUM 9.3 9.4  --  9.3  --  9.2  --  9.3   IONIZED CALCIUM  --   --   --   --  1.23  --   --   --    PHOSPHORUS 4.4 3.9  --  4.7*  --   --   --  4.7*    < > = values in this interval not displayed.         Lab 07/01/24  0627 06/30/24  0504 06/26/24  0454   ALBUMIN 3.9 4.1 3.4*                         Brief Urine Lab Results  (Last result in the past 365 days)        Color   Clarity   Blood   Leuk Est   Nitrite   Protein   CREAT   Urine HCG        06/26/24 1621             39.5                 Microbiology Results Abnormal       None            No radiology results from the last 24 hrs        Current medications:  Scheduled Meds:castor oil-balsam peru, 1 Application, Topical, Q12H  cholecalciferol, 1,000 Units, Oral, Daily  colchicine, 0.6 mg, Oral, Daily  ferrous sulfate, 325 mg, Oral, Daily With Breakfast  furosemide, 80 mg, Intravenous, BID  heparin (porcine), 5,000 Units, Subcutaneous, Q8H  insulin lispro, 2-7 Units, Subcutaneous, 4x Daily AC & at Bedtime  Insulin Lispro, 4 Units, Subcutaneous, TID With Meals  ipratropium-albuterol, 3 mL, Nebulization, 4x Daily - RT  lactulose, 10 g, Oral, TID  pantoprazole, 40 mg, Oral, Q AM  pharmacy consult - [facility], , Does not apply, Daily  senna-docusate sodium, 2 tablet, Oral, BID   And  polyethylene glycol, 17 g, Oral, BID  predniSONE, 20 mg, Oral, Daily With Breakfast  sodium chloride, 10 mL, Intravenous, Q12H  spironolactone, 50 mg, Oral, Daily      Continuous Infusions:   PRN Meds:.  acetaminophen **OR**  acetaminophen **OR** acetaminophen    Albuterol Sulfate NEB Orderable    senna-docusate sodium **AND** polyethylene glycol **AND** bisacodyl **AND** bisacodyl    Calcium Replacement - Follow Nurse / BPA Driven Protocol    dextrose    dextrose    glucagon (human recombinant)    magnesium hydroxide    Magnesium Low Dose Replacement - Follow Nurse / BPA Driven Protocol    nitroglycerin    Phosphorus Replacement - Follow Nurse / BPA Driven Protocol    Potassium Replacement - Follow Nurse / BPA Driven Protocol    prochlorperazine    sodium chloride    sodium chloride    sodium chloride    Assessment & Plan   Assessment & Plan     Active Hospital Problems    Diagnosis  POA    **Acute on chronic heart failure with preserved ejection fraction (HFpEF) [I50.33]  Yes    Acute on chronic HFrEF (heart failure with reduced ejection fraction) [I50.23]  Yes    Elevated serum creatinine [R79.89]  Yes    Anemia [D64.9]  Yes    Hypokalemia [E87.6]  Yes    Cirrhosis of liver [K74.60]  Yes    HCV (hepatitis C virus) [B19.20]  Yes      Resolved Hospital Problems   No resolved problems to display.        Brief Hospital Course to date:  Jaycob Ndiaye II is a 64 y.o. male with past medical history significant for CHF, COPD, cirrhosis, prostate cancer, chronic hematuria, GI bleed, HCV, HTN, and HLD who presents to the ED due to worsening shortness of breath and lower extremity edema over the past week.    A/C HFrEF  Significant BLE edema and scrotal edema/Anasarca  Patient with extensive lower extremity and anterior abdominal wall edema  Nephrology team managing diuretic therapy.  Currently on IV Lasix 80 mg twice daily  Monitor kidney functions with ongoing aggressive diuresis  He follows with Cardiologist Dr. Craig  BLE venous duplex negative for DVT  WOC to see about scrotal wrap, PT to see about leg wraps tomorrow     CKD  Creatinine stable around 2  I will likely I will have biclonal antibody antibody thyroid  Nephrology  managing diuretic therapy    ?Calcium deposits in fingertips and right elbow  Hyperphosphatemia  Check uric level (elevated), ionized calcium, PTH (normal) and vitamin D (low)  Normal sed rate   Continue colchicine and low dose steroids which seem to have helped significantly    Anemia  Recent GI Bleed  S/p EGD and colonoscopy at [facility] on 6/13/2024, records requested  Hgb stable  Continue PPI   Continue PO Iron -- give dose of IV iron     Hypokalemia  Replace per protocol     Newly diagnosed liver cirrhosis   HCV  S/p US abd on 6/10/24 that revealed nodular liver consistent with cirrhosis and moderate ascites  S/p US guided paracentesis on 6/12/2024 at [facility] with 200 mL of skylar-colored fluid removed.  No fluid was sent to lab.  Will need referral to establish with [facility] upon discharge per patient request  Consider paracentesis-- though he is not eager because it hurt so badly at [facility]. Our CT does not show a large voume of ascites- he think a good BM will help with abdominal distension, lactulose added     Type 2 diabetes with peripheral neuropathy  Holding home Mounjaro, Hg A1c is 6.0  Continue low dose  SSI for now.  Glucoses stable running 137-209 last 24 hours-- jaimee QAC insulin while on steroids     PAF  Xarelto discontinued 6/13 at [facility] d/t persistent hematuria      HTN  Continue holding home bystolic for now d/t borderline BP      HO prostate cancer  Patient want PSA checked and thinks he will not need DC at home    Neuropathy  Trial Lyrica    DVT prophylaxis:  Heparin     Expected Discharge Location and Transportation: home once medically improved and cleared by cardiology/nephrology.  Expected Discharge   Expected Discharge Date: 7/5/2024; Expected Discharge Time:    Awaiting placement- he is not eager to leave until he is good and ready    VTE Prophylaxis:  Pharmacologic VTE prophylaxis orders are present.         AM-PAC 6 Clicks Score (PT): 21 (07/01/24 7621)    CODE STATUS:   Code Status and Medical  "Interventions:   Ordered at: 06/21/24 1840     Level Of Support Discussed With:    Patient     Code Status (Patient has no pulse and is not breathing):    CPR (Attempt to Resuscitate)     Medical Interventions (Patient has pulse or is breathing):    Full Support       Kaylen Huerta MD  07/02/24        Electronically signed by Kaylen Huerta MD at 07/02/24 1211       Milton Muñoz MD at 07/01/24 1130           LOS: 9 days   Patient Care Team:  Lorena Chamberlain APRN as PCP - General (Nurse Practitioner)  Lloyd Craig MD as Consulting Physician (Cardiology)    Chief Complaint: NASRIN    Subjective   No new events no added distress.  Continues to have significant edema.  Creatinine improved 2.13, BUN 91, sodium 132    History taken from: patient    Objective     Vital Sign Min/Max for last 24 hours  Temp  Min: 97.3 °F (36.3 °C)  Max: 98.4 °F (36.9 °C)   BP  Min: 114/77  Max: 133/68   Pulse  Min: 69  Max: 92   Resp  Min: 16  Max: 19   SpO2  Min: 94 %  Max: 97 %   No data recorded   No data recorded     Flowsheet Rows      Flowsheet Row First Filed Value   Admission Height 185.4 cm (73\") Documented at 06/21/2024 1409   Admission Weight 127 kg (279 lb) Documented at 06/21/2024 1409            I/O this shift:  In: 236 [P.O.:236]  Out: -   I/O last 3 completed shifts:  In: -   Out: 3600 [Urine:3600]    Objective:  Physical examination:    General Appearance: Alert, oriented, no obvious distress.  Morbid obesity  Eyes: PER, EOMI.  Neck: Supple no JVD.  Lungs: Clear auscultation, no rales rhonchi's, equal chest movement, nonlabored.  Heart: No gallop, murmur, rub, RRR.  Abdomen: Soft, nontender, positive bowel sounds, morbid obesity with abdominal wall edema  Extremities: Significant bilateral lower extremity edema 3-4+.  Lower extremity tenderness to improve.  No cyanosis.  Neuro: No focal deficit, moving all extremities, alert oriented X 3    : Rouse catheter in place.  Urine " "slightly bloody    Results Review:     I reviewed the patient's new clinical results.    WBC No results found for: \"WBC\"     HGB No results found for: \"HGB\"     HCT No results found for: \"HCT\"     Platlets No results found for: \"LABPLAT\"   MCV No results found for: \"MCV\"         Sodium Sodium   Date Value Ref Range Status   07/01/2024 132 (L) 136 - 145 mmol/L Final   06/30/2024 132 (L) 136 - 145 mmol/L Final   06/29/2024 133 (L) 136 - 145 mmol/L Final      Potassium Potassium   Date Value Ref Range Status   07/01/2024 3.7 3.5 - 5.2 mmol/L Final   06/30/2024 4.4 3.5 - 5.2 mmol/L Final   06/29/2024 3.9 3.5 - 5.2 mmol/L Final   06/29/2024 3.4 (L) 3.5 - 5.2 mmol/L Final      Chloride Chloride   Date Value Ref Range Status   07/01/2024 91 (L) 98 - 107 mmol/L Final   06/30/2024 89 (L) 98 - 107 mmol/L Final   06/29/2024 91 (L) 98 - 107 mmol/L Final      CO2 CO2   Date Value Ref Range Status   07/01/2024 26.0 22.0 - 29.0 mmol/L Final   06/30/2024 26.0 22.0 - 29.0 mmol/L Final   06/29/2024 28.0 22.0 - 29.0 mmol/L Final      BUN BUN   Date Value Ref Range Status   07/01/2024 91 (H) 8 - 23 mg/dL Final   06/30/2024 86 (H) 8 - 23 mg/dL Final   06/29/2024 76 (H) 8 - 23 mg/dL Final      Creatinine Creatinine   Date Value Ref Range Status   07/01/2024 2.13 (H) 0.76 - 1.27 mg/dL Final   06/30/2024 2.26 (H) 0.76 - 1.27 mg/dL Final   06/29/2024 2.14 (H) 0.76 - 1.27 mg/dL Final      Calcium Calcium   Date Value Ref Range Status   07/01/2024 9.3 8.6 - 10.5 mg/dL Final   06/30/2024 9.4 8.6 - 10.5 mg/dL Final   06/29/2024 9.3 8.6 - 10.5 mg/dL Final      PO4 No results found for: \"CAPO4\"   Albumin Albumin   Date Value Ref Range Status   07/01/2024 3.9 3.5 - 5.2 g/dL Final   06/30/2024 4.1 3.5 - 5.2 g/dL Final        Magnesium No results found for: \"MG\"   Uric Acid No results found for: \"URICACID\"       Medication Review: Yes    Assessment & Plan       Acute on chronic heart failure with preserved ejection fraction (HFpEF)    Elevated serum " "creatinine    Anemia    Hypokalemia    Cirrhosis of liver    HCV (hepatitis C virus)    Acute on chronic HFrEF (heart failure with reduced ejection fraction)           1.  Acute kidney disease: Last known stable cr 1.1 in 2023. Cr on recent admission at [facility] few weeks ago 1.6-1.9 mg/dl. Most recent cr 2.0-2.2 GFR 35-36ml/min. Urine sodium 20. Urine cr 39.5. Renal US no hydro     2.  New onset liver cirrhosis: Complications include ascites and LE edema.      3.  Volume status: Dependent edema b/l + ascites. Getting diuretics     4.  Hypokalemia: In the setting of diuretics     5.  Hyponatremia: Due to total body volume overload.      6.  HFpEF: Dependent edema      7.  Anemia: Recent hx of GI bleed. S/p EGD and colonoscopy.      Recommendation:  Microscopic hematuria is noted with Rouse catheter in place.    Acute gout: Hold allopurinol, okay to give colchicine and prednisone at this time.  Vitamin D 1000 IU daily ordered  Good urine output continue with the Lasix 80 mg twice daily, increase spironolactone 50 mg daily  If the blood pressure below 110 will add midodrine  Avoid nephrotoxic medication.  High risk complex patient.       Milton Muñoz MD  07/01/24  11:30 EDT              Electronically signed by Milton Muñoz MD at 07/01/24 1134       Lloyd Craig MD at 07/01/24 0950           [facility] - Progress Note    Jaycob Ndiaye II  1960  [id number]    07/01/24, 08:50 EDT      Chief Complaint: Following for acute on chronic HFpEF, PAF    Subjective:   Sitting on BSC.  States all of \"his problems are due to constipation.\"  Denies chest pain.  Dyspnea is stable.  Still with significant BLE, scrotal edema.  FC anchored with blood tinged UOP.    Pain from gout in hands/elbows is improving.    Review of Systems:  Pertinent positives are listed above and in physical exam.  All others have been reviewed and are negative.    castor oil-balsam peru, 1 Application, Topical, " "Q12H  cholecalciferol, 1,000 Units, Oral, Daily  colchicine, 0.6 mg, Oral, Daily  ferrous sulfate, 325 mg, Oral, Daily With Breakfast  furosemide, 80 mg, Intravenous, BID  heparin (porcine), 5,000 Units, Subcutaneous, Q8H  insulin lispro, 2-7 Units, Subcutaneous, 4x Daily AC & at Bedtime  Insulin Lispro, 4 Units, Subcutaneous, TID With Meals  ipratropium-albuterol, 3 mL, Nebulization, 4x Daily - RT  lactulose, 10 g, Oral, TID  pantoprazole, 40 mg, Oral, Q AM  pharmacy consult - [facility], , Does not apply, Daily  senna-docusate sodium, 2 tablet, Oral, BID   And  polyethylene glycol, 17 g, Oral, BID  predniSONE, 20 mg, Oral, Daily With Breakfast  sodium chloride, 10 mL, Intravenous, Q12H  spironolactone, 25 mg, Oral, Daily        Objective:  Vitals:   height is 185.4 cm (73\") and weight is 163 kg (358 lb 11.2 oz) (abnormal). His oral temperature is 98.4 °F (36.9 °C). His blood pressure is 133/68 and his pulse is 79. His respiration is 18 and oxygen saturation is 96%.     Intake/Output Summary (Last 24 hours) at 7/1/2024 0950  Last data filed at 7/1/2024 0345  Gross per 24 hour   Intake --   Output 1900 ml   Net -1900 ml         Physical Exam:  General:  WN, NAD, A and O x3.  CV: Irregular. No murmur, rub, or gallop.  Resp:  CTA Tomi, equal, nonlabored.  Abd:  Soft, + BS, no organomegaly. Nontender to palpation.  Obese  Extrem: 2-3+ edema BLE, 2+ pedal/PT pulses.            Results from last 7 days   Lab Units 06/27/24  0440   WBC 10*3/mm3 8.09   HEMOGLOBIN g/dL 8.7*   HEMATOCRIT % 27.4*   PLATELETS 10*3/mm3 186     Results from last 7 days   Lab Units 07/01/24  0627   SODIUM mmol/L 132*   POTASSIUM mmol/L 3.7   CHLORIDE mmol/L 91*   CO2 mmol/L 26.0   BUN mg/dL 91*   CREATININE mg/dL 2.13*   CALCIUM mg/dL 9.3   GLUCOSE mg/dL 159*                         Tele: Atrial fibrillation    Assessment:  -64-year-old CM with known HFpEF presents to [facility] ED with progressive dyspnea, BLE edema.  Recently admitted to  [facility] over urologic " issues.  Multiple changes to diuretic therapy and antihypertensives at that time.  -Acute on chronic HFpEF with recent echo 6/10/2024: Normal EF, severe septal hypertrophy, grade 2 diastolic dysfunction with no significant valvular abnormalities.  -PAF with remote ablation and [employer] pacemaker.    -Recent discontinuation of anticoagulation secondary to GIB  -CKD stage III  -Cirrhosis status post recent paracentesis at [facility].  -Anemia of chronic disease  -History of compliance issues secondary to insurance coverage.  -Constipation  -Gout          Plan:  -Admitted to hospitalist services, appreciate their assistance.  -Nephrology  managing diuretics, appreciate their assistance.  -NOAC on hold with anemia.  - BSC rep interrogated device , normal interrogation.  -Bilateral lower extremity venous duplex negative  -Cardiology will continue to follow.        I discussed the patient's findings and my recommendations with the patient, any present family members, and the nursing staff.  Lloyd Craig MD saw and examined patient, verified hx and PE, read all radiographic studies, reviewed labs and micro data, and formulated dx, plan for treatment and all medical decision making.      Dariana Miranda PA-C  24, 09:50 EDT                      Electronically signed by Lloyd Craig MD at 24 1208       Kaylen Huerta MD at 24 0632                [facility] Medicine Services  PROGRESS NOTE    Patient Name: Jaycob Ndiaye II  : 1960  MRN: [id number]    Date of Admission: 2024  Primary Care Physician: Lorena Chamberlain, GARRICK    Subjective   Subjective     CC:  Follow-up for heart failure    HPI:  Patient was seen and examined this morning.  Sitting comfortably in recliner at bedside.  Still with extensive lower extremity and scrotal edema.  No shortness of breath or chest pain.    Objective   Objective     Vital Signs:   Temp:   [97.3 °F (36.3 °C)-98.4 °F (36.9 °C)] 97.3 °F (36.3 °C)  Heart Rate:  [69-92] 69  Resp:  [16-19] 18  BP: (114-133)/(68-78) 120/72     Physical Exam:  General: Comfortable, not in distress, conversant and cooperative  Head: Atraumatic and normocephalic  Eyes: No Icterus. No pallor  Ears:  Ears appear intact with no abnormalities noted  Throat: No oral lesions, no thrush  Neck: Supple, trachea midline  Lungs: Clear to auscultation bilaterally, equal air entry, no wheezing or crackles  Heart:  Normal S1 and S2, no murmur, no gallop, No JVD, 3+ lower extremity swelling  Abdomen:  Soft, no tenderness, no organomegaly, normal bowel sounds, no organomegaly.  Scrotal edema  Extremities: pulses equal bilaterally  Skin: No bleeding, bruising or rash, normal skin turgor and elasticity  Neurologic: Cranial nerves appear intact with no evidence of facial asymmetry, normal motor and sensory functions in all 4 extremities  Psych: Alert and oriented x 3, normal mood    Results Reviewed:  LAB RESULTS:      Lab 06/28/24  0843 06/27/24  0440 06/26/24  0454 06/25/24  0754   WBC  --  8.09 7.28 8.77   HEMOGLOBIN  --  8.7* 8.6* 8.7*   HEMATOCRIT  --  27.4* 26.5* 27.2*   PLATELETS  --  186 195 202   MCV  --  96.8 95.0 96.1   SED RATE 9  --   --   --          Lab 07/01/24  0627 06/30/24  0504 06/29/24  1606 06/29/24  0559 06/28/24  0448 06/27/24  0440 06/26/24  1620 06/26/24  0454   SODIUM 132* 132*  --  133*  --  133*  --  134*   POTASSIUM 3.7 4.4 3.9 3.4*  --  3.7   < > 3.6   CHLORIDE 91* 89*  --  91*  --  92*  --  93*   CO2 26.0 26.0  --  28.0  --  27.0  --  27.0   ANION GAP 15.0 17.0*  --  14.0  --  14.0  --  14.0   BUN 91* 86*  --  76*  --  69*  --  64*   CREATININE 2.13* 2.26*  --  2.14*  --  2.24*  --  2.01*   EGFR 33.7* 31.4*  --  33.5*  --  31.7*  --  36.1*   GLUCOSE 159* 180*  --  184*  --  147*  --  113*   CALCIUM 9.3 9.4  --  9.3  --  9.2  --  9.3   IONIZED CALCIUM  --   --   --   --  1.23  --   --   --    PHOSPHORUS 4.4 3.9  --   4.7*  --   --   --  4.7*    < > = values in this interval not displayed.         Lab 07/01/24  0627 06/30/24  0504 06/26/24  0454   ALBUMIN 3.9 4.1 3.4*                         Brief Urine Lab Results  (Last result in the past 365 days)        Color   Clarity   Blood   Leuk Est   Nitrite   Protein   CREAT   Urine HCG        06/26/24 1621             39.5                 Microbiology Results Abnormal       None            No radiology results from the last 24 hrs        Current medications:  Scheduled Meds:castor oil-balsam peru, 1 Application, Topical, Q12H  cholecalciferol, 1,000 Units, Oral, Daily  colchicine, 0.6 mg, Oral, Daily  ferrous sulfate, 325 mg, Oral, Daily With Breakfast  furosemide, 80 mg, Intravenous, BID  heparin (porcine), 5,000 Units, Subcutaneous, Q8H  insulin lispro, 2-7 Units, Subcutaneous, 4x Daily AC & at Bedtime  Insulin Lispro, 4 Units, Subcutaneous, TID With Meals  ipratropium-albuterol, 3 mL, Nebulization, 4x Daily - RT  lactulose, 10 g, Oral, TID  pantoprazole, 40 mg, Oral, Q AM  pharm[facility] consult - [facility], , Does not apply, Daily  senna-docusate sodium, 2 tablet, Oral, BID   And  polyethylene glycol, 17 g, Oral, BID  predniSONE, 20 mg, Oral, Daily With Breakfast  sodium chloride, 10 mL, Intravenous, Q12H  [START ON 7/2/2024] spironolactone, 50 mg, Oral, Daily      Continuous Infusions:   PRN Meds:.  acetaminophen **OR** acetaminophen **OR** acetaminophen    Albuterol Sulfate NEB Orderable    senna-docusate sodium **AND** polyethylene glycol **AND** bisacodyl **AND** bisacodyl    Calcium Replacement - Follow Nurse / BPA Driven Protocol    dextrose    dextrose    glucagon (human recombinant)    magnesium hydroxide    Magnesium Low Dose Replacement - Follow Nurse / BPA Driven Protocol    nitroglycerin    Phosphorus Replacement - Follow Nurse / BPA Driven Protocol    Potassium Replacement - Follow Nurse / BPA Driven Protocol    prochlorperazine    sodium chloride    sodium chloride    sodium  chloride    Assessment & Plan   Assessment & Plan     Active Hospital Problems    Diagnosis  POA    **Acute on chronic heart failure with preserved ejection fraction (HFpEF) [I50.33]  Yes    Acute on chronic HFrEF (heart failure with reduced ejection fraction) [I50.23]  Yes    Elevated serum creatinine [R79.89]  Yes    Anemia [D64.9]  Yes    Hypokalemia [E87.6]  Yes    Cirrhosis of liver [K74.60]  Yes    HCV (hepatitis C virus) [B19.20]  Yes      Resolved Hospital Problems   No resolved problems to display.        Brief Hospital Course to date:  Jaycob Ndiaye II is a 64 y.o. male with past medical history significant for CHF, COPD, cirrhosis, prostate cancer, chronic hematuria, GI bleed, HCV, HTN, and HLD who presents to the ED due to worsening shortness of breath and lower extremity edema over the past week.    A/C HFrEF  Significant BLE edema and scrotal edema/Anasarca  Patient with extensive lower extremity and anterior abdominal wall edema  Nephrology team managing diuretic therapy.  Currently on IV Lasix 80 mg twice daily  Monitor kidney functions with ongoing aggressive diuresis  He follows with Cardiologist Dr. Craig  BLE venous duplex negative for DVT  WOC to see about scrotal wrap, PT to see about leg wraps tomorrow     CKD  Creatinine stable around 2  I will likely I will have biclonal antibody antibody thyroid  Nephrology managing diuretic therapy    ?Calcium deposits in fingertips and right elbow  Hyperphosphatemia  Check uric level (elevated), ionized calcium, PTH (normal)  and vitamin D (low)  Sed rate normal, uric acid elevated - will try prednisone and colchicine  Continue colchicine and low dose steroids which seem to have helped significantly    Anemia  Recent GI Bleed  S/p EGD and colonoscopy at [facility] on 6/13/2024, records requested  Hgb stable  Continue PPI   Continue PO Iron -- give dose of IV iron     Hypokalemia  Replace per protocol     Newly diagnosed liver cirrhosis   HCV  S/p US  abd on 6/10/24 that revealed nodular liver consistent with cirrhosis and moderate ascites  S/p US guided paracentesis on 6/12/2024 at [facility] with 200 mL of skylar-colored fluid removed.  No fluid was sent to lab.  Will need referral to establish with [facility] upon discharge per patient request  Consider paracentesis-- though he is not eager because it hurt so badly at [facility]. Our CT does not show a large voume of ascites- he think a good BM will help with abdominal distension, lactulose added     Type 2 diabetes with peripheral neuropathy  Holding home Mounjaro, Hg A1c is 6.0  Continue low dose  SSI for now.  Glucoses stable running 137-209 last 24 hours-- jaimee QAC insulin while on steroids     PAF  Xarelto discontinued 6/13 at [facility] d/t persistent hematuria      HTN  Continue holding home bystolic for now d/t borderline BP      HO prostate cancer  Patient want PSA checked and thinks he will not need DC at home    DVT prophylaxis:  Heparin     Expected Discharge Location and Transportation: home once medically improved and cleared by cardiology/nephrology.  Expected Discharge   Expected Discharge Date: 7/5/2024; Expected Discharge Time:    Awaiting placement- he is not eager to leave until he is good and ready    VTE Prophylaxis:  Pharmacologic VTE prophylaxis orders are present.         AM-PAC 6 Clicks Score (PT): 21 (07/01/24 1154)    CODE STATUS:   Code Status and Medical Interventions:   Ordered at: 06/21/24 1840     Level Of Support Discussed With:    Patient     Code Status (Patient has no pulse and is not breathing):    CPR (Attempt to Resuscitate)     Medical Interventions (Patient has pulse or is breathing):    Full Support       Kaylen Huerta MD  07/01/24        Electronically signed by Kaylen Huerta MD at 07/01/24 1310       Consult Notes (last 72 hours)  Notes from 06/30/24 1658 through 07/03/24 1658   No notes of this type exist for this encounter.

## 2024-07-03 NOTE — CASE MANAGEMENT/SOCIAL WORK
Continued Stay Note   [place]     Patient Name: Jaycob Ndiaye II  MRN: [id number]  Today's Date: 7/3/2024    Admit Date: 6/21/2024    Plan: Home   Discharge Plan       Row Name 07/03/24 1304       Plan    Plan Home    Patient/Family in Agreement with Plan yes    Plan Comments Patient discharge plan is home with outpatient PT and PT leg wraps. CM called [facility] for outpatient therapy. This is closes to patient house. It is a 30 min drive from address. CM faxed the order and not to [phone]. They will call patient to updated him on appointment time. CM will follow.    Final Discharge Disposition Code 01 - home or self-care                   Discharge Codes    No documentation.                 Expected Discharge Date and Time       Expected Discharge Date Expected Discharge Time    Jul 5, 2024               Arlene Hyde RN

## 2024-07-03 NOTE — PROGRESS NOTES
" LOS: 11 days   Patient Care Team:  Lorena Chamberlain APRN as PCP - General (Nurse Practitioner)  Lloyd Craig MD as Consulting Physician (Cardiology)    Chief Complaint: NASRIN    Subjective   Excellent response to diuretics with recent changes. Cr ~ 1.9 mg/dl. LE edema improving but persistent.     History taken from: patient    Objective     Vital Sign Min/Max for last 24 hours  Temp  Min: 97.2 °F (36.2 °C)  Max: 98.2 °F (36.8 °C)   BP  Min: 113/69  Max: 119/69   Pulse  Min: 71  Max: 88   Resp  Min: 18  Max: 18   SpO2  Min: 94 %  Max: 98 %   No data recorded   No data recorded     Flowsheet Rows      Flowsheet Row First Filed Value   Admission Height 185.4 cm (73\") Documented at 06/21/2024 1409   Admission Weight 127 kg (279 lb) Documented at 06/21/2024 1409            I/O this shift:  In: 1192 [P.O.:1192]  Out: 1500 [Urine:1500]  I/O last 3 completed shifts:  In: 2252 [P.O.:2252]  Out: 5625 [Urine:5625]    Objective:  Physical examination:    General Appearance: Alert, oriented, no obvious distress.  Morbid obesity  Eyes: PER, EOMI.  Neck: Supple no JVD.  Lungs: Clear auscultation, no rales rhonchi's, equal chest movement, nonlabored.  Heart: No gallop, murmur, rub, RRR.  Abdomen: Soft, nontender, positive bowel sounds, morbid obesity with abdominal wall edema  Extremities: Significant bilateral lower extremity edema 3-4+.  Lower extremity tenderness to improve.  No cyanosis.  Neuro: No focal deficit, moving all extremities, alert oriented X 3    : Rouse catheter in place.  Urine slightly bloody    Results Review:     I reviewed the patient's new clinical results.    WBC WBC   Date Value Ref Range Status   07/03/2024 7.62 3.40 - 10.80 10*3/mm3 Final   07/02/2024 9.29 3.40 - 10.80 10*3/mm3 Final        HGB Hemoglobin   Date Value Ref Range Status   07/03/2024 8.8 (L) 13.0 - 17.7 g/dL Final   07/02/2024 8.9 (L) 13.0 - 17.7 g/dL Final        HCT Hematocrit   Date Value Ref Range Status " "  07/03/2024 27.4 (L) 37.5 - 51.0 % Final   07/02/2024 28.2 (L) 37.5 - 51.0 % Final        Platlets No results found for: \"LABPLAT\"   MCV MCV   Date Value Ref Range Status   07/03/2024 95.8 79.0 - 97.0 fL Final   07/02/2024 97.6 (H) 79.0 - 97.0 fL Final            Sodium Sodium   Date Value Ref Range Status   07/03/2024 134 (L) 136 - 145 mmol/L Final   07/02/2024 133 (L) 136 - 145 mmol/L Final   07/01/2024 132 (L) 136 - 145 mmol/L Final      Potassium Potassium   Date Value Ref Range Status   07/03/2024 3.6 3.5 - 5.2 mmol/L Final   07/02/2024 3.9 3.5 - 5.2 mmol/L Final   07/02/2024 3.5 3.5 - 5.2 mmol/L Final   07/01/2024 3.7 3.5 - 5.2 mmol/L Final      Chloride Chloride   Date Value Ref Range Status   07/03/2024 92 (L) 98 - 107 mmol/L Final   07/02/2024 92 (L) 98 - 107 mmol/L Final   07/01/2024 91 (L) 98 - 107 mmol/L Final      CO2 CO2   Date Value Ref Range Status   07/03/2024 30.0 (H) 22.0 - 29.0 mmol/L Final   07/02/2024 26.0 22.0 - 29.0 mmol/L Final   07/01/2024 26.0 22.0 - 29.0 mmol/L Final      BUN BUN   Date Value Ref Range Status   07/03/2024 87 (H) 8 - 23 mg/dL Final   07/02/2024 87 (H) 8 - 23 mg/dL Final   07/01/2024 91 (H) 8 - 23 mg/dL Final      Creatinine Creatinine   Date Value Ref Range Status   07/03/2024 1.99 (H) 0.76 - 1.27 mg/dL Final   07/02/2024 2.19 (H) 0.76 - 1.27 mg/dL Final   07/01/2024 2.13 (H) 0.76 - 1.27 mg/dL Final      Calcium Calcium   Date Value Ref Range Status   07/03/2024 9.5 8.6 - 10.5 mg/dL Final   07/02/2024 8.9 8.6 - 10.5 mg/dL Final   07/01/2024 9.3 8.6 - 10.5 mg/dL Final      PO4 No results found for: \"CAPO4\"   Albumin Albumin   Date Value Ref Range Status   07/03/2024 4.0 3.5 - 5.2 g/dL Final   07/02/2024 4.0 3.5 - 5.2 g/dL Final   07/01/2024 3.9 3.5 - 5.2 g/dL Final        Magnesium Magnesium   Date Value Ref Range Status   07/02/2024 2.2 1.6 - 2.4 mg/dL Final      Uric Acid No results found for: \"URICACID\"       Medication Review: Yes    Assessment & Plan       Acute on " chronic heart failure with preserved ejection fraction (HFpEF)    Elevated serum creatinine    Anemia    Hypokalemia    Cirrhosis of liver    HCV (hepatitis C virus)    Acute on chronic HFrEF (heart failure with reduced ejection fraction)           1.  Acute kidney disease: Last known stable cr 1.1 in 2023. Cr on recent admission at [facility] few weeks ago 1.6-1.9 mg/dl. Most recent cr 2.0-2.2 GFR 35-36ml/min. Urine sodium 20. Urine cr 39.5. Renal US no hydro     2.  New onset liver cirrhosis: Complications include ascites and LE edema.      3.  Volume status: Dependent edema b/l + ascites. Getting diuretics     4.  Hypokalemia: In the setting of diuretics     5.  Hyponatremia: Due to total body volume overload.      6.  HFpEF: Dependent edema      7.  Anemia: Recent hx of GI bleed. S/p EGD and colonoscopy.      Recommendation:  Continue bumex 2 mg TID.   Increase spironolactone to 100mg daily     William Bartlett MD  07/03/24  12:13 EDT

## 2024-07-03 NOTE — THERAPY WOUND CARE TREATMENT
Acute Care - Wound/Debridement Treatment Note  [facility]     Patient Name: Jaycob Ndiaye II  : 1960  MRN: [id number]  Today's Date: 7/3/2024                Admit Date: 2024    Visit Dx:    ICD-10-CM ICD-9-CM   1. Peripheral edema  R60.0 782.3   2. Congestive heart failure, unspecified HF chronicity, unspecified heart failure type  I50.9 428.0   3. Hypokalemia  E87.6 276.8   4. Acute on chronic heart failure with preserved ejection fraction (HFpEF)  I50.33 428.23   5. Screen for colon cancer  Z12.11 V76.51       Patient Active Problem List   Diagnosis    Screen for colon cancer    Acute on chronic heart failure with preserved ejection fraction (HFpEF)    Elevated serum creatinine    Anemia    Hypokalemia    Cirrhosis of liver    HCV (hepatitis C virus)    Acute on chronic HFrEF (heart failure with reduced ejection fraction)        Past Medical History:   Diagnosis Date    Arthritis     Cancer     Constipation     Depression     Diabetes     type 2 dm, check blood sugar once a day    History of hepatitis C     History of prostate cancer     Hypertension     Irregular heartbeat     Pacemaker     home monitoring    Prostate CA     Requires supplemental oxygen     at night with cpap    Sleep apnea     wears a cpap    SOB (shortness of breath) on exertion     Wears glasses         Past Surgical History:   Procedure Laterality Date    CARDIAC ABLATION      COLONOSCOPY N/A 2021    Procedure: Colonoscopy with polypectomy;  Surgeon: Maurice Proctor MD;  Location: [facility];  Service: Gastroenterology;  Laterality: N/A;    COLONOSCOPY      ENDOSCOPY      KNEE SURGERY Right     1985    PACEMAKER IMPLANTATION      PROSTATE SURGERY      REPLACEMENT TOTAL KNEE Right            Wound 24 1540 Bilateral lower leg Blisters (Active)   Dressing Appearance open to air 24 0830   Base scab 24 0830   Periwound dry;redness;warm;edematous;swelling 24 0830   Periwound  Temperature warm 07/03/24 0830   Periwound Skin Turgor firm 07/03/24 0830   Edges irregular 07/03/24 0830   Drainage Amount none 07/03/24 0830   Care, Wound cleansed with;soap and water;debrided 07/03/24 0830   Dressing Care open to air 07/03/24 0830   Periwound Care dry periwound area maintained 07/03/24 0830      Lymphedema       Row Name 07/03/24 0830             Lymphedema Edema Assessment    Ptting Edema Category By severity  -      Pitting Edema Severe  -         Compression/Skin Care    Compression/Skin Care skin care;wrapping location;bandaging  -      Skin Care washed/dried;lotion applied  -      Wrapping Location lower extremity  -      Wrapping Location LE bilateral:;foot to knee  -      Wrapping Comments optifoam Ag foam to ant ankles with size 5/6/8 compressogrips doubled/overlapping for gradient compression  -                User Key  (r) = Recorded By, (t) = Taken By, (c) = Cosigned By      Initials Name Provider Type    Lloyd Ngo, PT Physical Therapist                    WOUND DEBRIDEMENT  Total area of Debridement: ~5cm2  Debridement Site 1  Location- Site 1: R ant Le  Selective Debridement- Site 1: Wound Surface <20cmsq  Instruments- Site 1: tweezers  Excised Tissue Description- Site 1: minimum, other (comment) (crusted nonviable skin / scabs)  Bleeding- Site 1: none               PT Assessment (Last 12 Hours)       PT Evaluation and Treatment       Row Name 07/03/24 0830          Physical Therapy Time and Intention    Subjective Information complains of;weakness;fatigue  -     Document Type therapy note (daily note);wound care  -     Mode of Treatment individual therapy;physical therapy  -       Row Name 07/03/24 0830          Pain Scale: FACES Pre/Post-Treatment    Pain: FACES Scale, Pretreatment 2-->hurts little bit  -     Posttreatment Pain Rating 2-->hurts little bit  -       Row Name 07/03/24 0830          Wound 06/22/24 1540 Bilateral lower leg Blisters     Wound - Properties Group Placement Date: 06/22/24  - Placement Time: 1540  -JM Side: Bilateral  - Orientation: lower  - Location: leg  -JM Primary Wound Type: Blisters  -JM    Dressing Appearance open to air  -     Base scab  -MF     Periwound dry;redness;warm;edematous;swelling  -     Periwound Temperature warm  -     Periwound Skin Turgor firm  -     Edges irregular  -     Drainage Amount none  -     Care, Wound cleansed with;soap and water;debrided  -     Dressing Care open to air  -MF     Periwound Care dry periwound area maintained  -     Retired Wound - Properties Group Placement Date: 06/22/24  - Placement Time: 1540  -JM Side: Bilateral  - Orientation: lower  - Location: leg  -JM Primary Wound Type: Blisters  -JM    Retired Wound - Properties Group Date first assessed: 06/22/24  - Time first assessed: 1540  -JM Side: Bilateral  - Location: leg  -JM Primary Wound Type: Blisters  -JM      Row Name             Wound 06/28/24 0000 Right medial gluteal Pressure Injury    Wound - Properties Group Placement Date: 06/28/24 -TH Placement Time: 0000  -TH Present on Original Admission: N  -TH Side: Right  -TH Orientation: medial  -TH Location: gluteal  -TH Primary Wound Type: Pressure inj  -TH    Retired Wound - Properties Group Placement Date: 06/28/24  -TH Placement Time: 0000  -TH Present on Original Admission: N  -TH Side: Right  -TH Orientation: medial  -TH Location: gluteal  -TH Primary Wound Type: Pressure inj  -TH    Retired Wound - Properties Group Date first assessed: 06/28/24  -TH Time first assessed: 0000  -TH Present on Original Admission: N  -TH Side: Right  -TH Location: gluteal  -TH Primary Wound Type: Pressure inj  -TH      Row Name             Wound 06/30/24 0000 Right posterior greater trochanter Skin Tear    Wound - Properties Group Placement Date: 06/30/24 -TH Placement Time: 0000  -TH Side: Right  -TH Orientation: posterior  -TH Location: greater trochanter  -TH  Primary Wound Type: Skin tear  -TH    Retired Wound - Properties Group Placement Date: 06/30/24 -TH Placement Time: 0000  -TH Side: Right  -TH Orientation: posterior  -TH Location: greater trochanter  -TH Primary Wound Type: Skin tear  -TH    Retired Wound - Properties Group Date first assessed: 06/30/24 -TH Time first assessed: 0000  -TH Side: Right  -TH Location: greater trochanter  -TH Primary Wound Type: Skin tear  -TH      Row Name             Wound 06/30/24 0000 Left posterior greater trochanter    Wound - Properties Group Placement Date: 06/30/24 -TH Placement Time: 0000  -TH Side: Left  -TH Orientation: posterior  -TH Location: greater trochanter  -TH    Retired Wound - Properties Group Placement Date: 06/30/24 -TH Placement Time: 0000  -TH Side: Left  -TH Orientation: posterior  -TH Location: greater trochanter  -TH    Retired Wound - Properties Group Date first assessed: 06/30/24 -TH Time first assessed: 0000  -TH Side: Left  -TH Location: greater trochanter  -TH      Row Name             Wound 06/30/24 2146 scrotum Skin Tear    Wound - Properties Group Placement Date: 06/30/24 -TH Placement Time: 2146 -TH Location: scrotum  -TH Primary Wound Type: Skin tear  -TH    Retired Wound - Properties Group Placement Date: 06/30/24 -TH Placement Time: 2146 -TH Location: scrotum  -TH Primary Wound Type: Skin tear  -TH    Retired Wound - Properties Group Date first assessed: 06/30/24 -TH Time first assessed: 2146 -TH Location: scrotum  -TH Primary Wound Type: Skin tear  -TH      Row Name 07/03/24 0830          Coping    Observed Emotional State calm;cooperative;pleasant  -MF     Verbalized Emotional State acceptance  -MF     Trust Relationship/Rapport care explained  -MF       Row Name 07/03/24 0830          Plan of Care Review    Plan of Care Reviewed With patient  -MF     Outcome Evaluation Pt cont to have moderate difficulty maintaining compression wrapping overnight due to pain / restlessness in LEs.  PT will cont with daily compression wrapping changes until edema decreases enough to allow for longer wear times. PT able to debride a small amount crusted nonviable scabs to improve skin integrity.  -       Row Name 07/03/24 0830          Positioning and Restraints    Pre-Treatment Position sitting in chair/recliner  -MF     Post Treatment Position chair  -MF     In Chair sitting;call light within reach  -               User Key  (r) = Recorded By, (t) = Taken By, (c) = Cosigned By      Initials Name Provider Type     Lloyd Reagan, PT Physical Therapist    Jenny Salazar, RN Registered Nurse    Carolina Mccurdy PT Physical Therapist                  Physical Therapy Education       Title: PT OT SLP Therapies (Done)       Topic: Physical Therapy (Done)       Point: Mobility training (Done)       Learning Progress Summary             Patient Acceptance, E, VU,DU,NR by  at 7/1/2024 1154    Acceptance, E, VU by ND at 6/25/2024 1109    Acceptance, E, VU by ND at 6/22/2024 1006                         Point: Home exercise program (Done)       Learning Progress Summary             Patient Acceptance, E, VU,DU,NR by  at 7/1/2024 1154                         Point: Body mechanics (Done)       Learning Progress Summary             Patient Acceptance, E, VU by ND at 6/25/2024 1109    Acceptance, E, VU by ND at 6/22/2024 1006                         Point: Precautions (Done)       Learning Progress Summary             Patient Acceptance, E, VU,DU,NR by  at 7/1/2024 1154    Acceptance, E, VU by ND at 6/25/2024 1109    Acceptance, E, VU by ND at 6/22/2024 1006                                         User Key       Initials Effective Dates Name Provider Type Discipline     04/22/21 -  Sally Perez Physical Therapist PT    ND 11/16/23 -  Maryjane Das PT Physical Therapist PT                    Recommendation and Plan  Anticipated Discharge Disposition (PT): home with assist, home with home  health  Planned Therapy Interventions (PT): balance training, bed mobility training, gait training, neuromuscular re-education, patient/family education, home exercise program, postural re-education, ROM (range of motion), strengthening, stretching, transfer training  Therapy Frequency (PT): daily  Plan of Care Reviewed With: patient           Outcome Evaluation: Pt cont to have moderate difficulty maintaining compression wrapping overnight due to pain / restlessness in LEs. PT will cont with daily compression wrapping changes until edema decreases enough to allow for longer wear times. PT able to debride a small amount crusted nonviable scabs to improve skin integrity.  Plan of Care Reviewed With: patient            Time Calculation   PT Charges       Row Name 07/03/24 0830             Time Calculation    Start Time 0830  -MF      PT Goal Re-Cert Due Date 07/11/24  -MF         Untimed Charges    Wound Care 03678 Selective debridement  -MF      06454-Uuzlfuvuet comp below knee 25  -MF      77216-Beeelyvvx debridement 10  -MF         Total Minutes    Untimed Charges Total Minutes 35  -MF       Total Minutes 35  -MF                User Key  (r) = Recorded By, (t) = Taken By, (c) = Cosigned By      Initials Name Provider Type    Lloyd Ngo, PT Physical Therapist                      Therapy Charges for Today       Code Description Service Date Service Provider Modifiers Qty    [id number] HC PT MULTI LAYER COMP SYS BELOW KNEE 7/2/2024 Lloyd Reagan, PT GP 1              PT G-Codes  Outcome Measure Options: AM-PAC 6 Clicks Basic Mobility (PT)  AM-PAC 6 Clicks Score (PT): 21  AM-PAC 6 Clicks Score (OT): 17       Lloyd Reagan, PT  7/3/2024

## 2024-07-03 NOTE — PLAN OF CARE
Goal Outcome Evaluation:           Progress: no change  Outcome Evaluation: Patient resting in chair with no signs and symptoms of distress noted. VSS. RA. NSR, Vpaced on demand. No acute changes compared to patient's initial assessment. Needs met all throughout the shift. Continue with POC.

## 2024-07-03 NOTE — PLAN OF CARE
Goal Outcome Evaluation:  Plan of Care Reviewed With: patient           Outcome Evaluation: Pt cont to have moderate difficulty maintaining compression wrapping overnight due to pain / restlessness in LEs. PT will cont with daily compression wrapping changes until edema decreases enough to allow for longer wear times. PT able to debride a small amount crusted nonviable scabs to improve skin integrity.

## 2024-07-03 NOTE — PROGRESS NOTES
1.  Today first-line      [facility] Medicine Services  PROGRESS NOTE    Patient Name: Jaycob Ndiaye II  : 1960  MRN: [id number]    Date of Admission: 2024  Primary Care Physician: Lorena Chamberlain APRN    Subjective   Subjective     CC:  Follow-up for heart failure    HPI:  And examined this morning.  Diuresed 4 L yesterday, negative balance of 2 L .  Pain in both hands and legs better after adding lidocaine yesterday.    Objective   Objective     Vital Signs:   Temp:  [97.2 °F (36.2 °C)-98.2 °F (36.8 °C)] 98.2 °F (36.8 °C)  Heart Rate:  [71-92] 82  Resp:  [18-20] 18  BP: (113-119)/(62-78) 113/69     Physical Exam:  General: Comfortable, not in distress, conversant and cooperative  Head: Atraumatic and normocephalic  Eyes: No Icterus. No pallor  Ears:  Ears appear intact with no abnormalities noted  Throat: No oral lesions, no thrush  Neck: Supple, trachea midline  Lungs: Clear to auscultation bilaterally, equal air entry, no wheezing or crackles  Heart:  Normal S1 and S2, no murmur, no gallop, No JVD, 3+ lower extremity swelling  Abdomen:  Soft, no tenderness, no organomegaly, normal bowel sounds, no organomegaly.  Scrotal edema  Extremities: pulses equal bilaterally  Skin: No bleeding, bruising or rash, normal skin turgor and elasticity  Neurologic: Cranial nerves appear intact with no evidence of facial asymmetry, normal motor and sensory functions in all 4 extremities  Psych: Alert and oriented x 3, normal mood    Results Reviewed:  LAB RESULTS:      Lab 24  0403 24  1117 24  0843 24  0440   WBC 7.62 9.29  --  8.09   HEMOGLOBIN 8.8* 8.9*  --  8.7*   HEMATOCRIT 27.4* 28.2*  --  27.4*   PLATELETS 190 196  --  186   NEUTROS ABS 6.02 7.64*  --   --    IMMATURE GRANS (ABS) 0.03 0.04  --   --    LYMPHS ABS 0.66* 0.58*  --   --    MONOS ABS 0.87 0.96*  --   --    EOS ABS 0.02 0.04  --   --    MCV 95.8 97.6*  --  96.8   SED RATE  --  11 9   --    CRP  --  0.31  --   --          Lab 07/03/24  0403 07/02/24 2243 07/02/24  1117 07/01/24  0627 06/30/24  0504 06/29/24  1606 06/29/24  0559 06/28/24  0448   SODIUM 134*  --  133* 132* 132*  --  133*  --    POTASSIUM 3.6 3.9 3.5 3.7 4.4   < > 3.4*  --    CHLORIDE 92*  --  92* 91* 89*  --  91*  --    CO2 30.0*  --  26.0 26.0 26.0  --  28.0  --    ANION GAP 12.0  --  15.0 15.0 17.0*  --  14.0  --    BUN 87*  --  87* 91* 86*  --  76*  --    CREATININE 1.99*  --  2.19* 2.13* 2.26*  --  2.14*  --    EGFR 36.6*  --  32.6* 33.7* 31.4*  --  33.5*  --    GLUCOSE 151*  --  236* 159* 180*  --  184*  --    CALCIUM 9.5  --  8.9 9.3 9.4  --  9.3  --    IONIZED CALCIUM  --   --   --   --   --   --   --  1.23   MAGNESIUM  --   --  2.2  --   --   --   --   --    PHOSPHORUS  --   --  3.9 4.4 3.9  --  4.7*  --     < > = values in this interval not displayed.         Lab 07/03/24 0403 07/02/24 1117 07/01/24  0627 06/30/24  0504   TOTAL PROTEIN 6.1 6.4  --   --    ALBUMIN 4.0 4.0 3.9 4.1   GLOBULIN 2.1 2.4  --   --    ALT (SGPT) 14 14  --   --    AST (SGOT) 20 22  --   --    BILIRUBIN 0.6 0.5  --   --    ALK PHOS 71 66  --   --                          Brief Urine Lab Results  (Last result in the past 365 days)        Color   Clarity   Blood   Leuk Est   Nitrite   Protein   CREAT   Urine HCG        06/26/24 1621             39.5                 Microbiology Results Abnormal       None            No radiology results from the last 24 hrs        Current medications:  Scheduled Meds:bumetanide, 2 mg, Intravenous, TID  castor oil-balsam peru, 1 Application, Topical, Q12H  cholecalciferol, 1,000 Units, Oral, Daily  colchicine, 0.6 mg, Oral, Daily  ferrous sulfate, 325 mg, Oral, Daily With Breakfast  heparin (porcine), 5,000 Units, Subcutaneous, Q8H  insulin lispro, 2-7 Units, Subcutaneous, 4x Daily AC & at Bedtime  Insulin Lispro, 4 Units, Subcutaneous, TID With Meals  ipratropium-albuterol, 3 mL, Nebulization, 4x Daily -  RT  lactulose, 10 g, Oral, TID  pantoprazole, 40 mg, Oral, Q AM  pharmacy consult - MTM, , Does not apply, Daily  senna-docusate sodium, 2 tablet, Oral, BID   And  polyethylene glycol, 17 g, Oral, BID  predniSONE, 20 mg, Oral, Daily With Breakfast  pregabalin, 75 mg, Oral, Daily  sodium chloride, 10 mL, Intravenous, Q12H  spironolactone, 100 mg, Oral, Daily      Continuous Infusions:   PRN Meds:.  acetaminophen **OR** acetaminophen **OR** acetaminophen    Albuterol Sulfate NEB Orderable    senna-docusate sodium **AND** polyethylene glycol **AND** bisacodyl **AND** bisacodyl    Calcium Replacement - Follow Nurse / BPA Driven Protocol    dextrose    dextrose    glucagon (human recombinant)    magnesium hydroxide    Magnesium Low Dose Replacement - Follow Nurse / BPA Driven Protocol    nitroglycerin    Phosphorus Replacement - Follow Nurse / BPA Driven Protocol    Potassium Replacement - Follow Nurse / BPA Driven Protocol    prochlorperazine    sodium chloride    sodium chloride    sodium chloride    Assessment & Plan   Assessment & Plan     Active Hospital Problems    Diagnosis  POA    **Acute on chronic heart failure with preserved ejection fraction (HFpEF) [I50.33]  Yes    Acute on chronic HFrEF (heart failure with reduced ejection fraction) [I50.23]  Yes    Elevated serum creatinine [R79.89]  Yes    Anemia [D64.9]  Yes    Hypokalemia [E87.6]  Yes    Cirrhosis of liver [K74.60]  Yes    HCV (hepatitis C virus) [B19.20]  Yes      Resolved Hospital Problems   No resolved problems to display.        Brief Hospital Course to date:  Jaycob Ndiaye II is a 64 y.o. male with past medical history significant for CHF, COPD, cirrhosis, prostate cancer, chronic hematuria, GI bleed, HCV, HTN, and HLD who presents to the ED due to worsening shortness of breath and lower extremity edema over the past week.    A/C HFrEF  Significant BLE edema and scrotal edema/Anasarca  Patient with extensive lower extremity and anterior  abdominal wall edema  Nephrology team managing diuretic therapy.  Currently on IV Bumex 2 mg 3 times daily.  Status post 1 dose of Diuril  Monitor kidney functions with ongoing aggressive diuresis  He follows with Cardiologist Dr. Craig  BLE venous duplex negative for DVT  WOC to see about scrotal wrap, PT to see about leg wraps tomorrow     CKD  Creatinine stable around 1.9  Nephrology managing diuretic therapy    ?Calcium deposits in fingertips and right elbow  Hyperphosphatemia  Check uric level (elevated), ionized calcium, PTH (normal) and vitamin D (low)  Normal sed rate   Continue colchicine and low dose steroids which seem to have helped significantly    Anemia  Recent GI Bleed  S/p EGD and colonoscopy at [facility] on 6/13/2024, records requested  Hgb stable  Continue PPI   Continue PO Iron -- give dose of IV iron     Hypokalemia  Replace per protocol     Newly diagnosed liver cirrhosis   HCV  S/p US abd on 6/10/24 that revealed nodular liver consistent with cirrhosis and moderate ascites  S/p US guided paracentesis on 6/12/2024 at [facility] with 200 mL of skylar-colored fluid removed.  No fluid was sent to lab.  Will need referral to establish with [facility] upon discharge per patient request  Consider paracentesis-- though he is not eager because it hurt so badly at [facility]. Our CT does not show a large voume of ascites- he think a good BM will help with abdominal distension, lactulose added     Type 2 diabetes with peripheral neuropathy  Holding home Mounjaro, Hg A1c is 6.0  Continue low dose  SSI for now.  Glucoses stable running 137-209 last 24 hours-- jaimee QAC insulin while on steroids     PAF  Xarelto discontinued 6/13 at [facility] d/t persistent hematuria      HTN  Continue holding home bystolic for now d/t borderline BP      HO prostate cancer  Patient want PSA checked and thinks he will not need DC at home    Neuropathy  Continue Lyrica    DVT prophylaxis:  Heparin     Expected Discharge Location and Transportation:  home once medically improved and cleared by cardiology/nephrology.  Expected Discharge   Expected Discharge Date: 7/5/2024; Expected Discharge Time:    Awaiting placement- he is not eager to leave until he is good and ready    VTE Prophylaxis:  Pharmacologic VTE prophylaxis orders are present.         AM-PAC 6 Clicks Score (PT): 21 (07/02/24 0832)    CODE STATUS:   Code Status and Medical Interventions:   Ordered at: 06/21/24 1840     Level Of Support Discussed With:    Patient     Code Status (Patient has no pulse and is not breathing):    CPR (Attempt to Resuscitate)     Medical Interventions (Patient has pulse or is breathing):    Full Support       Kaylen Huerta MD  07/03/24

## 2024-07-03 NOTE — THERAPY PROGRESS REPORT/RE-CERT
Patient Name: Jaycob Ndiaye II  : 1960    MRN: [id number]                              Today's Date: 7/3/2024       Admit Date: 2024    Visit Dx:     ICD-10-CM ICD-9-CM   1. Peripheral edema  R60.0 782.3   2. Congestive heart failure, unspecified HF chronicity, unspecified heart failure type  I50.9 428.0   3. Hypokalemia  E87.6 276.8   4. Acute on chronic heart failure with preserved ejection fraction (HFpEF)  I50.33 428.23   5. Screen for colon cancer  Z12.11 V76.51     Patient Active Problem List   Diagnosis    Screen for colon cancer    Acute on chronic heart failure with preserved ejection fraction (HFpEF)    Elevated serum creatinine    Anemia    Hypokalemia    Cirrhosis of liver    HCV (hepatitis C virus)    Acute on chronic HFrEF (heart failure with reduced ejection fraction)     Past Medical History:   Diagnosis Date    Arthritis     Cancer     Constipation     Depression     Diabetes     type 2 dm, check blood sugar once a day    History of hepatitis C     History of prostate cancer     Hypertension     Irregular heartbeat     Pacemaker     home monitoring    Prostate CA     Requires supplemental oxygen     at night with cpap    Sleep apnea     wears a cpap    SOB (shortness of breath) on exertion     Wears glasses      Past Surgical History:   Procedure Laterality Date    CARDIAC ABLATION      COLONOSCOPY N/A 2021    Procedure: Colonoscopy with polypectomy;  Surgeon: Maurice Proctor MD;  Location: [facility];  Service: Gastroenterology;  Laterality: N/A;    COLONOSCOPY      ENDOSCOPY      KNEE SURGERY Right     1985    PACEMAKER IMPLANTATION      PROSTATE SURGERY      REPLACEMENT TOTAL KNEE Right       General Information       Row Name 24 0927          OT Time and Intention    Document Type progress note/recertification  -LANEY     Mode of Treatment individual therapy;occupational therapy  -LANEY       Row Name 24 0927          General Information    Patient  Profile Reviewed yes  -LANEY     Existing Precautions/Restrictions fall;other (see comments)  BLE edema  -LANEY     Barriers to Rehab medically complex;previous functional deficit;physical barrier  -LANEY       Row Name 07/03/24 0927          Cognition    Orientation Status (Cognition) oriented x 4  -LANEY       Row Name 07/03/24 0927          Safety Issues, Functional Mobility    Impairments Affecting Function (Mobility) strength;range of motion (ROM);other (see comments)  -LANEY     Comment, Safety Issues/Impairments (Mobility) scrotal edema  -LANEY               User Key  (r) = Recorded By, (t) = Taken By, (c) = Cosigned By      Initials Name Provider Type    Nelly Esparza, OT Occupational Therapist                   Lymphedema       Row Name 07/03/24 0830 07/02/24 0830 07/01/24 1015       Lymphedema Edema Assessment    Ptting Edema Category By severity  -MF By severity  -MF By severity  -MF    Pitting Edema Severe  -MF Severe  -MF Severe  -MF    Recorded by [MF] Lloyd Reagan, PT [MF] Lloyd Reagan, PT [MF] Lloyd Reagan, PT       Compression/Skin Care    Compression/Skin Care skin care;wrapping location;bandaging  -MF skin care;wrapping location;bandaging  -MF skin care;wrapping location;bandaging  -MF    Skin Care washed/dried;lotion applied  -MF washed/dried;lotion applied  -MF washed/dried;lotion applied  -MF    Wrapping Location lower extremity  -MF lower extremity  -MF lower extremity  -MF    Wrapping Location LE bilateral:;foot to knee  -MF bilateral:;foot to knee  -MF bilateral:;foot to knee  -MF    Wrapping Comments optifoam Ag foam to ant ankles with size 5/6/8 compressogrips doubled/overlapping for gradient compression  -MF optifoam Ag foam to ant ankles with size 5/6/8 compressogrips doubled/overlapping for gradient compression  -MF optifoam Ag foam to ant ankles with size 5/6/8 compressogrips doubled/overlapping for gradient compression  -MF    Recorded by [MF] Lloyd Reagan, PT [MF] Tez  Lloyd AGUDELO, PT [] Lloyd Reagan, PT              User Key  (r) = Recorded By, (t) = Taken By, (c) = Cosigned By      Initials Name Effective Dates     StephanieLloyd elizalde, PT 02/03/23 -                    Mobility/ADL's       Row Name 07/03/24 0928          Bed Mobility    Comment, (Bed Mobility) patient in chair on arrival and left in recliner  -       Row Name 07/03/24 0928          Transfers    Comment, (Transfers) with first attempt to see pt. he was on BSC and asked OT to return, when returned pt. in recliner, per pt. he transfers with SBA, declined at this time to work on movement due to scrotal edema  -       Row Name 07/03/24 0928          Activities of Daily Living    BADL Assessment/Intervention lower body dressing;toileting;bathing  -       Row Name 07/03/24 0928          Lower Body Dressing Assessment/Training    Position (Lower Body Dressing) supported sitting  -LANEY     Comment, (Lower Body Dressing) shoe horn issued to assist with shoe donning, per pt. he uses his cane to help gio pants and side sits on bed to gio socks with difficulty, pt. declined other dressing AE at this time, wound care arrived while getting AE so did not get to practice  -       Row Name 07/03/24 0928          Grooming Assessment/Training    Comment, (Grooming) wound  are arrived, did not get to attempt  -       Row Name 07/03/24 0928          Toileting Assessment/Training    Comment, (Toileting) per pt. he wipes himself post BMs on BSC and does not need help from nursing  -       Row Name 07/03/24 0928          Bathing Assessment/Intervention    Comment, (Bathing)  bath sponge issued and OT simulated for pt. how to use  -LANEY               User Key  (r) = Recorded By, (t) = Taken By, (c) = Cosigned By      Initials Name Provider Type    Nelly Esparza, OT Occupational Therapist                   Obj/Interventions       Row Name 07/03/24 0932          Shoulder (Therapeutic Exercise)    Shoulder AROM  (Therapeutic Exercise) --  pt. completed 5 wand UE TE 10 reps each using cane post OT demonstration, rest needed every 5-10 reps  -LANEY       Row Name 07/03/24 0932          Elbow/Forearm (Therapeutic Exercise)    Elbow/Forearm (Therapeutic Exercise) strengthening exercise  -LANEY     Elbow/Forearm Strengthening (Therapeutic Exercise) bilateral;flexion;extension;sitting  mod resistance given  -LANEY       Row Name 07/03/24 0932          Motor Skills    Therapeutic Exercise shoulder;elbow/forearm  -LANEY       Row Name 07/03/24 0932          Balance    Static Sitting Balance independent  -LANEY     Dynamic Sitting Balance independent  -LANEY     Position, Sitting Balance unsupported;sitting in chair  pt. sits at edge of chair  -LANEY     Comment, Balance UE TE  -LANEY               User Key  (r) = Recorded By, (t) = Taken By, (c) = Cosigned By      Initials Name Provider Type    Nelly Esparza, OT Occupational Therapist                   Goals/Plan       Row Name 07/03/24 0940          Transfer Goal 1 (OT)    Progress/Outcome (Transfer Goal 1, OT) goal ongoing  met pt report only  -LANEY       Row Name 07/03/24 0940          Toileting Goal 1 (OT)    Progress/Outcome (Toileting Goal 1, OT) goal ongoing  met hygiene pt. report only  -LANEY       Row Name 07/03/24 0940          Grooming Goal 1 (OT)    Progress/Outcome (Grooming Goal 1, OT) goal ongoing  -LANEY       Row Name 07/03/24 0940          Strength Goal 1 (OT)    Strength Goal 1 (OT) Pt. will demonstrate independence with wand, tband and  sponge HEP to support return to PLOF ADLs.  -LANEY     Time Frame (Strength Goal 1, OT) long term goal (LTG);10 days  -LANEY     Progress/Outcome (Strength Goal 1, OT) new goal  -LANEY       Row Name 07/03/24 0940          Problem Specific Goal 1 (OT)    Problem Specific Goal 1 (OT) Pt. will demonstrate good balance and endurance to complete home distance to and from bathroom to support ADL independence.  -LANEY     Time Frame (Problem Specific Goal 1, OT) short  term goal (STG);5 days  -LANEY     Progress/Outcome (Problem Specific Goal 1, OT) new goal  -LANEY               User Key  (r) = Recorded By, (t) = Taken By, (c) = Cosigned By      Initials Name Provider Type    Nelly Esparza, OT Occupational Therapist                   Clinical Impression       Row Name 07/03/24 0934          Pain Assessment    Pretreatment Pain Rating 2/10  -LANEY     Posttreatment Pain Rating 2/10  -LANEY     Pain Location - Side/Orientation Bilateral  -LANEY     Pain Location - hand  -LANEY     Pain Intervention(s) Ambulation/increased activity  -LANEY       Row Name 07/03/24 0934          Plan of Care Review    Plan of Care Reviewed With patient  -LANEY     Progress improving  -LANEY     Outcome Evaluation Per pt. report he has been able to wipe himself post BM. Good progress with UE TE. Recommend wand HEP handout next session.  Pt. would also like to attempt tband with HEP handout and  sponge TE with handout issue.  Pt. wants HEP plans for independent home use due to limited therapy option in his town per pt. report.  LH shoe horn and LH bath sponge issued to increase independence with LBB and LBD.  Per pt. he uses his cane to assist with pants.  With foot/leg wound/edema issues  a sock-aid was not issued at this time. Limited progress fully to goals due to scrotal edema and wound care interuption this session.  Goals updated.  Continue to recommend IRF due to lives alone.  -LANEY       Row Name 07/03/24 0934          Therapy Assessment/Plan (OT)    Therapy Frequency (OT) daily  -LANEY       Row Name 07/03/24 0934          Therapy Plan Review/Discharge Plan (OT)    Anticipated Discharge Disposition (OT) inpatient rehabilitation facility  per pt. he plans to go home at discharge  -LANEY       Row Name 07/03/24 0934          Vital Signs    Pre Systolic BP Rehab 117  -LANEY     Pre Treatment Diastolic BP 68  -LANEY     Pretreatment Heart Rate (beats/min) 84  -LANEY     Posttreatment Heart Rate (beats/min) 77  -LANEY     O2 Delivery  Pre Treatment room air  -LANEY     O2 Delivery Intra Treatment room air  -LANEY     O2 Delivery Post Treatment room air  -LANEY     Pre Patient Position Sitting  -LANEY     Intra Patient Position Sitting  -LANEY     Post Patient Position Sitting  -LANEY       Row Name 07/03/24 0934          Positioning and Restraints    Pre-Treatment Position sitting in chair/recliner  -LANEY     Post Treatment Position chair  -LANEY     In Chair sitting;call light within reach;with PT;with nsg  -LANEY               User Key  (r) = Recorded By, (t) = Taken By, (c) = Cosigned By      Initials Name Provider Type    Nelly Esparza OT Occupational Therapist                   Outcome Measures       Row Name 07/03/24 0942          How much help from another is currently needed...    Putting on and taking off regular lower body clothing? 2  -LANEY     Bathing (including washing, rinsing, and drying) 2  -LANEY     Toileting (which includes using toilet bed pan or urinal) 3  -LANEY     Putting on and taking off regular upper body clothing 4  -LANEY     Taking care of personal grooming (such as brushing teeth) 3  -LANEY     Eating meals 4  -LANEY     AM-PAC 6 Clicks Score (OT) 18  -LANEY       Row Name 07/03/24 0942          Functional Assessment    Outcome Measure Options AM-PAC 6 Clicks Daily Activity (OT)  -LANEY               User Key  (r) = Recorded By, (t) = Taken By, (c) = Cosigned By      Initials Name Provider Type    Nelly Esparza OT Occupational Therapist                    Occupational Therapy Education       Title: PT OT SLP Therapies (Done)       Topic: Occupational Therapy (Done)       Point: ADL training (Done)       Description:   Instruct learner(s) on proper safety adaptation and remediation techniques during self care or transfers.   Instruct in proper use of assistive devices.                  Learning Progress Summary             Patient Acceptance, E,D, VU,NR by LANEY at 7/3/2024 0943    Comment: AE use to improve LBB/LBD, wand UE TE    Acceptance, E,TB, VU,DU  by KF at 6/26/2024 0754    Acceptance, TB,E, VU by AF at 6/22/2024 0955                         Point: Home exercise program (Done)       Description:   Instruct learner(s) on appropriate technique for monitoring, assisting and/or progressing therapeutic exercises/activities.                  Learning Progress Summary             Patient Acceptance, E,D, VU,NR by LANEY at 7/3/2024 0943    Comment: AE use to improve LBB/LBD, wand UE TE    Acceptance, TB,E, VU by AF at 6/22/2024 0955                         Point: Precautions (Done)       Description:   Instruct learner(s) on prescribed precautions during self-care and functional transfers.                  Learning Progress Summary             Patient Acceptance, E,TB, VU,DU by KF at 6/26/2024 0754                         Point: Body mechanics (Done)       Description:   Instruct learner(s) on proper positioning and spine alignment during self-care, functional mobility activities and/or exercises.                  Learning Progress Summary             Patient Acceptance, E,TB, VU,DU by KF at 6/26/2024 0754    Acceptance, TB,E, VU by AF at 6/22/2024 0955                                         User Key       Initials Effective Dates Name Provider Type Discipline    LANEY 07/11/23 -  Nelly Echeverria, OT Occupational Therapist OT    KF 08/09/23 -  Sho Workman OT Occupational Therapist OT    AF 08/15/23 -  Roberta Corbin OT Occupational Therapist OT                  OT Recommendation and Plan  Therapy Frequency (OT): daily  Plan of Care Review  Plan of Care Reviewed With: patient  Progress: improving  Outcome Evaluation: Per pt. report he has been able to wipe himself post BM. Good progress with UE TE. Recommend wand HEP handout next session.  Pt. would also like to attempt tband with HEP handout and  sponge TE with handout issue.  Pt. wants HEP plans for independent home use due to limited therapy option in his town per pt. report.  LH shoe horn and LH bath sponge  issued to increase independence with LBB and LBD.  Per pt. he uses his cane to assist with pants.  With foot/leg wound/edema issues  a sock-aid was not issued at this time. Limited progress fully to goals due to scrotal edema and wound care interuption this session.  Goals updated.  Continue to recommend IRF due to lives alone.     Time Calculation:         Time Calculation- OT       Row Name 07/03/24 0944             Time Calculation- OT    OT Start Time 0840  -LANEY      OT Received On 07/03/24  -LANEY      OT Goal Re-Cert Due Date 07/13/24  -LANEY         Timed Charges    32382 - OT Therapeutic Exercise Minutes 17  -LANEY      23045 - OT Self Care/Mgmt Minutes 4  -LANEY         Total Minutes    Timed Charges Total Minutes 21  -LANEY       Total Minutes 21  -LANEY                User Key  (r) = Recorded By, (t) = Taken By, (c) = Cosigned By      Initials Name Provider Type    Nelly Esparza OT Occupational Therapist                  Therapy Charges for Today       Code Description Service Date Service Provider Modifiers Qty    [id number] HC OT THER PROC EA 15 MIN 7/3/2024 Nelly Echeverria OT GO 1                 Nelly Echeverria OT  7/3/2024

## 2024-07-04 LAB
ANION GAP SERPL CALCULATED.3IONS-SCNC: 15 MMOL/L (ref 5–15)
BASOPHILS # BLD AUTO: 0.03 10*3/MM3 (ref 0–0.2)
BASOPHILS NFR BLD AUTO: 0.4 % (ref 0–1.5)
BUN SERPL-MCNC: 82 MG/DL (ref 8–23)
BUN/CREAT SERPL: 43.9 (ref 7–25)
CALCIUM SPEC-SCNC: 9.5 MG/DL (ref 8.6–10.5)
CHLORIDE SERPL-SCNC: 93 MMOL/L (ref 98–107)
CO2 SERPL-SCNC: 29 MMOL/L (ref 22–29)
CREAT SERPL-MCNC: 1.87 MG/DL (ref 0.76–1.27)
DEPRECATED RDW RBC AUTO: 57.6 FL (ref 37–54)
EGFRCR SERPLBLD CKD-EPI 2021: 39.4 ML/MIN/1.73
EOSINOPHIL # BLD AUTO: 0.03 10*3/MM3 (ref 0–0.4)
EOSINOPHIL NFR BLD AUTO: 0.4 % (ref 0.3–6.2)
ERYTHROCYTE [DISTWIDTH] IN BLOOD BY AUTOMATED COUNT: 16.2 % (ref 12.3–15.4)
GLUCOSE BLDC GLUCOMTR-MCNC: 159 MG/DL (ref 70–130)
GLUCOSE BLDC GLUCOMTR-MCNC: 203 MG/DL (ref 70–130)
GLUCOSE BLDC GLUCOMTR-MCNC: 208 MG/DL (ref 70–130)
GLUCOSE BLDC GLUCOMTR-MCNC: 251 MG/DL (ref 70–130)
GLUCOSE SERPL-MCNC: 160 MG/DL (ref 65–99)
HCT VFR BLD AUTO: 28.4 % (ref 37.5–51)
HGB BLD-MCNC: 9.2 G/DL (ref 13–17.7)
IMM GRANULOCYTES # BLD AUTO: 0.04 10*3/MM3 (ref 0–0.05)
IMM GRANULOCYTES NFR BLD AUTO: 0.6 % (ref 0–0.5)
LYMPHOCYTES # BLD AUTO: 0.77 10*3/MM3 (ref 0.7–3.1)
LYMPHOCYTES NFR BLD AUTO: 11.1 % (ref 19.6–45.3)
MCH RBC QN AUTO: 31.4 PG (ref 26.6–33)
MCHC RBC AUTO-ENTMCNC: 32.4 G/DL (ref 31.5–35.7)
MCV RBC AUTO: 96.9 FL (ref 79–97)
MONOCYTES # BLD AUTO: 0.81 10*3/MM3 (ref 0.1–0.9)
MONOCYTES NFR BLD AUTO: 11.6 % (ref 5–12)
NEUTROPHILS NFR BLD AUTO: 5.28 10*3/MM3 (ref 1.7–7)
NEUTROPHILS NFR BLD AUTO: 75.9 % (ref 42.7–76)
NRBC BLD AUTO-RTO: 0 /100 WBC (ref 0–0.2)
PLATELET # BLD AUTO: 201 10*3/MM3 (ref 140–450)
PMV BLD AUTO: 10.6 FL (ref 6–12)
POTASSIUM SERPL-SCNC: 3.6 MMOL/L (ref 3.5–5.2)
POTASSIUM SERPL-SCNC: 3.7 MMOL/L (ref 3.5–5.2)
RBC # BLD AUTO: 2.93 10*6/MM3 (ref 4.14–5.8)
SODIUM SERPL-SCNC: 137 MMOL/L (ref 136–145)
WBC NRBC COR # BLD AUTO: 6.96 10*3/MM3 (ref 3.4–10.8)

## 2024-07-04 PROCEDURE — 94799 UNLISTED PULMONARY SVC/PX: CPT

## 2024-07-04 PROCEDURE — 29581 APPL MULTLAYER CMPRN SYS LEG: CPT

## 2024-07-04 PROCEDURE — 25010000002 BUMETANIDE PER 0.5 MG: Performed by: INTERNAL MEDICINE

## 2024-07-04 PROCEDURE — 85025 COMPLETE CBC W/AUTO DIFF WBC: CPT | Performed by: INTERNAL MEDICINE

## 2024-07-04 PROCEDURE — 82948 REAGENT STRIP/BLOOD GLUCOSE: CPT

## 2024-07-04 PROCEDURE — 63710000001 INSULIN LISPRO (HUMAN) PER 5 UNITS: Performed by: NURSE PRACTITIONER

## 2024-07-04 PROCEDURE — 80048 BASIC METABOLIC PNL TOTAL CA: CPT | Performed by: INTERNAL MEDICINE

## 2024-07-04 PROCEDURE — 94664 DEMO&/EVAL PT USE INHALER: CPT

## 2024-07-04 PROCEDURE — 84132 ASSAY OF SERUM POTASSIUM: CPT | Performed by: INTERNAL MEDICINE

## 2024-07-04 PROCEDURE — 99232 SBSQ HOSP IP/OBS MODERATE 35: CPT | Performed by: INTERNAL MEDICINE

## 2024-07-04 PROCEDURE — 63710000001 INSULIN LISPRO (HUMAN) PER 5 UNITS: Performed by: INTERNAL MEDICINE

## 2024-07-04 RX ORDER — POTASSIUM CHLORIDE 20 MEQ/1
40 TABLET, EXTENDED RELEASE ORAL EVERY 4 HOURS
Status: COMPLETED | OUTPATIENT
Start: 2024-07-04 | End: 2024-07-04

## 2024-07-04 RX ORDER — LACTULOSE 10 G/15ML
30 SOLUTION ORAL ONCE
Status: COMPLETED | OUTPATIENT
Start: 2024-07-04 | End: 2024-07-04

## 2024-07-04 RX ORDER — BISACODYL 5 MG/1
20 TABLET, DELAYED RELEASE ORAL ONCE
Status: COMPLETED | OUTPATIENT
Start: 2024-07-04 | End: 2024-07-04

## 2024-07-04 RX ADMIN — LACTULOSE 10 G: 20 SOLUTION ORAL at 08:38

## 2024-07-04 RX ADMIN — INSULIN LISPRO 2 UNITS: 100 INJECTION, SOLUTION INTRAVENOUS; SUBCUTANEOUS at 18:50

## 2024-07-04 RX ADMIN — IPRATROPIUM BROMIDE AND ALBUTEROL SULFATE 3 ML: 2.5; .5 SOLUTION RESPIRATORY (INHALATION) at 20:02

## 2024-07-04 RX ADMIN — POLYETHYLENE GLYCOL 3350 17 G: 17 POWDER, FOR SOLUTION ORAL at 20:27

## 2024-07-04 RX ADMIN — IPRATROPIUM BROMIDE AND ALBUTEROL SULFATE 3 ML: 2.5; .5 SOLUTION RESPIRATORY (INHALATION) at 17:03

## 2024-07-04 RX ADMIN — IPRATROPIUM BROMIDE AND ALBUTEROL SULFATE 3 ML: 2.5; .5 SOLUTION RESPIRATORY (INHALATION) at 11:59

## 2024-07-04 RX ADMIN — Medication 1 APPLICATION: at 08:40

## 2024-07-04 RX ADMIN — BISACODYL 20 MG: 5 TABLET, COATED ORAL at 11:04

## 2024-07-04 RX ADMIN — POTASSIUM CHLORIDE 40 MEQ: 1500 TABLET, EXTENDED RELEASE ORAL at 08:38

## 2024-07-04 RX ADMIN — INSULIN LISPRO 3 UNITS: 100 INJECTION, SOLUTION INTRAVENOUS; SUBCUTANEOUS at 08:35

## 2024-07-04 RX ADMIN — INSULIN LISPRO 4 UNITS: 100 INJECTION, SOLUTION INTRAVENOUS; SUBCUTANEOUS at 11:05

## 2024-07-04 RX ADMIN — INSULIN LISPRO 3 UNITS: 100 INJECTION, SOLUTION INTRAVENOUS; SUBCUTANEOUS at 20:27

## 2024-07-04 RX ADMIN — LACTULOSE 10 G: 20 SOLUTION ORAL at 18:50

## 2024-07-04 RX ADMIN — INSULIN LISPRO 3 UNITS: 100 INJECTION, SOLUTION INTRAVENOUS; SUBCUTANEOUS at 11:05

## 2024-07-04 RX ADMIN — COLCHICINE 0.6 MG: 0.6 TABLET ORAL at 08:37

## 2024-07-04 RX ADMIN — BUMETANIDE 2 MG: 0.25 INJECTION, SOLUTION INTRAMUSCULAR; INTRAVENOUS at 20:27

## 2024-07-04 RX ADMIN — Medication 1000 UNITS: at 08:37

## 2024-07-04 RX ADMIN — POTASSIUM CHLORIDE 40 MEQ: 1500 TABLET, EXTENDED RELEASE ORAL at 13:16

## 2024-07-04 RX ADMIN — SENNOSIDES AND DOCUSATE SODIUM 2 TABLET: 8.6; 5 TABLET ORAL at 08:38

## 2024-07-04 RX ADMIN — SENNOSIDES AND DOCUSATE SODIUM 2 TABLET: 8.6; 5 TABLET ORAL at 20:27

## 2024-07-04 RX ADMIN — POLYETHYLENE GLYCOL 3350 17 G: 17 POWDER, FOR SOLUTION ORAL at 08:34

## 2024-07-04 RX ADMIN — IPRATROPIUM BROMIDE AND ALBUTEROL SULFATE 3 ML: 2.5; .5 SOLUTION RESPIRATORY (INHALATION) at 07:18

## 2024-07-04 RX ADMIN — BUMETANIDE 2 MG: 0.25 INJECTION, SOLUTION INTRAMUSCULAR; INTRAVENOUS at 18:49

## 2024-07-04 RX ADMIN — LACTULOSE 30 G: 20 SOLUTION ORAL at 11:04

## 2024-07-04 RX ADMIN — LACTULOSE 10 G: 20 SOLUTION ORAL at 20:27

## 2024-07-04 RX ADMIN — PANTOPRAZOLE SODIUM 40 MG: 40 TABLET, DELAYED RELEASE ORAL at 05:44

## 2024-07-04 RX ADMIN — FERROUS SULFATE TAB 325 MG (65 MG ELEMENTAL FE) 325 MG: 325 (65 FE) TAB at 08:37

## 2024-07-04 RX ADMIN — Medication 10 ML: at 08:50

## 2024-07-04 RX ADMIN — SPIRONOLACTONE 100 MG: 25 TABLET ORAL at 08:37

## 2024-07-04 RX ADMIN — INSULIN LISPRO 4 UNITS: 100 INJECTION, SOLUTION INTRAVENOUS; SUBCUTANEOUS at 08:35

## 2024-07-04 RX ADMIN — BUMETANIDE 2 MG: 0.25 INJECTION, SOLUTION INTRAMUSCULAR; INTRAVENOUS at 08:38

## 2024-07-04 RX ADMIN — PREGABALIN 100 MG: 100 CAPSULE ORAL at 08:37

## 2024-07-04 RX ADMIN — Medication 10 ML: at 20:28

## 2024-07-04 RX ADMIN — INSULIN LISPRO 4 UNITS: 100 INJECTION, SOLUTION INTRAVENOUS; SUBCUTANEOUS at 18:50

## 2024-07-04 NOTE — H&P (VIEW-ONLY)
" LOS: 12 days   Patient Care Team:  Lorena Chamberlain APRN as PCP - General (Nurse Practitioner)  Lloyd Craig MD as Consulting Physician (Cardiology)    Chief Complaint: NASRIN    Subjective   Excellent response to diuretics with recent changes. Cr ~ 1.8mg/dl. Standing weight 124 Kg previous weight ( bedscale 155-162 kg).    History taken from: patient    Objective     Vital Sign Min/Max for last 24 hours  Temp  Min: 96.8 °F (36 °C)  Max: 97.7 °F (36.5 °C)   BP  Min: 102/68  Max: 116/74   Pulse  Min: 68  Max: 96   Resp  Min: 16  Max: 18   SpO2  Min: 91 %  Max: 97 %   No data recorded   No data recorded     Flowsheet Rows      Flowsheet Row First Filed Value   Admission Height 185.4 cm (73\") Documented at 06/21/2024 1409   Admission Weight 127 kg (279 lb) Documented at 06/21/2024 1409            I/O this shift:  In: 960 [P.O.:960]  Out: 2500 [Urine:2500]  I/O last 3 completed shifts:  In: 1192 [P.O.:1192]  Out: 8950 [Urine:8950]    Objective:  Physical examination:    General Appearance: Alert, oriented, no obvious distress.  Morbid obesity  Eyes: PER, EOMI.  Neck: Supple no JVD.  Lungs: Clear auscultation, no rales rhonchi's, equal chest movement, nonlabored.  Heart: No gallop, murmur, rub, RRR.  Abdomen: Soft, nontender, positive bowel sounds, morbid obesity with abdominal wall edema  Extremities: Significant bilateral lower extremity edema 3-4+.  Lower extremity tenderness to improve.  No cyanosis.  Neuro: No focal deficit, moving all extremities, alert oriented X 3    : Rouse catheter in place.  Urine slightly bloody    Results Review:     I reviewed the patient's new clinical results.    WBC WBC   Date Value Ref Range Status   07/04/2024 6.96 3.40 - 10.80 10*3/mm3 Final   07/03/2024 7.62 3.40 - 10.80 10*3/mm3 Final   07/02/2024 9.29 3.40 - 10.80 10*3/mm3 Final        HGB Hemoglobin   Date Value Ref Range Status   07/04/2024 9.2 (L) 13.0 - 17.7 g/dL Final   07/03/2024 8.8 (L) 13.0 - 17.7 g/dL " "Final   07/02/2024 8.9 (L) 13.0 - 17.7 g/dL Final        HCT Hematocrit   Date Value Ref Range Status   07/04/2024 28.4 (L) 37.5 - 51.0 % Final   07/03/2024 27.4 (L) 37.5 - 51.0 % Final   07/02/2024 28.2 (L) 37.5 - 51.0 % Final        Platlets No results found for: \"LABPLAT\"   MCV MCV   Date Value Ref Range Status   07/04/2024 96.9 79.0 - 97.0 fL Final   07/03/2024 95.8 79.0 - 97.0 fL Final   07/02/2024 97.6 (H) 79.0 - 97.0 fL Final            Sodium Sodium   Date Value Ref Range Status   07/04/2024 137 136 - 145 mmol/L Final   07/03/2024 134 (L) 136 - 145 mmol/L Final   07/02/2024 133 (L) 136 - 145 mmol/L Final      Potassium Potassium   Date Value Ref Range Status   07/04/2024 3.6 3.5 - 5.2 mmol/L Final   07/03/2024 4.4 3.5 - 5.2 mmol/L Final   07/03/2024 3.6 3.5 - 5.2 mmol/L Final   07/02/2024 3.9 3.5 - 5.2 mmol/L Final   07/02/2024 3.5 3.5 - 5.2 mmol/L Final      Chloride Chloride   Date Value Ref Range Status   07/04/2024 93 (L) 98 - 107 mmol/L Final   07/03/2024 92 (L) 98 - 107 mmol/L Final   07/02/2024 92 (L) 98 - 107 mmol/L Final      CO2 CO2   Date Value Ref Range Status   07/04/2024 29.0 22.0 - 29.0 mmol/L Final   07/03/2024 30.0 (H) 22.0 - 29.0 mmol/L Final   07/02/2024 26.0 22.0 - 29.0 mmol/L Final      BUN BUN   Date Value Ref Range Status   07/04/2024 82 (H) 8 - 23 mg/dL Final   07/03/2024 87 (H) 8 - 23 mg/dL Final   07/02/2024 87 (H) 8 - 23 mg/dL Final      Creatinine Creatinine   Date Value Ref Range Status   07/04/2024 1.87 (H) 0.76 - 1.27 mg/dL Final   07/03/2024 1.99 (H) 0.76 - 1.27 mg/dL Final   07/02/2024 2.19 (H) 0.76 - 1.27 mg/dL Final      Calcium Calcium   Date Value Ref Range Status   07/04/2024 9.5 8.6 - 10.5 mg/dL Final   07/03/2024 9.5 8.6 - 10.5 mg/dL Final   07/02/2024 8.9 8.6 - 10.5 mg/dL Final      PO4 No results found for: \"CAPO4\"   Albumin Albumin   Date Value Ref Range Status   07/03/2024 4.0 3.5 - 5.2 g/dL Final   07/02/2024 4.0 3.5 - 5.2 g/dL Final        Magnesium Magnesium " "  Date Value Ref Range Status   07/02/2024 2.2 1.6 - 2.4 mg/dL Final      Uric Acid No results found for: \"URICACID\"       Medication Review: Yes    Assessment & Plan       Acute on chronic heart failure with preserved ejection fraction (HFpEF)    Elevated serum creatinine    Anemia    Hypokalemia    Cirrhosis of liver    HCV (hepatitis C virus)    Acute on chronic HFrEF (heart failure with reduced ejection fraction)           1.  Acute kidney disease: Last known stable cr 1.1 in 2023. Cr on recent admission at [facility] few weeks ago 1.6-1.9 mg/dl. Most recent cr 2.0-2.2 GFR 35-36ml/min. Urine sodium 20. Urine cr 39.5. Renal US no hydro     2.  New onset liver cirrhosis: Complications include ascites and LE edema.      3.  Volume status: Dependent edema b/l + ascites. Getting diuretics     4.  Hypokalemia: In the setting of diuretics     5.  Hyponatremia: Due to total body volume overload.      6.  HFpEF: Dependent edema      7.  Anemia: Recent hx of GI bleed. S/p EGD and colonoscopy.     Volume status: anasarca on admission. Responding well to diuretics so far. LE edema improving. Scrotal edema persistent.     Gout: Stable. On colchicine. Reduce the dose to 3x weekly if developing diarrhea       Recommendation:  Continue bumex 2 mg TID.   Continue spironolactone to 100mg daily   Strict I/O.   Check standing weight every 2-3 days. Patient still c/o significant scrotal edema. Therefore will continue with IV bumex for another 24-48hr.     William Bartlett MD  07/04/24  16:43 EDT            "

## 2024-07-04 NOTE — PLAN OF CARE
Goal Outcome Evaluation:      Pt denies pain, nausea, vomiting. Tolerating PO intake well. PIV changed. CHG bath given. Pt reports he is passing flatus. Pt denies any needs at this time.

## 2024-07-04 NOTE — PROGRESS NOTES
" LOS: 12 days   Patient Care Team:  Lorena Chamberlain APRN as PCP - General (Nurse Practitioner)  Lloyd Craig MD as Consulting Physician (Cardiology)    Chief Complaint: NASRIN    Subjective   Excellent response to diuretics with recent changes. Cr ~ 1.8mg/dl. Standing weight 124 Kg previous weight ( bedscale 155-162 kg).    History taken from: patient    Objective     Vital Sign Min/Max for last 24 hours  Temp  Min: 96.8 °F (36 °C)  Max: 97.7 °F (36.5 °C)   BP  Min: 102/68  Max: 116/74   Pulse  Min: 68  Max: 96   Resp  Min: 16  Max: 18   SpO2  Min: 91 %  Max: 97 %   No data recorded   No data recorded     Flowsheet Rows      Flowsheet Row First Filed Value   Admission Height 185.4 cm (73\") Documented at 06/21/2024 1409   Admission Weight 127 kg (279 lb) Documented at 06/21/2024 1409            I/O this shift:  In: 960 [P.O.:960]  Out: 2500 [Urine:2500]  I/O last 3 completed shifts:  In: 1192 [P.O.:1192]  Out: 8950 [Urine:8950]    Objective:  Physical examination:    General Appearance: Alert, oriented, no obvious distress.  Morbid obesity  Eyes: PER, EOMI.  Neck: Supple no JVD.  Lungs: Clear auscultation, no rales rhonchi's, equal chest movement, nonlabored.  Heart: No gallop, murmur, rub, RRR.  Abdomen: Soft, nontender, positive bowel sounds, morbid obesity with abdominal wall edema  Extremities: Significant bilateral lower extremity edema 3-4+.  Lower extremity tenderness to improve.  No cyanosis.  Neuro: No focal deficit, moving all extremities, alert oriented X 3    : Rouse catheter in place.  Urine slightly bloody    Results Review:     I reviewed the patient's new clinical results.    WBC WBC   Date Value Ref Range Status   07/04/2024 6.96 3.40 - 10.80 10*3/mm3 Final   07/03/2024 7.62 3.40 - 10.80 10*3/mm3 Final   07/02/2024 9.29 3.40 - 10.80 10*3/mm3 Final        HGB Hemoglobin   Date Value Ref Range Status   07/04/2024 9.2 (L) 13.0 - 17.7 g/dL Final   07/03/2024 8.8 (L) 13.0 - 17.7 g/dL " "Final   07/02/2024 8.9 (L) 13.0 - 17.7 g/dL Final        HCT Hematocrit   Date Value Ref Range Status   07/04/2024 28.4 (L) 37.5 - 51.0 % Final   07/03/2024 27.4 (L) 37.5 - 51.0 % Final   07/02/2024 28.2 (L) 37.5 - 51.0 % Final        Platlets No results found for: \"LABPLAT\"   MCV MCV   Date Value Ref Range Status   07/04/2024 96.9 79.0 - 97.0 fL Final   07/03/2024 95.8 79.0 - 97.0 fL Final   07/02/2024 97.6 (H) 79.0 - 97.0 fL Final            Sodium Sodium   Date Value Ref Range Status   07/04/2024 137 136 - 145 mmol/L Final   07/03/2024 134 (L) 136 - 145 mmol/L Final   07/02/2024 133 (L) 136 - 145 mmol/L Final      Potassium Potassium   Date Value Ref Range Status   07/04/2024 3.6 3.5 - 5.2 mmol/L Final   07/03/2024 4.4 3.5 - 5.2 mmol/L Final   07/03/2024 3.6 3.5 - 5.2 mmol/L Final   07/02/2024 3.9 3.5 - 5.2 mmol/L Final   07/02/2024 3.5 3.5 - 5.2 mmol/L Final      Chloride Chloride   Date Value Ref Range Status   07/04/2024 93 (L) 98 - 107 mmol/L Final   07/03/2024 92 (L) 98 - 107 mmol/L Final   07/02/2024 92 (L) 98 - 107 mmol/L Final      CO2 CO2   Date Value Ref Range Status   07/04/2024 29.0 22.0 - 29.0 mmol/L Final   07/03/2024 30.0 (H) 22.0 - 29.0 mmol/L Final   07/02/2024 26.0 22.0 - 29.0 mmol/L Final      BUN BUN   Date Value Ref Range Status   07/04/2024 82 (H) 8 - 23 mg/dL Final   07/03/2024 87 (H) 8 - 23 mg/dL Final   07/02/2024 87 (H) 8 - 23 mg/dL Final      Creatinine Creatinine   Date Value Ref Range Status   07/04/2024 1.87 (H) 0.76 - 1.27 mg/dL Final   07/03/2024 1.99 (H) 0.76 - 1.27 mg/dL Final   07/02/2024 2.19 (H) 0.76 - 1.27 mg/dL Final      Calcium Calcium   Date Value Ref Range Status   07/04/2024 9.5 8.6 - 10.5 mg/dL Final   07/03/2024 9.5 8.6 - 10.5 mg/dL Final   07/02/2024 8.9 8.6 - 10.5 mg/dL Final      PO4 No results found for: \"CAPO4\"   Albumin Albumin   Date Value Ref Range Status   07/03/2024 4.0 3.5 - 5.2 g/dL Final   07/02/2024 4.0 3.5 - 5.2 g/dL Final        Magnesium Magnesium " "  Date Value Ref Range Status   07/02/2024 2.2 1.6 - 2.4 mg/dL Final      Uric Acid No results found for: \"URICACID\"       Medication Review: Yes    Assessment & Plan       Acute on chronic heart failure with preserved ejection fraction (HFpEF)    Elevated serum creatinine    Anemia    Hypokalemia    Cirrhosis of liver    HCV (hepatitis C virus)    Acute on chronic HFrEF (heart failure with reduced ejection fraction)           1.  Acute kidney disease: Last known stable cr 1.1 in 2023. Cr on recent admission at [facility] few weeks ago 1.6-1.9 mg/dl. Most recent cr 2.0-2.2 GFR 35-36ml/min. Urine sodium 20. Urine cr 39.5. Renal US no hydro     2.  New onset liver cirrhosis: Complications include ascites and LE edema.      3.  Volume status: Dependent edema b/l + ascites. Getting diuretics     4.  Hypokalemia: In the setting of diuretics     5.  Hyponatremia: Due to total body volume overload.      6.  HFpEF: Dependent edema      7.  Anemia: Recent hx of GI bleed. S/p EGD and colonoscopy.     Volume status: anasarca on admission. Responding well to diuretics so far. LE edema improving. Scrotal edema persistent.     Gout: Stable. On colchicine. Reduce the dose to 3x weekly if developing diarrhea       Recommendation:  Continue bumex 2 mg TID.   Continue spironolactone to 100mg daily   Strict I/O.   Check standing weight every 2-3 days. Patient still c/o significant scrotal edema. Therefore will continue with IV bumex for another 24-48hr.     William Bartlett MD  07/04/24  16:43 EDT            "

## 2024-07-04 NOTE — PLAN OF CARE
Goal Outcome Evaluation:  Plan of Care Reviewed With: patient           Outcome Evaluation: BLE edema improved today with pt able to tolerate compression all night. PT replaced compression and will f/u with pt tomorrow, if able to maintain compression through the night tonight, may decrease freq of compression wrapping change to every other day.

## 2024-07-04 NOTE — PLAN OF CARE
Goal Outcome Evaluation:              Outcome Evaluation: VSS. RA. SR -> V-paced <50% on tele. IV bumex given with great UOP from rodrigues. Pt up mult times to bedside commode throughout the night to try to have BM. Last BM 7/3. Pt slept between care.

## 2024-07-04 NOTE — PROGRESS NOTES
1.  Today first-line      [facility] Medicine Services  PROGRESS NOTE    Patient Name: Jaycob Ndiaye II  : 1960  MRN: [id number]    Date of Admission: 2024  Primary Care Physician: Lorena Chamberlain APRN    Subjective   Subjective     CC:  Follow-up for heart failure    HPI:  And examined this morning.  Diuresed 5 L yesterday, negative balance of 2 L .  Complains of abdominal distention today.  Bedside ultrasound revealed moderate to large ascites.  Agreeable to paracentesis tomorrow    Objective   Objective     Vital Signs:   Temp:  [97.4 °F (36.3 °C)-98.2 °F (36.8 °C)] 97.7 °F (36.5 °C)  Heart Rate:  [63-96] 77  Resp:  [16-18] 18  BP: (112-117)/(67-76) 112/67     Physical Exam:  General: Comfortable, not in distress, conversant and cooperative  Head: Atraumatic and normocephalic  Eyes: No Icterus. No pallor  Ears:  Ears appear intact with no abnormalities noted  Throat: No oral lesions, no thrush  Neck: Supple, trachea midline  Lungs: Clear to auscultation bilaterally, equal air entry, no wheezing or crackles  Heart:  Normal S1 and S2, no murmur, no gallop, No JVD, 3+ lower extremity swelling  Abdomen:  Soft, no tenderness, no organomegaly, normal bowel sounds, no organomegaly.  Scrotal edema  Extremities: pulses equal bilaterally  Skin: No bleeding, bruising or rash, normal skin turgor and elasticity  Neurologic: Cranial nerves appear intact with no evidence of facial asymmetry, normal motor and sensory functions in all 4 extremities  Psych: Alert and oriented x 3, normal mood    Results Reviewed:  LAB RESULTS:      Lab 24  0500 24  0403 24  1117 24  0843   WBC 6.96 7.62 9.29  --    HEMOGLOBIN 9.2* 8.8* 8.9*  --    HEMATOCRIT 28.4* 27.4* 28.2*  --    PLATELETS 201 190 196  --    NEUTROS ABS 5.28 6.02 7.64*  --    IMMATURE GRANS (ABS) 0.04 0.03 0.04  --    LYMPHS ABS 0.77 0.66* 0.58*  --    MONOS ABS 0.81 0.87 0.96*  --    EOS ABS  0.03 0.02 0.04  --    MCV 96.9 95.8 97.6*  --    SED RATE  --   --  11 9   CRP  --   --  0.31  --          Lab 07/03/24  1647 07/03/24 0403 07/02/24 2243 07/02/24 1117 07/01/24 0627 06/30/24  0504 06/29/24  1606 06/29/24  0559 06/28/24  0448   SODIUM  --  134*  --  133* 132* 132*  --  133*  --    POTASSIUM 4.4 3.6 3.9 3.5 3.7 4.4   < > 3.4*  --    CHLORIDE  --  92*  --  92* 91* 89*  --  91*  --    CO2  --  30.0*  --  26.0 26.0 26.0  --  28.0  --    ANION GAP  --  12.0  --  15.0 15.0 17.0*  --  14.0  --    BUN  --  87*  --  87* 91* 86*  --  76*  --    CREATININE  --  1.99*  --  2.19* 2.13* 2.26*  --  2.14*  --    EGFR  --  36.6*  --  32.6* 33.7* 31.4*  --  33.5*  --    GLUCOSE  --  151*  --  236* 159* 180*  --  184*  --    CALCIUM  --  9.5  --  8.9 9.3 9.4  --  9.3  --    IONIZED CALCIUM  --   --   --   --   --   --   --   --  1.23   MAGNESIUM  --   --   --  2.2  --   --   --   --   --    PHOSPHORUS  --   --   --  3.9 4.4 3.9  --  4.7*  --     < > = values in this interval not displayed.         Lab 07/03/24 0403 07/02/24 1117 07/01/24 0627 06/30/24  0504   TOTAL PROTEIN 6.1 6.4  --   --    ALBUMIN 4.0 4.0 3.9 4.1   GLOBULIN 2.1 2.4  --   --    ALT (SGPT) 14 14  --   --    AST (SGOT) 20 22  --   --    BILIRUBIN 0.6 0.5  --   --    ALK PHOS 71 66  --   --                          Brief Urine Lab Results  (Last result in the past 365 days)        Color   Clarity   Blood   Leuk Est   Nitrite   Protein   CREAT   Urine HCG        06/26/24 1621             39.5                 Microbiology Results Abnormal       None            No radiology results from the last 24 hrs        Current medications:  Scheduled Meds:bumetanide, 2 mg, Intravenous, TID  castor oil-balsam peru, 1 Application, Topical, Q12H  cholecalciferol, 1,000 Units, Oral, Daily  colchicine, 0.6 mg, Oral, Daily  ferrous sulfate, 325 mg, Oral, Daily With Breakfast  heparin (porcine), 5,000 Units, Subcutaneous, Q8H  insulin lispro, 2-7 Units,  Subcutaneous, 4x Daily AC & at Bedtime  Insulin Lispro, 4 Units, Subcutaneous, TID With Meals  ipratropium-albuterol, 3 mL, Nebulization, 4x Daily - RT  lactulose, 10 g, Oral, TID  pantoprazole, 40 mg, Oral, Q AM  pharmacy consult - MTM, , Does not apply, Daily  senna-docusate sodium, 2 tablet, Oral, BID   And  polyethylene glycol, 17 g, Oral, BID  pregabalin, 100 mg, Oral, Daily  sodium chloride, 10 mL, Intravenous, Q12H  spironolactone, 100 mg, Oral, Daily      Continuous Infusions:   PRN Meds:.  acetaminophen **OR** acetaminophen **OR** acetaminophen    Albuterol Sulfate NEB Orderable    senna-docusate sodium **AND** polyethylene glycol **AND** bisacodyl **AND** bisacodyl    Calcium Replacement - Follow Nurse / BPA Driven Protocol    dextrose    dextrose    glucagon (human recombinant)    magnesium hydroxide    Magnesium Low Dose Replacement - Follow Nurse / BPA Driven Protocol    nitroglycerin    Phosphorus Replacement - Follow Nurse / BPA Driven Protocol    Potassium Replacement - Follow Nurse / BPA Driven Protocol    prochlorperazine    sodium chloride    sodium chloride    sodium chloride    Assessment & Plan   Assessment & Plan     Active Hospital Problems    Diagnosis  POA    **Acute on chronic heart failure with preserved ejection fraction (HFpEF) [I50.33]  Yes    Acute on chronic HFrEF (heart failure with reduced ejection fraction) [I50.23]  Yes    Elevated serum creatinine [R79.89]  Yes    Anemia [D64.9]  Yes    Hypokalemia [E87.6]  Yes    Cirrhosis of liver [K74.60]  Yes    HCV (hepatitis C virus) [B19.20]  Yes      Resolved Hospital Problems   No resolved problems to display.        Brief Hospital Course to date:  Jaycob Ndiaye II is a 64 y.o. male with past medical history significant for CHF, COPD, cirrhosis, prostate cancer, chronic hematuria, GI bleed, HCV, HTN, and HLD who presents to the ED due to worsening shortness of breath and lower extremity edema over the past week.    A/C  HFrEF  Significant BLE edema and scrotal edema/Anasarca  Patient with extensive lower extremity and anterior abdominal wall edema  Nephrology team managing diuretic therapy.  Currently on IV Bumex 2 mg 3 times daily.    Monitor kidney functions with ongoing aggressive diuresis  He follows with Cardiologist Dr. Craig  BLE venous duplex negative for DVT  WOC to see about scrotal wrap, PT to see about leg wraps tomorrow     Ascites  Bedside ultrasound with moderate to large ascites  IR consulted for paracentesis tomorrow    CKD  Creatinine stable around 1.9  Nephrology managing diuretic therapy    ?Calcium deposits in fingertips and right elbow  Hyperphosphatemia  Check uric level (elevated), ionized calcium, PTH (normal) and vitamin D (low)  Normal sed rate   Continue colchicine and low dose steroids which seem to have helped significantly    Anemia  Recent GI Bleed  S/p EGD and colonoscopy at [facility] on 6/13/2024, records requested  Hgb stable  Continue PPI   Continue PO Iron -- give dose of IV iron     Hypokalemia  Replace per protocol     Newly diagnosed liver cirrhosis   HCV  S/p US abd on 6/10/24 that revealed nodular liver consistent with cirrhosis and moderate ascites  S/p US guided paracentesis on 6/12/2024 at [facility] with 200 mL of skylar-colored fluid removed.  No fluid was sent to lab.  Will need referral to establish with [facility] upon discharge per patient request  Consider paracentesis-- though he is not eager because it hurt so badly at [facility]. Our CT does not show a large voume of ascites- he think a good BM will help with abdominal distension, lactulose added     Type 2 diabetes with peripheral neuropathy  Holding home Mounjaro, Hg A1c is 6.0  Continue low dose  SSI for now.  Glucoses stable running 137-209 last 24 hours-- jaimee QAC insulin while on steroids     PAF  Xarelto discontinued 6/13 at [facility] d/t persistent hematuria      HTN  Continue holding home bystolic for now d/t borderline BP      HO prostate  cancer  Patient want PSA checked and thinks he will not need DC at home    Neuropathy  Continue Lyrica    DVT prophylaxis:  Heparin     Expected Discharge Location and Transportation: home once medically improved and cleared by cardiology/nephrology.  Expected Discharge   Expected Discharge Date: 7/5/2024; Expected Discharge Time:    Awaiting placement- he is not eager to leave until he is good and ready    VTE Prophylaxis:  Pharmacologic VTE prophylaxis orders are present.         AM-PAC 6 Clicks Score (PT): 19 (07/03/24 2091)    CODE STATUS:   Code Status and Medical Interventions:   Ordered at: 06/21/24 1840     Level Of Support Discussed With:    Patient     Code Status (Patient has no pulse and is not breathing):    CPR (Attempt to Resuscitate)     Medical Interventions (Patient has pulse or is breathing):    Full Support       Kaylen Huerta MD  07/04/24

## 2024-07-04 NOTE — THERAPY WOUND CARE TREATMENT
Acute Care - Wound/Debridement Treatment Note  [facility]     Patient Name: Jaycob Ndiaye II  : 1960  MRN: [id number]  Today's Date: 2024                Admit Date: 2024    Visit Dx:    ICD-10-CM ICD-9-CM   1. Peripheral edema  R60.0 782.3   2. Congestive heart failure, unspecified HF chronicity, unspecified heart failure type  I50.9 428.0   3. Hypokalemia  E87.6 276.8   4. Acute on chronic heart failure with preserved ejection fraction (HFpEF)  I50.33 428.23   5. Screen for colon cancer  Z12.11 V76.51   6. Acute on chronic HFrEF (heart failure with reduced ejection fraction)  I50.23 428.23   7. Elevated serum creatinine  R79.89 790.99       Patient Active Problem List   Diagnosis    Screen for colon cancer    Acute on chronic heart failure with preserved ejection fraction (HFpEF)    Elevated serum creatinine    Anemia    Hypokalemia    Cirrhosis of liver    HCV (hepatitis C virus)    Acute on chronic HFrEF (heart failure with reduced ejection fraction)        Past Medical History:   Diagnosis Date    Arthritis     Cancer     Constipation     Depression     Diabetes     type 2 dm, check blood sugar once a day    History of hepatitis C     History of prostate cancer     Hypertension     Irregular heartbeat     Pacemaker     home monitoring    Prostate CA     Requires supplemental oxygen     at night with cpap    Sleep apnea     wears a cpap    SOB (shortness of breath) on exertion     Wears glasses         Past Surgical History:   Procedure Laterality Date    CARDIAC ABLATION      COLONOSCOPY N/A 2021    Procedure: Colonoscopy with polypectomy;  Surgeon: Maurice Proctor MD;  Location: [facility];  Service: Gastroenterology;  Laterality: N/A;    COLONOSCOPY      ENDOSCOPY      KNEE SURGERY Right     1985    PACEMAKER IMPLANTATION      PROSTATE SURGERY      REPLACEMENT TOTAL KNEE Right            Wound 24 1540 Bilateral lower leg Blisters (Active)   Dressing Appearance  dry;intact 07/04/24 1400   Closure Adhesive bandage 07/03/24 2010   Base dressing in place, unable to visualize 07/04/24 1400   Periwound dry;redness;warm;edematous;swelling 07/04/24 1330   Periwound Temperature warm 07/04/24 1330   Periwound Skin Turgor firm 07/04/24 1330   Edges irregular 07/04/24 1330   Drainage Amount none 07/04/24 1400   Care, Wound cleansed with;soap and water 07/04/24 1330   Dressing Care foam;low-adherent 07/04/24 1330   Periwound Care dry periwound area maintained 07/04/24 1330       Wound 06/28/24 0000 Right medial gluteal Pressure Injury (Active)   Dressing Appearance intact;dry 07/03/24 2010   Closure Open to air 07/04/24 1400   Base maroon/purple 07/04/24 1400   Periwound intact 07/03/24 2010   Care, Wound cleansed with;soap and water 07/04/24 1143   Dressing Care open to air 07/04/24 1400   Periwound Care dry periwound area maintained 07/04/24 1143       Wound 06/30/24 0000 Right posterior greater trochanter Skin Tear (Active)   Closure Open to air 07/04/24 1400   Base red 07/04/24 1143   Drainage Amount none 07/04/24 1400   Care, Wound cleansed with;soap and water 07/04/24 1400   Periwound Care dry periwound area maintained 07/04/24 1143       Wound 06/30/24 0000 Left posterior greater trochanter (Active)   Dressing Appearance open to air 07/04/24 1143   Closure Open to air 07/04/24 1400   Base red 07/04/24 1143   Drainage Amount none 07/04/24 1400   Care, Wound cleansed with;soap and water 07/04/24 1400   Periwound Care dry periwound area maintained 07/04/24 1143       Wound 06/30/24 2146 scrotum Skin Tear (Active)   Dressing Appearance open to air 07/04/24 1400   Closure Open to air 07/04/24 1400   Drainage Characteristics/Odor sanguineous 07/03/24 2010   Drainage Amount none 07/04/24 1400   Care, Wound cleansed with;soap and water 07/04/24 1400   Periwound Care dry periwound area maintained 07/04/24 1400      Lymphedema       Row Name 07/04/24 1330             Lymphedema Edema  Assessment    Ptting Edema Category By severity  -      Pitting Edema Severe  -MF         Compression/Skin Care    Compression/Skin Care skin care;wrapping location;bandaging  -      Skin Care washed/dried;lotion applied  -      Wrapping Location lower extremity  -      Wrapping Location LE bilateral:;foot to knee  -      Wrapping Comments optifoam Ag foam to ant ankles with size 5/6/8 compressogrips doubled/overlapping for gradient compression  -                User Key  (r) = Recorded By, (t) = Taken By, (c) = Cosigned By      Initials Name Provider Type    Lloyd Ngo, PT Physical Therapist                    WOUND DEBRIDEMENT  Total area of Debridement: ~5cm2  Debridement Site 1  Location- Site 1: R ant Le  Selective Debridement- Site 1: Wound Surface <20cmsq  Instruments- Site 1: tweezers  Excised Tissue Description- Site 1: minimum, other (comment) (crusted nonviable skin / scabs)  Bleeding- Site 1: none               PT Assessment (Last 12 Hours)       PT Evaluation and Treatment       Row Name 07/04/24 1330          Physical Therapy Time and Intention    Subjective Information complains of;weakness;fatigue;pain  -     Document Type therapy note (daily note);wound care  -     Mode of Treatment physical therapy  -       Row Name 07/04/24 1330          Pain    Pain Intervention(s) Repositioned  -       Row Name 07/04/24 1330          Pain Scale: FACES Pre/Post-Treatment    Pain: FACES Scale, Pretreatment 4-->hurts little more  -     Posttreatment Pain Rating 4-->hurts little more  -     Pain Location generalized  -       Row Name 07/04/24 1330          Wound 06/22/24 1540 Bilateral lower leg Blisters    Wound - Properties Group Placement Date: 06/22/24  - Placement Time: 1540  -JM Side: Bilateral  - Orientation: lower  - Location: leg  - Primary Wound Type: Blisters  -    Dressing Appearance dry;intact  -     Base dressing in place, unable to visualize  -      Periwound dry;redness;warm;edematous;swelling  -     Periwound Temperature warm  -     Periwound Skin Turgor firm  -     Edges irregular  -     Drainage Amount none  -MF     Care, Wound cleansed with;soap and water  -     Dressing Care foam;low-adherent  -MF     Periwound Care dry periwound area maintained  -     Retired Wound - Properties Group Placement Date: 06/22/24  -JM Placement Time: 1540  -JM Side: Bilateral  -JM Orientation: lower  -JM Location: leg  -JM Primary Wound Type: Blisters  -JM    Retired Wound - Properties Group Date first assessed: 06/22/24  -JM Time first assessed: 1540  -JM Side: Bilateral  - Location: leg  -JM Primary Wound Type: Blisters  -JM      Row Name             Wound 06/28/24 0000 Right medial gluteal Pressure Injury    Wound - Properties Group Placement Date: 06/28/24 -TH Placement Time: 0000  -TH Present on Original Admission: N  -TH Side: Right  -TH Orientation: medial  -TH Location: gluteal  -TH Primary Wound Type: Pressure inj  -TH    Retired Wound - Properties Group Placement Date: 06/28/24  -TH Placement Time: 0000  -TH Present on Original Admission: N  -TH Side: Right  -TH Orientation: medial  -TH Location: gluteal  -TH Primary Wound Type: Pressure inj  -TH    Retired Wound - Properties Group Date first assessed: 06/28/24  -TH Time first assessed: 0000  -TH Present on Original Admission: N  -TH Side: Right  -TH Location: gluteal  -TH Primary Wound Type: Pressure inj  -TH      Row Name             Wound 06/30/24 0000 Right posterior greater trochanter Skin Tear    Wound - Properties Group Placement Date: 06/30/24 -TH Placement Time: 0000  -TH Side: Right  -TH Orientation: posterior  -TH Location: greater trochanter  -TH Primary Wound Type: Skin tear  -TH    Retired Wound - Properties Group Placement Date: 06/30/24 -TH Placement Time: 0000  -TH Side: Right  -TH Orientation: posterior  -TH Location: greater trochanter  -TH Primary Wound Type: Skin tear  -TH     Retired Wound - Properties Group Date first assessed: 06/30/24 -TH Time first assessed: 0000  -TH Side: Right  -TH Location: greater trochanter  -TH Primary Wound Type: Skin tear  -TH      Row Name             Wound 06/30/24 0000 Left posterior greater trochanter    Wound - Properties Group Placement Date: 06/30/24 -TH Placement Time: 0000  -TH Side: Left  -TH Orientation: posterior  -TH Location: greater trochanter  -TH    Retired Wound - Properties Group Placement Date: 06/30/24 -TH Placement Time: 0000  -TH Side: Left  -TH Orientation: posterior  -TH Location: greater trochanter  -TH    Retired Wound - Properties Group Date first assessed: 06/30/24 -TH Time first assessed: 0000  -TH Side: Left  -TH Location: greater trochanter  -TH      Row Name             Wound 06/30/24 2146 scrotum Skin Tear    Wound - Properties Group Placement Date: 06/30/24 -TH Placement Time: 2146 -TH Location: scrotum  -TH Primary Wound Type: Skin tear  -TH    Retired Wound - Properties Group Placement Date: 06/30/24 -TH Placement Time: 2146 -TH Location: scrotum  -TH Primary Wound Type: Skin tear  -TH    Retired Wound - Properties Group Date first assessed: 06/30/24 -TH Time first assessed: 2146 -TH Location: scrotum  -TH Primary Wound Type: Skin tear  -TH      Row Name 07/04/24 1330          Coping    Observed Emotional State calm;cooperative;pleasant  -     Verbalized Emotional State acceptance  -     Trust Relationship/Rapport care explained  -       Row Name 07/04/24 1330          Plan of Care Review    Plan of Care Reviewed With patient  -MF     Outcome Evaluation BLE edema improved today with pt able to tolerate compression all night. PT replaced compression and will f/u with pt tomorrow, if able to maintain compression through the night tonight, may decrease freq of compression wrapping change to every other day.  -       Row Name 07/04/24 1330          Positioning and Restraints    Pre-Treatment Position sitting  in chair/recliner  -MF     Post Treatment Position chair  -MF     In Chair sitting;call light within reach  -MF               User Key  (r) = Recorded By, (t) = Taken By, (c) = Cosigned By      Initials Name Provider Type    MF Lloyd Reagan, PT Physical Therapist    Jenny Salazar, RN Registered Nurse    Carolina Mccurdy, PT Physical Therapist                  Physical Therapy Education       Title: PT OT SLP Therapies (Done)       Topic: Physical Therapy (Done)       Point: Mobility training (Done)       Learning Progress Summary             Patient Acceptance, E, VU,DU,NR by ML at 7/1/2024 1154    Acceptance, E, VU by ND at 6/25/2024 1109    Acceptance, E, VU by ND at 6/22/2024 1006                         Point: Home exercise program (Done)       Learning Progress Summary             Patient Acceptance, E, VU,DU,NR by ML at 7/1/2024 1154                         Point: Body mechanics (Done)       Learning Progress Summary             Patient Acceptance, E, VU by ND at 6/25/2024 1109    Acceptance, E, VU by ND at 6/22/2024 1006                         Point: Precautions (Done)       Learning Progress Summary             Patient Acceptance, E, VU,DU,NR by ML at 7/1/2024 1154    Acceptance, E, VU by ND at 6/25/2024 1109    Acceptance, E, VU by ND at 6/22/2024 1006                                         User Key       Initials Effective Dates Name Provider Type Discipline     04/22/21 -  Sally Perez Physical Therapist PT    ND 11/16/23 -  Maryjane Das PT Physical Therapist PT                    Recommendation and Plan  Anticipated Discharge Disposition (PT): inpatient rehabilitation facility  Planned Therapy Interventions (PT): balance training, bed mobility training, gait training, neuromuscular re-education, patient/family education, home exercise program, postural re-education, ROM (range of motion), strengthening, stretching, transfer training  Therapy Frequency (PT): daily  Plan of  Care Reviewed With: patient           Outcome Evaluation: BLE edema improved today with pt able to tolerate compression all night. PT replaced compression and will f/u with pt tomorrow, if able to maintain compression through the night tonight, may decrease freq of compression wrapping change to every other day.  Plan of Care Reviewed With: patient            Time Calculation   PT Charges       Row Name 07/04/24 1330             Time Calculation    Start Time 1330  -MF      PT Goal Re-Cert Due Date 07/11/24  -MF         Untimed Charges    41823-[id number] comp below knee 25  -MF         Total Minutes    Untimed Charges Total Minutes 25  -MF       Total Minutes 25  -MF                User Key  (r) = Recorded By, (t) = Taken By, (c) = Cosigned By      Initials Name Provider Type     Lloyd Reagan, PT Physical Therapist                      Therapy Charges for Today       Code Description Service Date Service Provider Modifiers Qty    28876123897 HC AMANDA DEBRIDE OPEN WOUND UP TO 20CM 7/3/2024 Lloyd Reagan, PT GP 1    38504944084 HC PT MULTI LAYER COMP SYS BELOW KNEE 7/3/2024 Lloyd Reagan, PT GP 1              PT G-Codes  Outcome Measure Options: AM-PAC 6 Clicks Basic Mobility (PT)  AM-PAC 6 Clicks Score (PT): 19  AM-PAC 6 Clicks Score (OT): 18       Lloyd Reagan PT  7/4/2024

## 2024-07-05 ENCOUNTER — APPOINTMENT (OUTPATIENT)
Dept: INTERVENTIONAL RADIOLOGY/VASCULAR | Facility: HOSPITAL | Age: 64
DRG: 291 | End: 2024-07-05
Payer: MEDICARE

## 2024-07-05 LAB
BASOPHILS # BLD AUTO: 0.04 10*3/MM3 (ref 0–0.2)
BASOPHILS NFR BLD AUTO: 0.6 % (ref 0–1.5)
DEPRECATED RDW RBC AUTO: 56.3 FL (ref 37–54)
EOSINOPHIL # BLD AUTO: 0.09 10*3/MM3 (ref 0–0.4)
EOSINOPHIL NFR BLD AUTO: 1.2 % (ref 0.3–6.2)
ERYTHROCYTE [DISTWIDTH] IN BLOOD BY AUTOMATED COUNT: 15.9 % (ref 12.3–15.4)
GLUCOSE BLDC GLUCOMTR-MCNC: 159 MG/DL (ref 70–130)
GLUCOSE BLDC GLUCOMTR-MCNC: 170 MG/DL (ref 70–130)
GLUCOSE BLDC GLUCOMTR-MCNC: 172 MG/DL (ref 70–130)
GLUCOSE BLDC GLUCOMTR-MCNC: 192 MG/DL (ref 70–130)
HCT VFR BLD AUTO: 28.1 % (ref 37.5–51)
HGB BLD-MCNC: 8.9 G/DL (ref 13–17.7)
IMM GRANULOCYTES # BLD AUTO: 0.02 10*3/MM3 (ref 0–0.05)
IMM GRANULOCYTES NFR BLD AUTO: 0.3 % (ref 0–0.5)
INR PPP: 1.36 (ref 0.89–1.12)
LYMPHOCYTES # BLD AUTO: 0.81 10*3/MM3 (ref 0.7–3.1)
LYMPHOCYTES NFR BLD AUTO: 11.1 % (ref 19.6–45.3)
MCH RBC QN AUTO: 30.4 PG (ref 26.6–33)
MCHC RBC AUTO-ENTMCNC: 31.7 G/DL (ref 31.5–35.7)
MCV RBC AUTO: 95.9 FL (ref 79–97)
MONOCYTES # BLD AUTO: 0.92 10*3/MM3 (ref 0.1–0.9)
MONOCYTES NFR BLD AUTO: 12.7 % (ref 5–12)
NEUTROPHILS NFR BLD AUTO: 5.39 10*3/MM3 (ref 1.7–7)
NEUTROPHILS NFR BLD AUTO: 74.1 % (ref 42.7–76)
NRBC BLD AUTO-RTO: 0 /100 WBC (ref 0–0.2)
PLATELET # BLD AUTO: 201 10*3/MM3 (ref 140–450)
PMV BLD AUTO: 10 FL (ref 6–12)
PROTHROMBIN TIME: 16.9 SECONDS (ref 12.2–14.5)
RBC # BLD AUTO: 2.93 10*6/MM3 (ref 4.14–5.8)
WBC NRBC COR # BLD AUTO: 7.27 10*3/MM3 (ref 3.4–10.8)

## 2024-07-05 PROCEDURE — 0W9G3ZZ DRAINAGE OF PERITONEAL CAVITY, PERCUTANEOUS APPROACH: ICD-10-PCS | Performed by: INTERNAL MEDICINE

## 2024-07-05 PROCEDURE — 85610 PROTHROMBIN TIME: CPT | Performed by: INTERNAL MEDICINE

## 2024-07-05 PROCEDURE — 63710000001 INSULIN LISPRO (HUMAN) PER 5 UNITS: Performed by: INTERNAL MEDICINE

## 2024-07-05 PROCEDURE — P9047 ALBUMIN (HUMAN), 25%, 50ML: HCPCS | Performed by: INTERNAL MEDICINE

## 2024-07-05 PROCEDURE — 76942 ECHO GUIDE FOR BIOPSY: CPT

## 2024-07-05 PROCEDURE — 94799 UNLISTED PULMONARY SVC/PX: CPT

## 2024-07-05 PROCEDURE — 25010000002 LIDOCAINE 1 % SOLUTION: Performed by: RADIOLOGY

## 2024-07-05 PROCEDURE — 99232 SBSQ HOSP IP/OBS MODERATE 35: CPT | Performed by: INTERNAL MEDICINE

## 2024-07-05 PROCEDURE — 25010000002 BUMETANIDE PER 0.5 MG: Performed by: INTERNAL MEDICINE

## 2024-07-05 PROCEDURE — 97535 SELF CARE MNGMENT TRAINING: CPT

## 2024-07-05 PROCEDURE — 97530 THERAPEUTIC ACTIVITIES: CPT

## 2024-07-05 PROCEDURE — 63710000001 INSULIN LISPRO (HUMAN) PER 5 UNITS: Performed by: NURSE PRACTITIONER

## 2024-07-05 PROCEDURE — 25010000002 ALBUMIN HUMAN 25% PER 50 ML: Performed by: INTERNAL MEDICINE

## 2024-07-05 PROCEDURE — 82948 REAGENT STRIP/BLOOD GLUCOSE: CPT

## 2024-07-05 PROCEDURE — 94664 DEMO&/EVAL PT USE INHALER: CPT

## 2024-07-05 PROCEDURE — 85025 COMPLETE CBC W/AUTO DIFF WBC: CPT | Performed by: INTERNAL MEDICINE

## 2024-07-05 PROCEDURE — 94761 N-INVAS EAR/PLS OXIMETRY MLT: CPT

## 2024-07-05 PROCEDURE — 29581 APPL MULTLAYER CMPRN SYS LEG: CPT

## 2024-07-05 PROCEDURE — C1729 CATH, DRAINAGE: HCPCS

## 2024-07-05 RX ORDER — LACTULOSE 10 G/15ML
30 SOLUTION ORAL
Status: COMPLETED | OUTPATIENT
Start: 2024-07-05 | End: 2024-07-05

## 2024-07-05 RX ORDER — BISACODYL 5 MG/1
20 TABLET, DELAYED RELEASE ORAL ONCE
Status: COMPLETED | OUTPATIENT
Start: 2024-07-05 | End: 2024-07-05

## 2024-07-05 RX ORDER — ALBUMIN (HUMAN) 12.5 G/50ML
25 SOLUTION INTRAVENOUS EVERY 8 HOURS
Qty: 300 ML | Refills: 0 | Status: COMPLETED | OUTPATIENT
Start: 2024-07-05 | End: 2024-07-06

## 2024-07-05 RX ORDER — LIDOCAINE HYDROCHLORIDE 10 MG/ML
8 INJECTION, SOLUTION INFILTRATION; PERINEURAL ONCE
Status: COMPLETED | OUTPATIENT
Start: 2024-07-05 | End: 2024-07-05

## 2024-07-05 RX ADMIN — Medication 1 APPLICATION: at 11:53

## 2024-07-05 RX ADMIN — INSULIN LISPRO 2 UNITS: 100 INJECTION, SOLUTION INTRAVENOUS; SUBCUTANEOUS at 09:13

## 2024-07-05 RX ADMIN — INSULIN LISPRO 4 UNITS: 100 INJECTION, SOLUTION INTRAVENOUS; SUBCUTANEOUS at 17:01

## 2024-07-05 RX ADMIN — INSULIN LISPRO 2 UNITS: 100 INJECTION, SOLUTION INTRAVENOUS; SUBCUTANEOUS at 21:28

## 2024-07-05 RX ADMIN — LACTULOSE 30 G: 20 SOLUTION ORAL at 11:55

## 2024-07-05 RX ADMIN — BUMETANIDE 2 MG: 0.25 INJECTION, SOLUTION INTRAMUSCULAR; INTRAVENOUS at 21:29

## 2024-07-05 RX ADMIN — IPRATROPIUM BROMIDE AND ALBUTEROL SULFATE 3 ML: 2.5; .5 SOLUTION RESPIRATORY (INHALATION) at 16:40

## 2024-07-05 RX ADMIN — LIDOCAINE HYDROCHLORIDE 8 ML: 10 INJECTION, SOLUTION INFILTRATION; PERINEURAL at 09:50

## 2024-07-05 RX ADMIN — INSULIN LISPRO 2 UNITS: 100 INJECTION, SOLUTION INTRAVENOUS; SUBCUTANEOUS at 11:54

## 2024-07-05 RX ADMIN — SENNOSIDES AND DOCUSATE SODIUM 2 TABLET: 8.6; 5 TABLET ORAL at 21:28

## 2024-07-05 RX ADMIN — BUMETANIDE 2 MG: 0.25 INJECTION, SOLUTION INTRAMUSCULAR; INTRAVENOUS at 08:59

## 2024-07-05 RX ADMIN — POLYETHYLENE GLYCOL 3350 17 G: 17 POWDER, FOR SOLUTION ORAL at 21:28

## 2024-07-05 RX ADMIN — ALBUMIN (HUMAN) 25 G: 0.25 INJECTION, SOLUTION INTRAVENOUS at 15:19

## 2024-07-05 RX ADMIN — BUMETANIDE 2 MG: 0.25 INJECTION, SOLUTION INTRAMUSCULAR; INTRAVENOUS at 15:21

## 2024-07-05 RX ADMIN — LACTULOSE 10 G: 20 SOLUTION ORAL at 15:19

## 2024-07-05 RX ADMIN — LACTULOSE 30 G: 20 SOLUTION ORAL at 14:11

## 2024-07-05 RX ADMIN — IPRATROPIUM BROMIDE AND ALBUTEROL SULFATE 3 ML: 2.5; .5 SOLUTION RESPIRATORY (INHALATION) at 13:04

## 2024-07-05 RX ADMIN — SENNOSIDES AND DOCUSATE SODIUM 2 TABLET: 8.6; 5 TABLET ORAL at 09:00

## 2024-07-05 RX ADMIN — IPRATROPIUM BROMIDE AND ALBUTEROL SULFATE 3 ML: 2.5; .5 SOLUTION RESPIRATORY (INHALATION) at 20:08

## 2024-07-05 RX ADMIN — Medication 10 ML: at 09:04

## 2024-07-05 RX ADMIN — COLCHICINE 0.6 MG: 0.6 TABLET ORAL at 09:00

## 2024-07-05 RX ADMIN — POLYETHYLENE GLYCOL 3350 17 G: 17 POWDER, FOR SOLUTION ORAL at 08:59

## 2024-07-05 RX ADMIN — SPIRONOLACTONE 100 MG: 25 TABLET ORAL at 08:59

## 2024-07-05 RX ADMIN — PANTOPRAZOLE SODIUM 40 MG: 40 TABLET, DELAYED RELEASE ORAL at 04:25

## 2024-07-05 RX ADMIN — FERROUS SULFATE TAB 325 MG (65 MG ELEMENTAL FE) 325 MG: 325 (65 FE) TAB at 09:00

## 2024-07-05 RX ADMIN — PREGABALIN 100 MG: 100 CAPSULE ORAL at 09:00

## 2024-07-05 RX ADMIN — INSULIN LISPRO 2 UNITS: 100 INJECTION, SOLUTION INTRAVENOUS; SUBCUTANEOUS at 17:01

## 2024-07-05 RX ADMIN — BISACODYL 20 MG: 5 TABLET, COATED ORAL at 11:53

## 2024-07-05 RX ADMIN — LACTULOSE 10 G: 20 SOLUTION ORAL at 21:28

## 2024-07-05 RX ADMIN — Medication 1000 UNITS: at 09:00

## 2024-07-05 RX ADMIN — INSULIN LISPRO 4 UNITS: 100 INJECTION, SOLUTION INTRAVENOUS; SUBCUTANEOUS at 11:54

## 2024-07-05 RX ADMIN — IPRATROPIUM BROMIDE AND ALBUTEROL SULFATE 3 ML: 2.5; .5 SOLUTION RESPIRATORY (INHALATION) at 08:13

## 2024-07-05 RX ADMIN — INSULIN LISPRO 4 UNITS: 100 INJECTION, SOLUTION INTRAVENOUS; SUBCUTANEOUS at 09:13

## 2024-07-05 RX ADMIN — LACTULOSE 10 G: 20 SOLUTION ORAL at 08:59

## 2024-07-05 RX ADMIN — Medication 10 ML: at 21:29

## 2024-07-05 NOTE — CASE MANAGEMENT/SOCIAL WORK
Continued Stay Note   [facility]     Patient Name: Jaycob Ndiaye II  MRN: [id number]  Today's Date: 7/5/2024    Admit Date: 6/21/2024    Plan: Home   Discharge Plan       Row Name 07/05/24 1355       Plan    Plan Home    Patient/Family in Agreement with Plan yes    Plan Comments Patinet discharge plan is home with outpatient PT and PT leg Wraps at [facility] for outpatient therapy. Discussed in MDR's, patient had para done today. CM will follow for any discharge needs.    Final Discharge Disposition Code 01 - home or self-care                   Discharge Codes    No documentation.                 Expected Discharge Date and Time       Expected Discharge Date Expected Discharge Time    Jul 5, 2024               Arlene Hyde RN

## 2024-07-05 NOTE — THERAPY TREATMENT NOTE
Patient Name: Jaycob Ndiaye II  : 1960    MRN: [id number]                              Today's Date: 2024       Admit Date: 2024    Visit Dx:     ICD-10-CM ICD-9-CM   1. Peripheral edema  R60.0 782.3   2. Congestive heart failure, unspecified HF chronicity, unspecified heart failure type  I50.9 428.0   3. Hypokalemia  E87.6 276.8   4. Acute on chronic heart failure with preserved ejection fraction (HFpEF)  I50.33 428.23   5. Screen for colon cancer  Z12.11 V76.51   6. Acute on chronic HFrEF (heart failure with reduced ejection fraction)  I50.23 428.23   7. Elevated serum creatinine  R79.89 790.99     Patient Active Problem List   Diagnosis    Screen for colon cancer    Acute on chronic heart failure with preserved ejection fraction (HFpEF)    Elevated serum creatinine    Anemia    Hypokalemia    Cirrhosis of liver    HCV (hepatitis C virus)    Acute on chronic HFrEF (heart failure with reduced ejection fraction)     Past Medical History:   Diagnosis Date    Arthritis     Cancer     Constipation     Depression     Diabetes     type 2 dm, check blood sugar once a day    History of hepatitis C     History of prostate cancer     Hypertension     Irregular heartbeat     Pacemaker     home monitoring    Prostate CA     Requires supplemental oxygen     at night with cpap    Sleep apnea     wears a cpap    SOB (shortness of breath) on exertion     Wears glasses      Past Surgical History:   Procedure Laterality Date    CARDIAC ABLATION      COLONOSCOPY N/A 2021    Procedure: Colonoscopy with polypectomy;  Surgeon: Maurice Proctor MD;  Location: Replaced by [facility] ENDOSCOPY;  Service: Gastroenterology;  Laterality: N/A;    COLONOSCOPY      ENDOSCOPY      KNEE SURGERY Right     1985    PACEMAKER IMPLANTATION      PROSTATE SURGERY      REPLACEMENT TOTAL KNEE Right       General Information       Row Name 24 1520          OT Time and Intention    Document Type therapy note (daily note)  -TODD      Mode of Treatment occupational therapy  -       Row Name 07/05/24 1520          General Information    Patient Profile Reviewed yes  -     Existing Precautions/Restrictions fall;other (see comments)  BLE swelling; scrotal edema  -     Barriers to Rehab medically complex;previous functional deficit  -       Row Name 07/05/24 1520          Cognition    Orientation Status (Cognition) oriented x 4  -       Row Name 07/05/24 1520          Safety Issues, Functional Mobility    Safety Issues Affecting Function (Mobility) awareness of need for assistance;insight into deficits/self-awareness;safety precaution awareness;safety precautions follow-through/compliance;sequencing abilities  -     Impairments Affecting Function (Mobility) strength;range of motion (ROM);other (see comments);endurance/activity tolerance  scrotal edema  -               User Key  (r) = Recorded By, (t) = Taken By, (c) = Cosigned By      Initials Name Provider Type    Kamila Medrano OT Occupational Therapist                   Lymphedema       Row Name 07/05/24 1330 07/04/24 1330 07/03/24 0830       Lymphedema Edema Assessment    Ptting Edema Category By severity  -MF By severity  -MF By severity  -MF    Pitting Edema Severe  -MF Severe  -MF Severe  -MF    Recorded by [MF] Lloyd Reagan, PT [MF] Lloyd Reagan, PT [MF] Lloyd Reagan, PT       Compression/Skin Care    Compression/Skin Care skin care;wrapping location;bandaging  -MF skin care;wrapping location;bandaging  -MF skin care;wrapping location;bandaging  -MF    Skin Care washed/dried;lotion applied  -MF washed/dried;lotion applied  -MF washed/dried;lotion applied  -MF    Wrapping Location lower extremity  -MF lower extremity  -MF lower extremity  -MF    Wrapping Location LE bilateral:;foot to knee  -MF bilateral:;foot to knee  -MF bilateral:;foot to knee  -MF    Wrapping Comments optifoam Ag foam to ant ankles with size 5/6/8 compressogrips doubled/overlapping for  gradient compression  -MF optifoam Ag foam to ant ankles with size 5/6/8 compressogrips doubled/overlapping for gradient compression  -MF optifoam Ag foam to ant ankles with size 5/6/8 compressogrips doubled/overlapping for gradient compression  -MF    Recorded by [MF] Lloyd Reagan, PT [MF] Lloyd Reagan, PT [MF] Lloyd Reagan, PT              User Key  (r) = Recorded By, (t) = Taken By, (c) = Cosigned By      Initials Name Effective Dates    Lloyd Ngo, PT 02/03/23 -                    Mobility/ADL's       Row Name 07/05/24 1522          Transfers    Transfers toilet transfer  -KL       Row Name 07/05/24 1522          Sit-Stand Transfer    Sit-Stand [place] (Transfers) standby assist  -     Assistive Device (Sit-Stand Transfers) walker, front-wheeled  -       Row Name 07/05/24 1522          Stand-Sit Transfer    Stand-Sit [place] (Transfers) standby assist  -     Assistive Device (Stand-Sit Transfers) walker, front-wheeled  -       Row Name 07/05/24 1522          Toilet Transfer    Type (Toilet Transfer) sit-stand;stand-sit  -KL     [place] Level (Toilet Transfer) standby assist;1 person assist  -     Assistive Device (Toilet Transfer) commode, bedside without drop arms;walker, front-wheeled  -KL       Row Name 07/05/24 1522          Activities of Daily Living    BADL Assessment/Intervention upper body dressing;toileting  -       Row Name 07/05/24 1522          Upper Body Dressing Assessment/Training    [place] Level (Upper Body Dressing) don;doff;front opening garment;standby assist  -     Position (Upper Body Dressing) unsupported sitting  -       Row Name 07/05/24 1522          Toileting Assessment/Training    [place] Level (Toileting) perform perineal hygiene;standby assist  -     Assistive Devices (Toileting) commode, bedside without drop arms  -KL     Position (Toileting) supported sitting  -KL               User Key  (r) = Recorded By, (t)  = Taken By, (c) = Cosigned By      Initials Name Provider Type    Kamila Medrano OT Occupational Therapist                   Obj/Interventions       Row Name 07/05/24 1525          Balance    Static Sitting Balance independent  -     Dynamic Sitting Balance independent  -     Position, Sitting Balance unsupported;sitting in chair  -     Static Standing Balance standby assist  -     Dynamic Standing Balance supervision  -     Position/Device Used, Standing Balance supported  -     Balance Interventions sitting;standing;sit to stand;supported;static;dynamic;occupation based/functional task  -               User Key  (r) = Recorded By, (t) = Taken By, (c) = Cosigned By      Initials Name Provider Type    Kamila Medrano OT Occupational Therapist                   Goals/Plan    No documentation.                  Clinical Impression       Row Name 07/05/24 1526          Pain Assessment    Pretreatment Pain Rating 0/10 - no pain  -     Posttreatment Pain Rating 0/10 - no pain  -       Row Name 07/05/24 1526          Plan of Care Review    Plan of Care Reviewed With patient  -     Progress no change  -     Outcome Evaluation Pt continues to benefit from IPOT services to address deficits in functional activity tolerance, generalized weakness and self-care. Will continue POC to progress towards PLOF. d/c rec is IPR.  -       Row Name 07/05/24 1526          Therapy Plan Review/Discharge Plan (OT)    Anticipated Discharge Disposition (OT) inpatient rehabilitation facility  -       Row Name 07/05/24 1526          Vital Signs    Pre Patient Position Sitting  -     Intra Patient Position Standing  -     Post Patient Position Sitting  -       Row Name 07/05/24 1526          Positioning and Restraints    Pre-Treatment Position sitting in chair/recliner  -     Post Treatment Position chair  -KL     In Chair notified nsg;reclined;sitting;call light within reach;encouraged to call for assist  RN  deferred chair alarm  -               User Key  (r) = Recorded By, (t) = Taken By, (c) = Cosigned By      Initials Name Provider Type    Kamila Medrano OT Occupational Therapist                   Outcome Measures       Row Name 07/05/24 1528          How much help from another is currently needed...    Putting on and taking off regular lower body clothing? 2  -KL     Bathing (including washing, rinsing, and drying) 2  -KL     Toileting (which includes using toilet bed pan or urinal) 3  -KL     Putting on and taking off regular upper body clothing 4  -KL     Taking care of personal grooming (such as brushing teeth) 4  -KL     Eating meals 4  -KL     AM-PAC 6 Clicks Score (OT) 19  -       Row Name 07/05/24 1528          Functional Assessment    Outcome Measure Options AM-PAC 6 Clicks Daily Activity (OT)  -               User Key  (r) = Recorded By, (t) = Taken By, (c) = Cosigned By      Initials Name Provider Type    Kamila Medrano OT Occupational Therapist                    Occupational Therapy Education       Title: PT OT SLP Therapies (Done)       Topic: Occupational Therapy (Done)       Point: ADL training (Done)       Description:   Instruct learner(s) on proper safety adaptation and remediation techniques during self care or transfers.   Instruct in proper use of assistive devices.                  Learning Progress Summary             Patient Acceptance, E, VU by TODD at 7/5/2024 1529    Acceptance, E,D, VU,NR by LANEY at 7/3/2024 0943    Comment: AE use to improve LBB/LBD, wand UE TE    Acceptance, E,TB, VU,DU by KF at 6/26/2024 0754    Acceptance, TB,E, VU by AF at 6/22/2024 0955                         Point: Home exercise program (Done)       Description:   Instruct learner(s) on appropriate technique for monitoring, assisting and/or progressing therapeutic exercises/activities.                  Learning Progress Summary             Patient Acceptance, E,D, VU,NR by LANEY at 7/3/2024 0943    Comment: AE  use to improve LBB/LBD, wand UE TE    Acceptance, TB,E, VU by AF at 6/22/2024 0955                         Point: Precautions (Done)       Description:   Instruct learner(s) on prescribed precautions during self-care and functional transfers.                  Learning Progress Summary             Patient Acceptance, E, VU by KL at 7/5/2024 1529    Acceptance, E,TB, VU,DU by KF at 6/26/2024 0754                         Point: Body mechanics (Done)       Description:   Instruct learner(s) on proper positioning and spine alignment during self-care, functional mobility activities and/or exercises.                  Learning Progress Summary             Patient Acceptance, E, VU by KL at 7/5/2024 1529    Acceptance, E,TB, VU,DU by KF at 6/26/2024 0754    Acceptance, TB,E, VU by AF at 6/22/2024 0955                                         User Key       Initials Effective Dates Name Provider Type Discipline    LANEY 07/11/23 -  Nelly Echeverria OT Occupational Therapist OT    KF 08/09/23 -  Sho Workman OT Occupational Therapist OT    AF 08/15/23 -  Roberta Corbin OT Occupational Therapist OT     02/05/24 -  Kamila Sanabria OT Occupational Therapist OT                  OT Recommendation and Plan     Plan of Care Review  Plan of Care Reviewed With: patient  Progress: no change  Outcome Evaluation: Pt continues to benefit from IPOT services to address deficits in functional activity tolerance, generalized weakness and self-care. Will continue POC to progress towards PLOF. d/c rec is IPR.     Time Calculation:         Time Calculation- OT       Row Name 07/05/24 1529             Time Calculation- OT    OT Start Time 1445  -KL      OT Received On 07/05/24  -KL         Timed Charges    25912 - OT Self Care/Mgmt Minutes 15  -KL         Total Minutes    Timed Charges Total Minutes 15  -KL       Total Minutes 15  -KL                User Key  (r) = Recorded By, (t) = Taken By, (c) = Cosigned By      Initials Name Provider Type     Kamila Medrano OT Occupational Therapist                  Therapy Charges for Today       Code Description Service Date Service Provider Modifiers Qty    47014181664 HC OT SELF CARE/MGMT/TRAIN EA 15 MIN 7/5/2024 Kamila Sanabria OT GO 1                 Kamila Sanabria OT  7/5/2024

## 2024-07-05 NOTE — PLAN OF CARE
Goal Outcome Evaluation:  Plan of Care Reviewed With: patient           Outcome Evaluation: Pt with moderate increased c/o pain noted today.  PT replaced compression wrapping to help further centralize LE edema to reduce pain and discomfort. PT will f/u with pt tomorrow to assess tolerance to compression wraps.

## 2024-07-05 NOTE — THERAPY TREATMENT NOTE
Patient Name: Jaycob Ndiaye II  : 1960    MRN: [id number]                              Today's Date: 2024       Admit Date: 2024    Visit Dx:     ICD-10-CM ICD-9-CM   1. Peripheral edema  R60.0 782.3   2. Congestive heart failure, unspecified HF chronicity, unspecified heart failure type  I50.9 428.0   3. Hypokalemia  E87.6 276.8   4. Acute on chronic heart failure with preserved ejection fraction (HFpEF)  I50.33 428.23   5. Screen for colon cancer  Z12.11 V76.51   6. Acute on chronic HFrEF (heart failure with reduced ejection fraction)  I50.23 428.23   7. Elevated serum creatinine  R79.89 790.99     Patient Active Problem List   Diagnosis    Screen for colon cancer    Acute on chronic heart failure with preserved ejection fraction (HFpEF)    Elevated serum creatinine    Anemia    Hypokalemia    Cirrhosis of liver    HCV (hepatitis C virus)    Acute on chronic HFrEF (heart failure with reduced ejection fraction)     Past Medical History:   Diagnosis Date    Arthritis     Cancer     Constipation     Depression     Diabetes     type 2 dm, check blood sugar once a day    History of hepatitis C     History of prostate cancer     Hypertension     Irregular heartbeat     Pacemaker     home monitoring    Prostate CA     Requires supplemental oxygen     at night with cpap    Sleep apnea     wears a cpap    SOB (shortness of breath) on exertion     Wears glasses      Past Surgical History:   Procedure Laterality Date    CARDIAC ABLATION      COLONOSCOPY N/A 2021    Procedure: Colonoscopy with polypectomy;  Surgeon: Maurice Proctor MD;  Location: [facility] ENDOSCOPY;  Service: Gastroenterology;  Laterality: N/A;    COLONOSCOPY      ENDOSCOPY      KNEE SURGERY Right     1985    PACEMAKER IMPLANTATION      PROSTATE SURGERY      REPLACEMENT TOTAL KNEE Right       General Information       Row Name 24 1544          Physical Therapy Time and Intention    Document Type therapy note (daily  note)  -KE     Mode of Treatment physical therapy  -       Row Name 07/05/24 1540          General Information    Patient Profile Reviewed yes  -KE     Existing Precautions/Restrictions fall;other (see comments)  BLE swelling; scrotal edema  -KE     Barriers to Rehab medically complex;previous functional deficit  -KE       Row Name 07/05/24 1544          Cognition    Orientation Status (Cognition) oriented x 4  -KE       Row Name 07/05/24 1546          Safety Issues, Functional Mobility    Safety Issues Affecting Function (Mobility) ability to follow commands;awareness of need for assistance;insight into deficits/self-awareness;safety precaution awareness;safety precautions follow-through/compliance;sequencing abilities  -KE     Impairments Affecting Function (Mobility) strength;range of motion (ROM);other (see comments);endurance/activity tolerance;balance  -KE               User Key  (r) = Recorded By, (t) = Taken By, (c) = Cosigned By      Initials Name Provider Type    Joycelyn Lobato, PT Physical Therapist                   Mobility       Row Name 07/05/24 1545          Bed Mobility    Comment, (Bed Mobility) UIC  -       Row Name 07/05/24 1545          Transfers    Comment, (Transfers) further mobility declined d/t increased pain levels with scrotal edema. Pt encouraged and educated on role of therapy in rehab process as well as the importance of improved participation.  -       Row Name 07/05/24 154          Bed-Chair Transfer    Bed-Chair [place] (Transfers) contact guard  -LITA     Assistive Device (Bed-Chair Transfers) walker, front-wheeled  -LITA     Comment, (Bed-Chair Transfer) t/f chair<>BSC;  -KE       Row Name 07/05/24 1541          Sit-Stand Transfer    Sit-Stand [place] (Transfers) standby assist  -KE     Assistive Device (Sit-Stand Transfers) walker, front-wheeled  -KE     Comment, (Sit-Stand Transfer) x1 from chair, x1 from BSC  -KE               User Key  (r) = Recorded By, (t)  = Taken By, (c) = Cosigned By      Initials Name Provider Type    Joycelyn Lobato PT Physical Therapist                   Obj/Interventions       Row Name 07/05/24 1549          Balance    Balance Assessment sitting static balance;sitting dynamic balance;sit to stand dynamic balance;standing static balance;standing dynamic balance  -KE     Static Sitting Balance independent  -KE     Dynamic Sitting Balance independent  -KE     Position, Sitting Balance unsupported;sitting in chair  -KE     Sit to Stand Dynamic Balance standby assist  -KE     Static Standing Balance standby assist  -KE     Dynamic Standing Balance contact guard  -KE     Position/Device Used, Standing Balance supported;walker, front-wheeled  -KE     Balance Interventions sitting;standing;sit to stand;supported;static;dynamic  -KE               User Key  (r) = Recorded By, (t) = Taken By, (c) = Cosigned By      Initials Name Provider Type    Joycelyn Lobato, [place] Physical Therapist                   Goals/Plan    No documentation.                  Clinical Impression       Row Name 07/05/24 1551          Pain    Pain Intervention(s) Ambulation/increased activity;Repositioned  -KE       Row Name 07/05/24 1551          Pain Scale: FACES Pre/Post-Treatment    Pain: FACES Scale, Pretreatment 4-->hurts little more  -KE     Posttreatment Pain Rating 4-->hurts little more  -KE     Pain Location - Side/Orientation Bilateral  -LITA     Pain Location lower  -KE     Pain Location - extremity  -KE       Row Name 07/05/24 1551          Plan of Care Review    Plan of Care Reviewed With patient  -KE     Progress no change  -KE     Outcome Evaluation Pt completing t/f from chair >BSC w/ SBA and FWW. Pt continues to present with weakness, balance deficits, and decreased activity tolerance warranting skilled IP PT services. Functional mobility remains limited by increased pain levels. Continue progressing current PT POC as tolerated.  -LITA       Row Name 07/05/24  1551          Vital Signs    Pre Systolic BP Rehab 108  -KE     Pre Treatment Diastolic BP 64  -KE     Pretreatment Heart Rate (beats/min) 88  -KE     O2 Delivery Pre Treatment room air  -KE     O2 Delivery Intra Treatment room air  -KE     O2 Delivery Post Treatment room air  -KE     Pre Patient Position Sitting  -KE     Intra Patient Position Standing  -KE     Post Patient Position Sitting  -KE       Row Name 07/05/24 1551          Positioning and Restraints    Pre-Treatment Position sitting in chair/recliner  -KE     Post Treatment Position chair  -KE     In Chair notified nsg;sitting;call light within reach;encouraged to call for assist  -KE               User Key  (r) = Recorded By, (t) = Taken By, (c) = Cosigned By      Initials Name Provider Type    Joycelyn Lobato, AR Physical Therapist                   Outcome Measures       Row Name 07/05/24 1554          How much help from another person do you currently need...    Turning from your back to your side while in flat bed without using bedrails? 3  -KE     Moving from lying on back to sitting on the side of a flat bed without bedrails? 3  -KE     Moving to and from a bed to a chair (including a wheelchair)? 3  -KE     Standing up from a chair using your arms (e.g., wheelchair, bedside chair)? 3  -KE     Climbing 3-5 steps with a railing? 2  -KE     To walk in hospital room? 3  -KE     AM-PAC 6 Clicks Score (PT) 17  -KE     Highest Level of Mobility Goal 5 --> Static standing  -KE       Row Name 07/05/24 1528          Functional Assessment    Outcome Measure Options AM-PAC 6 Clicks Daily Activity (OT)  -KL               User Key  (r) = Recorded By, (t) = Taken By, (c) = Cosigned By      Initials Name Provider Type    Joycelyn Lobato, AR Physical Therapist    Kamila Medrano OT Occupational Therapist                                 Physical Therapy Education       Title: PT OT SLP Therapies (Done)       Topic: Physical Therapy (Done)       Point:  Mobility training (Done)       Learning Progress Summary             Patient Acceptance, E, VU by KE at 7/5/2024 1447    Acceptance, E, VU,DU,NR by ML at 7/1/2024 1154    Acceptance, E, VU by ND at 6/25/2024 1109    Acceptance, E, VU by ND at 6/22/2024 1006                         Point: Home exercise program (Done)       Learning Progress Summary             Patient Acceptance, E, VU,DU,NR by ML at 7/1/2024 1154                         Point: Body mechanics (Done)       Learning Progress Summary             Patient Acceptance, E, VU by KE at 7/5/2024 1447    Acceptance, E, VU by ND at 6/25/2024 1109    Acceptance, E, VU by ND at 6/22/2024 1006                         Point: Precautions (Done)       Learning Progress Summary             Patient Acceptance, E, VU by KE at 7/5/2024 1447    Acceptance, E, VU,DU,NR by ML at 7/1/2024 1154    Acceptance, E, VU by ND at 6/25/2024 1109    Acceptance, E, VU by ND at 6/22/2024 1006                                         User Key       Initials Effective Dates Name Provider Type Discipline     04/22/21 -  Sally Perez Physical Therapist PT    ND 11/16/23 -  Maryjane Das, [place] Physical Therapist PT     11/16/23 -  Joycelyn Nguyen PT Physical Therapist PT                  PT Recommendation and Plan     Plan of Care Reviewed With: patient  Progress: no change  Outcome Evaluation: Pt completing t/f from chair >BSC w/ SBA and FWW. Pt continues to present with weakness, balance deficits, and decreased activity tolerance warranting skilled IP PT services. Functional mobility remains limited by increased pain levels. Continue progressing current PT POC as tolerated.     Time Calculation:         PT Charges       Row Name 07/05/24 1555 07/05/24 1330          Time Calculation    Start Time 1447  -KE 1330  -     PT Received On 07/05/24  -KE --     PT Goal Re-Cert Due Date -- 07/11/24  -        Timed Charges    58231 - PT Therapeutic Activity Minutes 15  -KE --         Untimed Charges    87681-Xkspdisxzb comp below knee -- 25  -MF        Total Minutes    Timed Charges Total Minutes 15  -KE --     Untimed Charges Total Minutes -- 25  -MF      Total Minutes 15  -KE 25  -MF               User Key  (r) = Recorded By, (t) = Taken By, (c) = Cosigned By      Initials Name Provider Type     Lloyd Reagan, PT Physical Therapist    Joycelyn Lobato, AR Physical Therapist                  Therapy Charges for Today       Code Description Service Date Service Provider Modifiers Qty    23537834754  PT THERAPEUTIC ACT EA 15 MIN 7/5/2024 Joycelyn Nguyen, PT GP 1            PT G-Codes  Outcome Measure Options: AM-PAC 6 Clicks Daily Activity (OT)  AM-PAC 6 Clicks Score (PT): 17  AM-PAC 6 Clicks Score (OT): 19  PT Discharge Summary  Anticipated Discharge Disposition (PT): inpatient rehabilitation facility    Joycelyn Nugyen PT  7/5/2024

## 2024-07-05 NOTE — PLAN OF CARE
Goal Outcome Evaluation:              Outcome Evaluation: VSS. RA. SR -> V-paced <50% on tele. IV bumex given with great UOP from rodrigues. Pt up mult times to bedside commode throughout the night to try to have BM. Pt c/o L hand pain r/t gout but refused pain meds. Pt c/o pain in his legs and stated he could no longer wear the compression wraps. Compression wraps removed at 0000. Pt slept between care.

## 2024-07-05 NOTE — PROCEDURES
The following procedure was performed: Para    Please see corresponding Radiology report for in detail procedural information. The Radiology report will be dictated shortly, if not done so already. Please see the IR RN note for the information regarding medicines administered if any, terry-procedural vitals and I/O information.

## 2024-07-05 NOTE — PROGRESS NOTES
" LOS: 13 days   Patient Care Team:  Lorena Chamberlain APRN as PCP - General (Nurse Practitioner)  Lloyd Craig MD as Consulting Physician (Cardiology)    Chief Complaint: NASRIN    Subjective   Underwent paracentesis today. Fluid removed 5.3 liter.     History taken from: patient    Objective     Vital Sign Min/Max for last 24 hours  Temp  Min: 97.2 °F (36.2 °C)  Max: 98.4 °F (36.9 °C)   BP  Min: 103/66  Max: 121/73   Pulse  Min: 74  Max: 91   Resp  Min: 16  Max: 20   SpO2  Min: 93 %  Max: 100 %   No data recorded   No data recorded     Flowsheet Rows      Flowsheet Row First Filed Value   Admission Height 185.4 cm (73\") Documented at 06/21/2024 1409   Admission Weight 127 kg (279 lb) Documented at 06/21/2024 1409            I/O this shift:  In: 350 [P.O.:350]  Out: 6400 [Urine:1100; Other:5300]  I/O last 3 completed shifts:  In: 960 [P.O.:960]  Out: 8150 [Urine:8150]    Objective:  Physical examination:    General Appearance: Alert, oriented, no obvious distress.  Morbid obesity  Eyes: PER, EOMI.  Neck: Supple no JVD.  Lungs: Clear auscultation, no rales rhonchi's, equal chest movement, nonlabored.  Heart: No gallop, murmur, rub, RRR.  Abdomen: Soft, nontender, positive bowel sounds, morbid obesity with abdominal wall edema  Extremities: Significant bilateral lower extremity edema 3-4+.  Lower extremity tenderness to improve.  No cyanosis.  Neuro: No focal deficit, moving all extremities, alert oriented X 3    : Rouse catheter in place.  Urine slightly bloody    Results Review:     I reviewed the patient's new clinical results.    WBC WBC   Date Value Ref Range Status   07/05/2024 7.27 3.40 - 10.80 10*3/mm3 Final   07/04/2024 6.96 3.40 - 10.80 10*3/mm3 Final   07/03/2024 7.62 3.40 - 10.80 10*3/mm3 Final        HGB Hemoglobin   Date Value Ref Range Status   07/05/2024 8.9 (L) 13.0 - 17.7 g/dL Final   07/04/2024 9.2 (L) 13.0 - 17.7 g/dL Final   07/03/2024 8.8 (L) 13.0 - 17.7 g/dL Final        HCT " "Hematocrit   Date Value Ref Range Status   07/05/2024 28.1 (L) 37.5 - 51.0 % Final   07/04/2024 28.4 (L) 37.5 - 51.0 % Final   07/03/2024 27.4 (L) 37.5 - 51.0 % Final        Platlets No results found for: \"LABPLAT\"   MCV MCV   Date Value Ref Range Status   07/05/2024 95.9 79.0 - 97.0 fL Final   07/04/2024 96.9 79.0 - 97.0 fL Final   07/03/2024 95.8 79.0 - 97.0 fL Final            Sodium Sodium   Date Value Ref Range Status   07/04/2024 137 136 - 145 mmol/L Final   07/03/2024 134 (L) 136 - 145 mmol/L Final      Potassium Potassium   Date Value Ref Range Status   07/04/2024 3.7 3.5 - 5.2 mmol/L Final   07/04/2024 3.6 3.5 - 5.2 mmol/L Final   07/03/2024 4.4 3.5 - 5.2 mmol/L Final   07/03/2024 3.6 3.5 - 5.2 mmol/L Final   07/02/2024 3.9 3.5 - 5.2 mmol/L Final      Chloride Chloride   Date Value Ref Range Status   07/04/2024 93 (L) 98 - 107 mmol/L Final   07/03/2024 92 (L) 98 - 107 mmol/L Final      CO2 CO2   Date Value Ref Range Status   07/04/2024 29.0 22.0 - 29.0 mmol/L Final   07/03/2024 30.0 (H) 22.0 - 29.0 mmol/L Final      BUN BUN   Date Value Ref Range Status   07/04/2024 82 (H) 8 - 23 mg/dL Final   07/03/2024 87 (H) 8 - 23 mg/dL Final      Creatinine Creatinine   Date Value Ref Range Status   07/04/2024 1.87 (H) 0.76 - 1.27 mg/dL Final   07/03/2024 1.99 (H) 0.76 - 1.27 mg/dL Final      Calcium Calcium   Date Value Ref Range Status   07/04/2024 9.5 8.6 - 10.5 mg/dL Final   07/03/2024 9.5 8.6 - 10.5 mg/dL Final      PO4 No results found for: \"CAPO4\"   Albumin Albumin   Date Value Ref Range Status   07/03/2024 4.0 3.5 - 5.2 g/dL Final        Magnesium No results found for: \"MG\"     Uric Acid No results found for: \"URICACID\"       Medication Review: Yes    Assessment & Plan       Acute on chronic heart failure with preserved ejection fraction (HFpEF)    Elevated serum creatinine    Anemia    Hypokalemia    Cirrhosis of liver    HCV (hepatitis C virus)    Acute on chronic HFrEF (heart failure with reduced ejection " fraction)           1.  Acute kidney disease: Last known stable cr 1.1 in 2023. Cr on recent admission at [facility] few weeks ago 1.6-1.9 mg/dl. Most recent cr 2.0-2.2 GFR 35-36ml/min. Urine sodium 20. Urine cr 39.5. Renal US no hydro     2.  New onset liver cirrhosis: Complications include ascites and LE edema.      3.  Volume status: Dependent edema b/l + ascites. Getting diuretics     4.  Hypokalemia: In the setting of diuretics     5.  Hyponatremia: Due to total body volume overload.      6.  HFpEF: Dependent edema      7.  Anemia: Recent hx of GI bleed. S/p EGD and colonoscopy.     Volume status: anasarca on admission. Responding well to diuretics so far. LE edema improving. Scrotal edema persistent.     Gout: Stable. On colchicine. Reduce the dose to 3x weekly if developing diarrhea       Recommendation:  Continue bumex 2 mg TID.   Continue spironolactone to 100mg daily   Strict I/O.   Check standing weight every 2-3 days. Patient still c/o significant scrotal edema. Therefore will continue with IV bumex for another 24-48hr.     William Bartlett MD  07/05/24  17:44 EDT

## 2024-07-05 NOTE — PLAN OF CARE
Goal Outcome Evaluation:  Plan of Care Reviewed With: patient        Progress: no change  Outcome Evaluation: Pt continues to benefit from IPOT services to address deficits in functional activity tolerance, generalized weakness and self-care. Will continue POC to progress towards PLOF. d/c rec is IPR.      Anticipated Discharge Disposition (OT): inpatient rehabilitation facility

## 2024-07-05 NOTE — THERAPY WOUND CARE TREATMENT
Acute Care - Wound/Debridement Treatment Note  [facility]     Patient Name: Jaycob Ndiaye II  : 1960  MRN: [id number]  Today's Date: 2024                Admit Date: 2024    Visit Dx:    ICD-10-CM ICD-9-CM   1. Peripheral edema  R60.0 782.3   2. Congestive heart failure, unspecified HF chronicity, unspecified heart failure type  I50.9 428.0   3. Hypokalemia  E87.6 276.8   4. Acute on chronic heart failure with preserved ejection fraction (HFpEF)  I50.33 428.23   5. Screen for colon cancer  Z12.11 V76.51   6. Acute on chronic HFrEF (heart failure with reduced ejection fraction)  I50.23 428.23   7. Elevated serum creatinine  R79.89 790.99       Patient Active Problem List   Diagnosis    Screen for colon cancer    Acute on chronic heart failure with preserved ejection fraction (HFpEF)    Elevated serum creatinine    Anemia    Hypokalemia    Cirrhosis of liver    HCV (hepatitis C virus)    Acute on chronic HFrEF (heart failure with reduced ejection fraction)        Past Medical History:   Diagnosis Date    Arthritis     Cancer     Constipation     Depression     Diabetes     type 2 dm, check blood sugar once a day    History of hepatitis C     History of prostate cancer     Hypertension     Irregular heartbeat     Pacemaker     home monitoring    Prostate CA     Requires supplemental oxygen     at night with cpap    Sleep apnea     wears a cpap    SOB (shortness of breath) on exertion     Wears glasses         Past Surgical History:   Procedure Laterality Date    CARDIAC ABLATION      COLONOSCOPY N/A 2021    Procedure: Colonoscopy with polypectomy;  Surgeon: Maurice Proctor MD;  Location: [facility];  Service: Gastroenterology;  Laterality: N/A;    COLONOSCOPY      ENDOSCOPY      KNEE SURGERY Right     1985    PACEMAKER IMPLANTATION      PROSTATE SURGERY      REPLACEMENT TOTAL KNEE Right            Wound 24 1540 Bilateral lower leg Blisters (Active)   Dressing Appearance  intact 07/05/24 1330   Closure Adhesive bandage 07/04/24 2010   Base dressing in place, unable to visualize 07/05/24 1330   Drainage Amount none 07/05/24 1330   Periwound Care dry periwound area maintained 07/05/24 0600       Wound 06/28/24 0000 Right medial gluteal Pressure Injury (Active)   Closure Open to air 07/04/24 2010   Base maroon/purple 07/04/24 2010   Periwound intact 07/04/24 2010   Dressing Care open to air 07/04/24 1800   Periwound Care dry periwound area maintained 07/05/24 0600       Wound 06/30/24 0000 Right posterior greater trochanter Skin Tear (Active)   Closure Open to air 07/04/24 2010   Base red 07/04/24 2010   Drainage Amount none 07/04/24 2010   Periwound Care dry periwound area maintained 07/05/24 0600       Wound 06/30/24 0000 Left posterior greater trochanter (Active)   Dressing Appearance open to air 07/04/24 2010   Closure Open to air 07/04/24 2010   Base red 07/04/24 2010   Drainage Amount none 07/04/24 2010   Periwound Care dry periwound area maintained 07/05/24 0600       Wound 06/30/24 2146 scrotum Skin Tear (Active)   Closure Open to air 07/04/24 2010   Drainage Amount none 07/04/24 2010   Periwound Care dry periwound area maintained 07/05/24 0600      Lymphedema       Row Name 07/05/24 1330             Lymphedema Edema Assessment    Ptting Edema Category By severity  -MF      Pitting Edema Severe  -MF         Compression/Skin Care    Compression/Skin Care skin care;wrapping location;bandaging  -MF      Skin Care washed/dried;lotion applied  -MF      Wrapping Location lower extremity  -MF      Wrapping Location LE bilateral:;foot to knee  -MF      Wrapping Comments optifoam Ag foam to ant ankles with size 5/6/8 compressogrips doubled/overlapping for gradient compression  -MF                User Key  (r) = Recorded By, (t) = Taken By, (c) = Cosigned By      Initials Name Provider Type    Lloyd Ngo, PT Physical Therapist                    WOUND DEBRIDEMENT  Total area of  Debridement: ~5cm2  Debridement Site 1  Location- Site 1: R ant Le  Selective Debridement- Site 1: Wound Surface <20cmsq  Instruments- Site 1: tweezers  Excised Tissue Description- Site 1: minimum, other (comment) (crusted nonviable skin / scabs)  Bleeding- Site 1: none               PT Assessment (Last 12 Hours)       PT Evaluation and Treatment       Row Name 07/05/24 1330          Physical Therapy Time and Intention    Subjective Information complains of;weakness;pain;fatigue  -     Document Type therapy note (daily note);wound care  -     Mode of Treatment individual therapy;physical therapy  -       Row Name 07/05/24 1330          Pain    Pain Intervention(s) Repositioned  -       Row Name 07/05/24 1330          Pain Scale: FACES Pre/Post-Treatment    Pain: FACES Scale, Pretreatment 4-->hurts little more  -MF     Posttreatment Pain Rating 4-->hurts little more  -MF     Pain Location - Side/Orientation Bilateral  -     Pain Location lower  -     Pain Location - extremity  -       Row Name 07/05/24 1330          Wound 06/22/24 1540 Bilateral lower leg Blisters    Wound - Properties Group Placement Date: 06/22/24  - Placement Time: 1540  -JM Side: Bilateral  -JM Orientation: lower  -JM Location: leg  -JM Primary Wound Type: Blisters  -JM    Dressing Appearance intact  -     Base dressing in place, unable to visualize  -     Drainage Amount none  -     Retired Wound - Properties Group Placement Date: 06/22/24  - Placement Time: 1540  -JM Side: Bilateral  -JM Orientation: lower  -JM Location: leg  -JM Primary Wound Type: Blisters  -JM    Retired Wound - Properties Group Date first assessed: 06/22/24  - Time first assessed: 1540  -JM Side: Bilateral  -JM Location: leg  -JM Primary Wound Type: Blisters  -JM      Row Name             Wound 06/28/24 0000 Right medial gluteal Pressure Injury    Wound - Properties Group Placement Date: 06/28/24  -TH Placement Time: 0000  -TH Present on Original  Admission: N  -TH Side: Right  -TH Orientation: medial  -TH Location: gluteal  -TH Primary Wound Type: Pressure inj  -TH    Retired Wound - Properties Group Placement Date: 06/28/24 -TH Placement Time: 0000  -TH Present on Original Admission: N  -TH Side: Right  -TH Orientation: medial  -TH Location: gluteal  -TH Primary Wound Type: Pressure inj  -TH    Retired Wound - Properties Group Date first assessed: 06/28/24 -TH Time first assessed: 0000  -TH Present on Original Admission: N  -TH Side: Right  -TH Location: gluteal  -TH Primary Wound Type: Pressure inj  -TH      Row Name             Wound 06/30/24 0000 Right posterior greater trochanter Skin Tear    Wound - Properties Group Placement Date: 06/30/24 -TH Placement Time: 0000  -TH Side: Right  -TH Orientation: posterior  -TH Location: greater trochanter  -TH Primary Wound Type: Skin tear  -TH    Retired Wound - Properties Group Placement Date: 06/30/24 -TH Placement Time: 0000  -TH Side: Right  -TH Orientation: posterior  -TH Location: greater trochanter  -TH Primary Wound Type: Skin tear  -TH    Retired Wound - Properties Group Date first assessed: 06/30/24 -TH Time first assessed: 0000  -TH Side: Right  -TH Location: greater trochanter  -TH Primary Wound Type: Skin tear  -TH      Row Name             Wound 06/30/24 0000 Left posterior greater trochanter    Wound - Properties Group Placement Date: 06/30/24 -TH Placement Time: 0000  -TH Side: Left  -TH Orientation: posterior  -TH Location: greater trochanter  -TH    Retired Wound - Properties Group Placement Date: 06/30/24 -TH Placement Time: 0000  -TH Side: Left  -TH Orientation: posterior  -TH Location: greater trochanter  -TH    Retired Wound - Properties Group Date first assessed: 06/30/24 -TH Time first assessed: 0000  -TH Side: Left  -TH Location: greater trochanter  -TH      Row Name             Wound 06/30/24 2146 scrotum Skin Tear    Wound - Properties Group Placement Date: 06/30/24 -TH  Placement Time: 2146 -TH Location: scrotum  -TH Primary Wound Type: Skin tear  -TH    Retired Wound - Properties Group Placement Date: 06/30/24 -TH Placement Time: 2146 -TH Location: scrotum  -TH Primary Wound Type: Skin tear  -TH    Retired Wound - Properties Group Date first assessed: 06/30/24 -TH Time first assessed: 2146 -TH Location: scrotum  -TH Primary Wound Type: Skin tear  -TH      Row Name             Wound 07/05/24 1209 Right anterior hip Puncture    Wound - Properties Group Placement Date: 07/05/24  -BK Placement Time: 1209  -BK Side: Right  -BK Orientation: anterior  -BK Location: hip  -BK Primary Wound Type: Puncture  -BK    Retired Wound - Properties Group Placement Date: 07/05/24  -BK Placement Time: 1209  -BK Side: Right  -BK Orientation: anterior  -BK Location: hip  -BK Primary Wound Type: Puncture  -BK    Retired Wound - Properties Group Date first assessed: 07/05/24  -BK Time first assessed: 1209  -BK Side: Right  -BK Location: hip  -BK Primary Wound Type: Puncture  -BK      Row Name 07/05/24 1330          Coping    Observed Emotional State calm;pleasant  -     Verbalized Emotional State acceptance  -     Trust Relationship/Rapport care explained  -       Row Name 07/05/24 1050          Plan of Care Review    Plan of Care Reviewed With patient  -MF     Outcome Evaluation Pt with moderate increased c/o pain noted today.  PT replaced compression wrapping to help further centralize LE edema to reduce pain and discomfort. PT will f/u with pt tomorrow to assess tolerance to compression wraps.  -       Row Name 07/05/24 2600          Positioning and Restraints    Pre-Treatment Position sitting in chair/recliner  -     Post Treatment Position chair  -MF     In Chair reclined;call light within reach  -               User Key  (r) = Recorded By, (t) = Taken By, (c) = Cosigned By      Initials Name Provider Type    Lloyd Ngo, PT Physical Therapist    Jenny Salazar, RN  Registered Nurse    Carolina Mccurdy, PT Physical Therapist    Alec Mcclain, RN Registered Nurse                  Physical Therapy Education       Title: PT OT SLP Therapies (Done)       Topic: Physical Therapy (Done)       Point: Mobility training (Done)       Learning Progress Summary             Patient Acceptance, E, VU,DU,NR by ML at 7/1/2024 1154    Acceptance, E, VU by ND at 6/25/2024 1109    Acceptance, E, VU by ND at 6/22/2024 1006                         Point: Home exercise program (Done)       Learning Progress Summary             Patient Acceptance, E, VU,DU,NR by ML at 7/1/2024 1154                         Point: Body mechanics (Done)       Learning Progress Summary             Patient Acceptance, E, VU by ND at 6/25/2024 1109    Acceptance, E, VU by ND at 6/22/2024 1006                         Point: Precautions (Done)       Learning Progress Summary             Patient Acceptance, E, VU,DU,NR by ML at 7/1/2024 1154    Acceptance, E, VU by ND at 6/25/2024 1109    Acceptance, E, VU by ND at 6/22/2024 1006                                         User Key       Initials Effective Dates Name Provider Type Discipline     04/22/21 -  Sally Perez Physical Therapist PT    ND 11/16/23 -  Maryjane Das PT Physical Therapist PT                    Recommendation and Plan  Anticipated Discharge Disposition (PT): inpatient rehabilitation facility  Planned Therapy Interventions (PT): balance training, bed mobility training, gait training, neuromuscular re-education, patient/family education, home exercise program, postural re-education, ROM (range of motion), strengthening, stretching, transfer training  Therapy Frequency (PT): daily  Plan of Care Reviewed With: patient           Outcome Evaluation: Pt with moderate increased c/o pain noted today.  PT replaced compression wrapping to help further centralize LE edema to reduce pain and discomfort. PT will f/u with pt tomorrow to assess tolerance to  compression wraps.  Plan of Care Reviewed With: patient            Time Calculation   PT Charges       Row Name 07/05/24 1330             Time Calculation    Start Time 1330  -MF      PT Goal Re-Cert Due Date 07/11/24  -MF         Untimed Charges    [id number] comp below knee 25  -MF         Total Minutes    Untimed Charges Total Minutes 25  -MF       Total Minutes 25  -MF                User Key  (r) = Recorded By, (t) = Taken By, (c) = Cosigned By      Initials Name Provider Type     Lloyd Reagan, PT Physical Therapist                      Therapy Charges for Today       Code Description Service Date Service Provider Modifiers Qty    [id number] HC PT MULTI LAYER COMP SYS BELOW KNEE 7/4/2024 Lloyd Reagan, PT GP 1              PT G-Codes  Outcome Measure Options: AM-PAC 6 Clicks Basic Mobility (PT)  AM-PAC 6 Clicks Score (PT): 19  AM-PAC 6 Clicks Score (OT): 18       Lloyd Reagan, PT  7/5/2024

## 2024-07-05 NOTE — NURSING NOTE
US guided paracentesis performed by Dr Hardy.  5.3 Liters of fluid removed from peritoneum. Patient tolerated well. Report called to Alec MENON.

## 2024-07-05 NOTE — PLAN OF CARE
Goal Outcome Evaluation:  Iv diuresis cont w good UO.  F/C in place for accurate I &O   VSS on RA.    on tele.   C/O const and meds given as ordered.  Paracentesis performed today w 5.3 liters of fluid removed without difficulty.  Legs wrapped per PT.

## 2024-07-05 NOTE — PLAN OF CARE
Goal Outcome Evaluation:  Plan of Care Reviewed With: patient        Progress: no change  Outcome Evaluation: Pt completing t/f from chair >BSC w/ SBA and FWW. Pt continues to present with weakness, balance deficits, and decreased activity tolerance warranting skilled IP PT services. Functional mobility remains limited by increased pain levels. Continue progressing current PT POC as tolerated.      Anticipated Discharge Disposition (PT): inpatient rehabilitation facility

## 2024-07-05 NOTE — PROGRESS NOTES
1.  Today first-line      [facility] Medicine Services  PROGRESS NOTE    Patient Name: Jaycob Ndiaye II  : 1960  MRN: [id number]    Date of Admission: 2024  Primary Care Physician: Lorena Chamberlain APRN    Subjective   Subjective     CC:  Follow-up for heart failure    HPI:  And examined this morning.  Continues to diurese well with IV Bumex.  Status post paracentesis with net fluid removal of 5.5 this morning.  Feeling that he is constipated and asking for laxative.      Objective   Objective     Vital Signs:   Temp:  [96.8 °F (36 °C)-98.2 °F (36.8 °C)] 98.2 °F (36.8 °C)  Heart Rate:  [68-89] 78  Resp:  [16-17] 16  BP: (102-121)/(68-73) 104/71     Physical Exam:  General: Comfortable, not in distress, conversant and cooperative  Head: Atraumatic and normocephalic  Eyes: No Icterus. No pallor  Ears:  Ears appear intact with no abnormalities noted  Throat: No oral lesions, no thrush  Neck: Supple, trachea midline  Lungs: Clear to auscultation bilaterally, equal air entry, no wheezing or crackles  Heart:  Normal S1 and S2, no murmur, no gallop, No JVD, 3+ lower extremity swelling  Abdomen:  Soft, no tenderness, no organomegaly, normal bowel sounds, no organomegaly.  Scrotal edema  Extremities: pulses equal bilaterally  Skin: No bleeding, bruising or rash, normal skin turgor and elasticity  Neurologic: Cranial nerves appear intact with no evidence of facial asymmetry, normal motor and sensory functions in all 4 extremities  Psych: Alert and oriented x 3, normal mood    Results Reviewed:  LAB RESULTS:      Lab 24  0500 24  0403 24  1117 24  0843   WBC 6.96 7.62 9.29  --    HEMOGLOBIN 9.2* 8.8* 8.9*  --    HEMATOCRIT 28.4* 27.4* 28.2*  --    PLATELETS 201 190 196  --    NEUTROS ABS 5.28 6.02 7.64*  --    IMMATURE GRANS (ABS) 0.04 0.03 0.04  --    LYMPHS ABS 0.77 0.66* 0.58*  --    MONOS ABS 0.81 0.87 0.96*  --    EOS ABS 0.03 0.02 0.04  --     MCV 96.9 95.8 97.6*  --    SED RATE  --   --  11 9   CRP  --   --  0.31  --          Lab 07/04/24  1630 07/04/24  0500 07/03/24 1647 07/03/24 0403 07/02/24 2243 07/02/24 1117 07/01/24 0627 06/30/24  0504 06/29/24  1606 06/29/24  0559   SODIUM  --  137  --  134*  --  133* 132* 132*  --  133*   POTASSIUM 3.7 3.6 4.4 3.6 3.9 3.5 3.7 4.4   < > 3.4*   CHLORIDE  --  93*  --  92*  --  92* 91* 89*  --  91*   CO2  --  29.0  --  30.0*  --  26.0 26.0 26.0  --  28.0   ANION GAP  --  15.0  --  12.0  --  15.0 15.0 17.0*  --  14.0   BUN  --  82*  --  87*  --  87* 91* 86*  --  76*   CREATININE  --  1.87*  --  1.99*  --  2.19* 2.13* 2.26*  --  2.14*   EGFR  --  39.4*  --  36.6*  --  32.6* 33.7* 31.4*  --  33.5*   GLUCOSE  --  160*  --  151*  --  236* 159* 180*  --  184*   CALCIUM  --  9.5  --  9.5  --  8.9 9.3 9.4  --  9.3   MAGNESIUM  --   --   --   --   --  2.2  --   --   --   --    PHOSPHORUS  --   --   --   --   --  3.9 4.4 3.9  --  4.7*    < > = values in this interval not displayed.         Lab 07/03/24 0403 07/02/24 1117 07/01/24 0627 06/30/24  0504   TOTAL PROTEIN 6.1 6.4  --   --    ALBUMIN 4.0 4.0 3.9 4.1   GLOBULIN 2.1 2.4  --   --    ALT (SGPT) 14 14  --   --    AST (SGOT) 20 22  --   --    BILIRUBIN 0.6 0.5  --   --    ALK PHOS 71 66  --   --                          Brief Urine Lab Results  (Last result in the past 365 days)        Color   Clarity   Blood   Leuk Est   Nitrite   Protein   CREAT   Urine HCG        06/26/24 1621             39.5                 Microbiology Results Abnormal       None            No radiology results from the last 24 hrs        Current medications:  Scheduled Meds:bumetanide, 2 mg, Intravenous, TID  castor oil-balsam peru, 1 Application, Topical, Q12H  cholecalciferol, 1,000 Units, Oral, Daily  colchicine, 0.6 mg, Oral, Daily  ferrous sulfate, 325 mg, Oral, Daily With Breakfast  heparin (porcine), 5,000 Units, Subcutaneous, Q8H  insulin lispro, 2-7 Units, Subcutaneous, 4x Daily  AC & at Bedtime  Insulin Lispro, 4 Units, Subcutaneous, TID With Meals  ipratropium-albuterol, 3 mL, Nebulization, 4x Daily - RT  lactulose, 10 g, Oral, TID  pantoprazole, 40 mg, Oral, Q AM  pharmacy consult - MTM, , Does not apply, Daily  senna-docusate sodium, 2 tablet, Oral, BID   And  polyethylene glycol, 17 g, Oral, BID  pregabalin, 100 mg, Oral, Daily  sodium chloride, 10 mL, Intravenous, Q12H  spironolactone, 100 mg, Oral, Daily      Continuous Infusions:   PRN Meds:.  acetaminophen **OR** acetaminophen **OR** acetaminophen    Albuterol Sulfate NEB Orderable    senna-docusate sodium **AND** polyethylene glycol **AND** bisacodyl **AND** bisacodyl    Calcium Replacement - Follow Nurse / BPA Driven Protocol    dextrose    dextrose    glucagon (human recombinant)    magnesium hydroxide    Magnesium Low Dose Replacement - Follow Nurse / BPA Driven Protocol    nitroglycerin    Phosphorus Replacement - Follow Nurse / BPA Driven Protocol    Potassium Replacement - Follow Nurse / BPA Driven Protocol    prochlorperazine    sodium chloride    sodium chloride    sodium chloride    Assessment & Plan   Assessment & Plan     Active Hospital Problems    Diagnosis  POA    **Acute on chronic heart failure with preserved ejection fraction (HFpEF) [I50.33]  Yes    Acute on chronic HFrEF (heart failure with reduced ejection fraction) [I50.23]  Yes    Elevated serum creatinine [R79.89]  Yes    Anemia [D64.9]  Yes    Hypokalemia [E87.6]  Yes    Cirrhosis of liver [K74.60]  Yes    HCV (hepatitis C virus) [B19.20]  Yes      Resolved Hospital Problems   No resolved problems to display.        Brief Hospital Course to date:  Jaycob Ndiaye II is a 64 y.o. male with past medical history significant for CHF, COPD, cirrhosis, prostate cancer, chronic hematuria, GI bleed, HCV, HTN, and HLD who presents to the ED due to worsening shortness of breath and lower extremity edema over the past week.    A/C HFrEF  Significant BLE edema and  scrotal edema/Anasarca  Patient with extensive lower extremity and anterior abdominal wall edema  Nephrology team managing diuretic therapy.  Currently on IV Bumex 2 mg 3 times daily.    Monitor kidney functions with ongoing aggressive diuresis  He follows with Cardiologist Dr. Craig  BLE venous duplex negative for DVT  WOC to see about scrotal wrap, PT to see about leg wraps tomorrow     Ascites  Bedside ultrasound with moderate to large ascites  Status post paracentesis by IR 7/5/2024 with net fluid removal 5.5 L   Will give 3 dose of albumin today     CKD  Creatinine stable around 1.9  Nephrology managing diuretic therapy    ?Calcium deposits in fingertips and right elbow  Hyperphosphatemia  Check uric level (elevated), ionized calcium, PTH (normal) and vitamin D (low)  Normal sed rate   Continue colchicine and low dose steroids which seem to have helped significantly    Anemia  Recent GI Bleed  S/p EGD and colonoscopy at [facility] on 6/13/2024, records requested  Hgb stable  Continue PPI   Continue PO Iron -- give dose of IV iron     Hypokalemia  Replace per protocol     Newly diagnosed liver cirrhosis   HCV  S/p US abd on 6/10/24 that revealed nodular liver consistent with cirrhosis and moderate ascites  S/p US guided paracentesis on 6/12/2024 at [facility] with 200 mL of skylar-colored fluid removed.  No fluid was sent to lab.  Will need referral to establish with [facility] upon discharge per patient request  Consider paracentesis-- though he is not eager because it hurt so badly at [facility]. Our CT does not show a large voume of ascites- he think a good BM will help with abdominal distension, lactulose added     Type 2 diabetes with peripheral neuropathy  Holding home Mounjaro, Hg A1c is 6.0  Continue low dose  SSI for now.  Glucoses stable running 137-209 last 24 hours-- jaimee QAC insulin while on steroids     PAF  Xarelto discontinued 6/13 at [facility] d/t persistent hematuria      HTN  Continue holding home bystolic for now  d/t borderline BP      HO prostate cancer  Patient want PSA checked and thinks he will not need DC at home    Neuropathy  Continue Lyrica    DVT prophylaxis:  Heparin     Expected Discharge Location and Transportation: home once medically improved and cleared by cardiology/nephrology.  Expected Discharge   Expected Discharge Date: 7/5/2024; Expected Discharge Time:    Awaiting placement- he is not eager to leave until he is good and ready    VTE Prophylaxis:  Pharmacologic VTE prophylaxis orders are present.         AM-PAC 6 Clicks Score (PT): 19 (07/04/24 1000)    CODE STATUS:   Code Status and Medical Interventions:   Ordered at: 06/21/24 1840     Level Of Support Discussed With:    Patient     Code Status (Patient has no pulse and is not breathing):    CPR (Attempt to Resuscitate)     Medical Interventions (Patient has pulse or is breathing):    Full Support       Kaylen Huerta MD  07/05/24

## 2024-07-06 LAB
ALBUMIN SERPL-MCNC: 3.9 G/DL (ref 3.5–5.2)
ALBUMIN/GLOB SERPL: 1.9 G/DL
ALP SERPL-CCNC: 69 U/L (ref 39–117)
ALT SERPL W P-5'-P-CCNC: 12 U/L (ref 1–41)
ANION GAP SERPL CALCULATED.3IONS-SCNC: 12 MMOL/L (ref 5–15)
AST SERPL-CCNC: 17 U/L (ref 1–40)
BASOPHILS # BLD AUTO: 0.04 10*3/MM3 (ref 0–0.2)
BASOPHILS NFR BLD AUTO: 0.7 % (ref 0–1.5)
BILIRUB SERPL-MCNC: 0.6 MG/DL (ref 0–1.2)
BUN SERPL-MCNC: 66 MG/DL (ref 8–23)
BUN/CREAT SERPL: 35.1 (ref 7–25)
CALCIUM SPEC-SCNC: 9.2 MG/DL (ref 8.6–10.5)
CHLORIDE SERPL-SCNC: 94 MMOL/L (ref 98–107)
CO2 SERPL-SCNC: 30 MMOL/L (ref 22–29)
CREAT SERPL-MCNC: 1.88 MG/DL (ref 0.76–1.27)
DEPRECATED RDW RBC AUTO: 54.3 FL (ref 37–54)
EGFRCR SERPLBLD CKD-EPI 2021: 39.2 ML/MIN/1.73
EOSINOPHIL # BLD AUTO: 0.04 10*3/MM3 (ref 0–0.4)
EOSINOPHIL NFR BLD AUTO: 0.7 % (ref 0.3–6.2)
ERYTHROCYTE [DISTWIDTH] IN BLOOD BY AUTOMATED COUNT: 15.9 % (ref 12.3–15.4)
GLOBULIN UR ELPH-MCNC: 2.1 GM/DL
GLUCOSE BLDC GLUCOMTR-MCNC: 139 MG/DL (ref 70–130)
GLUCOSE BLDC GLUCOMTR-MCNC: 169 MG/DL (ref 70–130)
GLUCOSE BLDC GLUCOMTR-MCNC: 241 MG/DL (ref 70–130)
GLUCOSE BLDC GLUCOMTR-MCNC: 263 MG/DL (ref 70–130)
GLUCOSE SERPL-MCNC: 205 MG/DL (ref 65–99)
HCT VFR BLD AUTO: 28.4 % (ref 37.5–51)
HGB BLD-MCNC: 9 G/DL (ref 13–17.7)
IMM GRANULOCYTES # BLD AUTO: 0.03 10*3/MM3 (ref 0–0.05)
IMM GRANULOCYTES NFR BLD AUTO: 0.5 % (ref 0–0.5)
LYMPHOCYTES # BLD AUTO: 0.67 10*3/MM3 (ref 0.7–3.1)
LYMPHOCYTES NFR BLD AUTO: 11.8 % (ref 19.6–45.3)
MCH RBC QN AUTO: 30.1 PG (ref 26.6–33)
MCHC RBC AUTO-ENTMCNC: 31.7 G/DL (ref 31.5–35.7)
MCV RBC AUTO: 95 FL (ref 79–97)
MONOCYTES # BLD AUTO: 0.68 10*3/MM3 (ref 0.1–0.9)
MONOCYTES NFR BLD AUTO: 12 % (ref 5–12)
NEUTROPHILS NFR BLD AUTO: 4.23 10*3/MM3 (ref 1.7–7)
NEUTROPHILS NFR BLD AUTO: 74.3 % (ref 42.7–76)
NRBC BLD AUTO-RTO: 0 /100 WBC (ref 0–0.2)
PLATELET # BLD AUTO: 180 10*3/MM3 (ref 140–450)
PMV BLD AUTO: 9.9 FL (ref 6–12)
POTASSIUM SERPL-SCNC: 3.6 MMOL/L (ref 3.5–5.2)
PROT SERPL-MCNC: 6 G/DL (ref 6–8.5)
RBC # BLD AUTO: 2.99 10*6/MM3 (ref 4.14–5.8)
SODIUM SERPL-SCNC: 136 MMOL/L (ref 136–145)
WBC NRBC COR # BLD AUTO: 5.69 10*3/MM3 (ref 3.4–10.8)

## 2024-07-06 PROCEDURE — 25010000002 BUMETANIDE PER 0.5 MG: Performed by: INTERNAL MEDICINE

## 2024-07-06 PROCEDURE — 94799 UNLISTED PULMONARY SVC/PX: CPT

## 2024-07-06 PROCEDURE — 63710000001 INSULIN LISPRO (HUMAN) PER 5 UNITS: Performed by: INTERNAL MEDICINE

## 2024-07-06 PROCEDURE — 63710000001 INSULIN LISPRO (HUMAN) PER 5 UNITS: Performed by: NURSE PRACTITIONER

## 2024-07-06 PROCEDURE — 25010000002 ALBUMIN HUMAN 25% PER 50 ML: Performed by: INTERNAL MEDICINE

## 2024-07-06 PROCEDURE — 94664 DEMO&/EVAL PT USE INHALER: CPT

## 2024-07-06 PROCEDURE — P9047 ALBUMIN (HUMAN), 25%, 50ML: HCPCS | Performed by: INTERNAL MEDICINE

## 2024-07-06 PROCEDURE — 25010000002 HEPARIN (PORCINE) PER 1000 UNITS: Performed by: NURSE PRACTITIONER

## 2024-07-06 PROCEDURE — 99231 SBSQ HOSP IP/OBS SF/LOW 25: CPT | Performed by: NURSE PRACTITIONER

## 2024-07-06 PROCEDURE — 80053 COMPREHEN METABOLIC PANEL: CPT | Performed by: INTERNAL MEDICINE

## 2024-07-06 PROCEDURE — 85025 COMPLETE CBC W/AUTO DIFF WBC: CPT | Performed by: INTERNAL MEDICINE

## 2024-07-06 PROCEDURE — 82948 REAGENT STRIP/BLOOD GLUCOSE: CPT

## 2024-07-06 RX ORDER — SPIRONOLACTONE 100 MG/1
200 TABLET, FILM COATED ORAL DAILY
Status: DISCONTINUED | OUTPATIENT
Start: 2024-07-07 | End: 2024-07-08 | Stop reason: HOSPADM

## 2024-07-06 RX ORDER — POTASSIUM CHLORIDE 750 MG/1
40 CAPSULE, EXTENDED RELEASE ORAL EVERY 4 HOURS
Status: COMPLETED | OUTPATIENT
Start: 2024-07-06 | End: 2024-07-06

## 2024-07-06 RX ADMIN — IPRATROPIUM BROMIDE AND ALBUTEROL SULFATE 3 ML: 2.5; .5 SOLUTION RESPIRATORY (INHALATION) at 08:31

## 2024-07-06 RX ADMIN — INSULIN LISPRO 4 UNITS: 100 INJECTION, SOLUTION INTRAVENOUS; SUBCUTANEOUS at 08:32

## 2024-07-06 RX ADMIN — ALBUMIN (HUMAN) 25 G: 0.25 INJECTION, SOLUTION INTRAVENOUS at 08:34

## 2024-07-06 RX ADMIN — Medication 1 APPLICATION: at 08:31

## 2024-07-06 RX ADMIN — FERROUS SULFATE TAB 325 MG (65 MG ELEMENTAL FE) 325 MG: 325 (65 FE) TAB at 08:33

## 2024-07-06 RX ADMIN — Medication 1000 UNITS: at 08:33

## 2024-07-06 RX ADMIN — POLYETHYLENE GLYCOL 3350 17 G: 17 POWDER, FOR SOLUTION ORAL at 08:34

## 2024-07-06 RX ADMIN — COLCHICINE 0.6 MG: 0.6 TABLET ORAL at 08:33

## 2024-07-06 RX ADMIN — LACTULOSE 10 G: 20 SOLUTION ORAL at 08:31

## 2024-07-06 RX ADMIN — IPRATROPIUM BROMIDE AND ALBUTEROL SULFATE 3 ML: 2.5; .5 SOLUTION RESPIRATORY (INHALATION) at 16:01

## 2024-07-06 RX ADMIN — POLYETHYLENE GLYCOL 3350 17 G: 17 POWDER, FOR SOLUTION ORAL at 21:02

## 2024-07-06 RX ADMIN — IPRATROPIUM BROMIDE AND ALBUTEROL SULFATE 3 ML: 2.5; .5 SOLUTION RESPIRATORY (INHALATION) at 13:06

## 2024-07-06 RX ADMIN — BUMETANIDE 2 MG: 0.25 INJECTION, SOLUTION INTRAMUSCULAR; INTRAVENOUS at 21:02

## 2024-07-06 RX ADMIN — INSULIN LISPRO 4 UNITS: 100 INJECTION, SOLUTION INTRAVENOUS; SUBCUTANEOUS at 17:00

## 2024-07-06 RX ADMIN — PREGABALIN 100 MG: 100 CAPSULE ORAL at 08:33

## 2024-07-06 RX ADMIN — LACTULOSE 10 G: 20 SOLUTION ORAL at 15:55

## 2024-07-06 RX ADMIN — INSULIN LISPRO 4 UNITS: 100 INJECTION, SOLUTION INTRAVENOUS; SUBCUTANEOUS at 17:01

## 2024-07-06 RX ADMIN — SPIRONOLACTONE 100 MG: 25 TABLET ORAL at 08:33

## 2024-07-06 RX ADMIN — INSULIN LISPRO 2 UNITS: 100 INJECTION, SOLUTION INTRAVENOUS; SUBCUTANEOUS at 21:03

## 2024-07-06 RX ADMIN — Medication 1 APPLICATION: at 21:03

## 2024-07-06 RX ADMIN — BUMETANIDE 2 MG: 0.25 INJECTION, SOLUTION INTRAMUSCULAR; INTRAVENOUS at 08:34

## 2024-07-06 RX ADMIN — INSULIN LISPRO 3 UNITS: 100 INJECTION, SOLUTION INTRAVENOUS; SUBCUTANEOUS at 08:32

## 2024-07-06 RX ADMIN — POTASSIUM CHLORIDE 40 MEQ: 750 CAPSULE, EXTENDED RELEASE ORAL at 08:38

## 2024-07-06 RX ADMIN — POTASSIUM CHLORIDE 40 MEQ: 750 CAPSULE, EXTENDED RELEASE ORAL at 11:32

## 2024-07-06 RX ADMIN — IPRATROPIUM BROMIDE AND ALBUTEROL SULFATE 3 ML: 2.5; .5 SOLUTION RESPIRATORY (INHALATION) at 19:48

## 2024-07-06 RX ADMIN — PANTOPRAZOLE SODIUM 40 MG: 40 TABLET, DELAYED RELEASE ORAL at 08:33

## 2024-07-06 RX ADMIN — SENNOSIDES AND DOCUSATE SODIUM 2 TABLET: 8.6; 5 TABLET ORAL at 21:02

## 2024-07-06 RX ADMIN — Medication 10 ML: at 21:03

## 2024-07-06 RX ADMIN — INSULIN LISPRO 4 UNITS: 100 INJECTION, SOLUTION INTRAVENOUS; SUBCUTANEOUS at 11:32

## 2024-07-06 RX ADMIN — ALBUMIN (HUMAN) 25 G: 0.25 INJECTION, SOLUTION INTRAVENOUS at 00:49

## 2024-07-06 RX ADMIN — SENNOSIDES AND DOCUSATE SODIUM 2 TABLET: 8.6; 5 TABLET ORAL at 08:33

## 2024-07-06 RX ADMIN — LACTULOSE 10 G: 20 SOLUTION ORAL at 21:02

## 2024-07-06 NOTE — PLAN OF CARE
Goal Outcome Evaluation:  Plan of Care Reviewed With: patient        Progress: no change  Outcome Evaluation: Patient awake throughout the shift. Denies pain. SBP low this shift.

## 2024-07-06 NOTE — PLAN OF CARE
Goal Outcome Evaluation:  Pt up to BSC w SBA.  VSS on RA.  IV diuresis cont.   F/C in place w good UO.  Nephrology following along.

## 2024-07-06 NOTE — PROGRESS NOTES
" LOS: 14 days   Patient Care Team:  Lorena Chamberlain APRN as PCP - General (Nurse Practitioner)  Lloyd Craig MD as Consulting Physician (Cardiology)    Chief Complaint: NASRIN    Subjective   Stable renal function. UOP ~ 3.5 liter. Persistent LE edema slowly improving.     History taken from: patient    Objective     Vital Sign Min/Max for last 24 hours  Temp  Min: 97.5 °F (36.4 °C)  Max: 98.3 °F (36.8 °C)   BP  Min: 98/70  Max: 113/73   Pulse  Min: 75  Max: 89   Resp  Min: 18  Max: 18   SpO2  Min: 96 %  Max: 97 %   No data recorded   No data recorded     Flowsheet Rows      Flowsheet Row First Filed Value   Admission Height 185.4 cm (73\") Documented at 06/21/2024 1409   Admission Weight 127 kg (279 lb) Documented at 06/21/2024 1409            I/O this shift:  In: -   Out: 600 [Urine:600]  I/O last 3 completed shifts:  In: 350 [P.O.:350]  Out: 03564 [Urine:6250; Other:5300]    Objective:  Physical examination:    General Appearance: Alert, oriented, no obvious distress.  Morbid obesity  Eyes: PER, EOMI.  Neck: Supple no JVD.  Lungs: Clear auscultation, no rales rhonchi's, equal chest movement, nonlabored.  Heart: No gallop, murmur, rub, RRR.  Abdomen: Soft, nontender, positive bowel sounds, morbid obesity with abdominal wall edema  Extremities: Significant bilateral lower extremity edema 3-4+.  Lower extremity tenderness to improve.  No cyanosis.  Neuro: No focal deficit, moving all extremities, alert oriented X 3    : Rouse catheter in place.  Urine slightly bloody    Results Review:     I reviewed the patient's new clinical results.    WBC WBC   Date Value Ref Range Status   07/06/2024 5.69 3.40 - 10.80 10*3/mm3 Final   07/05/2024 7.27 3.40 - 10.80 10*3/mm3 Final   07/04/2024 6.96 3.40 - 10.80 10*3/mm3 Final        HGB Hemoglobin   Date Value Ref Range Status   07/06/2024 9.0 (L) 13.0 - 17.7 g/dL Final   07/05/2024 8.9 (L) 13.0 - 17.7 g/dL Final   07/04/2024 9.2 (L) 13.0 - 17.7 g/dL Final " "       HCT Hematocrit   Date Value Ref Range Status   07/06/2024 28.4 (L) 37.5 - 51.0 % Final   07/05/2024 28.1 (L) 37.5 - 51.0 % Final   07/04/2024 28.4 (L) 37.5 - 51.0 % Final        Platlets No results found for: \"LABPLAT\"   MCV MCV   Date Value Ref Range Status   07/06/2024 95.0 79.0 - 97.0 fL Final   07/05/2024 95.9 79.0 - 97.0 fL Final   07/04/2024 96.9 79.0 - 97.0 fL Final            Sodium Sodium   Date Value Ref Range Status   07/06/2024 136 136 - 145 mmol/L Final   07/04/2024 137 136 - 145 mmol/L Final      Potassium Potassium   Date Value Ref Range Status   07/06/2024 3.6 3.5 - 5.2 mmol/L Final   07/04/2024 3.7 3.5 - 5.2 mmol/L Final   07/04/2024 3.6 3.5 - 5.2 mmol/L Final   07/03/2024 4.4 3.5 - 5.2 mmol/L Final      Chloride Chloride   Date Value Ref Range Status   07/06/2024 94 (L) 98 - 107 mmol/L Final   07/04/2024 93 (L) 98 - 107 mmol/L Final      CO2 CO2   Date Value Ref Range Status   07/06/2024 30.0 (H) 22.0 - 29.0 mmol/L Final   07/04/2024 29.0 22.0 - 29.0 mmol/L Final      BUN BUN   Date Value Ref Range Status   07/06/2024 66 (H) 8 - 23 mg/dL Final   07/04/2024 82 (H) 8 - 23 mg/dL Final      Creatinine Creatinine   Date Value Ref Range Status   07/06/2024 1.88 (H) 0.76 - 1.27 mg/dL Final   07/04/2024 1.87 (H) 0.76 - 1.27 mg/dL Final      Calcium Calcium   Date Value Ref Range Status   07/06/2024 9.2 8.6 - 10.5 mg/dL Final   07/04/2024 9.5 8.6 - 10.5 mg/dL Final      PO4 No results found for: \"CAPO4\"   Albumin Albumin   Date Value Ref Range Status   07/06/2024 3.9 3.5 - 5.2 g/dL Final        Magnesium No results found for: \"MG\"     Uric Acid No results found for: \"URICACID\"       Medication Review: Yes    Assessment & Plan       Acute on chronic heart failure with preserved ejection fraction (HFpEF)    Elevated serum creatinine    Anemia    Hypokalemia    Cirrhosis of liver    HCV (hepatitis C virus)    Acute on chronic HFrEF (heart failure with reduced ejection fraction)           1.  Acute " kidney disease: Last known stable cr 1.1 in 2023. Cr on recent admission at [facility] few weeks ago 1.6-1.9 mg/dl. Most recent cr 2.0-2.2 GFR 35-36ml/min. Urine sodium 20. Urine cr 39.5. Renal US no hydro     2.  New onset liver cirrhosis: Complications include ascites and LE edema.      3.  Volume status: Dependent edema b/l + ascites. Getting diuretics     4.  Hypokalemia: In the setting of diuretics     5.  Hyponatremia: Due to total body volume overload.      6.  HFpEF: Dependent edema      7.  Anemia: Recent hx of GI bleed. S/p EGD and colonoscopy.     Volume status: anasarca on admission. Responding well to diuretics so far. LE edema improving. Scrotal edema persistent.     Gout: Stable. On colchicine. Reduce the dose to 3x weekly if developing diarrhea       Recommendation:  Continue bumex 2 mg TID.   Increase spironolactone to 200mg daily   Strict I/O.   Check standing weight every 2-3 days. Patient still c/o significant scrotal edema. Therefore will continue with IV bumex for another 24-48hr.     William Bartlett MD  07/06/24  13:38 EDT

## 2024-07-06 NOTE — PROGRESS NOTES
1.  Today first-line      [facility] Medicine Services  PROGRESS NOTE    Patient Name: Jaycob Ndiaye II  : 1960  MRN: [id number]    Date of Admission: 2024  Primary Care Physician: Lorena Chamberlain APRN    Subjective   Subjective     CC:  Follow-up for heart failure    HPI:  Pt sitting up in the chair, reporting ongoing swelling of the scrotum. He reports feeling better following paracentesis yesterday where 5.3 L was removed.     Objective   Objective     Vital Signs:   Temp:  [97.5 °F (36.4 °C)-98.3 °F (36.8 °C)] 98 °F (36.7 °C)  Heart Rate:  [75-89] 80  Resp:  [18] 18  BP: ()/(60-73) 93/60     Physical Exam:  Constitutional: Awake, alert, NAD  HENT: NCAT, mucous membranes moist  Respiratory: Clear to auscultation bilaterally, nonlabored respirations   Cardiovascular: RRR, no murmurs, rubs, or gallops  Gastrointestinal: Positive bowel sounds, soft, nontender, nondistended, Obese  : F/C  Musculoskeletal: Bilateral lower extremity edema with compression wraps  Psychiatric: Appropriate affect, cooperative  Neurologic: Oriented x 3, SYLVESTER, speech clear  Skin: No rashes      Results Reviewed:  LAB RESULTS:      Lab 24  0426 24  0616 24  0500 24  0403 24  1117   WBC 5.69 7.27 6.96 7.62 9.29   HEMOGLOBIN 9.0* 8.9* 9.2* 8.8* 8.9*   HEMATOCRIT 28.4* 28.1* 28.4* 27.4* 28.2*   PLATELETS 180 201 201 190 196   NEUTROS ABS 4.23 5.39 5.28 6.02 7.64*   IMMATURE GRANS (ABS) 0.03 0.02 0.04 0.03 0.04   LYMPHS ABS 0.67* 0.81 0.77 0.66* 0.58*   MONOS ABS 0.68 0.92* 0.81 0.87 0.96*   EOS ABS 0.04 0.09 0.03 0.02 0.04   MCV 95.0 95.9 96.9 95.8 97.6*   SED RATE  --   --   --   --  11   CRP  --   --   --   --  0.31   PROTIME  --  16.9*  --   --   --          Lab 24  0426 24  1630 24  0500 24  1647 24  0403 24  2243 24  1117 24  0627 24  0504   SODIUM 136  --  137  --  134*  --  133* 132* 132*    POTASSIUM 3.6 3.7 3.6 4.4 3.6   < > 3.5 3.7 4.4   CHLORIDE 94*  --  93*  --  92*  --  92* 91* 89*   CO2 30.0*  --  29.0  --  30.0*  --  26.0 26.0 26.0   ANION GAP 12.0  --  15.0  --  12.0  --  15.0 15.0 17.0*   BUN 66*  --  82*  --  87*  --  87* 91* 86*   CREATININE 1.88*  --  1.87*  --  1.99*  --  2.19* 2.13* 2.26*   EGFR 39.2*  --  39.4*  --  36.6*  --  32.6* 33.7* 31.4*   GLUCOSE 205*  --  160*  --  151*  --  236* 159* 180*   CALCIUM 9.2  --  9.5  --  9.5  --  8.9 9.3 9.4   MAGNESIUM  --   --   --   --   --   --  2.2  --   --    PHOSPHORUS  --   --   --   --   --   --  3.9 4.4 3.9    < > = values in this interval not displayed.         Lab 07/06/24  0426 07/03/24  0403 07/02/24  1117 07/01/24  0627 06/30/24  0504   TOTAL PROTEIN 6.0 6.1 6.4  --   --    ALBUMIN 3.9 4.0 4.0 3.9 4.1   GLOBULIN 2.1 2.1 2.4  --   --    ALT (SGPT) 12 14 14  --   --    AST (SGOT) 17 20 22  --   --    BILIRUBIN 0.6 0.6 0.5  --   --    ALK PHOS 69 71 66  --   --          Lab 07/05/24  0616   PROTIME 16.9*   INR 1.36*                     Brief Urine Lab Results  (Last result in the past 365 days)        Color   Clarity   Blood   Leuk Est   Nitrite   Protein   CREAT   Urine HCG        06/26/24 1621             39.5                 Microbiology Results Abnormal       None            US Paracentesis    Result Date: 7/5/2024  US PARACENTESIS  History: ascites  : Brad Hardy MD.  Modality: Ultrasonography                   Sedation: None. Anesthesia: Lidocaine 1% local infiltration. Estimated blood loss:  0 cc.        Technique: A thorough discussion of the risks, benefits, and alternatives of the procedure, and if applicable, moderate sedation, was carried out with the patient. They were encouraged to ask any questions. Any questions were answered. They verbalized understanding. A written informed consent was then signed.  A multi-component timeout was performed prior to starting the procedure using the departmental  protocol. The procedure room personnel used personal protective equipment. The operators used sterile gloves and if indicated, sterile gowns. The surgical site was prepped with chlorhexidine gluconate  and draped in the maximal applicable sterile fashion. A preliminary ultrasonography was performed to assess the target and determine a safe access site. It showed ascites. Pertinent ultrasound images were stored to the PACS for documentation. The access site was sterilely prepped and draped. Local anesthesia was administered. A dermatotomy was performed if needed. A catheter over the needle system was advanced into the peritoneal cavity. Light brown, or blood-tinged fluid was aspirated and the plastic catheter advanced into the peritoneum. The catheter was then connected to a fluid recovery system. At the end of the procedure, the catheter was withdrawn and an aseptic dressing applied. The patient tolerated the procedure well. After uneventful recovery recovery, the patient was discharged from the department in stable condition. The total amount of fluid recovered is given below. Albumin was infused intravenously if ordered by the referring doctor. Complications: None immediate. Specimen: Nothing was ordered.     Impression: Impression:                                                              Successful ultrasound-guided paracentesis using a Right lower quadrant access with recovery of 5.3 liters of fluid as described above. Thank you for the opportunity to assist in the care of your patient. Electronically Signed: Brad Hardy MD  7/5/2024 11:07 AM EDT  Workstation ID: [id number]         Current medications:  Scheduled Meds:bumetanide, 2 mg, Intravenous, TID  castor oil-balsam peru, 1 Application, Topical, Q12H  cholecalciferol, 1,000 Units, Oral, Daily  colchicine, 0.6 mg, Oral, Daily  ferrous sulfate, 325 mg, Oral, Daily With Breakfast  heparin (porcine), 5,000 Units, Subcutaneous, Q8H  insulin lispro, 2-7  Units, Subcutaneous, 4x Daily AC & at Bedtime  Insulin Lispro, 4 Units, Subcutaneous, TID With Meals  ipratropium-albuterol, 3 mL, Nebulization, 4x Daily - RT  lactulose, 10 g, Oral, TID  pantoprazole, 40 mg, Oral, Q AM  pharmacy consult - MTM, , Does not apply, Daily  senna-docusate sodium, 2 tablet, Oral, BID   And  polyethylene glycol, 17 g, Oral, BID  pregabalin, 100 mg, Oral, Daily  sodium chloride, 10 mL, Intravenous, Q12H  [START ON 7/7/2024] spironolactone, 200 mg, Oral, Daily      Continuous Infusions:   PRN Meds:.  acetaminophen **OR** acetaminophen **OR** acetaminophen    Albuterol Sulfate NEB Orderable    senna-docusate sodium **AND** polyethylene glycol **AND** bisacodyl **AND** bisacodyl    Calcium Replacement - Follow Nurse / BPA Driven Protocol    dextrose    dextrose    glucagon (human recombinant)    magnesium hydroxide    Magnesium Low Dose Replacement - Follow Nurse / BPA Driven Protocol    nitroglycerin    Phosphorus Replacement - Follow Nurse / BPA Driven Protocol    Potassium Replacement - Follow Nurse / BPA Driven Protocol    prochlorperazine    sodium chloride    sodium chloride    sodium chloride    Assessment & Plan   Assessment & Plan     Active Hospital Problems    Diagnosis  POA    **Acute on chronic heart failure with preserved ejection fraction (HFpEF) [I50.33]  Yes    Acute on chronic HFrEF (heart failure with reduced ejection fraction) [I50.23]  Yes    Elevated serum creatinine [R79.89]  Yes    Anemia [D64.9]  Yes    Hypokalemia [E87.6]  Yes    Cirrhosis of liver [K74.60]  Yes    HCV (hepatitis C virus) [B19.20]  Yes      Resolved Hospital Problems   No resolved problems to display.        Brief Hospital Course to date:  Jaycob Ndiaye II is a 64 y.o. male with past medical history significant for CHF, COPD, cirrhosis, prostate cancer, chronic hematuria, GI bleed, HCV, HTN, and HLD who presents to the ED due to worsening shortness of breath and lower extremity edema over the  past week.    A/C HFrEF  Significant BLE edema and scrotal edema/Anasarca  Patient with extensive lower extremity and anterior abdominal wall edema  Nephrology team managing diuretic therapy.  Currently on IV Bumex 2 mg 3 times daily.    07/03 spironolactone 100 mg daily added, 07/06 increased spironolactone to 200 mg daily  Monitor kidney functions with ongoing aggressive diuresis  He follows with Cardiologist Dr. Craig  BLE venous duplex negative for DVT  WOC to see about scrotal wrap, PT wrapping legs     Ascites  Bedside ultrasound with moderate to large ascites  Status post paracentesis by IR 7/5/2024 with net fluid removal 5.5 L   7/5 received 3 dose of albumin     CKD  Creatinine stable around 1.9  Nephrology managing diuretic therapy    ?Calcium deposits in fingertips and right elbow  Hyperphosphatemia  uric level (elevated), ionized calcium, PTH (normal) and vitamin D (low)  Normal sed rate   Continue colchicine and low dose steroids which seem to have helped significantly    Anemia  Recent GI Bleed  S/p EGD and colonoscopy at [facility] on 6/13/2024, records requested  Hgb stable  Continue PPI   Continue PO Iron -- 6/28 gave dose of IV iron     Hypokalemia  Replace per protocol     Newly diagnosed liver cirrhosis   HCV  S/p US abd on 6/10/24 that revealed nodular liver consistent with cirrhosis and moderate ascites  S/p US guided paracentesis on 6/12/2024 at [facility] with 200 mL of skylar-colored fluid removed.  No fluid was sent to lab.  Will need referral to establish with [facility] upon discharge per patient request  06/30 Pt reluctant to do paracentesis because it hurt so badly at [facility]. Our CT does not show a large voume of ascites- he think a good BM will help with abdominal distension, lactulose added. Pt now s/p paracentesis with 5.3L removed     Type 2 diabetes with peripheral neuropathy  Holding home Mounjaro, Hg A1c is 6.0  Continue low dose  SSI for now.  Glucoses stable running 139-241 last 24 hours--  [facility] insulin while on steroids     PAF  Xarelto discontinued 6/13 at [facility] d/t persistent hematuria      HTN  Continue holding home bystolic for now d/t borderline BP      HO prostate cancer  Patient want PSA checked and thinks he will not need DC at home    Neuropathy  Continue Lyrica    DVT prophylaxis:  Heparin on hold due to persistent hematuira    Expected Discharge Location and Transportation: home once medically improved and cleared by cardiology/nephrology.  Expected Discharge   07/07/2024  Awaiting placement- he is not eager to leave until he is good and ready    VTE Prophylaxis:  Pharmacologic VTE prophylaxis orders are present.         AM-PAC 6 Clicks Score (PT): 17 (07/05/24 2000)    CODE STATUS:   Code Status and Medical Interventions:   Ordered at: 06/21/24 1840     Level Of Support Discussed With:    Patient     Code Status (Patient has no pulse and is not breathing):    CPR (Attempt to Resuscitate)     Medical Interventions (Patient has pulse or is breathing):    Full Support       GARRICK Collazo  07/06/24

## 2024-07-07 LAB
ANION GAP SERPL CALCULATED.3IONS-SCNC: 13 MMOL/L (ref 5–15)
BASOPHILS # BLD AUTO: 0.06 10*3/MM3 (ref 0–0.2)
BASOPHILS NFR BLD AUTO: 0.7 % (ref 0–1.5)
BUN SERPL-MCNC: 66 MG/DL (ref 8–23)
BUN/CREAT SERPL: 36.7 (ref 7–25)
CALCIUM SPEC-SCNC: 9.1 MG/DL (ref 8.6–10.5)
CHLORIDE SERPL-SCNC: 90 MMOL/L (ref 98–107)
CO2 SERPL-SCNC: 29 MMOL/L (ref 22–29)
CREAT SERPL-MCNC: 1.8 MG/DL (ref 0.76–1.27)
DEPRECATED RDW RBC AUTO: 56 FL (ref 37–54)
EGFRCR SERPLBLD CKD-EPI 2021: 41.3 ML/MIN/1.73
EOSINOPHIL # BLD AUTO: 0.08 10*3/MM3 (ref 0–0.4)
EOSINOPHIL NFR BLD AUTO: 1 % (ref 0.3–6.2)
ERYTHROCYTE [DISTWIDTH] IN BLOOD BY AUTOMATED COUNT: 16 % (ref 12.3–15.4)
GLUCOSE BLDC GLUCOMTR-MCNC: 166 MG/DL (ref 70–130)
GLUCOSE BLDC GLUCOMTR-MCNC: 171 MG/DL (ref 70–130)
GLUCOSE BLDC GLUCOMTR-MCNC: 227 MG/DL (ref 70–130)
GLUCOSE BLDC GLUCOMTR-MCNC: 252 MG/DL (ref 70–130)
GLUCOSE SERPL-MCNC: 196 MG/DL (ref 65–99)
HCT VFR BLD AUTO: 29.2 % (ref 37.5–51)
HGB BLD-MCNC: 9.3 G/DL (ref 13–17.7)
IMM GRANULOCYTES # BLD AUTO: 0.05 10*3/MM3 (ref 0–0.05)
IMM GRANULOCYTES NFR BLD AUTO: 0.6 % (ref 0–0.5)
LYMPHOCYTES # BLD AUTO: 0.82 10*3/MM3 (ref 0.7–3.1)
LYMPHOCYTES NFR BLD AUTO: 10.2 % (ref 19.6–45.3)
MAGNESIUM SERPL-MCNC: 1.8 MG/DL (ref 1.6–2.4)
MCH RBC QN AUTO: 30.8 PG (ref 26.6–33)
MCHC RBC AUTO-ENTMCNC: 31.8 G/DL (ref 31.5–35.7)
MCV RBC AUTO: 96.7 FL (ref 79–97)
MONOCYTES # BLD AUTO: 0.99 10*3/MM3 (ref 0.1–0.9)
MONOCYTES NFR BLD AUTO: 12.3 % (ref 5–12)
NEUTROPHILS NFR BLD AUTO: 6.02 10*3/MM3 (ref 1.7–7)
NEUTROPHILS NFR BLD AUTO: 75.2 % (ref 42.7–76)
NRBC BLD AUTO-RTO: 0 /100 WBC (ref 0–0.2)
PLATELET # BLD AUTO: 200 10*3/MM3 (ref 140–450)
PMV BLD AUTO: 10.4 FL (ref 6–12)
POTASSIUM SERPL-SCNC: 4.3 MMOL/L (ref 3.5–5.2)
RBC # BLD AUTO: 3.02 10*6/MM3 (ref 4.14–5.8)
SODIUM SERPL-SCNC: 132 MMOL/L (ref 136–145)
WBC NRBC COR # BLD AUTO: 8.02 10*3/MM3 (ref 3.4–10.8)

## 2024-07-07 PROCEDURE — 80048 BASIC METABOLIC PNL TOTAL CA: CPT | Performed by: NURSE PRACTITIONER

## 2024-07-07 PROCEDURE — 94799 UNLISTED PULMONARY SVC/PX: CPT

## 2024-07-07 PROCEDURE — 63710000001 INSULIN LISPRO (HUMAN) PER 5 UNITS: Performed by: NURSE PRACTITIONER

## 2024-07-07 PROCEDURE — 83735 ASSAY OF MAGNESIUM: CPT | Performed by: INTERNAL MEDICINE

## 2024-07-07 PROCEDURE — 94761 N-INVAS EAR/PLS OXIMETRY MLT: CPT

## 2024-07-07 PROCEDURE — 85025 COMPLETE CBC W/AUTO DIFF WBC: CPT | Performed by: NURSE PRACTITIONER

## 2024-07-07 PROCEDURE — 82948 REAGENT STRIP/BLOOD GLUCOSE: CPT

## 2024-07-07 PROCEDURE — 94664 DEMO&/EVAL PT USE INHALER: CPT

## 2024-07-07 PROCEDURE — 25010000002 BUMETANIDE PER 0.5 MG: Performed by: INTERNAL MEDICINE

## 2024-07-07 PROCEDURE — 63710000001 INSULIN LISPRO (HUMAN) PER 5 UNITS: Performed by: INTERNAL MEDICINE

## 2024-07-07 PROCEDURE — 99232 SBSQ HOSP IP/OBS MODERATE 35: CPT | Performed by: INTERNAL MEDICINE

## 2024-07-07 RX ORDER — METOLAZONE 5 MG/1
10 TABLET ORAL ONCE
Status: COMPLETED | OUTPATIENT
Start: 2024-07-07 | End: 2024-07-07

## 2024-07-07 RX ADMIN — IPRATROPIUM BROMIDE AND ALBUTEROL SULFATE 3 ML: 2.5; .5 SOLUTION RESPIRATORY (INHALATION) at 08:30

## 2024-07-07 RX ADMIN — INSULIN LISPRO 4 UNITS: 100 INJECTION, SOLUTION INTRAVENOUS; SUBCUTANEOUS at 08:29

## 2024-07-07 RX ADMIN — SENNOSIDES AND DOCUSATE SODIUM 2 TABLET: 8.6; 5 TABLET ORAL at 08:26

## 2024-07-07 RX ADMIN — INSULIN LISPRO 4 UNITS: 100 INJECTION, SOLUTION INTRAVENOUS; SUBCUTANEOUS at 12:40

## 2024-07-07 RX ADMIN — IPRATROPIUM BROMIDE AND ALBUTEROL SULFATE 3 ML: 2.5; .5 SOLUTION RESPIRATORY (INHALATION) at 12:45

## 2024-07-07 RX ADMIN — Medication 1 APPLICATION: at 08:29

## 2024-07-07 RX ADMIN — FERROUS SULFATE TAB 325 MG (65 MG ELEMENTAL FE) 325 MG: 325 (65 FE) TAB at 08:25

## 2024-07-07 RX ADMIN — LACTULOSE 10 G: 20 SOLUTION ORAL at 16:31

## 2024-07-07 RX ADMIN — BUMETANIDE 2 MG: 0.25 INJECTION, SOLUTION INTRAMUSCULAR; INTRAVENOUS at 08:26

## 2024-07-07 RX ADMIN — BUMETANIDE 2 MG: 0.25 INJECTION, SOLUTION INTRAMUSCULAR; INTRAVENOUS at 16:31

## 2024-07-07 RX ADMIN — PANTOPRAZOLE SODIUM 40 MG: 40 TABLET, DELAYED RELEASE ORAL at 05:23

## 2024-07-07 RX ADMIN — Medication 10 ML: at 08:27

## 2024-07-07 RX ADMIN — INSULIN LISPRO 2 UNITS: 100 INJECTION, SOLUTION INTRAVENOUS; SUBCUTANEOUS at 17:10

## 2024-07-07 RX ADMIN — IPRATROPIUM BROMIDE AND ALBUTEROL SULFATE 3 ML: 2.5; .5 SOLUTION RESPIRATORY (INHALATION) at 17:03

## 2024-07-07 RX ADMIN — INSULIN LISPRO 4 UNITS: 100 INJECTION, SOLUTION INTRAVENOUS; SUBCUTANEOUS at 17:10

## 2024-07-07 RX ADMIN — METOLAZONE 10 MG: 5 TABLET ORAL at 10:44

## 2024-07-07 RX ADMIN — BUMETANIDE 2 MG: 0.25 INJECTION, SOLUTION INTRAMUSCULAR; INTRAVENOUS at 20:15

## 2024-07-07 RX ADMIN — POLYETHYLENE GLYCOL 3350 17 G: 17 POWDER, FOR SOLUTION ORAL at 08:35

## 2024-07-07 RX ADMIN — INSULIN LISPRO 2 UNITS: 100 INJECTION, SOLUTION INTRAVENOUS; SUBCUTANEOUS at 20:16

## 2024-07-07 RX ADMIN — LACTULOSE 10 G: 20 SOLUTION ORAL at 20:15

## 2024-07-07 RX ADMIN — COLCHICINE 0.6 MG: 0.6 TABLET ORAL at 08:26

## 2024-07-07 RX ADMIN — INSULIN LISPRO 3 UNITS: 100 INJECTION, SOLUTION INTRAVENOUS; SUBCUTANEOUS at 12:40

## 2024-07-07 RX ADMIN — SENNOSIDES AND DOCUSATE SODIUM 2 TABLET: 8.6; 5 TABLET ORAL at 20:15

## 2024-07-07 RX ADMIN — Medication 10 ML: at 20:15

## 2024-07-07 RX ADMIN — IPRATROPIUM BROMIDE AND ALBUTEROL SULFATE 3 ML: 2.5; .5 SOLUTION RESPIRATORY (INHALATION) at 20:30

## 2024-07-07 RX ADMIN — LACTULOSE 10 G: 20 SOLUTION ORAL at 08:26

## 2024-07-07 RX ADMIN — Medication 1000 UNITS: at 08:26

## 2024-07-07 RX ADMIN — SPIRONOLACTONE 200 MG: 100 TABLET ORAL at 08:26

## 2024-07-07 RX ADMIN — PREGABALIN 100 MG: 100 CAPSULE ORAL at 08:26

## 2024-07-07 RX ADMIN — POLYETHYLENE GLYCOL 3350 17 G: 17 POWDER, FOR SOLUTION ORAL at 20:15

## 2024-07-07 RX ADMIN — INSULIN LISPRO 3 UNITS: 100 INJECTION, SOLUTION INTRAVENOUS; SUBCUTANEOUS at 08:26

## 2024-07-07 NOTE — PROGRESS NOTES
" LOS: 15 days   Patient Care Team:  Lorena Chamberlain APRN as PCP - General (Nurse Practitioner)  Lloyd Craig MD as Consulting Physician (Cardiology)    Chief Complaint: NASRIN    Subjective   Mild hyponatremia noted. Sodium ~ 132. BP on low side. UOP ~ 3.5liter. LE edema improving. Severe scrotal edema persistent     History taken from: patient    Objective     Vital Sign Min/Max for last 24 hours  Temp  Min: 97.8 °F (36.6 °C)  Max: 98.8 °F (37.1 °C)   BP  Min: 93/60  Max: 118/79   Pulse  Min: 73  Max: 100   Resp  Min: 16  Max: 18   SpO2  Min: 96 %  Max: 98 %   No data recorded   No data recorded     Flowsheet Rows      Flowsheet Row First Filed Value   Admission Height 185.4 cm (73\") Documented at 06/21/2024 1409   Admission Weight 127 kg (279 lb) Documented at 06/21/2024 1409            I/O this shift:  In: 236 [P.O.:236]  Out: 900 [Urine:900]  I/O last 3 completed shifts:  In: 1185 [P.O.:1185]  Out: 6050 [Urine:6050]    Objective:  Physical examination:    General Appearance: Alert, oriented, no obvious distress.  Morbid obesity  Eyes: PER, EOMI.  Neck: Supple no JVD.  Lungs: Clear auscultation, no rales rhonchi's, equal chest movement, nonlabored.  Heart: No gallop, murmur, rub, RRR.  Abdomen: Soft, nontender, positive bowel sounds, morbid obesity with abdominal wall edema  Extremities: Significant bilateral lower extremity edema 3-4+.  Lower extremity tenderness to improve.  No cyanosis.  Neuro: No focal deficit, moving all extremities, alert oriented X 3    : Rouse catheter in place.      Results Review:     I reviewed the patient's new clinical results.    WBC WBC   Date Value Ref Range Status   07/07/2024 8.02 3.40 - 10.80 10*3/mm3 Final   07/06/2024 5.69 3.40 - 10.80 10*3/mm3 Final   07/05/2024 7.27 3.40 - 10.80 10*3/mm3 Final        HGB Hemoglobin   Date Value Ref Range Status   07/07/2024 9.3 (L) 13.0 - 17.7 g/dL Final   07/06/2024 9.0 (L) 13.0 - 17.7 g/dL Final   07/05/2024 8.9 (L) " "13.0 - 17.7 g/dL Final        HCT Hematocrit   Date Value Ref Range Status   07/07/2024 29.2 (L) 37.5 - 51.0 % Final   07/06/2024 28.4 (L) 37.5 - 51.0 % Final   07/05/2024 28.1 (L) 37.5 - 51.0 % Final        Platlets No results found for: \"LABPLAT\"   MCV MCV   Date Value Ref Range Status   07/07/2024 96.7 79.0 - 97.0 fL Final   07/06/2024 95.0 79.0 - 97.0 fL Final   07/05/2024 95.9 79.0 - 97.0 fL Final            Sodium Sodium   Date Value Ref Range Status   07/07/2024 132 (L) 136 - 145 mmol/L Final   07/06/2024 136 136 - 145 mmol/L Final      Potassium Potassium   Date Value Ref Range Status   07/07/2024 4.3 3.5 - 5.2 mmol/L Final   07/06/2024 3.6 3.5 - 5.2 mmol/L Final   07/04/2024 3.7 3.5 - 5.2 mmol/L Final      Chloride Chloride   Date Value Ref Range Status   07/07/2024 90 (L) 98 - 107 mmol/L Final   07/06/2024 94 (L) 98 - 107 mmol/L Final      CO2 CO2   Date Value Ref Range Status   07/07/2024 29.0 22.0 - 29.0 mmol/L Final   07/06/2024 30.0 (H) 22.0 - 29.0 mmol/L Final      BUN BUN   Date Value Ref Range Status   07/07/2024 66 (H) 8 - 23 mg/dL Final   07/06/2024 66 (H) 8 - 23 mg/dL Final      Creatinine Creatinine   Date Value Ref Range Status   07/07/2024 1.80 (H) 0.76 - 1.27 mg/dL Final   07/06/2024 1.88 (H) 0.76 - 1.27 mg/dL Final      Calcium Calcium   Date Value Ref Range Status   07/07/2024 9.1 8.6 - 10.5 mg/dL Final   07/06/2024 9.2 8.6 - 10.5 mg/dL Final      PO4 No results found for: \"CAPO4\"   Albumin Albumin   Date Value Ref Range Status   07/06/2024 3.9 3.5 - 5.2 g/dL Final        Magnesium Magnesium   Date Value Ref Range Status   07/07/2024 1.8 1.6 - 2.4 mg/dL Final        Uric Acid No results found for: \"URICACID\"       Medication Review: Yes    Assessment & Plan       Acute on chronic heart failure with preserved ejection fraction (HFpEF)    Elevated serum creatinine    Anemia    Hypokalemia    Cirrhosis of liver    HCV (hepatitis C virus)    Acute on chronic HFrEF (heart failure with reduced " ejection fraction)           1.  Acute kidney disease: Last known stable cr 1.1 in 2023. Cr on recent admission at [facility] few weeks ago 1.6-1.9 mg/dl. Most recent cr 2.0-2.2 GFR 35-36ml/min. Urine sodium 20. Urine cr 39.5. Renal US no hydro     2.  New onset liver cirrhosis: Complications include ascites and LE edema.      3.  Volume status: Dependent edema b/l + ascites. Getting diuretics     4.  Hypokalemia: In the setting of diuretics     5.  Hyponatremia: Due to total body volume overload.      6.  HFpEF: Dependent edema      7.  Anemia: Recent hx of GI bleed. S/p EGD and colonoscopy.     8. Volume status: anasarca on admission. Responding well to diuretics so far. LE edema improving. Scrotal edema persistent.     Gout: Stable. On colchicine. Reduce the dose to 3x weekly if developing diarrhea       Recommendation:  Ok to transition to Bumex 2 mg BID tomorrow.  Continue spironolactone 200 mg daily   Strict I/O.   Check standing weight every 2-3 days. Patient still c/o significant scrotal edema. Therefore will   Limit Fluid intake to 1.2L/day  Need outpatient f/u in renal clinic in 2 weeks after discharge        William Bartlett MD  07/07/24  12:51 EDT

## 2024-07-07 NOTE — PLAN OF CARE
Problem: Adult Inpatient Plan of Care  Goal: Plan of Care Review  Outcome: Ongoing, Progressing  Flowsheets (Taken 7/7/2024 0433)  Progress: no change     Problem: Adult Inpatient Plan of Care  Goal: Absence of Hospital-Acquired Illness or Injury  Intervention: Identify and Manage Fall Risk  Recent Flowsheet Documentation  Taken 7/7/2024 0415 by Massiel Ariza RN  Safety Promotion/Fall Prevention:   activity supervised   assistive device/personal items within reach   safety round/check completed  Taken 7/7/2024 0215 by Massiel Ariza RN  Safety Promotion/Fall Prevention:   activity supervised   assistive device/personal items within reach   safety round/check completed  Taken 7/7/2024 0015 by Massiel Ariza RN  Safety Promotion/Fall Prevention:   assistive device/personal items within reach   activity supervised   safety round/check completed  Taken 7/6/2024 2215 by Massiel Ariza RN  Safety Promotion/Fall Prevention:   activity supervised   assistive device/personal items within reach   safety round/check completed  Taken 7/6/2024 2015 by Massiel Ariza RN  Safety Promotion/Fall Prevention:   activity supervised   assistive device/personal items within reach   safety round/check completed     Problem: Adult Inpatient Plan of Care  Goal: Absence of Hospital-Acquired Illness or Injury  Intervention: Prevent Skin Injury  Recent Flowsheet Documentation  Taken 7/7/2024 0415 by Massiel Ariza RN  Body Position: position changed independently  Skin Protection:   adhesive use limited   incontinence pads utilized  Taken 7/7/2024 0215 by Massiel Ariza RN  Body Position: position changed independently  Skin Protection:   adhesive use limited   incontinence pads utilized  Taken 7/7/2024 0015 by Massiel Ariza RN  Body Position: position changed independently  Skin Protection:   adhesive use limited   incontinence pads utilized  Taken 7/6/2024 2215 by Massiel Ariza RN  Body Position: position changed independently  Skin  Protection:   adhesive use limited   incontinence pads utilized  Taken 7/6/2024 2015 by Massiel Ariza RN  Body Position: position changed independently  Skin Protection:   adhesive use limited   incontinence pads utilized     Problem: Adult Inpatient Plan of Care  Goal: Absence of Hospital-Acquired Illness or Injury  Intervention: Prevent and Manage VTE (Venous Thromboembolism) Risk  Recent Flowsheet Documentation  Taken 7/7/2024 0415 by Massiel Ariza RN  Activity Management: activity encouraged  Taken 7/7/2024 0215 by Massiel Ariza RN  Activity Management: up in chair  Taken 7/7/2024 0015 by Massiel Ariza RN  Activity Management: up in chair  Taken 7/6/2024 2215 by Massiel Ariza RN  Activity Management: up in chair  Taken 7/6/2024 2015 by Massiel Ariza RN  Activity Management: up in chair     Problem: Fall Injury Risk  Goal: Absence of Fall and Fall-Related Injury  Intervention: Promote Injury-Free Environment  Recent Flowsheet Documentation  Taken 7/7/2024 0415 by Massiel Ariza RN  Safety Promotion/Fall Prevention:   activity supervised   assistive device/personal items within reach   safety round/check completed  Taken 7/7/2024 0215 by Massiel Ariza RN  Safety Promotion/Fall Prevention:   activity supervised   assistive device/personal items within reach   safety round/check completed  Taken 7/7/2024 0015 by Massiel Ariza RN  Safety Promotion/Fall Prevention:   assistive device/personal items within reach   activity supervised   safety round/check completed  Taken 7/6/2024 2215 by Massiel Ariza RN  Safety Promotion/Fall Prevention:   activity supervised   assistive device/personal items within reach   safety round/check completed  Taken 7/6/2024 2015 by Massiel Ariza RN  Safety Promotion/Fall Prevention:   activity supervised   assistive device/personal items within reach   safety round/check completed     Problem: Skin Injury Risk Increased  Goal: Skin Health and Integrity  Intervention: Optimize Skin  Protection  Recent Flowsheet Documentation  Taken 7/7/2024 0415 by Massiel Ariza RN  Pressure Reduction Techniques: frequent weight shift encouraged  Head of Bed (HOB) Positioning: HOB elevated  Pressure Reduction Devices: chair cushion utilized  Skin Protection:   adhesive use limited   incontinence pads utilized  Taken 7/7/2024 0215 by Massiel Ariza RN  Pressure Reduction Techniques: frequent weight shift encouraged  Pressure Reduction Devices: chair cushion utilized  Skin Protection:   adhesive use limited   incontinence pads utilized  Taken 7/7/2024 0015 by Massiel Ariza RN  Pressure Reduction Techniques: frequent weight shift encouraged  Pressure Reduction Devices: chair cushion utilized  Skin Protection:   adhesive use limited   incontinence pads utilized  Taken 7/6/2024 2215 by Massiel Ariza RN  Pressure Reduction Techniques: frequent weight shift encouraged  Pressure Reduction Devices: chair cushion utilized  Skin Protection:   adhesive use limited   incontinence pads utilized  Taken 7/6/2024 2015 by Massiel Ariza RN  Pressure Reduction Techniques: frequent weight shift encouraged  Pressure Reduction Devices: chair cushion utilized  Skin Protection:   adhesive use limited   incontinence pads utilized   Goal Outcome Evaluation:           Progress: no change

## 2024-07-07 NOTE — PROGRESS NOTES
1.  Today first-line      [facility] Medicine Services  PROGRESS NOTE    Patient Name: Jaycob Ndiaye II  : 1960  MRN: [id number]    Date of Admission: 2024  Primary Care Physician: Lorena Chamberlain APRN    Subjective   Subjective     CC:  Follow-up for heart failure    HPI:  And examined this morning.  Lower extremity swelling is improving.  Still complaining of scrotal edema.  No respiratory symptoms.  No chest pain    Objective   Objective     Vital Signs:   Temp:  [97.8 °F (36.6 °C)-98.8 °F (37.1 °C)] 97.8 °F (36.6 °C)  Heart Rate:  [] 92  Resp:  [16-18] 16  BP: (102-118)/(59-79) 107/59     Physical Exam:  General: Comfortable, not in distress, conversant and cooperative  Head: Atraumatic and normocephalic  Eyes: No Icterus. No pallor  Ears:  Ears appear intact with no abnormalities noted  Throat: No oral lesions, no thrush  Neck: Supple, trachea midline  Lungs: Clear to auscultation bilaterally, equal air entry, no wheezing or crackles  Heart:  Normal S1 and S2, no murmur, no gallop, No JVD, 3+ lower extremity swelling  Abdomen:  Soft, no tenderness, no organomegaly, normal bowel sounds, no organomegaly.  Scrotal edema  Extremities: pulses equal bilaterally  Skin: No bleeding, bruising or rash, normal skin turgor and elasticity  Neurologic: Cranial nerves appear intact with no evidence of facial asymmetry, normal motor and sensory functions in all 4 extremities  Psych: Alert and oriented x 3, normal mood    Results Reviewed:  LAB RESULTS:      Lab 24  0641 24  0426 24  0616 24  0500 24  0403 24  1117   WBC 8.02 5.69 7.27 6.96 7.62 9.29   HEMOGLOBIN 9.3* 9.0* 8.9* 9.2* 8.8* 8.9*   HEMATOCRIT 29.2* 28.4* 28.1* 28.4* 27.4* 28.2*   PLATELETS 200 180 201 201 190 196   NEUTROS ABS 6.02 4.23 5.39 5.28 6.02 7.64*   IMMATURE GRANS (ABS) 0.05 0.03 0.02 0.04 0.03 0.04   LYMPHS ABS 0.82 0.67* 0.81 0.77 0.66* 0.58*   MONOS ABS  0.99* 0.68 0.92* 0.81 0.87 0.96*   EOS ABS 0.08 0.04 0.09 0.03 0.02 0.04   MCV 96.7 95.0 95.9 96.9 95.8 97.6*   SED RATE  --   --   --   --   --  11   CRP  --   --   --   --   --  0.31   PROTIME  --   --  16.9*  --   --   --          Lab 07/07/24  0641 07/06/24  0426 07/04/24  1630 07/04/24  0500 07/03/24  1647 07/03/24 0403 07/02/24 2243 07/02/24 1117 07/01/24 0627   SODIUM 132* 136  --  137  --  134*  --  133* 132*   POTASSIUM 4.3 3.6 3.7 3.6 4.4 3.6   < > 3.5 3.7   CHLORIDE 90* 94*  --  93*  --  92*  --  92* 91*   CO2 29.0 30.0*  --  29.0  --  30.0*  --  26.0 26.0   ANION GAP 13.0 12.0  --  15.0  --  12.0  --  15.0 15.0   BUN 66* 66*  --  82*  --  87*  --  87* 91*   CREATININE 1.80* 1.88*  --  1.87*  --  1.99*  --  2.19* 2.13*   EGFR 41.3* 39.2*  --  39.4*  --  36.6*  --  32.6* 33.7*   GLUCOSE 196* 205*  --  160*  --  151*  --  236* 159*   CALCIUM 9.1 9.2  --  9.5  --  9.5  --  8.9 9.3   MAGNESIUM 1.8  --   --   --   --   --   --  2.2  --    PHOSPHORUS  --   --   --   --   --   --   --  3.9 4.4    < > = values in this interval not displayed.         Lab 07/06/24 0426 07/03/24 0403 07/02/24 1117 07/01/24 0627   TOTAL PROTEIN 6.0 6.1 6.4  --    ALBUMIN 3.9 4.0 4.0 3.9   GLOBULIN 2.1 2.1 2.4  --    ALT (SGPT) 12 14 14  --    AST (SGOT) 17 20 22  --    BILIRUBIN 0.6 0.6 0.5  --    ALK PHOS 69 71 66  --          Lab 07/05/24  0616   PROTIME 16.9*   INR 1.36*                     Brief Urine Lab Results  (Last result in the past 365 days)        Color   Clarity   Blood   Leuk Est   Nitrite   Protein   CREAT   Urine HCG        06/26/24 1621             39.5                 Microbiology Results Abnormal       None            No radiology results from the last 24 hrs        Current medications:  Scheduled Meds:bumetanide, 2 mg, Intravenous, TID  castor oil-balsam peru, 1 Application, Topical, Q12H  cholecalciferol, 1,000 Units, Oral, Daily  colchicine, 0.6 mg, Oral, Daily  ferrous sulfate, 325 mg, Oral, Daily With  Breakfast  heparin (porcine), 5,000 Units, Subcutaneous, Q8H  insulin lispro, 2-7 Units, Subcutaneous, 4x Daily AC & at Bedtime  Insulin Lispro, 4 Units, Subcutaneous, TID With Meals  ipratropium-albuterol, 3 mL, Nebulization, 4x Daily - RT  lactulose, 10 g, Oral, TID  pantoprazole, 40 mg, Oral, Q AM  pharmacy consult - MTM, , Does not apply, Daily  senna-docusate sodium, 2 tablet, Oral, BID   And  polyethylene glycol, 17 g, Oral, BID  pregabalin, 100 mg, Oral, Daily  sodium chloride, 10 mL, Intravenous, Q12H  spironolactone, 200 mg, Oral, Daily      Continuous Infusions:   PRN Meds:.  acetaminophen **OR** acetaminophen **OR** acetaminophen    Albuterol Sulfate NEB Orderable    senna-docusate sodium **AND** polyethylene glycol **AND** bisacodyl **AND** bisacodyl    Calcium Replacement - Follow Nurse / BPA Driven Protocol    dextrose    dextrose    glucagon (human recombinant)    magnesium hydroxide    Magnesium Low Dose Replacement - Follow Nurse / BPA Driven Protocol    nitroglycerin    Phosphorus Replacement - Follow Nurse / BPA Driven Protocol    Potassium Replacement - Follow Nurse / BPA Driven Protocol    prochlorperazine    sodium chloride    sodium chloride    sodium chloride    Assessment & Plan   Assessment & Plan     Active Hospital Problems    Diagnosis  POA    **Acute on chronic heart failure with preserved ejection fraction (HFpEF) [I50.33]  Yes    Acute on chronic HFrEF (heart failure with reduced ejection fraction) [I50.23]  Yes    Elevated serum creatinine [R79.89]  Yes    Anemia [D64.9]  Yes    Hypokalemia [E87.6]  Yes    Cirrhosis of liver [K74.60]  Yes    HCV (hepatitis C virus) [B19.20]  Yes      Resolved Hospital Problems   No resolved problems to display.        Brief Hospital Course to date:  Jaycob Ndiaye II is a 64 y.o. male with past medical history significant for CHF, COPD, cirrhosis, prostate cancer, chronic hematuria, GI bleed, HCV, HTN, and HLD who presents to the ED due to  worsening shortness of breath and lower extremity edema over the past week.    A/C HFrEF  Significant BLE edema and scrotal edema/Anasarca  Patient with extensive lower extremity and anterior abdominal wall edema  Nephrology team managing diuretic therapy.  Currently on IV Bumex 2 mg 3 times daily.  Continue Aldactone  Monitor kidney functions with ongoing aggressive diuresis  He follows with Cardiologist Dr. Craig  BLE venous duplex negative for DVT  WOC to see about scrotal wrap, PT to see about leg wraps tomorrow  Hopefully discharge tomorrow with Bumex 2 mg twice daily, and Aldactone 200 mg     Ascites  Bedside ultrasound with moderate to large ascites  Status post paracentesis by IR 7/5/2024 with net fluid removal 5.5 L     CKD  Creatinine stable around 1.8 despite aggressive diuresis  Nephrology managing diuretic therapy    ?Calcium deposits in fingertips and right elbow  Hyperphosphatemia  Check uric level (elevated), ionized calcium, PTH (normal) and vitamin D (low)  Normal sed rate   Continue colchicine and low dose steroids which seem to have helped significantly    Anemia  Recent GI Bleed  S/p EGD and colonoscopy at [facility] on 6/13/2024, records requested  Hgb stable  Continue PPI   Continue PO Iron -- give dose of IV iron     Hypokalemia  Replace per protocol     Newly diagnosed liver cirrhosis   HCV  S/p US abd on 6/10/24 that revealed nodular liver consistent with cirrhosis and moderate ascites  S/p US guided paracentesis on 6/12/2024 at [facility] with 200 mL of skylar-colored fluid removed.  No fluid was sent to lab.  Will need referral to establish with [facility] upon discharge per patient request  Consider paracentesis-- though he is not eager because it hurt so badly at [facility]. Our CT does not show a large voume of ascites- he think a good BM will help with abdominal distension, lactulose added     Type 2 diabetes with peripheral neuropathy  Holding home Mounjaro, Hg A1c is 6.0  Continue low dose  SSI for  now.  Glucoses stable running 137-209 last 24 hours-- jaimee QAC insulin while on steroids     PAF  Xarelto discontinued 6/13 at [facility] d/t persistent hematuria      HTN  Continue holding home bystolic for now d/t borderline BP      HO prostate cancer  Patient want PSA checked and thinks he will not need DC at home    Neuropathy  Continue Lyrica    DVT prophylaxis:  Heparin     Expected Discharge Location and Transportation: home once medically improved and cleared by cardiology/nephrology.  Expected Discharge   Expected Discharge Date: 7/5/2024; Expected Discharge Time:    Awaiting placement- he is not eager to leave until he is good and ready    VTE Prophylaxis:  Pharmacologic VTE prophylaxis orders are present.         AM-PAC 6 Clicks Score (PT): 17 (07/06/24 2015)    CODE STATUS:   Code Status and Medical Interventions:   Ordered at: 06/21/24 1840     Level Of Support Discussed With:    Patient     Code Status (Patient has no pulse and is not breathing):    CPR (Attempt to Resuscitate)     Medical Interventions (Patient has pulse or is breathing):    Full Support       Kaylen Huerta MD  07/07/24

## 2024-07-08 ENCOUNTER — READMISSION MANAGEMENT (OUTPATIENT)
Dept: CALL CENTER | Facility: HOSPITAL | Age: 64
End: 2024-07-08
Payer: MEDICARE

## 2024-07-08 VITALS
DIASTOLIC BLOOD PRESSURE: 66 MMHG | HEART RATE: 83 BPM | HEIGHT: 73 IN | TEMPERATURE: 97.5 F | WEIGHT: 273.5 LBS | RESPIRATION RATE: 16 BRPM | SYSTOLIC BLOOD PRESSURE: 100 MMHG | BODY MASS INDEX: 36.25 KG/M2 | OXYGEN SATURATION: 97 %

## 2024-07-08 LAB
ALBUMIN SERPL-MCNC: 3.8 G/DL (ref 3.5–5.2)
ALBUMIN/GLOB SERPL: 1.7 G/DL
ALP SERPL-CCNC: 69 U/L (ref 39–117)
ALT SERPL W P-5'-P-CCNC: 11 U/L (ref 1–41)
ANION GAP SERPL CALCULATED.3IONS-SCNC: 10 MMOL/L (ref 5–15)
AST SERPL-CCNC: 16 U/L (ref 1–40)
BASOPHILS # BLD AUTO: 0.04 10*3/MM3 (ref 0–0.2)
BASOPHILS NFR BLD AUTO: 0.6 % (ref 0–1.5)
BILIRUB SERPL-MCNC: 0.7 MG/DL (ref 0–1.2)
BUN SERPL-MCNC: 65 MG/DL (ref 8–23)
BUN/CREAT SERPL: 33.7 (ref 7–25)
CALCIUM SPEC-SCNC: 9.4 MG/DL (ref 8.6–10.5)
CHLORIDE SERPL-SCNC: 93 MMOL/L (ref 98–107)
CO2 SERPL-SCNC: 31 MMOL/L (ref 22–29)
CREAT SERPL-MCNC: 1.93 MG/DL (ref 0.76–1.27)
DEPRECATED RDW RBC AUTO: 55.8 FL (ref 37–54)
EGFRCR SERPLBLD CKD-EPI 2021: 37.9 ML/MIN/1.73
EOSINOPHIL # BLD AUTO: 0.08 10*3/MM3 (ref 0–0.4)
EOSINOPHIL NFR BLD AUTO: 1.1 % (ref 0.3–6.2)
ERYTHROCYTE [DISTWIDTH] IN BLOOD BY AUTOMATED COUNT: 16.1 % (ref 12.3–15.4)
GLOBULIN UR ELPH-MCNC: 2.3 GM/DL
GLUCOSE BLDC GLUCOMTR-MCNC: 180 MG/DL (ref 70–130)
GLUCOSE BLDC GLUCOMTR-MCNC: 230 MG/DL (ref 70–130)
GLUCOSE SERPL-MCNC: 168 MG/DL (ref 65–99)
HCT VFR BLD AUTO: 30.1 % (ref 37.5–51)
HGB BLD-MCNC: 9.4 G/DL (ref 13–17.7)
IMM GRANULOCYTES # BLD AUTO: 0.03 10*3/MM3 (ref 0–0.05)
IMM GRANULOCYTES NFR BLD AUTO: 0.4 % (ref 0–0.5)
LYMPHOCYTES # BLD AUTO: 0.7 10*3/MM3 (ref 0.7–3.1)
LYMPHOCYTES NFR BLD AUTO: 9.8 % (ref 19.6–45.3)
MCH RBC QN AUTO: 29.8 PG (ref 26.6–33)
MCHC RBC AUTO-ENTMCNC: 31.2 G/DL (ref 31.5–35.7)
MCV RBC AUTO: 95.6 FL (ref 79–97)
MONOCYTES # BLD AUTO: 0.81 10*3/MM3 (ref 0.1–0.9)
MONOCYTES NFR BLD AUTO: 11.4 % (ref 5–12)
NEUTROPHILS NFR BLD AUTO: 5.45 10*3/MM3 (ref 1.7–7)
NEUTROPHILS NFR BLD AUTO: 76.7 % (ref 42.7–76)
NRBC BLD AUTO-RTO: 0 /100 WBC (ref 0–0.2)
PLATELET # BLD AUTO: 191 10*3/MM3 (ref 140–450)
PMV BLD AUTO: 10.2 FL (ref 6–12)
POTASSIUM SERPL-SCNC: 3.7 MMOL/L (ref 3.5–5.2)
PROT SERPL-MCNC: 6.1 G/DL (ref 6–8.5)
RBC # BLD AUTO: 3.15 10*6/MM3 (ref 4.14–5.8)
SODIUM SERPL-SCNC: 134 MMOL/L (ref 136–145)
WBC NRBC COR # BLD AUTO: 7.11 10*3/MM3 (ref 3.4–10.8)

## 2024-07-08 PROCEDURE — 25010000002 BUMETANIDE PER 0.5 MG: Performed by: INTERNAL MEDICINE

## 2024-07-08 PROCEDURE — 82948 REAGENT STRIP/BLOOD GLUCOSE: CPT

## 2024-07-08 PROCEDURE — 99239 HOSP IP/OBS DSCHRG MGMT >30: CPT | Performed by: INTERNAL MEDICINE

## 2024-07-08 PROCEDURE — 85025 COMPLETE CBC W/AUTO DIFF WBC: CPT | Performed by: INTERNAL MEDICINE

## 2024-07-08 PROCEDURE — 63710000001 INSULIN LISPRO (HUMAN) PER 5 UNITS: Performed by: INTERNAL MEDICINE

## 2024-07-08 PROCEDURE — 94799 UNLISTED PULMONARY SVC/PX: CPT

## 2024-07-08 PROCEDURE — 63710000001 INSULIN LISPRO (HUMAN) PER 5 UNITS: Performed by: NURSE PRACTITIONER

## 2024-07-08 PROCEDURE — 80053 COMPREHEN METABOLIC PANEL: CPT | Performed by: INTERNAL MEDICINE

## 2024-07-08 RX ORDER — ONDANSETRON 4 MG/1
4 TABLET, ORALLY DISINTEGRATING ORAL EVERY 8 HOURS PRN
Qty: 60 TABLET | Refills: 0 | Status: SHIPPED | OUTPATIENT
Start: 2024-07-08

## 2024-07-08 RX ORDER — SPIRONOLACTONE 100 MG/1
200 TABLET, FILM COATED ORAL DAILY
Qty: 60 TABLET | Refills: 2 | Status: SHIPPED | OUTPATIENT
Start: 2024-07-09 | End: 2024-08-08

## 2024-07-08 RX ORDER — BUMETANIDE 2 MG/1
2 TABLET ORAL 2 TIMES DAILY
Qty: 30 TABLET | Refills: 11 | Status: SHIPPED | OUTPATIENT
Start: 2024-07-08 | End: 2025-07-08

## 2024-07-08 RX ORDER — POLYETHYLENE GLYCOL 3350 17 G/17G
17 POWDER, FOR SOLUTION ORAL DAILY
Qty: 30 PACKET | Refills: 2 | Status: SHIPPED | OUTPATIENT
Start: 2024-07-08 | End: 2024-10-06

## 2024-07-08 RX ORDER — PREGABALIN 100 MG/1
100 CAPSULE ORAL DAILY
Qty: 30 CAPSULE | Refills: 0 | Status: SHIPPED | OUTPATIENT
Start: 2024-07-09 | End: 2024-08-08

## 2024-07-08 RX ORDER — MELATONIN
1000 DAILY
Qty: 30 TABLET | Refills: 2 | Status: SHIPPED | OUTPATIENT
Start: 2024-07-09 | End: 2024-10-07

## 2024-07-08 RX ADMIN — IPRATROPIUM BROMIDE AND ALBUTEROL SULFATE 3 ML: 2.5; .5 SOLUTION RESPIRATORY (INHALATION) at 07:51

## 2024-07-08 RX ADMIN — LACTULOSE 10 G: 20 SOLUTION ORAL at 08:43

## 2024-07-08 RX ADMIN — INSULIN LISPRO 3 UNITS: 100 INJECTION, SOLUTION INTRAVENOUS; SUBCUTANEOUS at 08:43

## 2024-07-08 RX ADMIN — BUMETANIDE 2 MG: 0.25 INJECTION, SOLUTION INTRAMUSCULAR; INTRAVENOUS at 08:43

## 2024-07-08 RX ADMIN — INSULIN LISPRO 4 UNITS: 100 INJECTION, SOLUTION INTRAVENOUS; SUBCUTANEOUS at 08:47

## 2024-07-08 RX ADMIN — Medication 1000 UNITS: at 08:43

## 2024-07-08 RX ADMIN — SPIRONOLACTONE 200 MG: 100 TABLET ORAL at 08:44

## 2024-07-08 RX ADMIN — Medication 1 APPLICATION: at 08:43

## 2024-07-08 RX ADMIN — Medication 10 ML: at 09:52

## 2024-07-08 RX ADMIN — POLYETHYLENE GLYCOL 3350 17 G: 17 POWDER, FOR SOLUTION ORAL at 08:43

## 2024-07-08 RX ADMIN — SENNOSIDES AND DOCUSATE SODIUM 2 TABLET: 8.6; 5 TABLET ORAL at 08:43

## 2024-07-08 RX ADMIN — PREGABALIN 100 MG: 100 CAPSULE ORAL at 09:52

## 2024-07-08 RX ADMIN — INSULIN LISPRO 4 UNITS: 100 INJECTION, SOLUTION INTRAVENOUS; SUBCUTANEOUS at 12:02

## 2024-07-08 RX ADMIN — COLCHICINE 0.6 MG: 0.6 TABLET ORAL at 08:43

## 2024-07-08 RX ADMIN — INSULIN LISPRO 2 UNITS: 100 INJECTION, SOLUTION INTRAVENOUS; SUBCUTANEOUS at 12:02

## 2024-07-08 RX ADMIN — FERROUS SULFATE TAB 325 MG (65 MG ELEMENTAL FE) 325 MG: 325 (65 FE) TAB at 08:43

## 2024-07-08 RX ADMIN — PANTOPRAZOLE SODIUM 40 MG: 40 TABLET, DELAYED RELEASE ORAL at 05:32

## 2024-07-08 NOTE — DISCHARGE SUMMARY
[facility] Medicine Services  DISCHARGE SUMMARY    Patient Name: Jaycob Ndiaye II  : 1960  MRN: [id number]    Date of Admission: 2024  2:28 PM  Date of Discharge: 2024  Primary Care Physician: Lorena Chamberlain APRN    Consults       Date and Time Order Name Status Description    2024  7:42 AM Inpatient Nephrology Consult      2024  6:40 PM Inpatient Cardiology Consult Completed             Hospital Course     Presenting Problem:   Active Hospital Problems    Diagnosis  POA    **Acute on chronic heart failure with preserved ejection fraction (HFpEF) [I50.33]  Yes    Acute on chronic HFrEF (heart failure with reduced ejection fraction) [I50.23]  Yes    Elevated serum creatinine [R79.89]  Yes    Anemia [D64.9]  Yes    Hypokalemia [E87.6]  Yes    Cirrhosis of liver [K74.60]  Yes    HCV (hepatitis C virus) [B19.20]  Yes      Resolved Hospital Problems   No resolved problems to display.          Hospital Course:  Jaycob Ndiaye II is a 64 y.o. male with past medical history significant for CHF, COPD, cirrhosis, prostate cancer, chronic hematuria, GI bleed, HCV, and HLD who presents to the ED due to worsening shortness of breath and lower extremity edema.  He was found to be in decompensated systolic heart failure with generalized anasarca, likely worse because of his concomitant chronic kidney disease and liver cirrhosis.  Diuresed aggressively with IV Bumex, metolazone and Aldactone per nephrology recs.  Nearly -33 liters negative balance during this hospitalization with marked improvement of his respiratory symptoms.  Unfortunately, not candidate for heart failure goal-directed therapy because of his borderline blood pressure and chronic kidney disease.  He achieved medical stability and was discharged in a stable condition     A/C HFrEF  Significant BLE edema and scrotal edema/Anasarca  Patient with extensive lower extremity and anterior  abdominal wall edema  Nephrology team managing diuretic therapy.  Currently on IV Bumex 2 mg 3 times daily.  Continue Aldactone  Monitor kidney functions with ongoing aggressive diuresis  He follows with Cardiologist Dr. Craig  Discussed with Dr. Craig/cardiology on day of discharge 7/8/2024.  Unfortunately, patient is not candidate for goal-directed therapy including beta-blockers, ACE or ARB's, and SGLT 2 inhibitors because of his borderline blood pressure and CKD  BLE venous duplex negative for DVT  WOC to see about scrotal wrap, PT to see about leg wraps tomorrow  He will be discharged on Bumex 2 mg twice daily and Aldactone 20 mg daily per nephrology recs.    Ascites  Bedside ultrasound with moderate to large ascites  Status post paracentesis by IR 7/5/2024 with net fluid removal 5.5 L   Continue diuretic therapy as above     CKD  Creatinine stable around 1.8 despite aggressive diuresis  Nephrology managing diuretic therapy   He will follow with Dr. Bartlett as outpatient     ?Calcium deposits in fingertips and right elbow  Hyperphosphatemia  Check uric level (elevated), ionized calcium, PTH (normal) and vitamin D (low)  Normal sed rate   Status post colchicine and low dose steroids which seem to have helped significantly     Anemia  Recent GI Bleed  S/p EGD and colonoscopy at [facility] on 6/13/2024, records requested  Hgb stable  Continue PPI   Continue PO Iron -- give dose of IV iron     Hypokalemia  Replace per protocol     Newly diagnosed liver cirrhosis   HCV  S/p US abd on 6/10/24 that revealed nodular liver consistent with cirrhosis and moderate ascites  S/p US guided paracentesis on 6/12/2024 at [facility] with 200 mL of skylar-colored fluid removed.  No fluid was sent to lab.  Given referral to establish care with [facility] upon discharge per patient request     Type 2 diabetes with peripheral neuropathy  Holding home Mounjaro, Hg A1c is 6.0  Continue low dose  SSI for now.  Glucoses stable running 137-209 last  24 hours-- jaimee [facility] insulin while on steroids     PAF  Xarelto discontinued 6/13 at [facility] d/t persistent hematuria      HO prostate cancer  Patient want PSA checked and thinks he will not need DC at home     Neuropathy  Continue Lyrica      Discharge Follow Up Recommendations for outpatient labs/diagnostics:  PCP 1 week  Dr. Bartlett in 1 to 2 weeks  Referrals to GI service placed in epic    Day of Discharge     HPI:   Patient seen and examined this morning.  Comfortable in bed.  No shortness of breath or chest pain.  Continues to diurese well.  Discussed with him discharge planning and home diuretic therapy.  Excited to go home      Vital Signs:   Temp:  [97.4 °F (36.3 °C)-98 °F (36.7 °C)] 97.5 °F (36.4 °C)  Heart Rate:  [77-99] 83  Resp:  [16] 16  BP: ()/(64-75) 100/66      Physical Exam:  General: Comfortable, not in distress, conversant and cooperative  Head: Atraumatic and normocephalic  Eyes: No Icterus. No pallor  Ears:  Ears appear intact with no abnormalities noted  Throat: No oral lesions, no thrush  Neck: Supple, trachea midline  Lungs: Clear to auscultation bilaterally, equal air entry, no wheezing or crackles  Heart:  Normal S1 and S2, no murmur, no gallop, No JVD,  improving  lower extremity swelling  Abdomen:  Soft, no tenderness, no organomegaly, normal bowel sounds, no organomegaly.  Scrotal edema  Extremities: pulses equal bilaterally  Skin: No bleeding, bruising or rash, normal skin turgor and elasticity  Neurologic: Cranial nerves appear intact with no evidence of facial asymmetry, normal motor and sensory functions in all 4 extremities  Psych: Alert and oriented x 3, normal mood        Pertinent  and/or Most Recent Results     LAB RESULTS:      Lab 07/08/24  0810 07/07/24  0641 07/06/24  0426 07/05/24  0616 07/04/24  0500 07/03/24  0403 07/02/24  1117   WBC 7.11 8.02 5.69 7.27 6.96   < > 9.29   HEMOGLOBIN 9.4* 9.3* 9.0* 8.9* 9.2*   < > 8.9*   HEMATOCRIT 30.1* 29.2* 28.4* 28.1* 28.4*   < >  28.2*   PLATELETS 191 200 180 201 201   < > 196   NEUTROS ABS 5.45 6.02 4.23 5.39 5.28   < > 7.64*   IMMATURE GRANS (ABS) 0.03 0.05 0.03 0.02 0.04   < > 0.04   LYMPHS ABS 0.70 0.82 0.67* 0.81 0.77   < > 0.58*   MONOS ABS 0.81 0.99* 0.68 0.92* 0.81   < > 0.96*   EOS ABS 0.08 0.08 0.04 0.09 0.03   < > 0.04   MCV 95.6 96.7 95.0 95.9 96.9   < > 97.6*   SED RATE  --   --   --   --   --   --  11   CRP  --   --   --   --   --   --  0.31   PROTIME  --   --   --  16.9*  --   --   --     < > = values in this interval not displayed.         Lab 07/08/24  0810 07/07/24  0641 07/06/24  0426 07/04/24  1630 07/04/24  0500 07/03/24  1647 07/03/24  0403 07/02/24  2243 07/02/24  1117   SODIUM 134* 132* 136  --  137  --  134*  --  133*   POTASSIUM 3.7 4.3 3.6 3.7 3.6   < > 3.6   < > 3.5   CHLORIDE 93* 90* 94*  --  93*  --  92*  --  92*   CO2 31.0* 29.0 30.0*  --  29.0  --  30.0*  --  26.0   ANION GAP 10.0 13.0 12.0  --  15.0  --  12.0  --  15.0   BUN 65* 66* 66*  --  82*  --  87*  --  87*   CREATININE 1.93* 1.80* 1.88*  --  1.87*  --  1.99*  --  2.19*   EGFR 37.9* 41.3* 39.2*  --  39.4*  --  36.6*  --  32.6*   GLUCOSE 168* 196* 205*  --  160*  --  151*  --  236*   CALCIUM 9.4 9.1 9.2  --  9.5  --  9.5  --  8.9   MAGNESIUM  --  1.8  --   --   --   --   --   --  2.2   PHOSPHORUS  --   --   --   --   --   --   --   --  3.9    < > = values in this interval not displayed.         Lab 07/08/24  0810 07/06/24  0426 07/03/24  0403 07/02/24  1117   TOTAL PROTEIN 6.1 6.0 6.1 6.4   ALBUMIN 3.8 3.9 4.0 4.0   GLOBULIN 2.3 2.1 2.1 2.4   ALT (SGPT) 11 12 14 14   AST (SGOT) 16 17 20 22   BILIRUBIN 0.7 0.6 0.6 0.5   ALK PHOS 69 69 71 66         Lab 07/05/24  0616   PROTIME 16.9*   INR 1.36*                 Brief Urine Lab Results  (Last result in the past 365 days)        Color   Clarity   Blood   Leuk Est   Nitrite   Protein   CREAT   Urine HCG        06/26/24 1621             39.5               Microbiology Results (last 10 days)       ** No results  found for the last 240 hours. **            US Paracentesis    Result Date: 7/5/2024  US PARACENTESIS  History: ascites  : Brad Hardy MD.  Modality: Ultrasonography                   Sedation: None. Anesthesia: Lidocaine 1% local infiltration. Estimated blood loss:  0 cc.        Technique: A thorough discussion of the risks, benefits, and alternatives of the procedure, and if applicable, moderate sedation, was carried out with the patient. They were encouraged to ask any questions. Any questions were answered. They verbalized understanding. A written informed consent was then signed.  A multi-component timeout was performed prior to starting the procedure using the departmental protocol. The procedure room personnel used personal protective equipment. The operators used sterile gloves and if indicated, sterile gowns. The surgical site was prepped with chlorhexidine gluconate  and draped in the maximal applicable sterile fashion. A preliminary ultrasonography was performed to assess the target and determine a safe access site. It showed ascites. Pertinent ultrasound images were stored to the PACS for documentation. The access site was sterilely prepped and draped. Local anesthesia was administered. A dermatotomy was performed if needed. A catheter over the needle system was advanced into the peritoneal cavity. Light brown, or blood-tinged fluid was aspirated and the plastic catheter advanced into the peritoneum. The catheter was then connected to a fluid recovery system. At the end of the procedure, the catheter was withdrawn and an aseptic dressing applied. The patient tolerated the procedure well. After uneventful recovery recovery, the patient was discharged from the department in stable condition. The total amount of fluid recovered is given below. Albumin was infused intravenously if ordered by the referring doctor. Complications: None immediate. Specimen: Nothing was ordered.     Impression:                                                               Successful ultrasound-guided paracentesis using a Right lower quadrant access with recovery of 5.3 liters of fluid as described above. Thank you for the opportunity to assist in the care of your patient. Electronically Signed: Brad Hardy MD  7/5/2024 11:07 AM EDT  Workstation ID: [id number]     Results for orders placed during the hospital encounter of 06/21/24    Duplex Venous Lower Extremity - Bilateral CAR    Interpretation Summary    Normal bilateral lower extremity venous duplex scan.      Results for orders placed during the hospital encounter of 06/21/24    Duplex Venous Lower Extremity - Bilateral CAR    Interpretation Summary    Normal bilateral lower extremity venous duplex scan.          Plan for Follow-up of Pending Labs/Results:     Discharge Details        Discharge Medications        New Medications        Instructions Start Date   bumetanide 2 MG tablet  Commonly known as: BUMEX   2 mg, Oral, 2 Times Daily      cholecalciferol 25 MCG (1000 UT) tablet  Commonly known as: VITAMIN D3   1,000 Units, Oral, Daily   Start Date: July 9, 2024     ondansetron ODT 4 MG disintegrating tablet  Commonly known as: ZOFRAN-ODT   4 mg, Translingual, Every 8 Hours PRN      polyethylene glycol 17 g packet  Commonly known as: MIRALAX   17 g, Oral, Daily      pregabalin 100 MG capsule  Commonly known as: LYRICA   100 mg, Oral, Daily   Start Date: July 9, 2024            Changes to Medications        Instructions Start Date   spironolactone 100 MG tablet  Commonly known as: ALDACTONE  What changed: how much to take   200 mg, Oral, Daily   Start Date: July 9, 2024            Continue These Medications        Instructions Start Date   Breztri Aerosphere 160-9-4.8 MCG/ACT aerosol inhaler  Generic drug: Budeson-Glycopyrrol-Formoterol   2 puffs, Inhalation, 2 Times Daily      ferrous gluconate 324 (37.5 Fe) MG tablet tablet   324 mg, Oral, Daily With Breakfast       Linzess 72 MCG capsule capsule  Generic drug: linaclotide   1 capsule, Oral, Daily      magnesium oxide 400 MG tablet  Commonly known as: MAG-OX   1 tablet, Oral, Daily      metOLazone 5 MG tablet  Commonly known as: ZAROXOLYN   5 mg, Oral, Daily      nebivolol 5 MG tablet  Commonly known as: BYSTOLIC   1 tablet, Oral, Daily      Tirzepatide 10 MG/0.5ML solution pen-injector pen  Commonly known as: MOUNJARO   10 mg, Subcutaneous, Weekly, Patient takes on Fridays             Stop These Medications      doxycycline 100 MG tablet  Commonly known as: VIBRAMYICN     furosemide 40 MG tablet  Commonly known as: LASIX              Allergies   Allergen Reactions    Ketamine Confusion    Nsaids Swelling     Swelling of throat      Contrast Dye (Echo Or Unknown Ct/Mr) Rash     Hives, swelling , itching            Discharge Disposition:  Home or Self Care    Diet:  Hospital:  Diet Order   Procedures    Diet: Regular/House, Cardiac, Renal, Diabetic, Fluid Restriction (240 mL/tray); Healthy Heart (2-3 Na+); Consistent Carbohydrate; Low Potassium, Low Phosphorus; Other (Specify mL/day) (1200ml/day); Texture: Regular (IDDSI 7); Fluid Consistency: Th...            Activity:      Restrictions or Other Recommendations:  None        CODE STATUS:    Code Status and Medical Interventions:   Ordered at: 06/21/24 1840     Level Of Support Discussed With:    Patient     Code Status (Patient has no pulse and is not breathing):    CPR (Attempt to Resuscitate)     Medical Interventions (Patient has pulse or is breathing):    Full Support       No future appointments.    Additional Instructions for the Follow-ups that You Need to Schedule       Ambulatory Referral to Physical Therapy   As directed      Comments: Light compression wraps for BLE edema  Reason for consult: Compression Wrap/Unnaboot    Order Comments: Comments: Light compression wraps for BLE edema Reason for consult: Compression Wrap/Unnaboot    Specialty needed: Lymphedema  Evaluate and treat   Exercises: Stretching Strengthening   Weight Bearing Status: Full weight bearing   Follow-up needed: Yes                      Kaylen Huerta MD  07/08/24      Time Spent on Discharge:  I spent  40  minutes on this discharge activity which included: face-to-face encounter with the patient, reviewing the data in the system, coordination of the care with the nursing staff as well as consultants, documentation, and entering orders.

## 2024-07-08 NOTE — PLAN OF CARE
Goal Outcome Evaluation:           Progress: improving  Outcome Evaluation: pt to discharge today.

## 2024-07-08 NOTE — OUTREACH NOTE
Prep Survey      Flowsheet Row Responses   [facility] facility patient discharged from? [facility]   Is LACE score < 7 ? No   Eligibility Readm Mgmt   Discharge diagnosis Acute on chronic heart failure with preserved ejection fraction (HFpEF)   Does the patient have one of the following disease processes/diagnoses(primary or secondary)? CHF   Prep survey completed? Yes            Julissa SAMAYOA - Registered Nurse

## 2024-07-08 NOTE — PAYOR COMM NOTE
"Jaycob Scott II (64 y.o. Male)   [id number]     Carolina Good RN  Utilization Review  Sxcff-[phone]  [phone]          Date of Birth   1960    Social Security Number       Address   [address] [place] [address]    Home Phone   [phone]    MRN   [id number]       Muslim   None    Marital Status                               Admission Date   6/21/24    Admission Type   Emergency    Admitting Provider   Kaylen Huerta MD    Attending Provider   Kaylen Huerta MD    Department, Room/Bed   [facility] 6B, N636/1       Discharge Date       Discharge Disposition   Home or Self Care    Discharge Destination                                 Attending Provider: Kaylen Huerta MD    Allergies: Ketamine, Nsaids, Contrast Dye (Echo Or Unknown Ct/mr)    Isolation: None   Infection: None   Code Status: CPR    Ht: 185.4 cm (73\")   Wt: 124 kg (273 lb 8 oz)    Admission Cmt: None   Principal Problem: Acute on chronic heart failure with preserved ejection fraction (HFpEF) [I50.33]                   Active Insurance as of 6/21/2024       Primary Coverage       Payor Plan Insurance Group Employer/Plan Group    ANTHEM MEDICARE REPLACEMENT ANTHEM MEDICARE ADVANTAGE [id number]       Payor Plan Address Payor Plan Phone Number Payor Plan Fax Number Effective Dates    [address] [phone]  10/1/2020 - None Entered    [facility] [id number]         Subscriber Name Subscriber Birth Date Member ID       JAYCOB SCOTT II 1960 [id number]               Secondary Coverage       Payor Plan Insurance Group Employer/Plan Group    [place]        Payor Plan Address Payor Plan Phone Number Payor Plan Fax Number Effective Dates    [address] [phone]  6/21/2024 - None Entered    [facility] [id number]         Subscriber Name Subscriber Birth Date Member ID       JAYCOB SCOTT II 1960 [id number]       "               Emergency Contacts        (Rel.) Home Phone Work Phone Mobile Phone    Jaycob Elam (Son) [phone] -- --    isidra elam (Son) -- -- [phone]    Rahul Elam (Son) -- -- [phone]                 Discharge Summary        Kaylen Huerta MD at 24 1132              [facility] Services  DISCHARGE SUMMARY    Patient Name: Jaycob Elam II  : 1960  MRN: [id number]    Date of Admission: 2024  2:28 PM  Date of Discharge: 2024  Primary Care Physician: Lorena Chamberlain APRN    Consults       Date and Time Order Name Status Description    2024  7:42 AM Inpatient Nephrology Consult      2024  6:40 PM Inpatient Cardiology Consult Completed             Hospital Course     Presenting Problem:   Active Hospital Problems    Diagnosis  POA    **Acute on chronic heart failure with preserved ejection fraction (HFpEF) [I50.33]  Yes    Acute on chronic HFrEF (heart failure with reduced ejection fraction) [I50.23]  Yes    Elevated serum creatinine [R79.89]  Yes    Anemia [D64.9]  Yes    Hypokalemia [E87.6]  Yes    Cirrhosis of liver [K74.60]  Yes    HCV (hepatitis C virus) [B19.20]  Yes      Resolved Hospital Problems   No resolved problems to display.          Hospital Course:  Jayocb Elam II is a 64 y.o. male with past medical history significant for CHF, COPD, cirrhosis, prostate cancer, chronic hematuria, GI bleed, HCV, and HLD who presents to the ED due to worsening shortness of breath and lower extremity edema.  He was found to be in decompensated systolic heart failure with generalized anasarca, likely worse because of his concomitant chronic kidney disease and liver cirrhosis.  Diuresed aggressively with IV Bumex, metolazone and Aldactone per nephrology recs.  Nearly -33 liters negative balance during this hospitalization with marked improvement of his respiratory symptoms.   Unfortunately, not candidate for heart failure goal-directed therapy because of his borderline blood pressure and chronic kidney disease.  He achieved medical stability and was discharged in a stable condition     A/C HFrEF  Significant BLE edema and scrotal edema/Anasarca  Patient with extensive lower extremity and anterior abdominal wall edema  Nephrology team managing diuretic therapy.  Currently on IV Bumex 2 mg 3 times daily.  Continue Aldactone  Monitor kidney functions with ongoing aggressive diuresis  He follows with Cardiologist Dr. Craig  Discussed with Dr. Craig/cardiology on day of discharge 7/8/2024.  Unfortunately, patient is not candidate for goal-directed therapy including beta-blockers, ACE or ARB's, and SGLT 2 inhibitors because of his borderline blood pressure and CKD  BLE venous duplex negative for DVT  WOC to see about scrotal wrap, PT to see about leg wraps tomorrow  He will be discharged on Bumex 2 mg twice daily and Aldactone 20 mg daily per nephrology recs.    Ascites  Bedside ultrasound with moderate to large ascites  Status post paracentesis by IR 7/5/2024 with net fluid removal 5.5 L   Continue diuretic therapy as above     CKD  Creatinine stable around 1.8 despite aggressive diuresis  Nephrology managing diuretic therapy   He will follow with Dr. Bartlett as outpatient     ?Calcium deposits in fingertips and right elbow  Hyperphosphatemia  Check uric level (elevated), ionized calcium, PTH (normal) and vitamin D (low)  Normal sed rate   Status post colchicine and low dose steroids which seem to have helped significantly     Anemia  Recent GI Bleed  S/p EGD and colonoscopy at [facility] on 6/13/2024, records requested  Hgb stable  Continue PPI   Continue PO Iron -- give dose of IV iron     Hypokalemia  Replace per protocol     Newly diagnosed liver cirrhosis   HCV  S/p US abd on 6/10/24 that revealed nodular liver consistent with cirrhosis and moderate ascites  S/p US guided paracentesis on  6/12/2024 at [facility] with 200 mL of skylar-colored fluid removed.  No fluid was sent to lab.  Given referral to establish care with [facility] upon discharge per patient request     Type 2 diabetes with peripheral neuropathy  Holding home Mounjaro, Hg A1c is 6.0  Continue low dose  SSI for now.  Glucoses stable running 137-209 last 24 hours-- jaimee QAC insulin while on steroids     PAF  Xarelto discontinued 6/13 at [facility] d/t persistent hematuria      HO prostate cancer  Patient want PSA checked and thinks he will not need DC at home     Neuropathy  Continue Lyrica      Discharge Follow Up Recommendations for outpatient labs/diagnostics:  PCP 1 week  Dr. Bartlett in 1 to 2 weeks  Referrals to GI service placed in epic    Day of Discharge     HPI:   Patient seen and examined this morning.  Comfortable in bed.  No shortness of breath or chest pain.  Continues to diurese well.  Discussed with him discharge planning and home diuretic therapy.  Excited to go home      Vital Signs:   Temp:  [97.4 °F (36.3 °C)-98 °F (36.7 °C)] 97.5 °F (36.4 °C)  Heart Rate:  [77-99] 83  Resp:  [16] 16  BP: ()/(64-75) 100/66      Physical Exam:  General: Comfortable, not in distress, conversant and cooperative  Head: Atraumatic and normocephalic  Eyes: No Icterus. No pallor  Ears:  Ears appear intact with no abnormalities noted  Throat: No oral lesions, no thrush  Neck: Supple, trachea midline  Lungs: Clear to auscultation bilaterally, equal air entry, no wheezing or crackles  Heart:  Normal S1 and S2, no murmur, no gallop, No JVD,  improving  lower extremity swelling  Abdomen:  Soft, no tenderness, no organomegaly, normal bowel sounds, no organomegaly.  Scrotal edema  Extremities: pulses equal bilaterally  Skin: No bleeding, bruising or rash, normal skin turgor and elasticity  Neurologic: Cranial nerves appear intact with no evidence of facial asymmetry, normal motor and sensory functions in all 4 extremities  Psych: Alert and oriented x 3,  normal mood        Pertinent  and/or Most Recent Results     LAB RESULTS:      Lab 07/08/24  0810 07/07/24  0641 07/06/24  0426 07/05/24  0616 07/04/24  0500 07/03/24  0403 07/02/24  1117   WBC 7.11 8.02 5.69 7.27 6.96   < > 9.29   HEMOGLOBIN 9.4* 9.3* 9.0* 8.9* 9.2*   < > 8.9*   HEMATOCRIT 30.1* 29.2* 28.4* 28.1* 28.4*   < > 28.2*   PLATELETS 191 200 180 201 201   < > 196   NEUTROS ABS 5.45 6.02 4.23 5.39 5.28   < > 7.64*   IMMATURE GRANS (ABS) 0.03 0.05 0.03 0.02 0.04   < > 0.04   LYMPHS ABS 0.70 0.82 0.67* 0.81 0.77   < > 0.58*   MONOS ABS 0.81 0.99* 0.68 0.92* 0.81   < > 0.96*   EOS ABS 0.08 0.08 0.04 0.09 0.03   < > 0.04   MCV 95.6 96.7 95.0 95.9 96.9   < > 97.6*   SED RATE  --   --   --   --   --   --  11   CRP  --   --   --   --   --   --  0.31   PROTIME  --   --   --  16.9*  --   --   --     < > = values in this interval not displayed.         Lab 07/08/24  0810 07/07/24  0641 07/06/24  0426 07/04/24  1630 07/04/24  0500 07/03/24  1647 07/03/24  0403 07/02/24  2243 07/02/24  1117   SODIUM 134* 132* 136  --  137  --  134*  --  133*   POTASSIUM 3.7 4.3 3.6 3.7 3.6   < > 3.6   < > 3.5   CHLORIDE 93* 90* 94*  --  93*  --  92*  --  92*   CO2 31.0* 29.0 30.0*  --  29.0  --  30.0*  --  26.0   ANION GAP 10.0 13.0 12.0  --  15.0  --  12.0  --  15.0   BUN 65* 66* 66*  --  82*  --  87*  --  87*   CREATININE 1.93* 1.80* 1.88*  --  1.87*  --  1.99*  --  2.19*   EGFR 37.9* 41.3* 39.2*  --  39.4*  --  36.6*  --  32.6*   GLUCOSE 168* 196* 205*  --  160*  --  151*  --  236*   CALCIUM 9.4 9.1 9.2  --  9.5  --  9.5  --  8.9   MAGNESIUM  --  1.8  --   --   --   --   --   --  2.2   PHOSPHORUS  --   --   --   --   --   --   --   --  3.9    < > = values in this interval not displayed.         Lab 07/08/24  0810 07/06/24  0426 07/03/24  0403 07/02/24  1117   TOTAL PROTEIN 6.1 6.0 6.1 6.4   ALBUMIN 3.8 3.9 4.0 4.0   GLOBULIN 2.3 2.1 2.1 2.4   ALT (SGPT) 11 12 14 14   AST (SGOT) 16 17 20 22   BILIRUBIN 0.7 0.6 0.6 0.5   ALK PHOS 69  69 71 66         Lab 07/05/24  0616   PROTIME 16.9*   INR 1.36*                 Brief Urine Lab Results  (Last result in the past 365 days)        Color   Clarity   Blood   Leuk Est   Nitrite   Protein   CREAT   Urine HCG        06/26/24 1621             39.5               Microbiology Results (last 10 days)       ** No results found for the last 240 hours. **            US Paracentesis    Result Date: 7/5/2024  US PARACENTESIS  History: ascites  : Brad Hardy MD.  Modality: Ultrasonography                   Sedation: None. Anesthesia: Lidocaine 1% local infiltration. Estimated blood loss:  0 cc.        Technique: A thorough discussion of the risks, benefits, and alternatives of the procedure, and if applicable, moderate sedation, was carried out with the patient. They were encouraged to ask any questions. Any questions were answered. They verbalized understanding. A written informed consent was then signed.  A multi-component timeout was performed prior to starting the procedure using the departmental protocol. The procedure room personnel used personal protective equipment. The operators used sterile gloves and if indicated, sterile gowns. The surgical site was prepped with chlorhexidine gluconate  and draped in the maximal applicable sterile fashion. A preliminary ultrasonography was performed to assess the target and determine a safe access site. It showed ascites. Pertinent ultrasound images were stored to the PACS for documentation. The access site was sterilely prepped and draped. Local anesthesia was administered. A dermatotomy was performed if needed. A catheter over the needle system was advanced into the peritoneal cavity. Light brown, or blood-tinged fluid was aspirated and the plastic catheter advanced into the peritoneum. The catheter was then connected to a fluid recovery system. At the end of the procedure, the catheter was withdrawn and an aseptic dressing applied. The patient  tolerated the procedure well. After uneventful recovery recovery, the patient was discharged from the department in stable condition. The total amount of fluid recovered is given below. Albumin was infused intravenously if ordered by the referring doctor. Complications: None immediate. Specimen: Nothing was ordered.     Impression:                                                              Successful ultrasound-guided paracentesis using a Right lower quadrant access with recovery of 5.3 liters of fluid as described above. Thank you for the opportunity to assist in the care of your patient. Electronically Signed: Brad Hardy MD  7/5/2024 11:07 AM EDT  Workstation ID: [id number]     Results for orders placed during the hospital encounter of 06/21/24    Duplex Venous Lower Extremity - Bilateral CAR    Interpretation Summary    Normal bilateral lower extremity venous duplex scan.      Results for orders placed during the hospital encounter of 06/21/24    Duplex Venous Lower Extremity - Bilateral CAR    Interpretation Summary    Normal bilateral lower extremity venous duplex scan.          Plan for Follow-up of Pending Labs/Results:     Discharge Details        Discharge Medications        New Medications        Instructions Start Date   bumetanide 2 MG tablet  Commonly known as: BUMEX   2 mg, Oral, 2 Times Daily      cholecalciferol 25 MCG (1000 UT) tablet  Commonly known as: VITAMIN D3   1,000 Units, Oral, Daily   Start Date: July 9, 2024     ondansetron ODT 4 MG disintegrating tablet  Commonly known as: ZOFRAN-ODT   4 mg, Translingual, Every 8 Hours PRN      polyethylene glycol 17 g packet  Commonly known as: MIRALAX   17 g, Oral, Daily      pregabalin 100 MG capsule  Commonly known as: LYRICA   100 mg, Oral, Daily   Start Date: July 9, 2024            Changes to Medications        Instructions Start Date   spironolactone 100 MG tablet  Commonly known as: ALDACTONE  What changed: how much to take   200 mg, Oral,  Daily   Start Date: July 9, 2024            Continue These Medications        Instructions Start Date   Breztri Aerosphere 160-9-4.8 MCG/ACT aerosol inhaler  Generic drug: Budeson-Glycopyrrol-Formoterol   2 puffs, Inhalation, 2 Times Daily      ferrous gluconate 324 (37.5 Fe) MG tablet tablet   324 mg, Oral, Daily With Breakfast      Linzess 72 MCG capsule capsule  Generic drug: linaclotide   1 capsule, Oral, Daily      magnesium oxide 400 MG tablet  Commonly known as: MAG-OX   1 tablet, Oral, Daily      metOLazone 5 MG tablet  Commonly known as: ZAROXOLYN   5 mg, Oral, Daily      nebivolol 5 MG tablet  Commonly known as: BYSTOLIC   1 tablet, Oral, Daily      Tirzepatide 10 MG/0.5ML solution pen-injector pen  Commonly known as: MOUNJARO   10 mg, Subcutaneous, Weekly, Patient takes on Fridays             Stop These Medications      doxycycline 100 MG tablet  Commonly known as: VIBRAMYICN     furosemide 40 MG tablet  Commonly known as: LASIX              Allergies   Allergen Reactions    Ketamine Confusion    Nsaids Swelling     Swelling of throat      Contrast Dye (Echo Or Unknown Ct/Mr) Rash     Hives, swelling , itching            Discharge Disposition:  Home or Self Care    Diet:  Hospital:  Diet Order   Procedures    Diet: Regular/House, Cardiac, Renal, Diabetic, Fluid Restriction (240 mL/tray); Healthy Heart (2-3 Na+); Consistent Carbohydrate; Low Potassium, Low Phosphorus; Other (Specify mL/day) (1200ml/day); Texture: Regular (IDDSI 7); Fluid Consistency: Th...            Activity:      Restrictions or Other Recommendations:  None        CODE STATUS:    Code Status and Medical Interventions:   Ordered at: 06/21/24 1840     Level Of Support Discussed With:    Patient     Code Status (Patient has no pulse and is not breathing):    CPR (Attempt to Resuscitate)     Medical Interventions (Patient has pulse or is breathing):    Full Support       No future appointments.    Additional Instructions for the Follow-ups  that You Need to Schedule       Ambulatory Referral to Physical Therapy   As directed      Comments: Light compression wraps for BLE edema  Reason for consult: Compression Wrap/Unnaboot    Order Comments: Comments: Light compression wraps for BLE edema Reason for consult: Compression Wrap/Unnaboot    Specialty needed: Lymphedema Evaluate and treat   Exercises: Stretching Strengthening   Weight Bearing Status: Full weight bearing   Follow-up needed: Yes                      Kaylen Huerta MD  07/08/24      Time Spent on Discharge:  I spent  40  minutes on this discharge activity which included: face-to-face encounter with the patient, reviewing the data in the system, coordination of the care with the nursing staff as well as consultants, documentation, and entering orders.            Electronically signed by Kaylen Huerta MD at 07/08/24 9290

## 2024-07-08 NOTE — PROGRESS NOTES
" LOS: 16 days   Patient Care Team:  Lorena Chamberlain APRN as PCP - General (Nurse Practitioner)  Lloyd Craig MD as Consulting Physician (Cardiology)    Chief Complaint: NASRIN    Subjective   Seen and examined at bedside for the first time.   Patient reports feeling much better.  Edema is significantly improving per patient.  Still volume overloaded on exam.     History taken from: patient    Objective     Vital Sign Min/Max for last 24 hours  Temp  Min: 97.4 °F (36.3 °C)  Max: 98 °F (36.7 °C)   BP  Min: 94/64  Max: 114/70   Pulse  Min: 77  Max: 99   Resp  Min: 16  Max: 16   SpO2  Min: 95 %  Max: 98 %   No data recorded   No data recorded     Flowsheet Rows      Flowsheet Row First Filed Value   Admission Height 185.4 cm (73\") Documented at 06/21/2024 1409   Admission Weight 127 kg (279 lb) Documented at 06/21/2024 1409            I/O this shift:  In: -   Out: 1450 [Urine:1450]  I/O last 3 completed shifts:  In: 2121 [P.O.:2121]  Out: 5300 [Urine:5300]    Objective:  Physical examination:    General Appearance: Alert, oriented, no obvious distress.  Morbid obesity  Eyes: PER, EOMI.  Neck: Supple no JVD.  Lungs: Clear auscultation, no rales rhonchi's, equal chest movement, nonlabored.  Heart: No gallop, murmur, rub, RRR.  Abdomen: Soft, nontender, positive bowel sounds, morbid obesity with abdominal wall edema  Extremities: Significant bilateral lower extremity edema 3-4+.  Lower extremity tenderness to improve.  No cyanosis.  Neuro: No focal deficit, moving all extremities, alert oriented X 3  : No rodrigues     Results Review:     I reviewed the patient's new clinical results.    WBC WBC   Date Value Ref Range Status   07/08/2024 7.11 3.40 - 10.80 10*3/mm3 Final   07/07/2024 8.02 3.40 - 10.80 10*3/mm3 Final   07/06/2024 5.69 3.40 - 10.80 10*3/mm3 Final        HGB Hemoglobin   Date Value Ref Range Status   07/08/2024 9.4 (L) 13.0 - 17.7 g/dL Final   07/07/2024 9.3 (L) 13.0 - 17.7 g/dL Final " "  07/06/2024 9.0 (L) 13.0 - 17.7 g/dL Final        HCT Hematocrit   Date Value Ref Range Status   07/08/2024 30.1 (L) 37.5 - 51.0 % Final   07/07/2024 29.2 (L) 37.5 - 51.0 % Final   07/06/2024 28.4 (L) 37.5 - 51.0 % Final        Platlets No results found for: \"LABPLAT\"   MCV MCV   Date Value Ref Range Status   07/08/2024 95.6 79.0 - 97.0 fL Final   07/07/2024 96.7 79.0 - 97.0 fL Final   07/06/2024 95.0 79.0 - 97.0 fL Final            Sodium Sodium   Date Value Ref Range Status   07/08/2024 134 (L) 136 - 145 mmol/L Final   07/07/2024 132 (L) 136 - 145 mmol/L Final   07/06/2024 136 136 - 145 mmol/L Final      Potassium Potassium   Date Value Ref Range Status   07/08/2024 3.7 3.5 - 5.2 mmol/L Final   07/07/2024 4.3 3.5 - 5.2 mmol/L Final   07/06/2024 3.6 3.5 - 5.2 mmol/L Final      Chloride Chloride   Date Value Ref Range Status   07/08/2024 93 (L) 98 - 107 mmol/L Final   07/07/2024 90 (L) 98 - 107 mmol/L Final   07/06/2024 94 (L) 98 - 107 mmol/L Final      CO2 CO2   Date Value Ref Range Status   07/08/2024 31.0 (H) 22.0 - 29.0 mmol/L Final   07/07/2024 29.0 22.0 - 29.0 mmol/L Final   07/06/2024 30.0 (H) 22.0 - 29.0 mmol/L Final      BUN BUN   Date Value Ref Range Status   07/08/2024 65 (H) 8 - 23 mg/dL Final   07/07/2024 66 (H) 8 - 23 mg/dL Final   07/06/2024 66 (H) 8 - 23 mg/dL Final      Creatinine Creatinine   Date Value Ref Range Status   07/08/2024 1.93 (H) 0.76 - 1.27 mg/dL Final   07/07/2024 1.80 (H) 0.76 - 1.27 mg/dL Final   07/06/2024 1.88 (H) 0.76 - 1.27 mg/dL Final      Calcium Calcium   Date Value Ref Range Status   07/08/2024 9.4 8.6 - 10.5 mg/dL Final   07/07/2024 9.1 8.6 - 10.5 mg/dL Final   07/06/2024 9.2 8.6 - 10.5 mg/dL Final      PO4 No results found for: \"CAPO4\"   Albumin Albumin   Date Value Ref Range Status   07/08/2024 3.8 3.5 - 5.2 g/dL Final   07/06/2024 3.9 3.5 - 5.2 g/dL Final        Magnesium Magnesium   Date Value Ref Range Status   07/07/2024 1.8 1.6 - 2.4 mg/dL Final        Uric Acid No " "results found for: \"URICACID\"       Medication Review: Yes    Assessment & Plan       Acute on chronic heart failure with preserved ejection fraction (HFpEF)    Elevated serum creatinine    Anemia    Hypokalemia    Cirrhosis of liver    HCV (hepatitis C virus)    Acute on chronic HFrEF (heart failure with reduced ejection fraction)           1.  Acute kidney disease: Last known stable cr 1.1 in 2023. Cr on recent admission at [facility] few weeks ago 1.6-1.9 mg/dl. Most recent cr 2.0-2.2 GFR 35-36ml/min. Urine sodium 20. Urine cr 39.5. Renal US no hydro     2.  New onset liver cirrhosis: Complications include ascites and LE edema.      3.  Volume status: Dependent edema b/l + ascites. Getting diuretics     4.  Hypokalemia: In the setting of diuretics     5.  Hyponatremia: Due to total body volume overload.      6.  HFpEF: Dependent edema      7.  Anemia: Recent hx of GI bleed. S/p EGD and colonoscopy.     8. Volume status: anasarca on admission. Responding well to diuretics so far. LE edema improving. Scrotal edema persistent.     Gout: Stable. On colchicine. Reduce the dose to 3x weekly if developing diarrhea       Recommendation:  Renal function stable, continue diuretics. [Continue Bumex and spironolactone 200 mg daily]   Check standing weight every 2-3 days. Patient still c/o significant scrotal edema. Therefore will   Limit Fluid intake to 1.2L/day  Strict I/O.     Need outpatient f/u in renal clinic in 2 weeks after discharge .    Mauri Valladares MD  07/08/24  15:59 EDT            "

## 2024-07-08 NOTE — CASE MANAGEMENT/SOCIAL WORK
Case Management Discharge Note      Final Note: Patient discharging home today.Spoke with patient at bedside. Patient is aware of outpatient PT at [facility]. CM called [facility] and notifed them of his discharge. Patient have transportation home. There are no other discharge needs identified.         Selected Continued Care - Admitted Since 6/21/2024       Destination    No services have been selected for the patient.                Durable Medical Equipment    No services have been selected for the patient.                Dialysis/Infusion    No services have been selected for the patient.                Home Medical Care    No services have been selected for the patient.                Therapy    No services have been selected for the patient.                Community Resources    No services have been selected for the patient.                Community & DME    No services have been selected for the patient.                         Final Discharge Disposition Code: 01 - home or self-care

## 2024-07-08 NOTE — PLAN OF CARE
Goal Outcome Evaluation:           Progress: no change  Outcome Evaluation: VSS. SR->vpaced<25% on tele. RA. Great UOP through catheter. Pt up mult times to bedside commode. Pt slept between care.

## 2024-07-09 ENCOUNTER — NURSE TRIAGE (OUTPATIENT)
Dept: CALL CENTER | Facility: HOSPITAL | Age: 64
End: 2024-07-09
Payer: MEDICARE

## 2024-07-09 NOTE — TELEPHONE ENCOUNTER
Caller requesting to verify whether he is supposed to take Xarelto post discharge from 07/08/2024.    Review of EPIC chart ( discharge summary, AVS and med list), Xarelto was discontinued during hospital stay and is not to restart.    Caller informed and verbalized understanding.    Reason for Disposition   Caller has medicine question only, adult not sick, AND triager answers question    Additional Information   Negative: [1] Intentional drug overdose AND [2] suicidal thoughts or ideas   Negative: Drug overdose and triager unable to answer question   Negative: Caller requesting a renewal or refill of a medicine patient is currently taking   Negative: Caller requesting information unrelated to medicine   Negative: Caller requesting information about COVID-19 Vaccine   Negative: Caller requesting information about Emergency Contraception   Negative: Caller requesting information about Combined Birth Control Pills   Negative: Caller requesting information about Progestin Birth Control Pills   Negative: Caller requesting information about Post-Op pain or medicines   Negative: Caller requesting a prescription antibiotic (such as Penicillin) for Strep throat and has a positive culture result   Negative: Caller requesting a prescription anti-viral med (such as Tamiflu) and has influenza (flu) symptoms   Negative: Immunization reaction suspected   Negative: Rash while taking a medicine or within 3 days of stopping it   Negative: [1] Asthma and [2] having symptoms of asthma (cough, wheezing, etc.)   Negative: [1] Symptom of illness (e.g., headache, abdominal pain, earache, vomiting) AND [2] more than mild   Negative: Breastfeeding questions about mother's medicines and diet   Negative: MORE THAN A DOUBLE DOSE of a prescription or over-the-counter (OTC) drug   Negative: [1] DOUBLE DOSE (an extra dose or lesser amount) of prescription drug AND [2] any symptoms (e.g., dizziness, nausea, pain, sleepiness)   Negative: [1] DOUBLE  "DOSE (an extra dose or lesser amount) of over-the-counter (OTC) drug AND [2] any symptoms (e.g., dizziness, nausea, pain, sleepiness)   Negative: Took another person's prescription drug   Negative: [1] DOUBLE DOSE (an extra dose or lesser amount) of prescription drug AND [2] NO symptoms  (Exception: A double dose of antibiotics.)   Negative: Diabetes drug error or overdose (e.g., took wrong type of insulin or took extra dose)   Negative: [1] Prescription not at pharmacy AND [2] was prescribed by PCP recently (Exception: Triager has access to EMR and prescription is recorded there. Go to Home Care and confirm for pharmacy.)   Negative: [1] Pharmacy calling with prescription question AND [2] triager unable to answer question   Negative: [1] Caller has URGENT medicine question about med that PCP or specialist prescribed AND [2] triager unable to answer question   Negative: Medicine patch causing local rash or itching   Negative: [1] Caller has medicine question about med NOT prescribed by PCP AND [2] triager unable to answer question (e.g., compatibility with other med, storage)   Negative: Prescription request for new medicine (not a refill)   Negative: [1] Caller has NON-URGENT medicine question about med that PCP prescribed AND [2] triager unable to answer question   Negative: Caller wants to use a complementary or alternative medicine   Negative: [1] Prescription prescribed recently is not at pharmacy AND [2] triager has access to patient's EMR AND [3] prescription is recorded in the EMR   Negative: [1] DOUBLE DOSE (an extra dose or lesser amount) of over-the-counter (OTC) drug AND [2] NO symptoms   Negative: [1] DOUBLE DOSE (an extra dose or lesser amount) of antibiotic drug AND [2] NO symptoms    Answer Assessment - Initial Assessment Questions  1. NAME of MEDICINE: \"What medicine(s) are you calling about?\"      Xarelto  2. QUESTION: \"What is your question?\" (e.g., double dose of medicine, side effect)      " "Requesting to verify if should be taking Xarelto post hospital discharge  3. PRESCRIBER: \"Who prescribed the medicine?\" Reason: if prescribed by specialist, call should be referred to that group.     States Kiara  4. SYMPTOMS: \"Do you have any symptoms?\" If Yes, ask: \"What symptoms are you having?\"  \"How bad are the symptoms (e.g., mild, moderate, severe)      Denies  5. PREGNANCY:  \"Is there any chance that you are pregnant?\" \"When was your last menstrual period?\"      N/A    Protocols used: Medication Question Call-ADULT-    "

## 2024-07-10 ENCOUNTER — TELEPHONE (OUTPATIENT)
Dept: CARDIOLOGY | Facility: CLINIC | Age: 64
End: 2024-07-10
Payer: MEDICARE

## 2024-07-10 NOTE — TELEPHONE ENCOUNTER
Spoke w/pt regarding restarting Xarelto after discharge from the hospital. He said he thought he wasn't supposed to be taking it but the pharmacy filled it, he wasn't sure if this was an old Rx or not. He sees Dr. Craig. Advised discharge summary stating Xarelto was dc 6/13 at [facility] for hematuria and is not listed on his med list. Instructed pt to reach out to Dr. Craig's office to confirm. Pt verbalized understanding and agreeable to plan

## 2024-07-16 ENCOUNTER — READMISSION MANAGEMENT (OUTPATIENT)
Dept: CALL CENTER | Facility: HOSPITAL | Age: 64
End: 2024-07-16
Payer: MEDICARE

## 2024-07-16 NOTE — OUTREACH NOTE
CHF Week 1 Survey      Flowsheet Row Responses   [facility] facility patient discharged from? [place]   Does the patient have one of the following disease processes/diagnoses(primary or secondary)? CHF   CHF Week 1 attempt successful? No   Unsuccessful attempts Attempt 1   Revoke Decline to participate  [pt disconnected line]   Discharge diagnosis Acute on chronic heart failure with preserved ejection fraction (HFpEF)            TAQUERIA Grey Registered Nurse

## 2025-06-06 NOTE — ANESTHESIA POSTPROCEDURE EVALUATION
"Patient: Jaycob Ndiaye II    Procedure Summary     Date: 04/28/21 Room / Location:  [facility]    Anesthesia Start: 1329 Anesthesia Stop:     Procedure: Colonoscopy with possible polypectomy, biopsy and control of GI bleeding (N/A ) Diagnosis:       Screen for colon cancer      (Screen for colon cancer [Z12.11])    Surgeons: Maurice Proctor MD Provider: Pedro Mcconnell MD    Anesthesia Type: general, MAC ASA Status: 3          Anesthesia Type: general, MAC    Vitals  No vitals data found for the desired time range.          Post Anesthesia Care and Evaluation    Patient location during evaluation: PACU  Patient participation: complete - patient participated  Level of consciousness: awake and responsive to verbal stimuli  Pain score: 2  Pain management: adequate  Airway patency: patent  Anesthetic complications: No anesthetic complications    Cardiovascular status: acceptable  Respiratory status: acceptable  Hydration status: acceptable    Comments: Pt awake and responsive. SV. VSS. Report to RN. Patient Vitals in the past 24 hrs:  04/28/21 1211, BP:104/65, Pulse:60, Resp:24, SpO2:96 %, Height:185.4 cm (73\"), Weight:(!) 176 kg (389 lb)  133/78. p 72. r 16. t 98.1                  "
"Patient: Jaycob Ndiaye II    Procedure Summary     Date: 04/28/21 Room / Location:  [facility]    Anesthesia Start: 1329 Anesthesia Stop: 1402    Procedure: Colonoscopy with possible polypectomy, biopsy and control of GI bleeding (N/A ) Diagnosis:       Screen for colon cancer      (Screen for colon cancer [Z12.11])    Surgeons: Maurice Proctor MD Provider: Pedro Mcconnell MD    Anesthesia Type: general, MAC ASA Status: 3          Anesthesia Type: general, MAC    Vitals  No vitals data found for the desired time range.          Post Anesthesia Care and Evaluation    Patient location during evaluation: PACU  Patient participation: complete - patient participated  Level of consciousness: awake and responsive to verbal stimuli  Pain score: 2  Pain management: adequate  Airway patency: patent  Anesthetic complications: No anesthetic complications    Cardiovascular status: acceptable  Respiratory status: acceptable  Hydration status: acceptable    Comments: Pt awake and responsive. SV. VSS. Report to RN. Patient Vitals in the past 24 hrs:  04/28/21 1211, BP:104/65, Pulse:60, Resp:24, SpO2:96 %, Height:185.4 cm (73\"), Weight:(!) 176 kg (389 lb)  133/78. p 72. r 16. t 98.1                  "
No

## (undated) DEVICE — SINGLE-USE POLYPECTOMY SNARE: Brand: SENSATION SHORT THROW

## (undated) DEVICE — CYSTO PACK: Brand: MEDLINE INDUSTRIES, INC.

## (undated) DEVICE — ELECTRODE PT RET AD L9FT HI MOIST COND ADH HYDRGEL CORDED

## (undated) DEVICE — Z INACTIVE USE 2635504 SOLUTION IRRIG 3000ML 1.5% GL USP UROMATIC CONT

## (undated) DEVICE — COOK, COAGULATING PERFORATED RESECTOR 24FR FOR USE WITH ACMI USA/ELITE RESECTOSCOPE(24FR): Brand: COOK

## (undated) DEVICE — Z DUP USE 2522782 SOLUTION IRRIG 1000ML STRL H2O PLAS CONTAINER UROMATIC

## (undated) DEVICE — Device: Brand: AIR/WATER CHANNEL CLEANING ADAPTER

## (undated) DEVICE — BAG DRNGE L6FT 20L PREFIL ABSRB POLYMER EXP TBNG DISP FOR

## (undated) DEVICE — CATHETER URETH 22FR BLLN 30CC 3 W F SPEC INF CTRL BARDX

## (undated) DEVICE — Device: Brand: MEDEX

## (undated) DEVICE — SOLUTION IV IRRIG WATER 1000ML POUR BRL 2F7114

## (undated) DEVICE — GLOVE ORANGE PI 7   MSG9070

## (undated) DEVICE — TRAY PREP DRY W/ PREM GLV 2 APPL 6 SPNG 2 UNDPD 1 OVERWRAP

## (undated) DEVICE — SYR LUERLOK 50ML

## (undated) DEVICE — ENDOGATOR HYBRID TUBING KIT FOR USE WITH ENDOGATOR IRRIGATION PUMP, OLYMPUS PUMP, GI4000 ESU, AND TORRENT IRRIGATION PUMP.: Brand: ENDOGATOR KIT

## (undated) DEVICE — CONTN GRAD MEAS TRIANG 32OZ BLK

## (undated) DEVICE — CABLE ENDOSCP L10FT ACT DISP

## (undated) DEVICE — Y-TYPE TUR/BLADDER IRRIGATION SET, REGULATING CLAMP

## (undated) DEVICE — "MH-438 A/W VLVE F/140 EVIS-140": Brand: AIR/WATER VALVE

## (undated) DEVICE — "MH-443 SUCTION VALVE F/EVIS140 EVIS160": Brand: SUCTION VALVE